# Patient Record
Sex: FEMALE | Race: BLACK OR AFRICAN AMERICAN | NOT HISPANIC OR LATINO | Employment: FULL TIME | URBAN - METROPOLITAN AREA
[De-identification: names, ages, dates, MRNs, and addresses within clinical notes are randomized per-mention and may not be internally consistent; named-entity substitution may affect disease eponyms.]

---

## 2017-01-28 ENCOUNTER — HOSPITAL ENCOUNTER (EMERGENCY)
Facility: HOSPITAL | Age: 47
Discharge: HOME | End: 2017-01-28
Attending: SURGERY
Payer: MEDICAID

## 2017-01-28 VITALS
DIASTOLIC BLOOD PRESSURE: 93 MMHG | HEART RATE: 84 BPM | RESPIRATION RATE: 18 BRPM | SYSTOLIC BLOOD PRESSURE: 168 MMHG | OXYGEN SATURATION: 98 % | WEIGHT: 200 LBS | TEMPERATURE: 98 F

## 2017-01-28 DIAGNOSIS — S62.609A FINGER FRACTURE, RIGHT, CLOSED, INITIAL ENCOUNTER: Primary | ICD-10-CM

## 2017-01-28 PROCEDURE — 99283 EMERGENCY DEPT VISIT LOW MDM: CPT

## 2017-01-28 PROCEDURE — 25000003 PHARM REV CODE 250: Performed by: SURGERY

## 2017-01-28 PROCEDURE — 26720 TREAT FINGER FRACTURE EACH: CPT | Mod: F9

## 2017-01-28 RX ORDER — IBUPROFEN 800 MG/1
800 TABLET ORAL
Status: COMPLETED | OUTPATIENT
Start: 2017-01-28 | End: 2017-01-28

## 2017-01-28 RX ORDER — IBUPROFEN 800 MG/1
800 TABLET ORAL EVERY 6 HOURS PRN
Qty: 20 TABLET | Refills: 0 | Status: SHIPPED | OUTPATIENT
Start: 2017-01-28 | End: 2017-05-31

## 2017-01-28 RX ADMIN — IBUPROFEN 800 MG: 800 TABLET ORAL at 06:01

## 2017-01-29 NOTE — ED PROVIDER NOTES
Encounter Date: 1/28/2017       History     Chief Complaint   Patient presents with    Hand Pain     rt 5th finger injury     Review of patient's allergies indicates:  No Known Allergies  HPI Aury Felix is a 46 y.o. female who fell through the air conditioning vent on the floor as she was   getting out of the shower.  She thought the air conditioning vent could hold her weight.  She jammed her right   fifth finger onto the floor.  She was getting ready to go to work.  She is supposed to work this evening.    She has limited range of motion of the right fifth finger due to swelling and ecchymosis.  It is also painful.    She also has a small abrasion on her right arm and right knee.  There is no bleeding.  Her tetanus is up-to-date.    She denies that she hit her head or loss of consciousness.  Denies nausea or vomiting.  Denies numbness  or tingling of extremity.  Past Medical History   Diagnosis Date    Hypertension      No past medical history pertinent negatives.  History reviewed. No pertinent past surgical history.  History reviewed. No pertinent family history.  Social History   Substance Use Topics    Smoking status: Never Smoker    Smokeless tobacco: Never Used    Alcohol use Yes     Review of Systems  -- Constitution - no fever, denies fatigue, no weakness, stable weight  -- Eyes - no tearing or redness, no change in vision, no double vision  -- Ear, Nose - no tinnitus or earache, no nasal congestion or discharge  -- Mouth,Throat - no sore throat, no toothache, denies dysphagia, normal swallowing  -- Respiratory - denies cough and sputum, no shortness of breath, no CARNES, no wheeze  -- Cardiovascular - denies chest pain, no palpitations, denies claudication. No orthopnea   -- Gastrointestinal - denies abdominal pain, nausea, vomiting, diarrhea, or constipation.   -- Genitourinary - denies flank pain and dysuria, also denies frequency or urgency  -- Musculoskeletal - Right 5th finger swelling and  tenderness.  Denies any othermuscle or joint pain, back pain.  -- Skin - denies rash or changes in skin, no hives or welts  -- Neurological - no headache, denies weakness or seizure; no loss of consciousness  -- Lymphatic- no lymphadenopathy or lymphedema noted by patient  -- Endocrine - no thirst, no excessive urination, no heat/cold intolerance.  -- Hematologic - denies abnormal bleeding or bruising, no petechiae  -- Allergic - no immunology issues, no shell fish allergies, no allergic rashes    Physical Exam   Initial Vitals   BP Pulse Resp Temp SpO2   01/28/17 1801 01/28/17 1801 01/28/17 1801 01/28/17 1801 01/28/17 1801   174/100 96 18 98 °F (36.7 °C) 98 %     Physical Exam    Vitals reviewed.  Constitutional: She appears well-developed and well-nourished. She is diaphoretic. No distress.   HENT:   Head: Normocephalic and atraumatic.   Mouth/Throat: Oropharynx is clear and moist.   Eyes: Conjunctivae and EOM are normal. No scleral icterus.   Neck: Normal range of motion. Neck supple. No thyromegaly present. No tracheal deviation present. No JVD present.   Cardiovascular: Normal rate, regular rhythm, normal heart sounds and intact distal pulses.   Pulmonary/Chest: Breath sounds normal. No stridor. No respiratory distress. She exhibits no tenderness.   Abdominal: She exhibits no distension. There is no tenderness. There is no rebound and no guarding.   Musculoskeletal: She exhibits edema and tenderness.   Mild edema with ecchymosis of the right fifth finger.  Tender to palpation.  There is decreased flexion and extension due to pain.  Sensory is intact.  All other extremities are within normal limits.   Lymphadenopathy:     She has no cervical adenopathy.   Neurological: She is alert and oriented to person, place, and time. She has normal strength. No cranial nerve deficit or sensory deficit.   Skin: Skin is warm.   There is a small superficial abrasion of the right forearm, volar surface.  It is not bleeding.  It  measures 4 mm x 10 mm.  There is a superficial abrasion on the right knee.  There is no bleeding.  It measures 2 x 3 cm.   Psychiatric: She has a normal mood and affect. Thought content normal.         ED Course   Procedures  Labs Reviewed - No data to display        Right fifth finger x-ray: Nondisplaced, closed fracture of the proximal head of the middle phalanx.      MDM: Right fifth finger fracture.  Findings discussed with the patient.    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  She needs orthopedics follow-up.   She has been given an excuse to be cleared by her orthopedist prior to returning to work.   Keep right hand elevated to the level of the heart.  No lifting or straining with the right hand.  Follow-up with orthopedics this week.              ED Course     Clinical Impression:   The encounter diagnosis was Finger fracture, right, closed, initial encounter.          Sharla Vann MD  01/28/17 2776

## 2017-01-29 NOTE — DISCHARGE INSTRUCTIONS
Finger Fracture, Closed  You have a broken finger (fracture). This causes local pain, swelling, and bruising. This injury takes about 4 to 6 weeks to heal. Finger injuries are often treated with a splint or cast, or by taping the injured finger to the next one (reba taping). This protects the injured finger and holds the bone in position while it heals. More serious fractures may need surgery.    If the fingernail has been severely injured, it will probably fall off in 1 to 2 weeks. A new fingernail will usually start to grow back within a month.  Home care  Follow these guidelines when caring for yourself at home:  · Keep your hand elevated to reduce pain and swelling. When sitting or lying down keep your arm above the level of your heart. You can do this by placing your arm on a pillow that rests on your chest or on a pillow at your side. This is most important during the first 2 days (48 hours) after the injury.  · Put an ice pack on the injured area. Do this for 20 minutes every 1 to 2 hours the first day for pain relief. You can make an ice pack by wrapping a plastic bag of ice cubes in a thin towel. As the ice melts, be careful that the cast or splint doesnt get wet. Continue using the ice pack 3 to 4 times a day until the pain and swelling go away.  · Keep the cast or splint completely dry at all times. Bathe with your cast or splint out of the water. Protect it with a large plastic bag, rubber-banded at the top end. If a fiberglass cast or splint gets wet, you can dry it with a hair dryer.  · If reba tape was put on and it becomes wet or dirty, change it. You may replace it with paper, plastic, or cloth tape. Cloth tape and paper tapes must be kept dry. Keep the reba tape in place for at least 4 weeks.  · You may use acetaminophen or ibuprofen to control pain, unless another pain medicine was prescribed. If you have chronic liver or kidney disease, talk with your health care provider before using these  medicines. Also talk with your provider if youve had a stomach ulcer or GI bleeding.  · Dont put creams or objects under the cast if you have itching.  Follow-up care  Follow up with your health care provider within 1 week, or as advised. This is to make sure the bone is healing the way it should.  If X-rays were taken, a radiologist will look at them. You will be told of any new findings that may affect your care.  When to seek medical advice  Call your health care provider right away if any of these occur:  · The plaster cast or splint becomes wet or soft  · The cast cracks  · The fiberglass cast or splint stays wet for more than 24 hours  · Pain or swelling gets worse  · Redness, warmth, swelling, drainage from the wound, or foul odor from a cast or splint  · Finger becomes more cold, blue, numb, or tingly  · You cant move your finger  · The skin around the cast becomes red  · Fever of 101ºF (38.3ºC) or higher, or as directed by your health care provider  © 7110-4146 [employer]. [address] [place] [address], [place] [address]. All rights reserved. This information is not intended as a substitute for professional medical care. Always follow your healthcare professional's instructions.

## 2017-01-30 ENCOUNTER — OFFICE VISIT (OUTPATIENT)
Dept: INTERNAL MEDICINE | Facility: CLINIC | Age: 47
End: 2017-01-30
Payer: MEDICAID

## 2017-01-30 VITALS
SYSTOLIC BLOOD PRESSURE: 132 MMHG | DIASTOLIC BLOOD PRESSURE: 80 MMHG | HEIGHT: 68 IN | WEIGHT: 220.44 LBS | RESPIRATION RATE: 18 BRPM | BODY MASS INDEX: 33.41 KG/M2 | HEART RATE: 113 BPM | OXYGEN SATURATION: 98 %

## 2017-01-30 DIAGNOSIS — S62.609D FINGER FRACTURE, RIGHT, WITH ROUTINE HEALING, SUBSEQUENT ENCOUNTER: Primary | ICD-10-CM

## 2017-01-30 PROCEDURE — 99214 OFFICE O/P EST MOD 30 MIN: CPT | Mod: S$PBB,,, | Performed by: NURSE PRACTITIONER

## 2017-01-30 PROCEDURE — 99213 OFFICE O/P EST LOW 20 MIN: CPT | Mod: PBBFAC | Performed by: NURSE PRACTITIONER

## 2017-01-30 PROCEDURE — 99999 PR PBB SHADOW E&M-EST. PATIENT-LVL III: CPT | Mod: PBBFAC,,, | Performed by: NURSE PRACTITIONER

## 2017-01-30 NOTE — PROGRESS NOTES
Subjective:       Patient ID: Aury Felix is a 46 y.o. female.    Chief Complaint: Hand Pain (fracture to finger)    HPI: Pt presents to clinic today new to me but usually seen by Yvette with c/o finger fracture. She reports that she stepped on vent in trailer and went through. She grabbed hold of anything she could when going down and ended up wit a finger fracture. She reports that she was seen in the ER and dx with fracture. Was put in a splint but took it off because it was hurting. Was suppose to f/u with Dr Mo but he doesn't accept her insurance so would like to be referred to todd.     Reviewed x ray  Review of Systems   Constitutional: Negative for chills, fatigue, fever and unexpected weight change.   HENT: Negative for congestion, ear pain, sore throat and trouble swallowing.    Eyes: Negative for pain and visual disturbance.   Respiratory: Negative for cough, chest tightness and shortness of breath.    Cardiovascular: Negative for chest pain, palpitations and leg swelling.   Gastrointestinal: Negative for abdominal distention, abdominal pain, constipation, diarrhea and vomiting.   Genitourinary: Negative for difficulty urinating, dysuria, flank pain, frequency and hematuria.   Musculoskeletal: Positive for arthralgias (left fifth finger swelling pain and bruising). Negative for back pain, gait problem, joint swelling, neck pain and neck stiffness.   Skin: Negative for rash and wound.   Neurological: Negative for dizziness, seizures, speech difficulty, light-headedness and headaches.       Objective:      Physical Exam   Constitutional: She is oriented to person, place, and time. She appears well-developed and well-nourished.   HENT:   Head: Normocephalic and atraumatic.   Right Ear: External ear normal.   Left Ear: External ear normal.   Nose: Nose normal.   Mouth/Throat: Oropharynx is clear and moist.   Eyes: Conjunctivae and EOM are normal. Pupils are equal, round, and reactive to light.    Neck: Normal range of motion. Neck supple.   Cardiovascular: Normal rate, regular rhythm, normal heart sounds and intact distal pulses.    Pulmonary/Chest: Effort normal and breath sounds normal.   Abdominal: Soft. Bowel sounds are normal.   Musculoskeletal: Normal range of motion. She exhibits edema and tenderness.        Arms:  Whole finger swollen and bruised. Pain around the PIP joint. Still moving joint   Neurological: She is alert and oriented to person, place, and time.   Skin: Skin is warm and dry.   Psychiatric: She has a normal mood and affect. Her behavior is normal. Judgment and thought content normal.   Nursing note and vitals reviewed.      Assessment:       1. Finger fracture, right, with routine healing, subsequent encounter        Plan:   Aury was seen today for hand pain.    Diagnoses and all orders for this visit:    Finger fracture, right, with routine healing, subsequent encounter  -     Ambulatory Referral to Orthopedics    Re splinted finger to the base of her knuckle and wrapped with gauze. I would like her to keep that PIP joint still till ortho can take a look at it. She verbalized understanding

## 2017-01-30 NOTE — MR AVS SNAPSHOT
[facility]  [address] [address]  Phone: [phone]  Fax: [phone]                  Aury Felix   2017 2:30 PM   Office Visit    Description:  Female : 1970   Provider:  Glenna Cota NP   Department:  [facility]           Reason for Visit     Hand Pain           Diagnoses this Visit        Comments    Finger fracture, right, with routine healing, subsequent encounter    -  Primary            To Do List           Future Appointments        Provider Department Dept Phone    2017 8:00 AM NURSE, [facility] [phone]    2017 9:00 AM Yvette Vargas NP [facility] [phone]      Goals (5 Years of Data)     None      [facility]     [facility] On Call Nurse Care Line -  Assistance  Registered nurses in the [facility] On Call Center provide clinical advisement, health education, appointment booking, and other advisory services.  Call for this free service at [phone].             Medications           Message regarding Medications     Verify the changes and/or additions to your medication regime listed below are the same as discussed with your clinician today.  If any of these changes or additions are incorrect, please notify your healthcare provider.             Verify that the below list of medications is an accurate representation of the medications you are currently taking.  If none reported, the list may be blank. If incorrect, please contact your healthcare provider. Carry this list with you in case of emergency.           Current Medications     aspirin 81 MG Chew Take 1 tablet (81 mg total) by mouth once daily.    fluticasone (FLONASE) 50 mcg/actuation nasal spray 2 sprays by Each Nare route once daily.    ibuprofen (ADVIL,MOTRIN) 800 MG tablet Take 1 tablet (800 mg total) by mouth every 6 (six) hours as needed for Pain.    lisinopril-hydrochlorothiazide  "(PRINZIDE,ZESTORETIC) 20-12.5 mg per tablet Take 1 tablet by mouth once daily.    potassium chloride (MICRO-K) 10 MEQ CpSR Take 1 capsule (10 mEq total) by mouth once daily.           Clinical Reference Information           Vital Signs - Last Recorded  Most recent update: 1/30/2017  2:32 PM by Rosa De La Cruz MA    BP Pulse Resp Ht Wt SpO2    132/80 (!) 113 18 5' 8" (1.727 m) 100 kg (220 lb 7.4 oz) 98%    BMI                33.52 kg/m2          Blood Pressure          Most Recent Value    BP  132/80      Allergies as of 1/30/2017     No Known Allergies      Immunizations Administered on Date of Encounter - 1/30/2017     None      Orders Placed During Today's Visit      Normal Orders This Visit    Ambulatory Referral to Orthopedics       "

## 2017-02-02 ENCOUNTER — OFFICE VISIT (OUTPATIENT)
Dept: INTERNAL MEDICINE | Facility: CLINIC | Age: 47
End: 2017-02-02
Payer: MEDICAID

## 2017-02-02 VITALS
WEIGHT: 220.44 LBS | HEIGHT: 68 IN | SYSTOLIC BLOOD PRESSURE: 140 MMHG | RESPIRATION RATE: 18 BRPM | HEART RATE: 80 BPM | DIASTOLIC BLOOD PRESSURE: 100 MMHG | BODY MASS INDEX: 33.41 KG/M2

## 2017-02-02 DIAGNOSIS — Z09 FOLLOW UP: Primary | ICD-10-CM

## 2017-02-02 DIAGNOSIS — Z79.1 ENCOUNTER FOR LONG-TERM (CURRENT) USE OF NSAIDS: ICD-10-CM

## 2017-02-02 DIAGNOSIS — S62.609D FINGER FRACTURE, RIGHT, WITH ROUTINE HEALING, SUBSEQUENT ENCOUNTER: ICD-10-CM

## 2017-02-02 DIAGNOSIS — I10 ESSENTIAL HYPERTENSION: ICD-10-CM

## 2017-02-02 PROCEDURE — 99999 PR PBB SHADOW E&M-EST. PATIENT-LVL III: CPT | Mod: PBBFAC,,, | Performed by: NURSE PRACTITIONER

## 2017-02-02 PROCEDURE — 99213 OFFICE O/P EST LOW 20 MIN: CPT | Mod: PBBFAC | Performed by: NURSE PRACTITIONER

## 2017-02-02 PROCEDURE — 99214 OFFICE O/P EST MOD 30 MIN: CPT | Mod: S$PBB,,, | Performed by: NURSE PRACTITIONER

## 2017-02-02 NOTE — PROGRESS NOTES
Subjective:       Patient ID: Aury Felix is a 46 y.o. female.    Chief Complaint: Follow-up    HPI: pt known to me presents for f/up. Reports doing well with finger. Ready to go back to work. Looked over imaging. Reports doing full duties at home.     Also reports taking nsaids. Does not monitor bp at home.     Review of Systems   Constitutional: Negative for chills, diaphoresis, fatigue and fever.   Eyes: Negative for visual disturbance.   Respiratory: Negative for cough, shortness of breath and wheezing.    Cardiovascular: Negative for chest pain.   Gastrointestinal: Negative for abdominal distention, abdominal pain, constipation, diarrhea, nausea and vomiting.   Musculoskeletal: Positive for arthralgias. Negative for back pain and myalgias.   Skin: Positive for color change.   Neurological: Negative for headaches.   Psychiatric/Behavioral: Negative for sleep disturbance.       Objective:      Physical Exam   Constitutional: She is oriented to person, place, and time. She appears well-developed and well-nourished.   HENT:   Head: Normocephalic and atraumatic.   Right Ear: External ear normal.   Left Ear: External ear normal.   Nose: Nose normal.   Mouth/Throat: Oropharynx is clear and moist.   Eyes: Conjunctivae and EOM are normal. Pupils are equal, round, and reactive to light.   Neck: Normal range of motion. Neck supple.   Cardiovascular: Normal rate, regular rhythm, normal heart sounds and intact distal pulses.    Pulmonary/Chest: Effort normal and breath sounds normal.   Abdominal: Soft. Bowel sounds are normal. There is no tenderness.   Musculoskeletal: Normal range of motion. She exhibits edema and tenderness.        Arms:  Whole finger swollen and bruised. Pain around the PIP joint. 5th digit with 70 % rom. Minimal tenderness noted.    Neurological: She is alert and oriented to person, place, and time.   Skin: Skin is warm and dry.   Psychiatric: She has a normal mood and affect. Her behavior is  normal. Judgment and thought content normal.   Nursing note and vitals reviewed.      Assessment:       1. Follow up    2. Finger fracture, right, with routine healing, subsequent encounter    3. Essential hypertension    4. Encounter for long-term (current) use of NSAIDs        Plan:   1. Follow up      2. Finger fracture, right, with routine healing, subsequent encounter  Ok to go back to work. Will be months until able to get into [employer]. Advised to listen to her body and limit use of finger. Not scheduled to go back for another week. Would like to go kirt on the 9th. I feel this is fine.     3. Essential hypertension  bp monitoring, d/c nsaids. F/u in 2 weeks with bp log.     4. Encounter for long-term (current) use of NSAIDs  See above.

## 2017-02-02 NOTE — MR AVS SNAPSHOT
[facility]  [address] [address]  Phone: [phone]  Fax: [phone]                  Aury Felix   2017 1:30 PM   Office Visit    Description:  Female : 1970   Provider:  Yvette Vargas NP   Department:  [facility]           Reason for Visit     Follow-up           Diagnoses this Visit        Comments    Follow up    -  Primary     Finger fracture, right, with routine healing, subsequent encounter         Essential hypertension         Encounter for long-term (current) use of NSAIDs                To Do List           Future Appointments        Provider Department Dept Phone    2017 8:00 AM NURSE [facility] [phone]    2017 8:00 AM NURSE, [facility] [phone]    2017 9:00 AM Yvette Vargas NP [facility] [phone]      Goals (5 Years of Data)     None      Follow-Up and Disposition     Return in about 6 days (around 2017), or if symptoms worsen or fail to improve.    Follow-up and Disposition History      [facility]     [facility] On Call Nurse Care Line -  Assistance  Registered nurses in the [facility] On Call Center provide clinical advisement, health education, appointment booking, and other advisory services.  Call for this free service at [phone].             Medications           Message regarding Medications     Verify the changes and/or additions to your medication regime listed below are the same as discussed with your clinician today.  If any of these changes or additions are incorrect, please notify your healthcare provider.             Verify that the below list of medications is an accurate representation of the medications you are currently taking.  If none reported, the list may be blank. If incorrect, please contact your healthcare provider. Carry this list with you in case of emergency.          "  Current Medications     aspirin 81 MG Chew Take 1 tablet (81 mg total) by mouth once daily.    fluticasone (FLONASE) 50 mcg/actuation nasal spray 2 sprays by Each Nare route once daily.    ibuprofen (ADVIL,MOTRIN) 800 MG tablet Take 1 tablet (800 mg total) by mouth every 6 (six) hours as needed for Pain.    lisinopril-hydrochlorothiazide (PRINZIDE,ZESTORETIC) 20-12.5 mg per tablet Take 1 tablet by mouth once daily.    potassium chloride (MICRO-K) 10 MEQ CpSR Take 1 capsule (10 mEq total) by mouth once daily.           Clinical Reference Information           Your Vitals Were     BP Pulse Resp Height Weight BMI    140/100 80 18 5' 8" (1.727 m) 100 kg (220 lb 7.4 oz) 33.52 kg/m2      Blood Pressure          Most Recent Value    BP  (!)  140/100      Allergies as of 2/2/2017     No Known Allergies      Immunizations Administered on Date of Encounter - 2/2/2017     None      Language Assistance Services     ATTENTION: Language assistance services are available, free of charge. Please call [phone].      ATENCIÓN: Si winter hoyos, tiene a michelle disposición servicios gratuitos de asistencia lingüística. Llame al [phone].     SORAYA Ý: N?u b?n nói Ti?ng Vi?t, có các d?ch v? h? tr? ngôn ng? mi?n phí dành cho b?n. G?i s? [phone].         [facility] complies with applicable Federal civil rights laws and does not discriminate on the basis of race, color, national origin, age, disability, or sex.        "

## 2017-02-08 ENCOUNTER — TELEPHONE (OUTPATIENT)
Dept: INTERNAL MEDICINE | Facility: CLINIC | Age: 47
End: 2017-02-08

## 2017-02-23 ENCOUNTER — TELEPHONE (OUTPATIENT)
Dept: INTERNAL MEDICINE | Facility: CLINIC | Age: 47
End: 2017-02-23

## 2017-02-23 ENCOUNTER — CLINICAL SUPPORT (OUTPATIENT)
Dept: INTERNAL MEDICINE | Facility: CLINIC | Age: 47
End: 2017-02-23
Payer: MEDICAID

## 2017-02-23 VITALS — SYSTOLIC BLOOD PRESSURE: 120 MMHG | DIASTOLIC BLOOD PRESSURE: 90 MMHG

## 2017-02-23 DIAGNOSIS — I10 ESSENTIAL HYPERTENSION: Primary | ICD-10-CM

## 2017-02-23 RX ORDER — AMLODIPINE BESYLATE 2.5 MG/1
2.5 TABLET ORAL DAILY
Qty: 30 TABLET | Refills: 11 | Status: SHIPPED | OUTPATIENT
Start: 2017-02-23 | End: 2017-05-31 | Stop reason: SDUPTHER

## 2017-05-16 ENCOUNTER — PATIENT OUTREACH (OUTPATIENT)
Dept: ADMINISTRATIVE | Facility: HOSPITAL | Age: 47
End: 2017-05-16

## 2017-05-16 NOTE — LETTER
May 16, 2017    Aury Felix  [address]  [place] [address]             [facility]  [address]  [facility] [address]  Phone: [phone] Dear Ms. Felix:    [facility] is committed to your overall health.  To help you get the most out of each of your visits, we will review your information to make sure you are up to date on all of your recommended tests and/or procedures.      Yvette Vargas NP has found that you may be due for a tetanus vaccine and a pap smear.     If you have had any of the above done at another facility, please bring the records or information with you so that your record at [facility] will be complete.  If you would like to schedule any of these, please contact me.    If you are currently taking medication, please bring it with you to your appointment for review.    If you have any questions or concerns, please don't hesitate to call.    Sincerely,        Haley De León LPN Clinical Care Coordinator  [facility]  [phone]

## 2017-05-31 ENCOUNTER — OFFICE VISIT (OUTPATIENT)
Dept: INTERNAL MEDICINE | Facility: CLINIC | Age: 47
End: 2017-05-31
Payer: MEDICAID

## 2017-05-31 VITALS
HEIGHT: 67 IN | SYSTOLIC BLOOD PRESSURE: 132 MMHG | BODY MASS INDEX: 34.91 KG/M2 | HEART RATE: 91 BPM | WEIGHT: 222.44 LBS | DIASTOLIC BLOOD PRESSURE: 98 MMHG | RESPIRATION RATE: 18 BRPM

## 2017-05-31 DIAGNOSIS — Z23 IMMUNIZATION DUE: ICD-10-CM

## 2017-05-31 DIAGNOSIS — I10 ESSENTIAL HYPERTENSION: ICD-10-CM

## 2017-05-31 DIAGNOSIS — Z13.6 SCREENING FOR CARDIOVASCULAR CONDITION: Primary | ICD-10-CM

## 2017-05-31 DIAGNOSIS — Z13.29 SCREENING FOR THYROID DISORDER: ICD-10-CM

## 2017-05-31 PROCEDURE — 99999 PR PBB SHADOW E&M-EST. PATIENT-LVL III: CPT | Mod: PBBFAC,,, | Performed by: NURSE PRACTITIONER

## 2017-05-31 PROCEDURE — 99213 OFFICE O/P EST LOW 20 MIN: CPT | Mod: PBBFAC,25 | Performed by: NURSE PRACTITIONER

## 2017-05-31 PROCEDURE — 90471 IMMUNIZATION ADMIN: CPT | Mod: PBBFAC

## 2017-05-31 PROCEDURE — 90715 TDAP VACCINE 7 YRS/> IM: CPT | Mod: PBBFAC

## 2017-05-31 PROCEDURE — 99213 OFFICE O/P EST LOW 20 MIN: CPT | Mod: S$PBB,,, | Performed by: NURSE PRACTITIONER

## 2017-05-31 RX ORDER — AMLODIPINE BESYLATE 2.5 MG/1
5 TABLET ORAL DAILY
Qty: 30 TABLET | Refills: 11
Start: 2017-05-31 | End: 2017-06-15 | Stop reason: SDUPTHER

## 2017-05-31 RX ORDER — LISINOPRIL AND HYDROCHLOROTHIAZIDE 10; 12.5 MG/1; MG/1
1 TABLET ORAL DAILY
COMMUNITY
End: 2017-06-15 | Stop reason: SDUPTHER

## 2017-05-31 NOTE — PROGRESS NOTES
Subjective:       Patient ID: Aury Felix is a 46 y.o. female.    Chief Complaint: 6 month checkup    HPI: pt known to me presents for 6 month check up. Admits to bp being high at home and at work. Didn't get blood work done. Has been feeling fine. Never got call from ortho concerning her left wrist gang cyst and right finger fracture.     Review of Systems   Constitutional: Negative for chills, diaphoresis, fatigue and fever.   Respiratory: Negative for cough, shortness of breath and wheezing.    Cardiovascular: Negative for chest pain and leg swelling.   Gastrointestinal: Negative for abdominal distention, abdominal pain, constipation, diarrhea, nausea and vomiting.   Musculoskeletal: Negative for arthralgias, back pain and myalgias.        See HPI   Neurological: Negative for headaches.   Psychiatric/Behavioral: Negative for sleep disturbance.       Objective:      Physical Exam   Constitutional: She is oriented to person, place, and time. She appears well-developed and well-nourished.   HENT:   Head: Normocephalic and atraumatic.   Cardiovascular: Normal rate, regular rhythm and normal heart sounds.    Pulmonary/Chest: Effort normal and breath sounds normal.   Abdominal: Soft. Bowel sounds are normal. There is no tenderness.   Musculoskeletal: She exhibits no edema.   Large ganglion cyst noted to left dorsal side of the wrist, quarter sized.    Neurological: She is alert and oriented to person, place, and time.   Skin: Skin is warm and dry.   Psychiatric: She has a normal mood and affect. Her behavior is normal. Judgment and thought content normal.   Nursing note and vitals reviewed.      Assessment:       1. Screening for cardiovascular condition    2. Essential hypertension    3. Screening for thyroid disorder    4. Immunization due        Plan:   1. Essential hypertension  bp not well controlled, mostly diastolic htn. Will increase norvasc x 2 week and f/up. Discussed to watch for leg swelling.   -  amlodipine (NORVASC) 2.5 MG tablet; Take 2 tablets (5 mg total) by mouth once daily.  Dispense: 30 tablet; Refill: 11    2. Screening for cardiovascular condition  3. Screening for thyroid disorder  Labs ordered.     4. Immunization due  tdap today, has grandchildren.

## 2017-06-13 ENCOUNTER — LAB VISIT (OUTPATIENT)
Dept: LAB | Facility: HOSPITAL | Age: 47
End: 2017-06-13
Attending: NURSE PRACTITIONER
Payer: MEDICAID

## 2017-06-13 DIAGNOSIS — I10 ESSENTIAL HYPERTENSION: ICD-10-CM

## 2017-06-13 DIAGNOSIS — R73.9 ELEVATED BLOOD SUGAR: ICD-10-CM

## 2017-06-13 LAB
ALBUMIN SERPL BCP-MCNC: 3.2 G/DL
ALP SERPL-CCNC: 82 U/L
ALT SERPL W/O P-5'-P-CCNC: 33 U/L
ANION GAP SERPL CALC-SCNC: 8 MMOL/L
AST SERPL-CCNC: 19 U/L
BASOPHILS NFR BLD: 0 %
BILIRUB SERPL-MCNC: 0.3 MG/DL
BUN SERPL-MCNC: 10 MG/DL
CALCIUM SERPL-MCNC: 9.2 MG/DL
CHLORIDE SERPL-SCNC: 107 MMOL/L
CHOLEST/HDLC SERPL: 4.5 {RATIO}
CO2 SERPL-SCNC: 26 MMOL/L
CREAT SERPL-MCNC: 0.8 MG/DL
DIFFERENTIAL METHOD: ABNORMAL
EOSINOPHIL NFR BLD: 1 %
ERYTHROCYTE [DISTWIDTH] IN BLOOD BY AUTOMATED COUNT: 15.5 %
EST. GFR  (AFRICAN AMERICAN): >60 ML/MIN/1.73 M^2
EST. GFR  (NON AFRICAN AMERICAN): >60 ML/MIN/1.73 M^2
GLUCOSE SERPL-MCNC: 179 MG/DL
HCT VFR BLD AUTO: 37.9 %
HDL/CHOLESTEROL RATIO: 22.2 %
HDLC SERPL-MCNC: 212 MG/DL
HDLC SERPL-MCNC: 47 MG/DL
HGB BLD-MCNC: 12.9 G/DL
LDLC SERPL CALC-MCNC: 134.2 MG/DL
LYMPHOCYTES NFR BLD: 54 %
MCH RBC QN AUTO: 26.3 PG
MCHC RBC AUTO-ENTMCNC: 34 %
MCV RBC AUTO: 77 FL
MONOCYTES NFR BLD: 2 %
NEUTROPHILS NFR BLD: 43 %
NONHDLC SERPL-MCNC: 165 MG/DL
PLATELET # BLD AUTO: 422 K/UL
PMV BLD AUTO: 10.7 FL
POTASSIUM SERPL-SCNC: 4 MMOL/L
PROT SERPL-MCNC: 8.1 G/DL
RBC # BLD AUTO: 4.9 M/UL
SODIUM SERPL-SCNC: 141 MMOL/L
TRIGL SERPL-MCNC: 154 MG/DL
WBC # BLD AUTO: 10.67 K/UL

## 2017-06-13 PROCEDURE — 80061 LIPID PANEL: CPT

## 2017-06-13 PROCEDURE — 36415 COLL VENOUS BLD VENIPUNCTURE: CPT

## 2017-06-13 PROCEDURE — 85025 COMPLETE CBC W/AUTO DIFF WBC: CPT

## 2017-06-13 PROCEDURE — 83036 HEMOGLOBIN GLYCOSYLATED A1C: CPT

## 2017-06-13 PROCEDURE — 80053 COMPREHEN METABOLIC PANEL: CPT

## 2017-06-14 ENCOUNTER — TELEPHONE (OUTPATIENT)
Dept: INTERNAL MEDICINE | Facility: CLINIC | Age: 47
End: 2017-06-14

## 2017-06-14 LAB
ESTIMATED AVG GLUCOSE: 134 MG/DL
HBA1C MFR BLD HPLC: 6.3 %

## 2017-06-15 ENCOUNTER — OFFICE VISIT (OUTPATIENT)
Dept: INTERNAL MEDICINE | Facility: CLINIC | Age: 47
End: 2017-06-15
Payer: MEDICAID

## 2017-06-15 VITALS
WEIGHT: 221.81 LBS | HEART RATE: 111 BPM | HEIGHT: 66 IN | SYSTOLIC BLOOD PRESSURE: 128 MMHG | RESPIRATION RATE: 18 BRPM | DIASTOLIC BLOOD PRESSURE: 99 MMHG | BODY MASS INDEX: 35.65 KG/M2

## 2017-06-15 DIAGNOSIS — R73.01 ELEVATED FASTING BLOOD SUGAR: ICD-10-CM

## 2017-06-15 DIAGNOSIS — E78.5 HYPERLIPIDEMIA, UNSPECIFIED HYPERLIPIDEMIA TYPE: ICD-10-CM

## 2017-06-15 DIAGNOSIS — R79.89 ELEVATED PLATELET COUNT: ICD-10-CM

## 2017-06-15 DIAGNOSIS — I10 ESSENTIAL HYPERTENSION: Primary | ICD-10-CM

## 2017-06-15 PROCEDURE — 99213 OFFICE O/P EST LOW 20 MIN: CPT | Mod: S$PBB,,, | Performed by: NURSE PRACTITIONER

## 2017-06-15 PROCEDURE — 99213 OFFICE O/P EST LOW 20 MIN: CPT | Mod: PBBFAC | Performed by: NURSE PRACTITIONER

## 2017-06-15 PROCEDURE — 99999 PR PBB SHADOW E&M-EST. PATIENT-LVL III: CPT | Mod: PBBFAC,,, | Performed by: NURSE PRACTITIONER

## 2017-06-15 RX ORDER — METFORMIN HYDROCHLORIDE 500 MG/1
500 TABLET, EXTENDED RELEASE ORAL
Qty: 90 TABLET | Refills: 3 | Status: SHIPPED | OUTPATIENT
Start: 2017-06-15 | End: 2021-11-10

## 2017-06-15 RX ORDER — ASPIRIN 81 MG/1
81 TABLET ORAL DAILY
Refills: 0 | COMMUNITY
Start: 2017-06-15 | End: 2018-06-15

## 2017-06-15 RX ORDER — LISINOPRIL AND HYDROCHLOROTHIAZIDE 10; 12.5 MG/1; MG/1
1 TABLET ORAL DAILY
Qty: 30 TABLET | Refills: 5 | Status: SHIPPED | OUTPATIENT
Start: 2017-06-15 | End: 2021-11-10

## 2017-06-15 RX ORDER — AMLODIPINE BESYLATE 5 MG/1
5 TABLET ORAL DAILY
Qty: 30 TABLET | Refills: 5 | Status: SHIPPED | OUTPATIENT
Start: 2017-06-15 | End: 2021-11-10

## 2017-06-15 NOTE — PATIENT INSTRUCTIONS
Diet: Diabetes  Food is an important tool that you can use to control diabetes and stay healthy. Eating well-balanced meals in the correct amounts will help you control your blood glucose levels and prevent low blood sugar reactions. It will also help you reduce the health risks of diabetes. There is no one specific diet that is right for everyone with diabetes. But there are general guidelines to follow. A registered dietitian (RD) will create a tailored diet approach thats just right for you. He or she will also help you plan healthy meals and snacks. If you have any questions, call your dietitian for advice.    Guidelines for success  Talk with your healthcare provider before starting a diabetes diet or weight loss program. If you haven't talked with a dietitian yet, ask your provider for a referral. The following guidelines can help you succeed:  · Select foods from the 6 food groups below. Your dietitian will help you find food choices within each group. He or she will also show you serving sizes and how many servings you can have at each meal.  ¨ Grains, beans, and starchy vegetables  ¨ Vegetables  ¨ Fruit  ¨ Milk or yogurt  ¨ Meat, poultry, fish, or tofu  ¨ Healthy fats  · Check your blood sugar levels as directed by your provider. Take any medicine as prescribed by your provider.  · Learn to read food labels and pick the right portion sizes.  · Eat only the amount of food in your meal plan. Eat about the same amount of food at regular times each day. Dont skip meals. Eat meals 4 to 5 hours apart, with snacks in between.  · Limit alcohol. It raises blood sugar levels. Drink water or calorie-free diet drinks that use safe sweeteners.  · Eat less fat to help lower your risk of heart disease. Use nonfat or low-fat dairy products and lean meats. Avoid fried foods. Use cooking oils that are unsaturated, such as olive, canola, or peanut oil.  · Talk with your dietitian about safe sugar substitutes.  · Avoid  added salt. It can contribute to high blood pressure, which can cause heart disease. People with diabetes already have a risk of high blood pressure and heart disease.  · Stay at a healthy weight. If you need to lose weight, cut down on your portion sizes. But dont skip meals. Exercise is an important part of any weight management program. Talk with your provider about an exercise program thats right for you.  · For more information about the best diet plan for you, talk with a registered dietitian (RD). To find an RD in your area, contact:  ¨ Academy of Nutrition and Dietetics www.eatright.org  ¨ The American Diabetes Association [phone] www.diabetes.org  Date Last Reviewed: 8/1/2016 © 2000-2016 [employer]. [address] [place] [address], [place] [address]. All rights reserved. This information is not intended as a substitute for professional medical care. Always follow your healthcare professional's instructions.        Healthy Meals for Diabetes     A healthcare provider will help you develop a meal plan that fits your needs.   Ask your healthcare team to help you make a meal plan that fits your needs. Your meal plan tells you when to eat your meals and snacks, what kinds of foods to eat, and how much of each food to eat. You dont have to give up all the foods you like. But you do need to follow some guidelines.  Choose healthy carbohydrates  Starches, sugars, and fiber are all types of carbohydrates. Fiber can help lower your cholesterol and triglycerides. Fiber is also healthy for your heart. You should have 20 to 35 grams of total fiber each day. Fiber-rich foods include:  · Whole-grain breads and cereals  · Bulgur wheat  · Brown rice     · Whole-wheat pasta  · Fruits and vegetables  · Dry beans, and peas   Keep track of the amount of carbohydrates you eat. This can help you keep the right balance of physical activity and medicine. The amount of carbohydrates needed will vary for each person.  It depends on many things such as your health, the medicines you take, and how active you are. Your healthcare team will help you figure out the right amount of carbohydrates for you. You may start with around 45 to 60 grams of carbohydrates per meal, depending on your situation.   Here are some examples of foods containing about 15 grams of carbohydrates (1 serving of carbohydrates):  · 1/2 cup of canned or frozen fruit  · A small piece of fresh fruit (4 ounces)  · 1 slice of bread  · 1/2 cup of oatmeal  · 1/3 cup of rice  · 4 to 6 crackers  · 1/2 English muffin  · 1/2 cup of black beans  · 1/4 of a large baked potato (3 ounces)  · 2/3 cup of plain fat-free yogurt  · 1 cup of soup  · 1/2 cup of casserole  · 6 chicken nuggets  · 2-inch-square brownie or cake without frosting  · 2 small cookies  · 1/2 cup of ice cream or sherbet  Choose healthy protein foods  Eating protein that is low in fat can help you control your weight. It also helps keep your heart healthy. Low-fat protein foods include:  · Fish  · Plant proteins, such as dry beans and peas, nuts, and soy products like tofu and soymilk  · Lean meat with all visible fat removed  · Poultry with the skin removed  · Low-fat or nonfat milk, cheese, and yogurt  Limit unhealthy fats and sugar  Saturated and trans fats are unhealthy for your heart. They raise LDL (bad) cholesterol. Fat is also high in calories, so it can make you gain weight. To cut down on unhealthy fats and sugar, limit these foods:  · Butter or margarine  · Palm and palm kernel oils and coconut oil  · Cream  · Cheese  · Tapia  · Lunch meats     · Ice cream  · Sweet bakery goods such as pies, muffins, and donuts  · Jams and jellies  · Candy bars  · Regular sodas   How much to eat  The amount of food you eat affects your blood sugar. It also affects your weight. Your healthcare team will tell you how much of each type of food you should eat.  · Use measuring cups and spoons and a food scale to  measure serving sizes.  · Learn what a correct serving size looks like on your plate. This will help when you are away from home and cant measure your servings.  · Eat only the number of servings given on your meal plan for each food. Dont take seconds.  · Learn to read food labels. Be sure to look at serving size, total carbohydrates, fiber, calories, sugar, and saturated and trans fats. Look for healthier alternatives to foods that have added sugar.  · Plan ahead for parties so you can still have a good time without going overboard with unhealthy food choices. Set a good example yourself by bringing a healthy dish to pot lucks.   Choose healthy snacks  When it comes to snacks, we usually think about foods with added sugar and fats. But there are many other options for healthier snack choices. Here are a few snack ideas to choose from:  Snacks with less than 5 grams of carbohydrates  · 1 piece of string cheese  · 3 celery sticks plus 1 tablespoon of peanut butter  · 5 cherry tomatoes plus 1 tablespoon of ranch dressing  · 1 hard-boiled egg  · 1/4 cup of fresh blueberries  ·  5 baby carrots  · 1 cup of light popcorn  · 1/2 cup of sugar-free gelatin  · 15 almonds  Snacks with about 10 to 20 grams of carbohydrates  · 1/3 cup of hummus plus 1 cup of fresh cut nonstarchy vegetables (carrots, green peppers, broccoli, celery, or a combination)  · 1/2 cup of fresh or canned fruit plus 1/4 cup of cottage cheese  · 1/2 cup of tuna salad with 4 crackers  · 2 rice cakes and a tablespoon of peanut butter  · 1 small apple or orange  · 3 cups light popcorn  · 1/2 of a turkey sandwich (1 slice of whole-wheat bread, 2 ounces of turkey, and mustard)  Portion sizes are important to controlling your blood sugar and staying at a healthy weight. Stock up on healthy snack items so you always have them on hand.  When to eat  Your meal plan will likely include breakfast, lunch, dinner, and some snacks.  · Try to eat your meals and snacks  at about the same times each day.  · Eat all your meals and snacks. Skipping a meal or snack can make your blood sugar drop too low. It can also cause you to eat too much at the next meal or snack. Then your blood sugar could get too high.  Date Last Reviewed: 7/1/2016  © 4341-4092 [employer]. [address] [place] [address], [place] [address]. All rights reserved. This information is not intended as a substitute for professional medical care. Always follow your healthcare professional's instructions.        Diabetes: Meal Planning    You can help keep your blood sugar level in your target range by eating healthy foods. Your healthcare team can help you create a low-fat, nutritious meal plan. Take an active role in your diabetes management by following your meal plan and working with your healthcare team.  Make your meal plan  A meal plan gives guidelines for the types and amounts of food you should eat. The goal is to balance food and insulin (or other diabetes medications) so your blood sugars will be in your target range. Your dietitian will help you make a flexible meal plan that includes many foods that you like.  Watch serving sizes  Your meal plan will group foods by servings. To learn how much a serving is, start by measuring food portions at each meal. Soon youll know what a serving looks like on your plate. Ask your healthcare provider about how to balance servings of different foods.  Eat from all the food groups  The basis of a healthy meal plan is variety (eating lots of different foods). Choose lean meats, fresh fruits and vegetables, whole grains, and low-fat or nonfat dairy products. Eating a wide variety of foods provides the nutrients your body needs. It can also keep you from getting bored with your meal plan.  Learn about carbohydrates, fats, and protein  · Carbohydrates are starches, sugars, and fiber. They are found in many foods, including fruit, bread, pasta, milk, and sweets. Of all  the foods you eat, carbohydrates have the most effect on your blood sugar. Your dietitian may teach you about carb counting, a way to figure out the number of carbohydrates in a meal.  · Fats have the most calories. They also have the most effect on your weight and your risk of heart disease. When you have diabetes, its important to control your weight and protect your heart. Foods that are high in fat include whole milk, cheese, snack foods, and desserts.  · Protein is important for building and repairing muscles and bones. Choose low-fat protein sources, such as fish, egg whites, and skinless chicken.  Reduce liquid sugars  Extra calories from sodas, sports drinks, and fruit drinks make it hard to keep blood sugar in range. Cut as many liquid sugars from your meal plan as you can.  This includes most fruit juices, which are often high in natural or added sugar. Instead, drink plenty of water and other sugar-free beverages.  Eat less fat  If you need to lose weight, try to reduce the amount of fat in your diet. This can also help lower your cholesterol level to keep blood vessels healthier. Cut fat by using only small amounts of liquid oil for cooking. Read food labels carefully to avoid foods with unhealthy trans fats.  Timing your meals  When it comes to blood sugar control, when you eat is as important as what you eat. You may need to eat several small meals spaced evenly throughout the day to stay in your target range. So dont skip breakfast or wait until late in the day to get most of your calories. Doing so can cause your blood sugar to rise too high or fall too low.   Date Last Reviewed: 3/1/2016  © 0861-8008 [employer]. [address] [place] [address], [place] [address]. All rights reserved. This information is not intended as a substitute for professional medical care. Always follow your healthcare professional's instructions.

## 2017-06-15 NOTE — PROGRESS NOTES
Subjective:       Patient ID: Aury Felix is a 46 y.o. female.    Chief Complaint: Follow-up (2 week)    HPI: pt known to me presents for 2 week f/up. Reports at home bp has been from 130-140's/70-90's. Reports no issues with meds.     Lab Results   Component Value Date    WBC 10.67 06/13/2017    HGB 12.9 06/13/2017    HCT 37.9 06/13/2017     (H) 06/13/2017    CHOL 212 (H) 06/13/2017    TRIG 154 (H) 06/13/2017    HDL 47 06/13/2017    ALT 33 06/13/2017    AST 19 06/13/2017     06/13/2017    K 4.0 06/13/2017     06/13/2017    CREATININE 0.8 06/13/2017    BUN 10 06/13/2017    CO2 26 06/13/2017    TSH 1.286 11/04/2016    HGBA1C 6.3 (H) 06/13/2017         Review of Systems   Constitutional: Negative for chills, diaphoresis, fatigue and fever.   Respiratory: Negative for cough, shortness of breath and wheezing.    Cardiovascular: Negative for chest pain.   Gastrointestinal: Negative for abdominal distention, abdominal pain, constipation, diarrhea, nausea and vomiting.   Endocrine: Positive for polyuria. Negative for polydipsia and polyphagia.   Genitourinary: Negative for difficulty urinating, frequency and hematuria.   Musculoskeletal: Negative for arthralgias, back pain and myalgias.   Neurological: Negative for headaches.   Psychiatric/Behavioral: Negative for sleep disturbance.       Objective:      Physical Exam   Constitutional: She is oriented to person, place, and time. She appears well-developed and well-nourished.   HENT:   Head: Normocephalic and atraumatic.   Cardiovascular: Normal rate, regular rhythm and normal heart sounds.    Pulmonary/Chest: Effort normal and breath sounds normal.   Abdominal: Soft. Bowel sounds are normal. There is no tenderness.   Musculoskeletal: She exhibits no edema.   Neurological: She is alert and oriented to person, place, and time.   Skin: Skin is warm and dry.   Psychiatric: She has a normal mood and affect. Her behavior is normal. Judgment and thought  content normal.   Nursing note and vitals reviewed.      Assessment:       1. Essential hypertension    2. Elevated platelet count    3. Hyperlipidemia, unspecified hyperlipidemia type    4. Elevated fasting blood sugar        Plan:   1. Essential hypertension  Still not well controlled. Hadn't started 5 mg, will send in and f/u in 1 month.   - amlodipine (NORVASC) 5 MG tablet; Take 1 tablet (5 mg total) by mouth once daily.  Dispense: 30 tablet; Refill: 5  - lisinopril-hydrochlorothiazide (PRINZIDE,ZESTORETIC) 10-12.5 mg per tablet; Take 1 tablet by mouth once daily.  Dispense: 30 tablet; Refill: 5    2. Elevated platelet count  Start baby asa    3. Hyperlipidemia, unspecified hyperlipidemia type  Advised on diet and exercise, need to increase hdl. Increase hdl with foods high in Omega 3 fatty oils such as almonds, walnuts, cold fish (salmon, tuna). Increased exercise as well as weight loss will also increase your HDL.     4. Elevated fasting blood sugar  Extensive counseling done.   - metformin (GLUCOPHAGE-XR) 500 MG 24 hr tablet; Take 1 tablet (500 mg total) by mouth daily with breakfast.  Dispense: 90 tablet; Refill: 3  - Hemoglobin A1c; Future

## 2017-12-12 ENCOUNTER — HOSPITAL ENCOUNTER (EMERGENCY)
Facility: HOSPITAL | Age: 47
Discharge: HOME | End: 2017-12-12
Attending: SURGERY

## 2017-12-12 VITALS
DIASTOLIC BLOOD PRESSURE: 107 MMHG | TEMPERATURE: 99 F | SYSTOLIC BLOOD PRESSURE: 168 MMHG | HEART RATE: 111 BPM | BODY MASS INDEX: 33.75 KG/M2 | OXYGEN SATURATION: 98 % | WEIGHT: 222 LBS | RESPIRATION RATE: 21 BRPM

## 2017-12-12 DIAGNOSIS — J00 ACUTE NASOPHARYNGITIS: Primary | ICD-10-CM

## 2017-12-12 PROCEDURE — 99284 EMERGENCY DEPT VISIT MOD MDM: CPT | Mod: 25

## 2017-12-12 PROCEDURE — 96372 THER/PROPH/DIAG INJ SC/IM: CPT

## 2017-12-12 PROCEDURE — 63600175 PHARM REV CODE 636 W HCPCS: Performed by: SURGERY

## 2017-12-12 RX ORDER — METHYLPREDNISOLONE 4 MG/1
TABLET ORAL
Qty: 1 PACKAGE | Refills: 0 | Status: SHIPPED | OUTPATIENT
Start: 2017-12-12 | End: 2021-11-10

## 2017-12-12 RX ORDER — METHYLPREDNISOLONE SOD SUCC 125 MG
125 VIAL (EA) INJECTION
Status: COMPLETED | OUTPATIENT
Start: 2017-12-12 | End: 2017-12-12

## 2017-12-12 RX ORDER — PROMETHAZINE HYDROCHLORIDE AND DEXTROMETHORPHAN HYDROBROMIDE 6.25; 15 MG/5ML; MG/5ML
5 SYRUP ORAL EVERY 6 HOURS PRN
Qty: 118 ML | Refills: 0 | Status: SHIPPED | OUTPATIENT
Start: 2017-12-12 | End: 2017-12-22

## 2017-12-12 RX ORDER — LEVOFLOXACIN 500 MG/1
500 TABLET, FILM COATED ORAL DAILY
Qty: 7 TABLET | Refills: 0 | Status: SHIPPED | OUTPATIENT
Start: 2017-12-12 | End: 2017-12-19

## 2017-12-12 RX ADMIN — METHYLPREDNISOLONE SODIUM SUCCINATE 125 MG: 125 INJECTION, POWDER, FOR SOLUTION INTRAMUSCULAR; INTRAVENOUS at 05:12

## 2017-12-12 NOTE — ED TRIAGE NOTES
46 y.o. female presents to ER   Chief Complaint   Patient presents with    Cough    No acute distress noted.

## 2017-12-12 NOTE — ED PROVIDER NOTES
[facility]                                       December 12, 2017                   Chief Complaint  46 y.o. female with Cough    History of Present Illness  Aury Felix presents to the emergency room with nasal congestion today  Patient has a cough and cold symptoms this week, no wheezing on ER evaluation  Patient on exam has clear nasal drainage with nasal mucosa erythema noted now  The patient has a 98% room air oxygenation with clear lung fields bilaterally today  Patient has no nausea vomiting or diarrhea, denies any flulike symptoms on interview    The history is provided by the patient    Past Medical History   -- Hypertension      History reviewed. No pertinent surgical history.   No Known Allergies   History reviewed. No pertinent family history.    Review of Systems and Physical Exam     Review of Systems  -- Constitution - no fever, denies fatigue, no weakness, no chills  -- Eyes - no tearing or redness, no visual disturbance  -- Ear, Nose - sneezing, nasal congestion and clear discharge   -- Mouth,Throat - no sore throat, no toothache, normal voice, normal swallowing  -- Respiratory - cough and congestion, no shortness of breath, no CARNES  -- Cardiovascular - denies chest pain, no palpitations, denies claudication  -- Gastrointestinal - denies abdominal pain, nausea, vomiting, or diarrhea  -- Musculoskeletal - denies back pain, negative for myalgias and arthralgias   -- Neurological - no headache, denies weakness or seizure; no LOC  -- Skin - denies pallor, rash, or changes in skin. no hives or welts noted    Vital Signs  -- Her oral temperature is 99 °F (37.2 °C).   -- Her blood pressure is 168/107 and her pulse is 111   -- Her respiration is 21 and oxygen saturation is 98%.      Physical Exam  -- Nursing note and vitals reviewed  -- Constitutional: Appears well-developed and well-nourished  -- Head: Atraumatic. Normocephalic. No obvious abnormality  -- Eyes: Pupils are equal  and reactive to light. Normal conjunctiva and lids  -- Nose: nasal mucosa erythema and edema; clear nasal discharge noted   -- Throat: Mucous membranes moist, pharynx normal, normal tonsils. No lesions   -- Ears: External ears and TM normal bilaterally. Normal hearing and no drainage  -- Neck: Normal range of motion. Neck supple. No masses, trachea midline  -- Cardiac: Normal rate, regular rhythm and normal heart sounds  -- Pulmonary: Normal respiratory effort, breath sounds clear to auscultation  -- Abdominal: Soft, no tenderness. Normal bowel sounds. Normal liver edge  -- Musculoskeletal: Normal range of motion, no effusions. Joints stable   -- Neurological: No focal deficits. Showed good interaction with staff  -- Vascular: Posterior tibial, dorsalis pedis and radial pulses 2+ bilaterally      Emergency Room Course     Treatment and Evaluation  -- Chest x-ray showed no infiltrate and showed no acute pathology  --  mg Solumedrol given today in the ER    Diagnosis  -- The encounter diagnosis was Acute nasopharyngitis.    Disposition and Plan  -- Disposition: home  -- Condition: stable  -- Follow-up: Patient to follow up with Yvette Vargas NP in 1-2 days.  -- I advised the patient that we have found no life threatening condition today  -- At this time, I believe the patient is clinically stable for discharge.   -- The patient acknowledges that close follow up with a MD is required   -- Patient agrees to comply with all instruction and direction given in the ER    This note is dictated on Dragon Natural Speaking word recognition program.  There are word recognition mistakes that are occasionally missed on review.           Geovanny Edward MD  12/12/17 4786

## 2018-02-06 ENCOUNTER — HOSPITAL ENCOUNTER (EMERGENCY)
Facility: HOSPITAL | Age: 48
Discharge: HOME | End: 2018-02-06
Attending: EMERGENCY MEDICINE

## 2018-02-06 VITALS
WEIGHT: 205 LBS | DIASTOLIC BLOOD PRESSURE: 93 MMHG | RESPIRATION RATE: 20 BRPM | SYSTOLIC BLOOD PRESSURE: 167 MMHG | TEMPERATURE: 99 F | BODY MASS INDEX: 31.17 KG/M2 | OXYGEN SATURATION: 97 % | HEART RATE: 108 BPM

## 2018-02-06 DIAGNOSIS — J00 ACUTE NASOPHARYNGITIS: Primary | ICD-10-CM

## 2018-02-06 LAB
FLUAV AG SPEC QL IA: NEGATIVE
FLUBV AG SPEC QL IA: NEGATIVE
SPECIMEN SOURCE: NORMAL

## 2018-02-06 PROCEDURE — 87400 INFLUENZA A/B EACH AG IA: CPT

## 2018-02-06 PROCEDURE — 63600175 PHARM REV CODE 636 W HCPCS: Performed by: SURGERY

## 2018-02-06 PROCEDURE — 99284 EMERGENCY DEPT VISIT MOD MDM: CPT | Mod: 25

## 2018-02-06 PROCEDURE — 96372 THER/PROPH/DIAG INJ SC/IM: CPT

## 2018-02-06 PROCEDURE — 25000003 PHARM REV CODE 250: Performed by: SURGERY

## 2018-02-06 RX ORDER — GUAIFENESIN, PSEUDOEPHEDRINE HYDROCHLORIDE 600; 60 MG/1; MG/1
1 TABLET, EXTENDED RELEASE ORAL 2 TIMES DAILY PRN
Qty: 20 TABLET | Refills: 0 | Status: SHIPPED | OUTPATIENT
Start: 2018-02-06 | End: 2018-02-16

## 2018-02-06 RX ORDER — CEFTRIAXONE 1 G/1
1 INJECTION, POWDER, FOR SOLUTION INTRAMUSCULAR; INTRAVENOUS ONCE
Status: COMPLETED | OUTPATIENT
Start: 2018-02-06 | End: 2018-02-06

## 2018-02-06 RX ORDER — LIDOCAINE HYDROCHLORIDE 10 MG/ML
2.1 INJECTION INFILTRATION; PERINEURAL ONCE
Status: COMPLETED | OUTPATIENT
Start: 2018-02-06 | End: 2018-02-06

## 2018-02-06 RX ORDER — AMOXICILLIN AND CLAVULANATE POTASSIUM 875; 125 MG/1; MG/1
1 TABLET, FILM COATED ORAL 2 TIMES DAILY
Qty: 20 TABLET | Refills: 0 | Status: SHIPPED | OUTPATIENT
Start: 2018-02-06 | End: 2018-02-16

## 2018-02-06 RX ADMIN — CEFTRIAXONE 1 G: 1 INJECTION, POWDER, FOR SOLUTION INTRAMUSCULAR; INTRAVENOUS at 06:02

## 2018-02-06 RX ADMIN — LIDOCAINE HYDROCHLORIDE 2.1 ML: 10 INJECTION, SOLUTION INFILTRATION; PERINEURAL at 06:02

## 2018-02-06 NOTE — ED NOTES
No s/s adverse reaction to medications given.  Discharge instructions/escript instructions given to patient, she voiced understanding.  Discharged to home in stable condition/ambulatory w steady gait out of ER, NAD.

## 2018-02-06 NOTE — ED PROVIDER NOTES
Encounter Date: 2/6/2018       History     Chief Complaint   Patient presents with    Nasal Congestion     The history is provided by the patient.   Cough   This is a new problem. The current episode started yesterday. The problem has been unchanged. The cough is productive of sputum. There has been no fever. Pertinent negatives include no chest pain, no chills, no sweats, no weight loss, no ear congestion, no ear pain, no headaches, no rhinorrhea, no sore throat, no myalgias, no shortness of breath, no wheezing and no eye redness. She has tried nothing for the symptoms.     Review of patient's allergies indicates:  No Known Allergies  Past Medical History:   Diagnosis Date    Hypertension      History reviewed. No pertinent surgical history.  History reviewed. No pertinent family history.  Social History   Substance Use Topics    Smoking status: Never Smoker    Smokeless tobacco: Never Used    Alcohol use Yes     Review of Systems   Constitutional: Negative for chills, fever and weight loss.   HENT: Negative for ear pain, rhinorrhea and sore throat.    Eyes: Negative for redness.   Respiratory: Positive for cough. Negative for shortness of breath and wheezing.    Cardiovascular: Negative for chest pain, palpitations and leg swelling.   Gastrointestinal: Negative for abdominal pain, diarrhea, nausea and vomiting.   Genitourinary: Negative for flank pain and frequency.   Musculoskeletal: Negative for back pain, gait problem, myalgias, neck pain and neck stiffness.   Skin: Negative for wound.   Neurological: Negative for weakness and headaches.       Physical Exam     Initial Vitals [02/06/18 0532]   BP Pulse Resp Temp SpO2   (!) 167/93 108 20 99.3 °F (37.4 °C) 97 %      MAP       117.67         Physical Exam    Nursing note and vitals reviewed.  Constitutional: She appears well-developed and well-nourished. She is not diaphoretic. No distress.   HENT:   Head: Normocephalic and atraumatic.   Mouth/Throat: No  oropharyngeal exudate.   Eyes: EOM are normal. Pupils are equal, round, and reactive to light.   Neck: Normal range of motion. Neck supple. No JVD present.   Pulmonary/Chest: Breath sounds normal. No stridor. No respiratory distress. She has no wheezes. She has no rhonchi. She has no rales.   Abdominal: Soft. Bowel sounds are normal. She exhibits no distension. There is no tenderness. There is no rebound and no guarding.   Musculoskeletal: Normal range of motion. She exhibits no edema or tenderness.   Neurological: She is alert and oriented to person, place, and time. No sensory deficit.   Skin: No rash noted.         ED Course   Procedures  Labs Reviewed   INFLUENZA A AND B ANTIGEN                               ED Course      Clinical Impression:   The encounter diagnosis was Acute nasopharyngitis.    Disposition:   Disposition: Discharged  Condition: Stable                        Clovis Dudley MD  02/06/18 9717

## 2018-02-06 NOTE — PROVIDER PROGRESS NOTES - EMERGENCY DEPT.
Encounter Date: 2/6/2018    Change of Shift and Discharge Note          I took over this patient from Dr. 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 6:12 AM 2/6/2018

## 2019-03-18 ENCOUNTER — HOSPITAL ENCOUNTER (EMERGENCY)
Facility: HOSPITAL | Age: 49
Discharge: HOME | End: 2019-03-18
Attending: FAMILY MEDICINE
Payer: MEDICAID

## 2019-03-18 VITALS
BODY MASS INDEX: 33.96 KG/M2 | TEMPERATURE: 97 F | HEART RATE: 110 BPM | SYSTOLIC BLOOD PRESSURE: 144 MMHG | OXYGEN SATURATION: 98 % | DIASTOLIC BLOOD PRESSURE: 107 MMHG | RESPIRATION RATE: 18 BRPM | WEIGHT: 223.31 LBS

## 2019-03-18 DIAGNOSIS — M46.1 SACROILIITIS: Primary | ICD-10-CM

## 2019-03-18 DIAGNOSIS — M79.18 LEFT BUTTOCK PAIN: ICD-10-CM

## 2019-03-18 PROCEDURE — 96372 THER/PROPH/DIAG INJ SC/IM: CPT

## 2019-03-18 PROCEDURE — 99284 EMERGENCY DEPT VISIT MOD MDM: CPT | Mod: 25

## 2019-03-18 PROCEDURE — 63600175 PHARM REV CODE 636 W HCPCS: Performed by: NURSE PRACTITIONER

## 2019-03-18 RX ORDER — CYCLOBENZAPRINE HCL 10 MG
10 TABLET ORAL 3 TIMES DAILY PRN
Qty: 9 TABLET | Refills: 0 | Status: SHIPPED | OUTPATIENT
Start: 2019-03-18 | End: 2019-03-21

## 2019-03-18 RX ORDER — KETOROLAC TROMETHAMINE 30 MG/ML
60 INJECTION, SOLUTION INTRAMUSCULAR; INTRAVENOUS
Status: COMPLETED | OUTPATIENT
Start: 2019-03-18 | End: 2019-03-18

## 2019-03-18 RX ORDER — NAPROXEN 500 MG/1
500 TABLET ORAL 2 TIMES DAILY PRN
Qty: 30 TABLET | Refills: 0 | Status: SHIPPED | OUTPATIENT
Start: 2019-03-18 | End: 2020-02-05

## 2019-03-18 RX ADMIN — KETOROLAC TROMETHAMINE 60 MG: 60 INJECTION, SOLUTION INTRAMUSCULAR at 10:03

## 2019-03-18 NOTE — ED PROVIDER NOTES
Encounter Date: 3/18/2019       History     Chief Complaint   Patient presents with    Muscle Pain     Aury Felix is a 48 y.o. Female with PMH of HTN who presents to the ED with reports of 1 day h/o left buttock pain. Patient describes aching pain to center of left buttock that radiates to left anterior thigh rated 7/10 on pain scale. Denies numbness or tingling to LLE; denies bowel or bladder dysfunction. Pain increases with movement. 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 Denies taking any medication for pain PTA.       The history is provided by the patient.     Review of patient's allergies indicates:  No Known Allergies  Past Medical History:   Diagnosis Date    Hypertension      History reviewed. No pertinent surgical history.  No family history on file.  Social History     Tobacco Use    Smoking status: Never Smoker    Smokeless tobacco: Never Used   Substance Use Topics    Alcohol use: Yes    Drug use: Not on file     Review of Systems   Constitutional: Negative.  Negative for activity change, chills and fever.   HENT: Negative.  Negative for congestion, ear discharge, ear pain, postnasal drip, sinus pressure, sinus pain and sore throat.    Eyes: Negative.    Respiratory: Negative.  Negative for cough, chest tightness and shortness of breath.    Cardiovascular: Negative.  Negative for chest pain.   Gastrointestinal: Negative.  Negative for abdominal distention, abdominal pain and nausea.   Endocrine: Negative.    Genitourinary: Negative.  Negative for dysuria, frequency and urgency.        S/p hysterectomy.    Musculoskeletal: Positive for back pain.        Left buttock pain with radiation to left anterior thigh. Denies numbness or tingling to LLE.    Skin: Negative.  Negative for rash.   Allergic/Immunologic: Negative.    Neurological: Negative.  Negative for dizziness, weakness, light-headedness and numbness.    Hematological: Negative.  Does not bruise/bleed easily.   Psychiatric/Behavioral: Negative.        Physical Exam     Initial Vitals [03/18/19 1015]   BP Pulse Resp Temp SpO2   (!) 144/107 110 18 97.4 °F (36.3 °C) 98 %      MAP       --         Physical Exam    Nursing note and vitals reviewed.  Constitutional: She appears well-developed and well-nourished.   HENT:   Head: Normocephalic and atraumatic.   Right Ear: External ear normal.   Left Ear: External ear normal.   Nose: Nose normal.   Mouth/Throat: Oropharynx is clear and moist.   Eyes: Conjunctivae and EOM are normal. Pupils are equal, round, and reactive to light.   Neck: Normal range of motion. Neck supple.   Cardiovascular: Normal rate, regular rhythm, normal heart sounds and intact distal pulses.   Pulmonary/Chest: Effort normal and breath sounds normal. She has no decreased breath sounds. She has no wheezes. She has no rhonchi. She has no rales.   Abdominal: Soft. Bowel sounds are normal. There is no tenderness.   Musculoskeletal: Normal range of motion.   TTP left SI joint. No sensory or motor deficits noted. Neg. SLR.    Neurological: She is alert and oriented to person, place, and time. She has normal strength. She displays normal reflexes. No cranial nerve deficit or sensory deficit.   Skin: Skin is warm and dry. Capillary refill takes less than 2 seconds. No rash noted.   Psychiatric: She has a normal mood and affect. Her behavior is normal. Judgment and thought content normal.         ED Course   Procedures  Labs Reviewed - No data to display       Imaging Results          X-Ray Sacrum And Coccyx (Final result)  Result time 03/18/19 11:02:46    Final result by Emmanuelle Hernandez MD (03/18/19 11:02:46)                 Impression:      As above.      Electronically signed by: Emmanuelle Hernandez MD  Date:    03/18/2019  Time:    11:02             Narrative:    EXAMINATION:  XR SACRUM AND COCCYX    CLINICAL HISTORY:  Myalgia, other site    TECHNIQUE:  Two  or three views of the sacrum and coccyx were performed.    COMPARISON:  None    FINDINGS:  Mild-to-moderate bilateral sacroiliitis and osteitis pubis.  No fracture or dislocation is seen.                                  Medications   ketorolac injection 60 mg (60 mg Intramuscular Given 3/18/19 1049)                          Clinical Impression:       ICD-10-CM ICD-9-CM   1. Sacroiliitis M46.1 720.2   2. Left buttock pain M79.18 729.1         Disposition:   Disposition: Discharged  Condition: Stable    New Prescriptions    CYCLOBENZAPRINE (FLEXERIL) 10 MG TABLET    Take 1 tablet (10 mg total) by mouth 3 (three) times daily as needed for Muscle spasms.    NAPROXEN (NAPROSYN) 500 MG TABLET    Take 1 tablet (500 mg total) by mouth 2 (two) times daily as needed (take with meals).        The patient acknowledges that close follow up with medical provider is required. Instructed to follow up with PCP within 2 days. Patient was given specific return precautions. The patient agrees to comply with all instruction and directions given in the ER.                Wanda Beatty NP  03/18/19 7553

## 2020-02-05 ENCOUNTER — HOSPITAL ENCOUNTER (EMERGENCY)
Facility: HOSPITAL | Age: 50
Discharge: HOME | End: 2020-02-05
Attending: SURGERY
Payer: MEDICAID

## 2020-02-05 VITALS
BODY MASS INDEX: 32.67 KG/M2 | SYSTOLIC BLOOD PRESSURE: 166 MMHG | RESPIRATION RATE: 16 BRPM | HEART RATE: 76 BPM | TEMPERATURE: 98 F | WEIGHT: 214.81 LBS | DIASTOLIC BLOOD PRESSURE: 94 MMHG

## 2020-02-05 DIAGNOSIS — M25.511 RIGHT SHOULDER PAIN: Primary | ICD-10-CM

## 2020-02-05 PROCEDURE — 99284 EMERGENCY DEPT VISIT MOD MDM: CPT | Mod: 25

## 2020-02-05 PROCEDURE — 63600175 PHARM REV CODE 636 W HCPCS: Performed by: NURSE PRACTITIONER

## 2020-02-05 RX ORDER — NAPROXEN 500 MG/1
500 TABLET ORAL 2 TIMES DAILY PRN
Qty: 30 TABLET | Refills: 0 | Status: SHIPPED | OUTPATIENT
Start: 2020-02-05 | End: 2021-11-10

## 2020-02-05 RX ORDER — KETOROLAC TROMETHAMINE 30 MG/ML
60 INJECTION, SOLUTION INTRAMUSCULAR; INTRAVENOUS
Status: COMPLETED | OUTPATIENT
Start: 2020-02-05 | End: 2020-02-05

## 2020-02-05 RX ADMIN — KETOROLAC TROMETHAMINE 60 MG: 30 INJECTION, SOLUTION INTRAMUSCULAR; INTRAVENOUS at 06:02

## 2020-02-05 NOTE — ED PROVIDER NOTES
Encounter Date: 2/5/2020       History     Chief Complaint   Patient presents with    Fall     right arm pain, pt states she fell on a slippery floor today      Aury Felix is a 49 y.o. female with PMH of hypertension who presents to the ED with reports of right shoulder pain.  Patient reports right shoulder pain after fall today.  She reports that she slipped, falling onto floor, catching herself with her right arm.  She presents with aching pain to right shoulder, pain is currently rated 8/10 on pain scale.  She reports associated decreased range of motion.  She denies numbness or tingling to right upper extremity.  She denies taking medication for pain prior to arrival.    The history is provided by the patient.     Review of patient's allergies indicates:  No Known Allergies  Past Medical History:   Diagnosis Date    Hypertension      History reviewed. No pertinent surgical history.  History reviewed. No pertinent family history.  Social History     Tobacco Use    Smoking status: Never Smoker    Smokeless tobacco: Never Used   Substance Use Topics    Alcohol use: Yes    Drug use: Not on file     Review of Systems   Constitutional: Negative.  Negative for activity change, chills and fever.   HENT: Negative.  Negative for congestion, ear discharge, ear pain, postnasal drip, sinus pressure, sinus pain and sore throat.    Eyes: Negative.    Respiratory: Negative.  Negative for cough, chest tightness and shortness of breath.    Cardiovascular: Negative.  Negative for chest pain.   Gastrointestinal: Negative.  Negative for abdominal distention, abdominal pain and nausea.   Endocrine: Negative.    Genitourinary: Negative.  Negative for dysuria, frequency and urgency.   Musculoskeletal: Positive for arthralgias (Right shoulder pain status post fall). Negative for back pain.   Skin: Negative.  Negative for rash.   Allergic/Immunologic: Negative.    Neurological: Negative.  Negative for dizziness, weakness,  light-headedness and numbness.   Hematological: Negative.  Does not bruise/bleed easily.   Psychiatric/Behavioral: Negative.        Physical Exam     Initial Vitals [02/05/20 1644]   BP Pulse Resp Temp SpO2   (!) 186/110 107 20 97.7 °F (36.5 °C) --      MAP       --         Physical Exam    Nursing note and vitals reviewed.  Constitutional: She appears well-developed and well-nourished.   HENT:   Head: Normocephalic and atraumatic.   Right Ear: Tympanic membrane, external ear and ear canal normal. Tympanic membrane is not erythematous. No middle ear effusion.   Left Ear: Tympanic membrane, external ear and ear canal normal. Tympanic membrane is not erythematous.  No middle ear effusion.   Nose: Nose normal.   Mouth/Throat: Uvula is midline, oropharynx is clear and moist and mucous membranes are normal. Mucous membranes are not pale and not dry.   Eyes: Conjunctivae and EOM are normal. Pupils are equal, round, and reactive to light.   Neck: Normal range of motion. Neck supple.   Cardiovascular: Normal rate, regular rhythm, normal heart sounds and intact distal pulses.   Pulmonary/Chest: Effort normal and breath sounds normal. She has no decreased breath sounds. She has no wheezes. She has no rhonchi. She has no rales.   Abdominal: Soft. Bowel sounds are normal. There is no tenderness.   Musculoskeletal:        Right shoulder: She exhibits decreased range of motion, tenderness (No sensory deficits noted. Distal pulses intact.  Capillary refill less than 2 sec.) and bony tenderness.   Neurological: She is alert and oriented to person, place, and time. She has normal strength. She displays normal reflexes. No cranial nerve deficit or sensory deficit.   Skin: Skin is warm and dry. Capillary refill takes less than 2 seconds. No rash noted.   Psychiatric: She has a normal mood and affect. Her behavior is normal. Judgment and thought content normal.         ED Course   Procedures  Labs Reviewed - No data to display        Imaging Results          X-ray Shoulder 2 or More Views Right (Final result)  Result time 02/05/20 17:41:42    Final result by Arya Navarro MD (02/05/20 17:41:42)                 Impression:      Negative exam.      Electronically signed by: Arya Navarro  Date:    02/05/2020  Time:    17:41             Narrative:    EXAMINATION:  XR SHOULDER COMPLETE 2 OR MORE VIEWS RIGHT    CLINICAL HISTORY:  Pain in right shoulder    TECHNIQUE:  Standard radiography performed.    COMPARISON:  None    FINDINGS:  Bone density and architecture are normal.  No acute findings.                                     Medications   ketorolac injection 60 mg (60 mg Intramuscular Given 2/5/20 1815)          Arm sling applied to right arm.                               Clinical Impression:       ICD-10-CM ICD-9-CM   1. Right shoulder pain M25.511 719.41         Disposition:   Disposition: Discharged  Condition: Stable    Discharge Medication List as of 2/5/2020  6:07 PM      START taking these medications    Details   naproxen (NAPROSYN) 500 MG tablet Take 1 tablet (500 mg total) by mouth 2 (two) times daily as needed., Starting Wed 2/5/2020, Normal           The patient acknowledges that close follow up with medical provider is required. Instructed to follow up with PCP within 2 days. Patient was given specific return precautions. The patient agrees to comply with all instruction and directions given in the ER.                Wanda Beatty NP  02/05/20 2035

## 2020-02-05 NOTE — ED TRIAGE NOTES
"Pt states she fell on a slippy floor today and she "braced her self" with her right arm and she is having right arm and shoulder pain.  Pt denies hitting her head and denies LOC  "

## 2020-02-07 ENCOUNTER — HOSPITAL ENCOUNTER (EMERGENCY)
Facility: HOSPITAL | Age: 50
Discharge: HOME | End: 2020-02-07
Attending: SURGERY
Payer: MEDICAID

## 2020-02-07 VITALS
TEMPERATURE: 98 F | SYSTOLIC BLOOD PRESSURE: 117 MMHG | RESPIRATION RATE: 19 BRPM | WEIGHT: 213.19 LBS | DIASTOLIC BLOOD PRESSURE: 78 MMHG | BODY MASS INDEX: 32.41 KG/M2 | OXYGEN SATURATION: 99 % | HEART RATE: 98 BPM

## 2020-02-07 DIAGNOSIS — N30.00 ACUTE CYSTITIS WITHOUT HEMATURIA: Primary | ICD-10-CM

## 2020-02-07 DIAGNOSIS — I10 HTN (HYPERTENSION): ICD-10-CM

## 2020-02-07 LAB
ALBUMIN SERPL BCP-MCNC: 4 G/DL (ref 3.5–5.2)
ALP SERPL-CCNC: 107 U/L (ref 55–135)
ALT SERPL W/O P-5'-P-CCNC: 49 U/L (ref 10–44)
AMPHET+METHAMPHET UR QL: NEGATIVE
ANION GAP SERPL CALC-SCNC: 12 MMOL/L (ref 8–16)
AST SERPL-CCNC: 25 U/L (ref 10–40)
BACTERIA #/AREA URNS HPF: ABNORMAL /HPF
BARBITURATES UR QL SCN>200 NG/ML: NEGATIVE
BASOPHILS # BLD AUTO: 0.11 K/UL (ref 0–0.2)
BASOPHILS NFR BLD: 0.8 % (ref 0–1.9)
BENZODIAZ UR QL SCN>200 NG/ML: NEGATIVE
BILIRUB SERPL-MCNC: 0.5 MG/DL (ref 0.1–1)
BILIRUB UR QL STRIP: NEGATIVE
BNP SERPL-MCNC: 12 PG/ML (ref 0–99)
BUN SERPL-MCNC: 10 MG/DL (ref 6–20)
BZE UR QL SCN: NEGATIVE
CALCIUM SERPL-MCNC: 10.6 MG/DL (ref 8.7–10.5)
CANNABINOIDS UR QL SCN: NEGATIVE
CHLORIDE SERPL-SCNC: 99 MMOL/L (ref 95–110)
CK MB SERPL-MCNC: 0.9 NG/ML (ref 0.1–6.5)
CK MB SERPL-MCNC: 1.1 NG/ML (ref 0.1–6.5)
CK MB SERPL-RTO: 1.4 % (ref 0–5)
CK MB SERPL-RTO: 1.4 % (ref 0–5)
CK SERPL-CCNC: 63 U/L (ref 20–180)
CK SERPL-CCNC: 63 U/L (ref 20–180)
CK SERPL-CCNC: 77 U/L (ref 20–180)
CK SERPL-CCNC: 77 U/L (ref 20–180)
CLARITY UR: ABNORMAL
CO2 SERPL-SCNC: 29 MMOL/L (ref 23–29)
COLOR UR: YELLOW
CREAT SERPL-MCNC: 0.9 MG/DL (ref 0.5–1.4)
CREAT UR-MCNC: 80.5 MG/DL (ref 15–325)
DIFFERENTIAL METHOD: ABNORMAL
EOSINOPHIL # BLD AUTO: 0.3 K/UL (ref 0–0.5)
EOSINOPHIL NFR BLD: 2.2 % (ref 0–8)
ERYTHROCYTE [DISTWIDTH] IN BLOOD BY AUTOMATED COUNT: 15.2 % (ref 11.5–14.5)
EST. GFR  (AFRICAN AMERICAN): >60 ML/MIN/1.73 M^2
EST. GFR  (NON AFRICAN AMERICAN): >60 ML/MIN/1.73 M^2
GLUCOSE SERPL-MCNC: 120 MG/DL (ref 70–110)
GLUCOSE UR QL STRIP: NEGATIVE
HCT VFR BLD AUTO: 45.2 % (ref 37–48.5)
HGB BLD-MCNC: 15.1 G/DL (ref 12–16)
HGB UR QL STRIP: NEGATIVE
HYALINE CASTS #/AREA URNS LPF: 0 /LPF
IMM GRANULOCYTES # BLD AUTO: 0.03 K/UL (ref 0–0.04)
IMM GRANULOCYTES NFR BLD AUTO: 0.2 % (ref 0–0.5)
KETONES UR QL STRIP: NEGATIVE
LEUKOCYTE ESTERASE UR QL STRIP: ABNORMAL
LYMPHOCYTES # BLD AUTO: 7.2 K/UL (ref 1–4.8)
LYMPHOCYTES NFR BLD: 52.3 % (ref 18–48)
MCH RBC QN AUTO: 27 PG (ref 27–31)
MCHC RBC AUTO-ENTMCNC: 33.4 G/DL (ref 32–36)
MCV RBC AUTO: 81 FL (ref 82–98)
METHADONE UR QL SCN>300 NG/ML: NEGATIVE
MICROSCOPIC COMMENT: ABNORMAL
MONOCYTES # BLD AUTO: 1.2 K/UL (ref 0.3–1)
MONOCYTES NFR BLD: 8.4 % (ref 4–15)
NEUTROPHILS # BLD AUTO: 5 K/UL (ref 1.8–7.7)
NEUTROPHILS NFR BLD: 36.1 % (ref 38–73)
NITRITE UR QL STRIP: NEGATIVE
NRBC BLD-RTO: 0 /100 WBC
OPIATES UR QL SCN: NEGATIVE
PCP UR QL SCN>25 NG/ML: NEGATIVE
PH UR STRIP: 6 [PH] (ref 5–8)
PLATELET # BLD AUTO: 559 K/UL (ref 150–350)
PMV BLD AUTO: 10.6 FL (ref 9.2–12.9)
POTASSIUM SERPL-SCNC: 3.3 MMOL/L (ref 3.5–5.1)
PROT SERPL-MCNC: 9.3 G/DL (ref 6–8.4)
PROT UR QL STRIP: ABNORMAL
RBC # BLD AUTO: 5.6 M/UL (ref 4–5.4)
RBC #/AREA URNS HPF: 1 /HPF (ref 0–4)
SODIUM SERPL-SCNC: 140 MMOL/L (ref 136–145)
SP GR UR STRIP: 1.01 (ref 1–1.03)
SQUAMOUS #/AREA URNS HPF: 34 /HPF
TOXICOLOGY INFORMATION: NORMAL
TRICHOMONAS UR QL MICRO: ABNORMAL
TROPONIN I SERPL DL<=0.01 NG/ML-MCNC: <0.006 NG/ML (ref 0–0.03)
TROPONIN I SERPL DL<=0.01 NG/ML-MCNC: <0.006 NG/ML (ref 0–0.03)
URN SPEC COLLECT METH UR: ABNORMAL
UROBILINOGEN UR STRIP-ACNC: NEGATIVE EU/DL
WBC # BLD AUTO: 13.8 K/UL (ref 3.9–12.7)
WBC #/AREA URNS HPF: >100 /HPF (ref 0–5)

## 2020-02-07 PROCEDURE — 63600175 PHARM REV CODE 636 W HCPCS: Performed by: SURGERY

## 2020-02-07 PROCEDURE — 87086 URINE CULTURE/COLONY COUNT: CPT

## 2020-02-07 PROCEDURE — 82553 CREATINE MB FRACTION: CPT | Mod: 91

## 2020-02-07 PROCEDURE — 25000003 PHARM REV CODE 250: Performed by: NURSE PRACTITIONER

## 2020-02-07 PROCEDURE — 82553 CREATINE MB FRACTION: CPT

## 2020-02-07 PROCEDURE — 93010 EKG 12-LEAD: ICD-10-PCS | Mod: ,,, | Performed by: INTERNAL MEDICINE

## 2020-02-07 PROCEDURE — 84484 ASSAY OF TROPONIN QUANT: CPT | Mod: 91

## 2020-02-07 PROCEDURE — 80307 DRUG TEST PRSMV CHEM ANLYZR: CPT

## 2020-02-07 PROCEDURE — 36415 COLL VENOUS BLD VENIPUNCTURE: CPT

## 2020-02-07 PROCEDURE — 83880 ASSAY OF NATRIURETIC PEPTIDE: CPT

## 2020-02-07 PROCEDURE — 85025 COMPLETE CBC W/AUTO DIFF WBC: CPT

## 2020-02-07 PROCEDURE — 99285 EMERGENCY DEPT VISIT HI MDM: CPT | Mod: 25

## 2020-02-07 PROCEDURE — 93010 ELECTROCARDIOGRAM REPORT: CPT | Mod: ,,, | Performed by: INTERNAL MEDICINE

## 2020-02-07 PROCEDURE — 84484 ASSAY OF TROPONIN QUANT: CPT

## 2020-02-07 PROCEDURE — 93005 ELECTROCARDIOGRAM TRACING: CPT

## 2020-02-07 PROCEDURE — 96372 THER/PROPH/DIAG INJ SC/IM: CPT

## 2020-02-07 PROCEDURE — 82550 ASSAY OF CK (CPK): CPT

## 2020-02-07 PROCEDURE — 82550 ASSAY OF CK (CPK): CPT | Mod: 91

## 2020-02-07 PROCEDURE — 80053 COMPREHEN METABOLIC PANEL: CPT

## 2020-02-07 PROCEDURE — 81000 URINALYSIS NONAUTO W/SCOPE: CPT

## 2020-02-07 RX ORDER — CLONIDINE HYDROCHLORIDE 0.1 MG/1
0.2 TABLET ORAL
Status: COMPLETED | OUTPATIENT
Start: 2020-02-07 | End: 2020-02-07

## 2020-02-07 RX ORDER — NITROFURANTOIN 25; 75 MG/1; MG/1
100 CAPSULE ORAL 2 TIMES DAILY
Qty: 14 CAPSULE | Refills: 0 | Status: SHIPPED | OUTPATIENT
Start: 2020-02-07 | End: 2020-02-14

## 2020-02-07 RX ORDER — CEFTRIAXONE 1 G/1
1 INJECTION, POWDER, FOR SOLUTION INTRAMUSCULAR; INTRAVENOUS
Status: COMPLETED | OUTPATIENT
Start: 2020-02-07 | End: 2020-02-07

## 2020-02-07 RX ADMIN — CEFTRIAXONE SODIUM 1 G: 1 INJECTION, POWDER, FOR SOLUTION INTRAMUSCULAR; INTRAVENOUS at 10:02

## 2020-02-07 RX ADMIN — CLONIDINE HYDROCHLORIDE 0.2 MG: 0.1 TABLET ORAL at 07:02

## 2020-02-08 NOTE — ED PROVIDER NOTES
"[facility]                                                 Chief Complaint  49 y.o. female with High blood pressure (reports blood pressure "180/82" at home)    History of Present Illness  Aury Felix presents to the emergency room with hypertension tonight  Patient took her blood pressure at home no versus systolic of 180 this p.m.  Patient has actually no symptoms at this time, long history of hypertension noted  Patient is noncompliant with diet the patient is noncompliant with medications  ROS is negative for chest pain or shortness of breath, normal neuro exam    The history is provided by the patient   device was not used during this ER visit  Medical history: Hypertension  History reviewed. No pertinent surgical history.   No Known Allergies     I have reviewed all of this patient's past medical, surgical, family, and social   histories as well as active allergies and medications documented in the  electronic medical record    Review of Systems and Physical Exam      Review of Systems  -- Constitution - no fever, denies fatigue, no weakness, no chills  -- Eyes - no tearing or redness, no visual disturbance  -- Ear, Nose - no tinnitus or earache, no nasal congestion or discharge  -- Mouth,Throat - no sore throat, no toothache, normal voice, normal swallowing  -- Respiratory - denies cough and congestion, no shortness of breath, no CARNES  -- Cardiovascular - denies chest pain, no palpitations, denies claudication  -- Gastrointestinal - denies abdominal pain, nausea, vomiting, or diarrhea  -- Genitourinary - no dysuria, denies flank pain, no hematuria, no STD risk  -- Musculoskeletal - denies back pain, negative for trauma or injury  -- Neurological - no headache, denies weakness or seizure; no LOC  -- Skin - denies pallor, rash, or changes in skin. no hives or welts noted    Vital Signs  Her temperature is 97.2 °F (36.2 °C).   Her blood pressure is 122/86 and her " pulse is 103.   Her respiration is 18 and oxygen saturation is 98%.     Physical Exam  -- Nursing note and vitals reviewed  -- Constitutional: Appears well-developed and well-nourished  -- Head: Atraumatic. Normocephalic. No obvious abnormality  -- Eyes: Pupils are equal and reactive to light. Normal conjunctiva and lids  -- Cardiac: Normal rate, regular rhythm and normal heart sounds  -- Pulmonary: Normal respiratory effort, breath sounds clear to auscultation  -- Abdominal: Soft, no tenderness. Normal bowel sounds. Normal liver edge  -- Musculoskeletal: Normal range of motion, no effusions. Joints stable   -- Neurological: No focal deficits. Showed good interaction with staff  -- Vascular: Posterior tibial, dorsalis pedis and radial pulses 2+ bilaterally      Emergency Room Course      Lab Results     K 3.3 (L)   CL 99   CO2 29   BUN 10   CREATININE 0.9    (H)   ALKPHOS 107   AST 25   ALT 49 (H)   BILITOT 0.5   ALBUMIN 4.0   PROT 9.3 (H)   WBC 13.80 (H)   HGB 15.1   HCT 45.2    (H)   CPK 63   CPK 63   CPKMB 0.9   TROPONINI <0.006   BNP 12     Urinalysis  -- Urinalysis performed during this ER visit showed signs of infection  -- The urine today has been sent for lab culture, results pending      EKG   -- The EKG findings today were without concerning findings from baseline  -- The troponin drawn in the ER today was within normal limits  -- The 2nd troponin drawn in the ER today was within normal limits      Radiology  -- The CT of the head performed in the ER today was negative for acute pathology  -- Chest x-ray showed no infiltrate and showed no acute pathology     Medications Given  cloNIDine tablet 0.2 mg (0.2 mg Oral Given 2/7/20 1952)   cefTRIAXone injection 1 g (1 g Intramuscular Given 2/7/20 2221)     ED Physician Management  -- Diagnosis management comments: 49 y.o. female with hypertension tonight  -- clonidine brought down the patient's blood pressure without issue in the ER today  --  patient had a negative workup including head CT and lab work and EKG today  -- patient also has urinary tract infection, IM Rocephin given the ER today  -- patient will prescribe Macrobid on discharge, follow-up PCP in 48 hours  -- patient needs blood pressure checks and further evaluation on discharge  -- return to the ER with any concerning symptoms after discharge today    Diagnosis  -- The primary encounter diagnosis was Acute cystitis without hematuria.   -- A diagnosis of HTN (hypertension) was also pertinent to this visit.    Disposition and Plan  -- Disposition: home  -- Condition: stable  -- Follow-up: Patient to follow up with Yvette Vargas NP in 1-2 days.  -- I advised the patient that we have found no life threatening condition today  -- At this time, I believe the patient is clinically stable for discharge.   -- The patient acknowledges that close follow up with a MD is required   -- Patient agrees to comply with all instruction and direction given in the ER    This note is dictated on M*Modal word recognition program.  There are word recognition mistakes that are occasionally missed on review.         Geovanny Edward MD  02/07/20 4113

## 2020-02-08 NOTE — ED TRIAGE NOTES
"49 y.o. female presents to ER    Chief Complaint   Patient presents with    High blood pressure     reports blood pressure "180/82" at home   Reports compliance with high blood pressure medications. No acute distress noted.    "

## 2020-02-09 LAB
BACTERIA UR CULT: NORMAL
BACTERIA UR CULT: NORMAL

## 2020-07-29 ENCOUNTER — HOSPITAL ENCOUNTER (EMERGENCY)
Facility: HOSPITAL | Age: 50
Discharge: HOME | End: 2020-07-29
Attending: SURGERY
Payer: MEDICAID

## 2020-07-29 VITALS
SYSTOLIC BLOOD PRESSURE: 135 MMHG | RESPIRATION RATE: 18 BRPM | HEART RATE: 99 BPM | DIASTOLIC BLOOD PRESSURE: 90 MMHG | TEMPERATURE: 98 F

## 2020-07-29 DIAGNOSIS — U07.1 COVID-19 VIRUS DETECTED: ICD-10-CM

## 2020-07-29 DIAGNOSIS — B34.2 CORONAVIRUS INFECTION: Primary | ICD-10-CM

## 2020-07-29 DIAGNOSIS — U07.1 COVID-19: ICD-10-CM

## 2020-07-29 LAB — SARS-COV-2 RDRP RESP QL NAA+PROBE: POSITIVE

## 2020-07-29 PROCEDURE — U0002 COVID-19 LAB TEST NON-CDC: HCPCS

## 2020-07-29 PROCEDURE — 99283 EMERGENCY DEPT VISIT LOW MDM: CPT | Mod: 25

## 2020-07-29 PROCEDURE — 25000003 PHARM REV CODE 250: Performed by: SURGERY

## 2020-07-29 PROCEDURE — 63700000 PHARM REV CODE 250 ALT 637 W/O HCPCS: Performed by: SURGERY

## 2020-07-29 RX ORDER — ACETAMINOPHEN 500 MG
1000 TABLET ORAL
Status: COMPLETED | OUTPATIENT
Start: 2020-07-29 | End: 2020-07-29

## 2020-07-29 RX ORDER — AZITHROMYCIN 250 MG/1
TABLET, FILM COATED ORAL
Qty: 6 TABLET | Refills: 0 | Status: SHIPPED | OUTPATIENT
Start: 2020-07-29 | End: 2021-11-10

## 2020-07-29 RX ORDER — IBUPROFEN 800 MG/1
800 TABLET ORAL
Status: COMPLETED | OUTPATIENT
Start: 2020-07-29 | End: 2020-07-29

## 2020-07-29 RX ORDER — AZITHROMYCIN 250 MG/1
500 TABLET, FILM COATED ORAL
Status: COMPLETED | OUTPATIENT
Start: 2020-07-29 | End: 2020-07-29

## 2020-07-29 RX ADMIN — AZITHROMYCIN MONOHYDRATE 500 MG: 250 TABLET ORAL at 10:07

## 2020-07-29 RX ADMIN — IBUPROFEN 800 MG: 800 TABLET, FILM COATED ORAL at 09:07

## 2020-07-29 RX ADMIN — ACETAMINOPHEN 1000 MG: 500 TABLET, FILM COATED ORAL at 09:07

## 2021-10-31 ENCOUNTER — HOSPITAL ENCOUNTER (EMERGENCY)
Facility: HOSPITAL | Age: 51
Discharge: HOME | End: 2021-10-31
Attending: STUDENT IN AN ORGANIZED HEALTH CARE EDUCATION/TRAINING PROGRAM
Payer: MEDICAID

## 2021-10-31 VITALS
HEART RATE: 86 BPM | DIASTOLIC BLOOD PRESSURE: 93 MMHG | WEIGHT: 198.75 LBS | BODY MASS INDEX: 30.22 KG/M2 | TEMPERATURE: 98 F | SYSTOLIC BLOOD PRESSURE: 153 MMHG | RESPIRATION RATE: 16 BRPM | OXYGEN SATURATION: 100 %
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
## 2021-10-31 DIAGNOSIS — M25.421 EFFUSION OF RIGHT ELBOW: Primary | ICD-10-CM

## 2021-10-31 PROCEDURE — 25000003 PHARM REV CODE 250: Performed by: STUDENT IN AN ORGANIZED HEALTH CARE EDUCATION/TRAINING PROGRAM

## 2021-10-31 PROCEDURE — 99283 EMERGENCY DEPT VISIT LOW MDM: CPT

## 2021-10-31 RX ORDER — ACETAMINOPHEN 500 MG
1000 TABLET ORAL
Status: COMPLETED | OUTPATIENT
Start: 2021-10-31 | End: 2021-10-31

## 2021-10-31 RX ORDER — IBUPROFEN 800 MG/1
800 TABLET ORAL
Status: COMPLETED | OUTPATIENT
Start: 2021-10-31 | End: 2021-10-31

## 2021-10-31 RX ADMIN — IBUPROFEN 800 MG: 800 TABLET, FILM COATED ORAL at 10:10

## 2021-10-31 RX ADMIN — ACETAMINOPHEN 1000 MG: 500 TABLET ORAL at 10:10

## 2021-11-10 ENCOUNTER — HOSPITAL ENCOUNTER (OUTPATIENT)
Dept: RADIOLOGY | Facility: HOSPITAL | Age: 51
Discharge: HOME | End: 2021-11-10
Attending: PHYSICIAN ASSISTANT
Payer: MEDICAID

## 2021-11-10 ENCOUNTER — OFFICE VISIT (OUTPATIENT)
Dept: ORTHOPEDICS | Facility: CLINIC | Age: 51
End: 2021-11-10
Payer: MEDICAID

## 2021-11-10 ENCOUNTER — TELEPHONE (OUTPATIENT)
Dept: ORTHOPEDICS | Facility: CLINIC | Age: 51
End: 2021-11-10

## 2021-11-10 VITALS
BODY MASS INDEX: 33.16 KG/M2 | WEIGHT: 199.06 LBS | HEIGHT: 65 IN | OXYGEN SATURATION: 99 % | SYSTOLIC BLOOD PRESSURE: 150 MMHG | DIASTOLIC BLOOD PRESSURE: 100 MMHG | RESPIRATION RATE: 18 BRPM | HEART RATE: 95 BPM

## 2021-11-10 DIAGNOSIS — M25.421 EFFUSION OF RIGHT ELBOW: ICD-10-CM

## 2021-11-10 DIAGNOSIS — R29.898 SUSPECTED FRACTURE OF BONE: ICD-10-CM
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
## 2021-11-10 DIAGNOSIS — M25.521 RIGHT ELBOW PAIN: Primary | ICD-10-CM

## 2021-11-10 DIAGNOSIS — M25.521 RIGHT ELBOW PAIN: ICD-10-CM

## 2021-11-10 PROCEDURE — 99999 PR PBB SHADOW E&M-EST. PATIENT-LVL III: CPT | Mod: PBBFAC,,, | Performed by: PHYSICIAN ASSISTANT

## 2021-11-10 PROCEDURE — 99203 OFFICE O/P NEW LOW 30 MIN: CPT | Mod: S$PBB,25,, | Performed by: PHYSICIAN ASSISTANT

## 2021-11-10 PROCEDURE — 29105 PR APPLY LONG ARM SPLINT: ICD-10-PCS | Mod: S$PBB,RT,, | Performed by: PHYSICIAN ASSISTANT

## 2021-11-10 PROCEDURE — 99999 PR PBB SHADOW E&M-EST. PATIENT-LVL III: ICD-10-PCS | Mod: PBBFAC,,, | Performed by: PHYSICIAN ASSISTANT

## 2021-11-10 PROCEDURE — 29105 APPLICATION LONG ARM SPLINT: CPT | Mod: S$PBB,RT,, | Performed by: PHYSICIAN ASSISTANT

## 2021-11-10 PROCEDURE — 29105 APPLICATION LONG ARM SPLINT: CPT | Mod: PBBFAC | Performed by: PHYSICIAN ASSISTANT

## 2021-11-10 PROCEDURE — 99213 OFFICE O/P EST LOW 20 MIN: CPT | Mod: PBBFAC,25 | Performed by: PHYSICIAN ASSISTANT

## 2021-11-10 PROCEDURE — 73080 XR ELBOW COMPLETE 3 VIEW RIGHT: ICD-10-PCS | Mod: 26,RT,, | Performed by: RADIOLOGY

## 2021-11-10 PROCEDURE — 73080 X-RAY EXAM OF ELBOW: CPT | Mod: TC,RT

## 2021-11-10 PROCEDURE — 99203 PR OFFICE/OUTPT VISIT, NEW, LEVL III, 30-44 MIN: ICD-10-PCS | Mod: S$PBB,25,, | Performed by: PHYSICIAN ASSISTANT

## 2021-11-10 PROCEDURE — 73080 X-RAY EXAM OF ELBOW: CPT | Mod: 26,RT,, | Performed by: RADIOLOGY

## 2023-02-08 NOTE — ED AVS SNAPSHOT
[facility]  [address] [facility] [address]               Aury Felix   2017  6:01 PM   ED    Description:  Female : 1970   Department:  [facility]           Your Care was Coordinated By:     Provider Role From To    Geovanny Edward MD Attending Provider 17 1802 17 [phone]    Sharla Vann MD Attending Provider 17 181 --      Reason for Visit     Hand Pain           Diagnoses this Visit        Comments    Finger fracture, right, closed, initial encounter    -  Primary       ED Disposition     ED Disposition Condition Comment    Discharge  Keep right hand elevated to level of heart.   Keep finger splint in place.  Ice off and on to the affected finger for 24 hours.  Keep right hand clean and dry.           To Do List           Follow-up Information     Follow up with Yvette Vargas NP.    Specialty:  Internal Medicine    Why:  Orthopedics follow up this week    Contact information:    [address] [place] [address]  [phone]         These Medications        Disp Refills Start End    ibuprofen (ADVIL,MOTRIN) 800 MG tablet 20 tablet 0 2017     Take 1 tablet (800 mg total) by mouth every 6 (six) hours as needed for Pain. - Oral    Pharmacy: [facility]/pharmacy #[id number] - [place], [place] - [address] [facility] [address]  #: [phone]         [facility]     [facility] -  Assistance  Registered nurses in the [facility] provide clinical advisement, health education, appointment booking, and other advisory services.  Call for this free service at [phone].             Medications           Message regarding Medications     Verify the changes and/or additions to your medication regime listed below are the same as discussed with your clinician today.  If any of these changes or additions are incorrect, please notify your healthcare provider.        START taking these NEW medications         Refills    ibuprofen (ADVIL,MOTRIN) 800 MG tablet 0    Sig: Take 1 tablet (800 mg total) by mouth every 6 (six) hours as needed for Pain.    Class: Print    Route: Oral      These medications were administered today        Dose Freq    ibuprofen tablet 800 mg 800 mg ED 1 Time    Sig: Take 1 tablet (800 mg total) by mouth ED 1 Time.    Class: Normal    Route: Oral           Verify that the below list of medications is an accurate representation of the medications you are currently taking.  If none reported, the list may be blank. If incorrect, please contact your healthcare provider. Carry this list with you in case of emergency.           Current Medications     aspirin 81 MG Chew Take 1 tablet (81 mg total) by mouth once daily.    fluticasone (FLONASE) 50 mcg/actuation nasal spray 2 sprays by Each Nare route once daily.    ibuprofen (ADVIL,MOTRIN) 800 MG tablet Take 1 tablet (800 mg total) by mouth every 6 (six) hours as needed for Pain.    lisinopril-hydrochlorothiazide (PRINZIDE,ZESTORETIC) 20-12.5 mg per tablet Take 1 tablet by mouth once daily.    potassium chloride (MICRO-K) 10 MEQ CpSR Take 1 capsule (10 mEq total) by mouth once daily.           Clinical Reference Information           Your Vitals Were     BP Pulse Temp Resp Weight SpO2    174/100 (BP Location: Right arm, Patient Position: Sitting) 96 98 °F (36.7 °C) (Oral) 18 90.7 kg (200 lb) 98%      Allergies as of 1/28/2017     No Known Allergies      Immunizations Administered on Date of Encounter - 1/28/2017     None      ED Micro, Lab, POCT     None      ED Imaging Orders     Start Ordered       Status Ordering Provider    01/28/17 1804 01/28/17 1803  X-Ray Hand 3 view Right  1 time imaging      In process         Discharge Instructions         Finger Fracture, Closed  You have a broken finger (fracture). This causes local pain, swelling, and bruising. This injury takes about 4 to 6 weeks to heal. Finger injuries are often treated with a splint or  cast, or by taping the injured finger to the next one (reba taping). This protects the injured finger and holds the bone in position while it heals. More serious fractures may need surgery.    If the fingernail has been severely injured, it will probably fall off in 1 to 2 weeks. A new fingernail will usually start to grow back within a month.  Home care  Follow these guidelines when caring for yourself at home:  · Keep your hand elevated to reduce pain and swelling. When sitting or lying down keep your arm above the level of your heart. You can do this by placing your arm on a pillow that rests on your chest or on a pillow at your side. This is most important during the first 2 days (48 hours) after the injury.  · Put an ice pack on the injured area. Do this for 20 minutes every 1 to 2 hours the first day for pain relief. You can make an ice pack by wrapping a plastic bag of ice cubes in a thin towel. As the ice melts, be careful that the cast or splint doesnt get wet. Continue using the ice pack 3 to 4 times a day until the pain and swelling go away.  · Keep the cast or splint completely dry at all times. Bathe with your cast or splint out of the water. Protect it with a large plastic bag, rubber-banded at the top end. If a fiberglass cast or splint gets wet, you can dry it with a hair dryer.  · If reba tape was put on and it becomes wet or dirty, change it. You may replace it with paper, plastic, or cloth tape. Cloth tape and paper tapes must be kept dry. Keep the reba tape in place for at least 4 weeks.  · You may use acetaminophen or ibuprofen to control pain, unless another pain medicine was prescribed. If you have chronic liver or kidney disease, talk with your health care provider before using these medicines. Also talk with your provider if youve had a stomach ulcer or GI bleeding.  · Dont put creams or objects under the cast if you have itching.  Follow-up care  Follow up with your health care  provider within 1 week, or as advised. This is to make sure the bone is healing the way it should.  If X-rays were taken, a radiologist will look at them. You will be told of any new findings that may affect your care.  When to seek medical advice  Call your health care provider right away if any of these occur:  · The plaster cast or splint becomes wet or soft  · The cast cracks  · The fiberglass cast or splint stays wet for more than 24 hours  · Pain or swelling gets worse  · Redness, warmth, swelling, drainage from the wound, or foul odor from a cast or splint  · Finger becomes more cold, blue, numb, or tingly  · You cant move your finger  · The skin around the cast becomes red  · Fever of 101ºF (38.3ºC) or higher, or as directed by your health care provider  © 2924-1579 [employer]. [address] [place] [address]. All rights reserved. This information is not intended as a substitute for professional medical care. Always follow your healthcare professional's instructions.          Your Scheduled Appointments     May 23, 2017  8:00 AM CDT   Nurse Visit with NURSE, [facility]   [facility]    [address] [facility] [address] [phone]            May 31, 2017  9:00 AM CDT   Established Patient Visit with Yvette Vargas NP   [facility]    [address] [facility] [address]   [phone]               [facility] complies with applicable Federal civil rights laws and does not discriminate on the basis of race, color, national origin, age, disability, or sex.        Language Assistance Services     ATTENTION: Language assistance services are available, free of charge. Please call [phone].      ATENCIÓN: Si emileela romain, tiene a michelle disposición servicios gratuitos de asistencia lingüística. Llame al [phone].     CHÚ Ý: N?u b?n nói Ti?ng Vi?t, có các d?ch v? h? tr? ngôn ng? mi?n phí dàn  ame medina?john PIEDRA?i s? [phone].         [id number]

## 2023-11-25 ENCOUNTER — HOSPITAL ENCOUNTER (EMERGENCY)
Facility: HOSPITAL | Age: 53
Discharge: HOME | End: 2023-11-25
Attending: SURGERY
Payer: MEDICAID

## 2023-11-25 VITALS
DIASTOLIC BLOOD PRESSURE: 114 MMHG | HEART RATE: 101 BPM | TEMPERATURE: 98 F | RESPIRATION RATE: 18 BRPM | WEIGHT: 206.25 LBS | OXYGEN SATURATION: 98 % | BODY MASS INDEX: 34.32 KG/M2 | SYSTOLIC BLOOD PRESSURE: 175 MMHG

## 2023-11-25 DIAGNOSIS — M25.512 ACUTE PAIN OF LEFT SHOULDER: Primary | ICD-10-CM

## 2023-11-25 PROCEDURE — 96372 THER/PROPH/DIAG INJ SC/IM: CPT | Performed by: SURGERY

## 2023-11-25 PROCEDURE — 63600175 PHARM REV CODE 636 W HCPCS: Performed by: SURGERY

## 2023-11-25 PROCEDURE — 99284 EMERGENCY DEPT VISIT MOD MDM: CPT

## 2023-11-25 RX ORDER — CYCLOBENZAPRINE HCL 10 MG
10 TABLET ORAL 3 TIMES DAILY PRN
Qty: 10 TABLET | Refills: 0 | Status: SHIPPED | OUTPATIENT
Start: 2023-11-25 | End: 2023-11-30

## 2023-11-25 RX ORDER — KETOROLAC TROMETHAMINE 30 MG/ML
60 INJECTION, SOLUTION INTRAMUSCULAR; INTRAVENOUS
Status: COMPLETED | OUTPATIENT
Start: 2023-11-25 | End: 2023-11-25

## 2023-11-25 RX ORDER — IBUPROFEN 800 MG/1
800 TABLET ORAL EVERY 6 HOURS PRN
Qty: 20 TABLET | Refills: 0 | Status: SHIPPED | OUTPATIENT
Start: 2023-11-25

## 2023-11-25 RX ADMIN — KETOROLAC TROMETHAMINE 60 MG: 30 INJECTION, SOLUTION INTRAMUSCULAR at 12:11

## 2023-11-25 NOTE — ED PROVIDER NOTES
Encounter Date: 11/25/2023       History     Chief Complaint   Patient presents with    Shoulder Pain     Aury Felix is a 52 y.o. female she that presents with left shoulder pain  Patient was doing heavy lifting at work with left shoulder pain this weekend  No obvious signs of deformity, no obvious crepitus, no instability on exam  Patient has good range of motion with no obvious signs of rotator cuff pain  Good , normal tone, no obvious signs of distress on ER evaluation today    The history is provided by the patient.     Review of patient's allergies indicates:  No Known Allergies  Past Medical History:   Diagnosis Date    Hypertension      No past surgical history on file.  No family history on file.  Social History     Tobacco Use    Smoking status: Never    Smokeless tobacco: Never   Substance Use Topics    Alcohol use: Yes     Review of Systems   Constitutional: Negative.    HENT: Negative.     Eyes: Negative.    Respiratory: Negative.     Cardiovascular: Negative.    Gastrointestinal: Negative.    Genitourinary: Negative.    Musculoskeletal:         (+) left shoulder pain   Skin: Negative.    Neurological: Negative.    Psychiatric/Behavioral: Negative.         Physical Exam     Initial Vitals [11/25/23 1127]   BP Pulse Resp Temp SpO2   (!) 202/129 (!) 121 20 97.9 °F (36.6 °C) 98 %      MAP       --         Physical Exam    Nursing note and vitals reviewed.  Constitutional: Vital signs are normal. She appears well-developed and well-nourished. She is cooperative.   HENT:   Head: Normocephalic and atraumatic.   Eyes: Conjunctivae, EOM and lids are normal. Pupils are equal, round, and reactive to light.   Neck: Trachea normal and phonation normal. Neck supple. No JVD present.   Normal range of motion.   Full passive range of motion without pain.     Cardiovascular:  Normal rate, regular rhythm, S1 normal, S2 normal, normal heart sounds, intact distal pulses and normal pulses.            Pulmonary/Chest: Effort normal and breath sounds normal.   Abdominal: Abdomen is soft and flat. Bowel sounds are normal.   Musculoskeletal:         General: Normal range of motion.      Cervical back: Full passive range of motion without pain, normal range of motion and neck supple.     Neurological: She is alert and oriented to person, place, and time. She has normal strength.   Skin: Skin is warm, dry and intact. Capillary refill takes less than 2 seconds.         ED Course   Procedures  Labs Reviewed - No data to display       Imaging Results              X-Ray Shoulder 2 or More Views Left (Final result)  Result time 11/25/23 12:09:07      Final result by Millicent Roth MD (11/25/23 12:09:07)                   Impression:      No acute fracture or dislocation      Electronically signed by: Millicent Roth MD  Date:    11/25/2023  Time:    12:09               Narrative:    EXAMINATION:  XR SHOULDER COMPLETE 2 OR MORE VIEWS LEFT    CLINICAL HISTORY:  pain;    TECHNIQUE:  Two or three views of the left shoulder were performed.    COMPARISON:  None    FINDINGS:  Mild degenerative changes.  No acute fracture or dislocation.  No peritendinous soft tissue calcifications.                                       Medications   ketorolac injection 60 mg (has no administration in time range)     Medical Decision Making  Left shoulder pain after doing heavy lifting at work this week on ER interview  Differential includes sprain, strain, fracture, dislocation, ligament/tendon injury    Problems Addressed:  Acute pain of left shoulder: complicated acute illness or injury    Amount and/or Complexity of Data Reviewed  Radiology: ordered and independent interpretation performed.    Risk  Prescription drug management.    ED Management & Risk of Complications, Morbidity, Mortality:  X-ray left shoulder showed no acute findings in the emergency room today  Toradol injection for pain with prescriptions given on ER discharge  today  Ice & rest left shoulder with a work excuse given for next 4 days  Ambulatory referral to Orthopedics going forward on discharge  Follow-up with primary care MD until complete resolution of symptoms  This patient does not need to be hospitalized on ER evaluation today  The patient's diagnosis is not limited by social determinants of health  Does not require surgery or procedure (major or minor), no risk factors  Pt/Family counseled to return to the ER with any concerning symptoms     Clinical Impression:  Final diagnoses:  [M25.512] Acute pain of left shoulder (Primary)          ED Disposition Condition    Discharge Stable          ED Prescriptions       Medication Sig Dispense Start Date End Date Auth. Provider    ibuprofen (ADVIL,MOTRIN) 800 MG tablet Take 1 tablet (800 mg total) by mouth every 6 (six) hours as needed for Pain. 20 tablet 11/25/2023 -- Geovanny Edward MD    cyclobenzaprine (FLEXERIL) 10 MG tablet Take 1 tablet (10 mg total) by mouth 3 (three) times daily as needed for Muscle spasms. 10 tablet 11/25/2023 11/30/2023 Geovanny Edward MD          Follow-up Information       Follow up With Specialties Details Why Contact Info    Aron Tapia MD Family Medicine Go in 2 days  [address] [facility] [address]  [phone]      Chantell Aggarwal PA-C Orthopedic Surgery Go in 2 days  [address] [place] [address]  [phone]                    Geovanny Edward MD  12/10/23 3589

## 2023-11-25 NOTE — ED TRIAGE NOTES
52 y.o. female presents to ER Room/bed info not found   Chief Complaint   Patient presents with    Shoulder Pain   .   C/o left shoulder pain for three days, reports hurt it while at work lifting on a harmeet chair

## 2023-11-25 NOTE — Clinical Note
"Aury"Dannie Felix was seen and treated in our emergency department on 11/25/2023.  She may return to work on 11/29/2023.       If you have any questions or concerns, please don't hesitate to call.       RN    "

## 2024-05-09 ENCOUNTER — HOSPITAL ENCOUNTER (EMERGENCY)
Facility: HOSPITAL | Age: 54
Discharge: HOME | End: 2024-05-09
Attending: STUDENT IN AN ORGANIZED HEALTH CARE EDUCATION/TRAINING PROGRAM

## 2024-05-09 VITALS
TEMPERATURE: 99 F | BODY MASS INDEX: 32.24 KG/M2 | DIASTOLIC BLOOD PRESSURE: 125 MMHG | OXYGEN SATURATION: 97 % | SYSTOLIC BLOOD PRESSURE: 192 MMHG | HEIGHT: 68 IN | RESPIRATION RATE: 18 BRPM | HEART RATE: 94 BPM | WEIGHT: 212.75 LBS

## 2024-05-09 DIAGNOSIS — M25.471 PAIN AND SWELLING OF RIGHT ANKLE: ICD-10-CM

## 2024-05-09 DIAGNOSIS — M79.671 RIGHT FOOT PAIN: ICD-10-CM

## 2024-05-09 DIAGNOSIS — M10.9 ACUTE GOUT OF RIGHT ANKLE, UNSPECIFIED CAUSE: Primary | ICD-10-CM

## 2024-05-09 DIAGNOSIS — M25.571 PAIN AND SWELLING OF RIGHT ANKLE: ICD-10-CM

## 2024-05-09 LAB
ALBUMIN SERPL BCP-MCNC: 3.4 G/DL (ref 3.5–5.2)
ALP SERPL-CCNC: 80 U/L (ref 55–135)
ALT SERPL W/O P-5'-P-CCNC: 38 U/L (ref 10–44)
ANION GAP SERPL CALC-SCNC: 8 MMOL/L (ref 8–16)
AST SERPL-CCNC: 31 U/L (ref 10–40)
BASOPHILS # BLD AUTO: 0.08 K/UL (ref 0–0.2)
BASOPHILS NFR BLD: 0.8 % (ref 0–1.9)
BILIRUB SERPL-MCNC: 0.3 MG/DL (ref 0.1–1)
BUN SERPL-MCNC: 8 MG/DL (ref 6–20)
CALCIUM SERPL-MCNC: 9.3 MG/DL (ref 8.7–10.5)
CHLORIDE SERPL-SCNC: 105 MMOL/L (ref 95–110)
CO2 SERPL-SCNC: 26 MMOL/L (ref 23–29)
CREAT SERPL-MCNC: 0.9 MG/DL (ref 0.5–1.4)
DIFFERENTIAL METHOD BLD: ABNORMAL
EOSINOPHIL # BLD AUTO: 0.3 K/UL (ref 0–0.5)
EOSINOPHIL NFR BLD: 2.8 % (ref 0–8)
ERYTHROCYTE [DISTWIDTH] IN BLOOD BY AUTOMATED COUNT: 13.8 % (ref 11.5–14.5)
EST. GFR  (NO RACE VARIABLE): >60 ML/MIN/1.73 M^2
GLUCOSE SERPL-MCNC: 186 MG/DL (ref 70–110)
HCT VFR BLD AUTO: 41.1 % (ref 37–48.5)
HGB BLD-MCNC: 13.6 G/DL (ref 12–16)
IMM GRANULOCYTES # BLD AUTO: 0.04 K/UL (ref 0–0.04)
IMM GRANULOCYTES NFR BLD AUTO: 0.4 % (ref 0–0.5)
LYMPHOCYTES # BLD AUTO: 4.9 K/UL (ref 1–4.8)
LYMPHOCYTES NFR BLD: 48.6 % (ref 18–48)
MCH RBC QN AUTO: 28.1 PG (ref 27–31)
MCHC RBC AUTO-ENTMCNC: 33.1 G/DL (ref 32–36)
MCV RBC AUTO: 85 FL (ref 82–98)
MONOCYTES # BLD AUTO: 0.8 K/UL (ref 0.3–1)
MONOCYTES NFR BLD: 8.3 % (ref 4–15)
NEUTROPHILS # BLD AUTO: 3.9 K/UL (ref 1.8–7.7)
NEUTROPHILS NFR BLD: 39.1 % (ref 38–73)
NRBC BLD-RTO: 0 /100 WBC
PLATELET # BLD AUTO: 416 K/UL (ref 150–450)
PMV BLD AUTO: 10.5 FL (ref 9.2–12.9)
POTASSIUM SERPL-SCNC: 4.4 MMOL/L (ref 3.5–5.1)
PROT SERPL-MCNC: 7.8 G/DL (ref 6–8.4)
RBC # BLD AUTO: 4.84 M/UL (ref 4–5.4)
SODIUM SERPL-SCNC: 139 MMOL/L (ref 136–145)
URATE SERPL-MCNC: 7.5 MG/DL (ref 2.4–5.7)
WBC # BLD AUTO: 10.04 K/UL (ref 3.9–12.7)

## 2024-05-09 PROCEDURE — 63600175 PHARM REV CODE 636 W HCPCS: Performed by: NURSE PRACTITIONER

## 2024-05-09 PROCEDURE — 80053 COMPREHEN METABOLIC PANEL: CPT | Performed by: NURSE PRACTITIONER

## 2024-05-09 PROCEDURE — 84550 ASSAY OF BLOOD/URIC ACID: CPT | Performed by: NURSE PRACTITIONER

## 2024-05-09 PROCEDURE — 85025 COMPLETE CBC W/AUTO DIFF WBC: CPT | Performed by: NURSE PRACTITIONER

## 2024-05-09 PROCEDURE — 96372 THER/PROPH/DIAG INJ SC/IM: CPT | Performed by: NURSE PRACTITIONER

## 2024-05-09 PROCEDURE — 25000003 PHARM REV CODE 250: Performed by: NURSE PRACTITIONER

## 2024-05-09 PROCEDURE — 99284 EMERGENCY DEPT VISIT MOD MDM: CPT | Mod: 25

## 2024-05-09 RX ORDER — INDOMETHACIN 50 MG/1
50 CAPSULE ORAL 3 TIMES DAILY PRN
Qty: 20 CAPSULE | Refills: 0 | Status: SHIPPED | OUTPATIENT
Start: 2024-05-09

## 2024-05-09 RX ORDER — KETOROLAC TROMETHAMINE 30 MG/ML
15 INJECTION, SOLUTION INTRAMUSCULAR; INTRAVENOUS
Status: COMPLETED | OUTPATIENT
Start: 2024-05-09 | End: 2024-05-09

## 2024-05-09 RX ORDER — COLCHICINE 0.6 MG/1
1.2 TABLET, FILM COATED ORAL
Status: COMPLETED | OUTPATIENT
Start: 2024-05-09 | End: 2024-05-09

## 2024-05-09 RX ADMIN — KETOROLAC TROMETHAMINE 15 MG: 30 INJECTION, SOLUTION INTRAMUSCULAR at 04:05

## 2024-05-09 RX ADMIN — COLCHICINE 1.2 MG: 0.6 TABLET ORAL at 04:05

## 2024-05-09 NOTE — Clinical Note
"Aury"Dannie Felix was seen and treated in our emergency department on 5/9/2024.  She may return to work on 05/13/2024.       If you have any questions or concerns, please don't hesitate to call.       RN    "

## 2024-05-09 NOTE — ED PROVIDER NOTES
Encounter Date: 5/9/2024       History     Chief Complaint   Patient presents with    Ankle Pain     Aury Felix is a 53 y.o. female with PMH of hypertension presents to the ED for evaluation of atraumaticr right ankle pain and swelling.  Patient reports that symptoms have been present for the past week.  She describes a throbbing pain in her lateral ankle and heel.  Pain is exacerbated by light touch and movement.  She currently rates pain 8/10 in severity.  She denies numbness or tingling to right lower extremity.  Denies associated redness or warmth.  Denies previous history of gout.    The history is provided by the patient.     Review of patient's allergies indicates:  No Known Allergies  Past Medical History:   Diagnosis Date    Hypertension      History reviewed. No pertinent surgical history.  No family history on file.  Social History     Tobacco Use    Smoking status: Never    Smokeless tobacco: Never   Substance Use Topics    Alcohol use: Yes     Review of Systems   Constitutional:  Positive for chills. Negative for activity change and fever.   HENT:  Negative for congestion, ear discharge, ear pain, postnasal drip, rhinorrhea, sinus pressure, sinus pain and sore throat.    Respiratory:  Negative for cough, chest tightness and shortness of breath.    Cardiovascular:  Negative for chest pain.   Gastrointestinal:  Negative for abdominal distention, abdominal pain and nausea.   Genitourinary:  Negative for dysuria, frequency and urgency.   Musculoskeletal:  Positive for arthralgias (R ankle) and joint swelling. Negative for back pain.   Skin:  Negative for rash.   Neurological:  Negative for dizziness, weakness, light-headedness and numbness.   Hematological:  Does not bruise/bleed easily.       Physical Exam     Initial Vitals [05/09/24 1346]   BP Pulse Resp Temp SpO2   (!) 194/114 (!) 121 18 98.8 °F (37.1 °C) 97 %      MAP       --         Physical Exam    Nursing note and vitals  reviewed.  Constitutional: She appears well-developed and well-nourished.   HENT:   Head: Normocephalic and atraumatic.   Right Ear: Tympanic membrane, external ear and ear canal normal. Tympanic membrane is not erythematous. No middle ear effusion.   Left Ear: Tympanic membrane, external ear and ear canal normal. Tympanic membrane is not erythematous.  No middle ear effusion.   Nose: Nose normal.   Mouth/Throat: Uvula is midline, oropharynx is clear and moist and mucous membranes are normal. Mucous membranes are not pale and not dry.   Eyes: Conjunctivae and EOM are normal. Pupils are equal, round, and reactive to light.   Neck: Neck supple.   Normal range of motion.  Cardiovascular:  Regular rhythm, normal heart sounds and intact distal pulses.   Tachycardia present.         Pulmonary/Chest: Effort normal and breath sounds normal. She has no decreased breath sounds. She has no wheezes. She has no rhonchi. She has no rales.   Abdominal: Abdomen is soft. Bowel sounds are normal. There is no abdominal tenderness.   Musculoskeletal:      Cervical back: Normal range of motion and neck supple.      Left ankle: Swelling present. Tenderness present over the lateral malleolus. Decreased range of motion.      Comments: Good distal pulses     Neurological: She is alert and oriented to person, place, and time. She has normal strength. She displays normal reflexes. No cranial nerve deficit or sensory deficit.   Skin: Skin is warm and dry. Capillary refill takes less than 2 seconds. No rash noted.   Psychiatric: She has a normal mood and affect. Her behavior is normal. Judgment and thought content normal.         ED Course   Procedures  Labs Reviewed   CBC W/ AUTO DIFFERENTIAL - Abnormal; Notable for the following components:       Result Value    Lymph # 4.9 (*)     Lymph % 48.6 (*)     All other components within normal limits   COMPREHENSIVE METABOLIC PANEL - Abnormal; Notable for the following components:    Glucose 186 (*)      Albumin 3.4 (*)     All other components within normal limits   URIC ACID - Abnormal; Notable for the following components:    Uric Acid 7.5 (*)     All other components within normal limits          Imaging Results              X-Ray Foot Complete Right (Final result)  Result time 05/09/24 14:36:14      Final result by Emmanuelle Hernandez MD (05/09/24 14:36:14)                   Impression:      As above.      Electronically signed by: Emmanuelle Hernandez MD  Date:    05/09/2024  Time:    14:36               Narrative:    EXAMINATION:  XR ANKLE COMPLETE 3 VIEW RIGHT; XR FOOT COMPLETE 3 VIEW RIGHT    CLINICAL HISTORY:  Pain in right ankle and joints of right foot; Pain in right foot    TECHNIQUE:  Three views of the right ankle and right foot    COMPARISON:  None.    FINDINGS:  The ankle mortise is intact.  The talar dome is preserved.  No fracture or dislocation is seen.  There is a small plantar calcaneal spur and tiny Achilles enthesophyte.  This bronchial tick elation is congruent.  Mild interphalangeal joint space narrowing.  No fracture or dislocation is seen..                                       X-Ray Ankle Complete Right (Final result)  Result time 05/09/24 14:36:14      Final result by Emmanuelle Hernandez MD (05/09/24 14:36:14)                   Impression:      As above.      Electronically signed by: Emmanuelle Hernandez MD  Date:    05/09/2024  Time:    14:36               Narrative:    EXAMINATION:  XR ANKLE COMPLETE 3 VIEW RIGHT; XR FOOT COMPLETE 3 VIEW RIGHT    CLINICAL HISTORY:  Pain in right ankle and joints of right foot; Pain in right foot    TECHNIQUE:  Three views of the right ankle and right foot    COMPARISON:  None.    FINDINGS:  The ankle mortise is intact.  The talar dome is preserved.  No fracture or dislocation is seen.  There is a small plantar calcaneal spur and tiny Achilles enthesophyte.  This bronchial tick elation is congruent.  Mild interphalangeal joint space narrowing.  No fracture or  dislocation is seen..                                       Medications   colchicine capsule/tablet 1.2 mg (1.2 mg Oral Given 5/9/24 1652)   ketorolac injection 15 mg (15 mg Intramuscular Given 5/9/24 1653)     Medical Decision Making  Evaluation of a 53-year-old female with right lateral ankle pain, swelling with radiation into right heel.  Symptoms present for the past week.  Patient has + lateral tenderness on exam with mild swelling.  No redness.  Good distal pulses with good capillary refill.    Differential diagnosis includes gout, osteoarthritis, plantar fasciitis, heel spur, sprain/strain    Amount and/or Complexity of Data Reviewed  Labs: ordered. Decision-making details documented in ED Course.     Details: Uric acid 7.5  Radiology: ordered. Decision-making details documented in ED Course.     Details: X-ray of the right ankle shows The ankle mortise is intact.  The talar dome is preserved.  No fracture or dislocation is seen.  There is a small plantar calcaneal spur and tiny Achilles enthesophyte.  This bronchial tick elation is congruent.  Mild interphalangeal joint space narrowing.  No fracture or dislocation    Risk  Prescription drug management.  Risk Details: Stable for DC home.  Patient with atraumatic left ankle pain and swelling with elevated uric acid.  She was given colchicine and a Toradol injection in the ED with improvement in symptoms.  Patient hypertensive in the ED, refusing BP medication. She is asymptomatic and wants to take her BP medication at home. Patient aware of risks and still refusing BP medication. Will dc home with close follow-up with PCP; Patient/caregiver voices understanding and feels comfortable with discharge plan.      The patient acknowledges that close follow up with medical provider is required. Instructed to follow up with PCP within 2 days. Patient was given specific return precautions. The patient agrees to comply with all instruction and directions given in the  ER.                                        Clinical Impression:  Final diagnoses:  [M25.571, M25.471] Pain and swelling of right ankle  [M79.671] Right foot pain  [M10.9] Acute gout of right ankle, unspecified cause (Primary)          ED Disposition Condition    Discharge Stable          ED Prescriptions       Medication Sig Dispense Start Date End Date Auth. Provider    indomethacin (INDOCIN) 50 MG capsule Take 1 capsule (50 mg total) by mouth 3 (three) times daily as needed (pain). 20 capsule 5/9/2024 -- Wanda Beatty NP          Follow-up Information       Follow up With Specialties Details Why Contact Info    PCP  Schedule an appointment as soon as possible for a visit in 2 days               Wanda Beatty NP  05/09/24 2744

## 2024-09-14 PROBLEM — E11.65 TYPE 2 DIABETES MELLITUS WITH HYPERGLYCEMIA, WITHOUT LONG-TERM CURRENT USE OF INSULIN: Status: ACTIVE | Noted: 2024-09-14

## 2024-09-14 PROBLEM — R82.71 BACTERIURIA, ASYMPTOMATIC: Status: ACTIVE | Noted: 2024-09-14

## 2024-09-14 PROBLEM — E11.9 NEW ONSET TYPE 2 DIABETES MELLITUS: Status: ACTIVE | Noted: 2024-09-14

## 2024-09-14 PROBLEM — H57.9 LEFT EYE COMPLAINT: Status: ACTIVE | Noted: 2024-09-14

## 2024-09-14 PROBLEM — E87.8 ELECTROLYTE ABNORMALITY: Status: ACTIVE | Noted: 2024-09-14

## 2024-10-25 PROBLEM — E88.82 CLASS 1 OBESITY DUE TO DISRUPTION OF MC4R PATHWAY WITH SERIOUS COMORBIDITY AND BODY MASS INDEX (BMI) OF 30.0 TO 30.9 IN ADULT: Status: ACTIVE | Noted: 2024-10-25

## 2024-10-25 PROBLEM — E66.811 CLASS 1 OBESITY DUE TO DISRUPTION OF MC4R PATHWAY WITH SERIOUS COMORBIDITY AND BODY MASS INDEX (BMI) OF 30.0 TO 30.9 IN ADULT: Status: ACTIVE | Noted: 2024-10-25

## 2024-10-25 PROBLEM — Z59.9 FINANCIAL DIFFICULTIES: Status: ACTIVE | Noted: 2024-10-25

## 2024-10-25 PROBLEM — E78.2 MIXED HYPERLIPIDEMIA: Status: ACTIVE | Noted: 2024-10-25

## 2024-10-25 PROBLEM — Z15.2 CLASS 1 OBESITY DUE TO DISRUPTION OF MC4R PATHWAY WITH SERIOUS COMORBIDITY AND BODY MASS INDEX (BMI) OF 30.0 TO 30.9 IN ADULT: Status: ACTIVE | Noted: 2024-10-25

## 2024-10-25 PROBLEM — Z79.899 LONG-TERM USE OF HIGH-RISK MEDICATION: Status: ACTIVE | Noted: 2024-10-25

## 2025-01-26 ENCOUNTER — HOSPITAL ENCOUNTER (EMERGENCY)
Facility: HOSPITAL | Age: 55
Discharge: HOME | End: 2025-01-26
Attending: SURGERY

## 2025-01-26 VITALS
HEART RATE: 105 BPM | WEIGHT: 200.81 LBS | DIASTOLIC BLOOD PRESSURE: 82 MMHG | RESPIRATION RATE: 20 BRPM | BODY MASS INDEX: 30.44 KG/M2 | TEMPERATURE: 98 F | OXYGEN SATURATION: 100 % | SYSTOLIC BLOOD PRESSURE: 124 MMHG | HEIGHT: 68 IN

## 2025-01-26 DIAGNOSIS — T78.40XA ALLERGIC REACTION, INITIAL ENCOUNTER: Primary | ICD-10-CM

## 2025-01-26 PROCEDURE — 25000003 PHARM REV CODE 250

## 2025-01-26 PROCEDURE — 63600175 PHARM REV CODE 636 W HCPCS

## 2025-01-26 PROCEDURE — 96372 THER/PROPH/DIAG INJ SC/IM: CPT

## 2025-01-26 PROCEDURE — 99284 EMERGENCY DEPT VISIT MOD MDM: CPT | Mod: 25

## 2025-01-26 RX ORDER — HYDROXYZINE HYDROCHLORIDE 25 MG/1
25 TABLET, FILM COATED ORAL 3 TIMES DAILY PRN
Qty: 21 TABLET | Refills: 0 | Status: SHIPPED | OUTPATIENT
Start: 2025-01-26

## 2025-01-26 RX ORDER — METHYLPREDNISOLONE SOD SUCC 125 MG
125 VIAL (EA) INJECTION
Status: COMPLETED | OUTPATIENT
Start: 2025-01-26 | End: 2025-01-26

## 2025-01-26 RX ORDER — DIPHENHYDRAMINE HYDROCHLORIDE 50 MG/ML
25 INJECTION INTRAMUSCULAR; INTRAVENOUS
Status: COMPLETED | OUTPATIENT
Start: 2025-01-26 | End: 2025-01-26

## 2025-01-26 RX ORDER — FAMOTIDINE 20 MG/1
40 TABLET, FILM COATED ORAL
Status: COMPLETED | OUTPATIENT
Start: 2025-01-26 | End: 2025-01-26

## 2025-01-26 RX ORDER — PREDNISONE 20 MG/1
40 TABLET ORAL DAILY
Qty: 10 TABLET | Refills: 0 | Status: SHIPPED | OUTPATIENT
Start: 2025-01-27 | End: 2025-02-01

## 2025-01-26 RX ADMIN — FAMOTIDINE 40 MG: 20 TABLET ORAL at 10:01

## 2025-01-26 RX ADMIN — METHYLPREDNISOLONE SODIUM SUCCINATE 125 MG: 125 INJECTION, POWDER, FOR SOLUTION INTRAMUSCULAR; INTRAVENOUS at 10:01

## 2025-01-26 RX ADMIN — DIPHENHYDRAMINE HYDROCHLORIDE 25 MG: 50 INJECTION INTRAMUSCULAR; INTRAVENOUS at 10:01

## 2025-01-26 NOTE — ED PROVIDER NOTES
Encounter Date: 1/26/2025       History     Chief Complaint   Patient presents with    Rash     Pt arrived to ED c/o itchy, red rash on bilateral arms, chest, abdomen, back, and legs that started yesterday. Pt rates pain 0/10. PT denies using new products or trying any new food. Pt denies shortness of breath.      This note is dictated on M*Modal word recognition program.  There are word recognition mistakes and grammatical errors that are occasionally missed on review.     Aury Felix is a 54 y.o. female presents to ER today with complaints of rash to torso, bilateral upper and lower extremities that started this morning.  Patient reports rash is pruritic.  Patient denies any pain denies shortness of breath.  Unknown cause of rash.  No new foods no new detergents no new body washes      The history is provided by the patient.     Review of patient's allergies indicates:  No Known Allergies  Past Medical History:   Diagnosis Date    Hypertension      Past Surgical History:   Procedure Laterality Date    HYSTERECTOMY       No family history on file.  Social History     Tobacco Use    Smoking status: Never    Smokeless tobacco: Never   Substance Use Topics    Alcohol use: Yes    Drug use: Never     Review of Systems   Skin:  Positive for color change and rash.   All other systems reviewed and are negative.      Physical Exam     Initial Vitals [01/26/25 1001]   BP Pulse Resp Temp SpO2   123/80 (!) 116 20 99.2 °F (37.3 °C) 100 %      MAP       --         Physical Exam    Constitutional: She appears well-developed and well-nourished.   HENT:   Head: Normocephalic.   Eyes: Pupils are equal, round, and reactive to light.   Neck:   Normal range of motion.  Cardiovascular:  Normal rate.           Pulmonary/Chest: Breath sounds normal.   Abdominal: She exhibits no distension.   Musculoskeletal:         General: No edema. Normal range of motion.      Cervical back: Normal range of motion.     Neurological: GCS score is  15. GCS eye subscore is 4. GCS verbal subscore is 5. GCS motor subscore is 6.   Skin: Rash noted.   Patient has rash consistent with urticaria to torso, bilateral upper and lower extremities no stridor no respiratory distress.   Psychiatric: She has a normal mood and affect. Thought content normal.         ED Course   Procedures  Labs Reviewed - No data to display       Imaging Results    None          Medications   methylPREDNISolone sodium succinate injection 125 mg (125 mg Intramuscular Given 1/26/25 1021)   diphenhydrAMINE injection 25 mg (25 mg Intramuscular Given 1/26/25 1020)   famotidine tablet 40 mg (40 mg Oral Given 1/26/25 1020)     Medical Decision Making  DDX includes, urticaria, allergic reaction, contact dermatitis    Will treat patient with corticosteroids.  Benefits outweigh the risk of hyperglycemia  Will also treat patient with Atarax for pruritus.  Patient was given Pepcid, Solu-Medrol, Benadryl prior to discharge today.  Vital signs stable no stridor no respiratory distress on examination  Symptoms consistent with allergic reaction of unknown cause  Patient identifiers and allergies verified and correct.  Patient stable at time of discharge in no acute distress.  No life-threatening illnesses were found during ER visit today.  Patient was instructed to follow-up with PCP or other recommended specialist within the next 48-72 hours.  Patient was instructed to return to ER immediately for any worsening or concerning symptoms.  All discharge instructions discussed with patient, and patient agrees to comply with discharge instructions given today.     Risk  Prescription drug management.                                      Clinical Impression:  Final diagnoses:  [T78.40XA] Allergic reaction, initial encounter (Primary)          ED Disposition Condition    Discharge Stable          ED Prescriptions       Medication Sig Dispense Start Date End Date Auth. Provider    predniSONE (DELTASONE) 20 MG tablet  Take 2 tablets (40 mg total) by mouth once daily. for 5 days 10 tablet 1/27/2025 2/1/2025 Geovanny Cortes, SUREKHA    hydrOXYzine HCL (ATARAX) 25 MG tablet Take 1 tablet (25 mg total) by mouth 3 (three) times daily as needed for Itching. 21 tablet 1/26/2025 -- Geovanny Cortes NP          Follow-up Information       Follow up With Specialties Details Why Contact Info    Jimbo Menard II, MD Internal Medicine Schedule an appointment as soon as possible for a visit in 3 days As needed, If symptoms worsen [address] [facility] [id number]  [phone]               Geovanny Cortes NP  01/26/25 6396

## 2025-01-26 NOTE — Clinical Note
"Aury"Dannie Felix was seen and treated in our emergency department on 1/26/2025.  She may return to work on 01/27/2025.       If you have any questions or concerns, please don't hesitate to call.      Geovanny Cortes, NP"

## 2025-01-26 NOTE — ED TRIAGE NOTES
Pt arrived to ED c/o itchy, red rash on bilateral arms, chest, abdomen, back, and legs that started yesterday. Pt rates pain 0/10. PT denies using new products or trying any new food. Pt denies shortness of breath.

## 2025-04-09 ENCOUNTER — HOSPITAL ENCOUNTER (EMERGENCY)
Facility: HOSPITAL | Age: 55
Discharge: ANOTHER HOSPITAL | End: 2025-04-09
Attending: STUDENT IN AN ORGANIZED HEALTH CARE EDUCATION/TRAINING PROGRAM

## 2025-04-09 VITALS
TEMPERATURE: 98 F | RESPIRATION RATE: 15 BRPM | OXYGEN SATURATION: 100 % | BODY MASS INDEX: 31.46 KG/M2 | WEIGHT: 207.56 LBS | HEART RATE: 108 BPM | HEIGHT: 68 IN | SYSTOLIC BLOOD PRESSURE: 152 MMHG | DIASTOLIC BLOOD PRESSURE: 98 MMHG

## 2025-04-09 DIAGNOSIS — R19.5 DARK STOOLS: Primary | ICD-10-CM

## 2025-04-09 LAB
ABSOLUTE EOSINOPHIL (OHS): 0.14 K/UL
ABSOLUTE EOSINOPHIL (OHS): 0.31 K/UL
ABSOLUTE MONOCYTE (OHS): 0.78 K/UL (ref 0.3–1)
ABSOLUTE MONOCYTE (OHS): 0.82 K/UL (ref 0.3–1)
ABSOLUTE NEUTROPHIL COUNT (OHS): 5.03 K/UL (ref 1.8–7.7)
ABSOLUTE NEUTROPHIL COUNT (OHS): 6.59 K/UL (ref 1.8–7.7)
ALBUMIN SERPL BCP-MCNC: 3.8 G/DL (ref 3.5–5.2)
ALP SERPL-CCNC: 84 UNIT/L (ref 40–150)
ALT SERPL W/O P-5'-P-CCNC: 21 UNIT/L (ref 10–44)
ANION GAP (OHS): 14 MMOL/L (ref 8–16)
APTT PPP: 22.9 SECONDS (ref 21–32)
AST SERPL-CCNC: 16 UNIT/L (ref 11–45)
BACTERIA #/AREA URNS HPF: NORMAL /HPF
BASOPHILS # BLD AUTO: 0.09 K/UL
BASOPHILS # BLD AUTO: 0.09 K/UL
BASOPHILS NFR BLD AUTO: 0.8 %
BASOPHILS NFR BLD AUTO: 0.8 %
BILIRUB SERPL-MCNC: 0.3 MG/DL (ref 0.1–1)
BILIRUB UR QL STRIP.AUTO: NEGATIVE
BUN SERPL-MCNC: 10 MG/DL (ref 6–20)
CALCIUM SERPL-MCNC: 9.4 MG/DL (ref 8.7–10.5)
CHLORIDE SERPL-SCNC: 106 MMOL/L (ref 95–110)
CLARITY UR: CLEAR
CO2 SERPL-SCNC: 20 MMOL/L (ref 23–29)
COLOR UR AUTO: YELLOW
CREAT SERPL-MCNC: 0.7 MG/DL (ref 0.5–1.4)
ERYTHROCYTE [DISTWIDTH] IN BLOOD BY AUTOMATED COUNT: 14.9 % (ref 11.5–14.5)
ERYTHROCYTE [DISTWIDTH] IN BLOOD BY AUTOMATED COUNT: 15.1 % (ref 11.5–14.5)
GFR SERPLBLD CREATININE-BSD FMLA CKD-EPI: >60 ML/MIN/1.73/M2
GLUCOSE SERPL-MCNC: 185 MG/DL (ref 70–110)
GLUCOSE UR QL STRIP: NEGATIVE
HCT VFR BLD AUTO: 26.1 % (ref 37–48.5)
HCT VFR BLD AUTO: 28.1 % (ref 37–48.5)
HGB BLD-MCNC: 8.9 GM/DL (ref 12–16)
HGB BLD-MCNC: 9.5 GM/DL (ref 12–16)
HGB UR QL STRIP: NEGATIVE
HOLD SPECIMEN: NORMAL
IMM GRANULOCYTES # BLD AUTO: 0.07 K/UL (ref 0–0.04)
IMM GRANULOCYTES # BLD AUTO: 0.09 K/UL (ref 0–0.04)
IMM GRANULOCYTES NFR BLD AUTO: 0.6 % (ref 0–0.5)
IMM GRANULOCYTES NFR BLD AUTO: 0.8 % (ref 0–0.5)
INDIRECT COOMBS: NORMAL
INR PPP: 1 (ref 0.8–1.2)
KETONES UR QL STRIP: NEGATIVE
LEUKOCYTE ESTERASE UR QL STRIP: ABNORMAL
LYMPHOCYTES # BLD AUTO: 3.42 K/UL (ref 1–4.8)
LYMPHOCYTES # BLD AUTO: 5.14 K/UL (ref 1–4.8)
MCH RBC QN AUTO: 27.6 PG (ref 27–31)
MCH RBC QN AUTO: 27.7 PG (ref 27–31)
MCHC RBC AUTO-ENTMCNC: 33.8 G/DL (ref 32–36)
MCHC RBC AUTO-ENTMCNC: 34.1 G/DL (ref 32–36)
MCV RBC AUTO: 81 FL (ref 82–98)
MCV RBC AUTO: 82 FL (ref 82–98)
MICROSCOPIC COMMENT: NORMAL
NITRITE UR QL STRIP: NEGATIVE
NUCLEATED RBC (/100WBC) (OHS): 0 /100 WBC
NUCLEATED RBC (/100WBC) (OHS): 0 /100 WBC
OB PNL STL: POSITIVE
PH UR STRIP: 6 [PH]
PLATELET # BLD AUTO: 423 K/UL (ref 150–450)
PLATELET # BLD AUTO: 465 K/UL (ref 150–450)
PLATELET BLD QL SMEAR: ABNORMAL
PMV BLD AUTO: 10.4 FL (ref 9.2–12.9)
PMV BLD AUTO: 11.1 FL (ref 9.2–12.9)
POTASSIUM SERPL-SCNC: 3.6 MMOL/L (ref 3.5–5.1)
PROT SERPL-MCNC: 8.2 GM/DL (ref 6–8.4)
PROT UR QL STRIP: NEGATIVE
PROTHROMBIN TIME: 10.8 SECONDS (ref 9–12.5)
RBC # BLD AUTO: 3.23 M/UL (ref 4–5.4)
RBC # BLD AUTO: 3.43 M/UL (ref 4–5.4)
RBC #/AREA URNS HPF: 1 /HPF (ref 0–4)
RELATIVE EOSINOPHIL (OHS): 1.3 %
RELATIVE EOSINOPHIL (OHS): 2.7 %
RELATIVE LYMPHOCYTE (OHS): 30.7 % (ref 18–48)
RELATIVE LYMPHOCYTE (OHS): 45 % (ref 18–48)
RELATIVE MONOCYTE (OHS): 6.8 % (ref 4–15)
RELATIVE MONOCYTE (OHS): 7.4 % (ref 4–15)
RELATIVE NEUTROPHIL (OHS): 44.1 % (ref 38–73)
RELATIVE NEUTROPHIL (OHS): 59 % (ref 38–73)
RH BLD: NORMAL
SODIUM SERPL-SCNC: 140 MMOL/L (ref 136–145)
SP GR UR STRIP: 1.01
SPECIMEN OUTDATE: NORMAL
SQUAMOUS #/AREA URNS HPF: 2 /HPF
UROBILINOGEN UR STRIP-ACNC: 1 EU/DL
WBC # BLD AUTO: 11.15 K/UL (ref 3.9–12.7)
WBC # BLD AUTO: 11.42 K/UL (ref 3.9–12.7)
WBC #/AREA URNS HPF: 3 /HPF (ref 0–5)

## 2025-04-09 PROCEDURE — 99284 EMERGENCY DEPT VISIT MOD MDM: CPT | Mod: 25

## 2025-04-09 PROCEDURE — 96375 TX/PRO/DX INJ NEW DRUG ADDON: CPT

## 2025-04-09 PROCEDURE — 25000003 PHARM REV CODE 250: Performed by: SURGERY

## 2025-04-09 PROCEDURE — 81003 URINALYSIS AUTO W/O SCOPE: CPT | Performed by: STUDENT IN AN ORGANIZED HEALTH CARE EDUCATION/TRAINING PROGRAM

## 2025-04-09 PROCEDURE — 96374 THER/PROPH/DIAG INJ IV PUSH: CPT

## 2025-04-09 PROCEDURE — 85025 COMPLETE CBC W/AUTO DIFF WBC: CPT | Performed by: SURGERY

## 2025-04-09 PROCEDURE — 99285 EMERGENCY DEPT VISIT HI MDM: CPT | Mod: 25

## 2025-04-09 PROCEDURE — 63600175 PHARM REV CODE 636 W HCPCS: Performed by: SURGERY

## 2025-04-09 PROCEDURE — 85025 COMPLETE CBC W/AUTO DIFF WBC: CPT | Performed by: STUDENT IN AN ORGANIZED HEALTH CARE EDUCATION/TRAINING PROGRAM

## 2025-04-09 PROCEDURE — 86901 BLOOD TYPING SEROLOGIC RH(D): CPT | Performed by: STUDENT IN AN ORGANIZED HEALTH CARE EDUCATION/TRAINING PROGRAM

## 2025-04-09 PROCEDURE — 85610 PROTHROMBIN TIME: CPT | Performed by: STUDENT IN AN ORGANIZED HEALTH CARE EDUCATION/TRAINING PROGRAM

## 2025-04-09 PROCEDURE — 85730 THROMBOPLASTIN TIME PARTIAL: CPT | Performed by: STUDENT IN AN ORGANIZED HEALTH CARE EDUCATION/TRAINING PROGRAM

## 2025-04-09 PROCEDURE — 82272 OCCULT BLD FECES 1-3 TESTS: CPT | Performed by: STUDENT IN AN ORGANIZED HEALTH CARE EDUCATION/TRAINING PROGRAM

## 2025-04-09 PROCEDURE — 25000003 PHARM REV CODE 250: Performed by: STUDENT IN AN ORGANIZED HEALTH CARE EDUCATION/TRAINING PROGRAM

## 2025-04-09 PROCEDURE — 80053 COMPREHEN METABOLIC PANEL: CPT | Performed by: STUDENT IN AN ORGANIZED HEALTH CARE EDUCATION/TRAINING PROGRAM

## 2025-04-09 RX ORDER — HYDRALAZINE HYDROCHLORIDE 20 MG/ML
20 INJECTION INTRAMUSCULAR; INTRAVENOUS
Status: COMPLETED | OUTPATIENT
Start: 2025-04-09 | End: 2025-04-09

## 2025-04-09 RX ORDER — CLONIDINE HYDROCHLORIDE 0.1 MG/1
0.1 TABLET ORAL
Status: COMPLETED | OUTPATIENT
Start: 2025-04-09 | End: 2025-04-09

## 2025-04-09 RX ORDER — PANTOPRAZOLE SODIUM 40 MG/10ML
80 INJECTION, POWDER, LYOPHILIZED, FOR SOLUTION INTRAVENOUS
Status: COMPLETED | OUTPATIENT
Start: 2025-04-09 | End: 2025-04-09

## 2025-04-09 RX ORDER — SODIUM CHLORIDE 9 MG/ML
1000 INJECTION, SOLUTION INTRAVENOUS CONTINUOUS
Status: DISCONTINUED | OUTPATIENT
Start: 2025-04-09 | End: 2025-04-10 | Stop reason: HOSPADM

## 2025-04-09 RX ADMIN — SODIUM CHLORIDE 1000 ML: 0.9 INJECTION, SOLUTION INTRAVENOUS at 08:04

## 2025-04-09 RX ADMIN — CLONIDINE HYDROCHLORIDE 0.1 MG: 0.1 TABLET ORAL at 05:04

## 2025-04-09 RX ADMIN — SODIUM CHLORIDE 8 MG/HR: 900 INJECTION INTRAVENOUS at 07:04

## 2025-04-09 RX ADMIN — HYDRALAZINE HYDROCHLORIDE 20 MG: 20 INJECTION INTRAMUSCULAR; INTRAVENOUS at 06:04

## 2025-04-09 RX ADMIN — PANTOPRAZOLE SODIUM 80 MG: 40 INJECTION, POWDER, LYOPHILIZED, FOR SOLUTION INTRAVENOUS at 07:04

## 2025-04-09 NOTE — PROVIDER TRANSFER
Outside Transfer Acceptance Note / Regional Referral Center    Referring facility: [facility]   Referring provider: KYLE DRISCOLL  Accepting facility: \[facility]\""  Accepting provider: FISH CONTEH  Reason for transfer: Higher Level of Care   Transfer diagnosis: GI bleed  Transfer specialty requested: Gastroenterology  Transfer specialty notified: No  Transfer level: NUMBER 1-5: 2  Bed type requested: tele  Isolation status: No active isolations   Admission class or status: IP- Inpatient    Narrative     54-y/o w with PMH HTN, and DM2 presented to [facility] with intermittent epigastric pain and black stools for one week.  Home meds:  Aspirin (81 daily), Jardiance, metformin, insulin glargine (20 U every evening) lisinopril atorvastatin    In ED /109 > 200/117 > 182/102,  > 102, RR 20, SpO2 100% (RA).  On exam there is no abd tenderness to palpation guarding rebound or masses.  No CVA tenderness.  WBC 11.4, Hb 14.8 (BL) > 9.5, Plts 423, CMP unremarkable except for Glu 185, U/A neg, FOBT pos.      The patient has had a few more melanotic stools in the ED. Patient given clonidine (0.1 mg PO) and hydralazine (20 mg IV).  Patient given Protonix (80 IV).  Repeat CBC in one hr.  Transfusion on hold at the moment.  Transfer requested for GI.  Transfer diagnosis: Upper GIB, acute blood loss anemia, hypertensive urgency      Instructions      [facility]  Admit to Hospital Medicine  Upon patient arrival to floor, please contact Hospital Medicine on call.

## 2025-04-09 NOTE — ED PROVIDER NOTES
Encounter Date: 4/9/2025       History     Chief Complaint   Patient presents with    Abdominal Pain    Melena     54-year-old female with history of hypertension, presenting with epigastric pain and black stools for one week.  She denies any history of ulcers, but states that her mother had some.  Denies taking any other medication.  No lower abdominal pain.  No other complaints.  Patient states that she is not currently suffering from the abdominal pain, that it is intermittent.      Review of patient's allergies indicates:  No Known Allergies  Past Medical History:   Diagnosis Date    Hypertension      Past Surgical History:   Procedure Laterality Date    HYSTERECTOMY       No family history on file.  Social History[1]  Review of Systems   Constitutional:  Negative for chills and fever.   HENT:  Negative for congestion and sore throat.    Respiratory:  Negative for cough and shortness of breath.    Cardiovascular:  Negative for chest pain.   Gastrointestinal:  Positive for abdominal pain. Negative for diarrhea, nausea and vomiting.        Dark stools   Genitourinary:  Negative for dysuria.   Musculoskeletal:  Negative for back pain.   Skin:  Negative for rash.   Neurological:  Negative for weakness.   Hematological:  Does not bruise/bleed easily.       Physical Exam     Initial Vitals [04/09/25 1504]   BP Pulse Resp Temp SpO2   (!) 176/109 (!) 112 20 98.5 °F (36.9 °C) 100 %      MAP       --         Physical Exam    Nursing note and vitals reviewed.  Constitutional: She appears well-developed.   HENT:   Head: Normocephalic.   Eyes: Pupils are equal, round, and reactive to light.   Neck:   Normal range of motion.  Cardiovascular:            No murmur heard.  Pulmonary/Chest: No respiratory distress.   Abdominal: Abdomen is soft.   No TTP diffusely. No guarding, rebound, or masses. No CVAT bilaterally.     Genitourinary:    Genitourinary Comments: Patient declined to have rectal exam performed, requesting to give a  stool sample instead.     Musculoskeletal:         General: No edema.      Cervical back: Normal range of motion.     Neurological: She is alert.   Skin: Skin is warm.   Psychiatric: She has a normal mood and affect.         ED Course   Critical Care    Date/Time: 4/9/2025 4:52 PM    Performed by: Rakan Davis MD  Authorized by: Rakan Davis MD  Direct patient critical care time: 10 minutes  Additional history critical care time: 5 minutes  Ordering / reviewing critical care time: 5 minutes  Documentation critical care time: 10 minutes  Consulting other physicians critical care time: 5 minutes  Consult with family critical care time: 0 minutes  Total critical care time (exclusive of procedural time) : 35 minutes  Critical care was necessary to treat or prevent imminent or life-threatening deterioration of the following conditions: GI Bleed.  Critical care was time spent personally by me on the following activities: interpretation of cardiac output measurements, evaluation of patient's response to treatment, examination of patient, obtaining history from patient or surrogate, ordering and performing treatments and interventions, ordering and review of laboratory studies, ordering and review of radiographic studies, pulse oximetry, re-evaluation of patient's condition and review of old charts.        Labs Reviewed   URINALYSIS, REFLEX TO URINE CULTURE   COMPREHENSIVE METABOLIC PANEL   CBC W/ AUTO DIFFERENTIAL    Narrative:     The following orders were created for panel order Complete Blood Count (CBC).  Procedure                               Abnormality         Status                     ---------                               -----------         ------                     CBC with Differential[[id number]]                                                        Please view results for these tests on the individual orders.   APTT   PROTIME-INR   CBC WITH DIFFERENTIAL   TYPE & SCREEN          Imaging  Results    None          Medications - No data to display  Medical Decision Making  DDX: Concern for GI bleed.  Will screen for signs of anemia, transfuse as necessary.  Will rule out other intra-abdominal pathologies such as pancreatitis, biliary pathology, appendicitis, with labs and imaging.  DX:  CBC, CMP coags, type and screen.   TX:  IV fluids, analgesia p.r.n., transfusion p.r.n.  Dispo:  Pending workup      Amount and/or Complexity of Data Reviewed  Labs: ordered. Decision-making details documented in ED Course.               ED Course as of 04/09/25 1652 Wed Apr 09, 2025   1606 Normal stool on NEHEMIAH. [NB]   1627 Hemoglobin(!): 9.5 [NB]   1646 Hemoglobin(!): 9.5 [NB]      ED Course User Index  [NB] Rakan Davis MD                           Clinical Impression:  Final diagnoses:  [R19.5] Dark stools (Primary)                   Rakan Davis MD  04/09/25 1535       [1]   Social History  Tobacco Use    Smoking status: Never    Smokeless tobacco: Never   Substance Use Topics    Alcohol use: Yes    Drug use: Never        Rakan Davis MD  04/09/25 1652

## 2025-04-10 ENCOUNTER — HOSPITAL ENCOUNTER (INPATIENT)
Facility: HOSPITAL | Age: 55
LOS: 2 days | Discharge: HOME | DRG: 376 | End: 2025-04-12
Attending: INTERNAL MEDICINE | Admitting: STUDENT IN AN ORGANIZED HEALTH CARE EDUCATION/TRAINING PROGRAM

## 2025-04-10 ENCOUNTER — ANESTHESIA (OUTPATIENT)
Dept: ENDOSCOPY | Facility: HOSPITAL | Age: 55
DRG: 376 | End: 2025-04-10

## 2025-04-10 ENCOUNTER — ANESTHESIA EVENT (OUTPATIENT)
Dept: ENDOSCOPY | Facility: HOSPITAL | Age: 55
DRG: 376 | End: 2025-04-10

## 2025-04-10 DIAGNOSIS — Z79.4 TYPE 2 DIABETES MELLITUS WITHOUT COMPLICATION, WITH LONG-TERM CURRENT USE OF INSULIN: ICD-10-CM

## 2025-04-10 DIAGNOSIS — K92.2 GIB (GASTROINTESTINAL BLEEDING): ICD-10-CM

## 2025-04-10 DIAGNOSIS — E11.9 TYPE 2 DIABETES MELLITUS WITHOUT COMPLICATION, WITH LONG-TERM CURRENT USE OF INSULIN: ICD-10-CM

## 2025-04-10 DIAGNOSIS — K31.89 DUODENAL MASS: Primary | ICD-10-CM

## 2025-04-10 PROBLEM — E66.9 OBESITY (BMI 30-39.9): Status: ACTIVE | Noted: 2025-04-10

## 2025-04-10 PROBLEM — I16.0 HYPERTENSIVE URGENCY: Status: ACTIVE | Noted: 2025-04-10

## 2025-04-10 LAB
ABSOLUTE EOSINOPHIL (OHS): 0.13 K/UL
ABSOLUTE EOSINOPHIL (OHS): 0.17 K/UL
ABSOLUTE MONOCYTE (OHS): 0.83 K/UL (ref 0.3–1)
ABSOLUTE MONOCYTE (OHS): 1.05 K/UL (ref 0.3–1)
ABSOLUTE NEUTROPHIL COUNT (OHS): 5.62 K/UL (ref 1.8–7.7)
ABSOLUTE NEUTROPHIL COUNT (OHS): 7.54 K/UL (ref 1.8–7.7)
BASOPHILS # BLD AUTO: 0.08 K/UL
BASOPHILS # BLD AUTO: 0.08 K/UL
BASOPHILS NFR BLD AUTO: 0.6 %
BASOPHILS NFR BLD AUTO: 0.7 %
ERYTHROCYTE [DISTWIDTH] IN BLOOD BY AUTOMATED COUNT: 15.2 % (ref 11.5–14.5)
ERYTHROCYTE [DISTWIDTH] IN BLOOD BY AUTOMATED COUNT: 15.3 % (ref 11.5–14.5)
HCT VFR BLD AUTO: 25.6 % (ref 37–48.5)
HCT VFR BLD AUTO: 25.9 % (ref 37–48.5)
HGB BLD-MCNC: 8.8 GM/DL (ref 12–16)
HGB BLD-MCNC: 8.8 GM/DL (ref 12–16)
IMM GRANULOCYTES # BLD AUTO: 0.04 K/UL (ref 0–0.04)
IMM GRANULOCYTES # BLD AUTO: 0.05 K/UL (ref 0–0.04)
IMM GRANULOCYTES NFR BLD AUTO: 0.3 % (ref 0–0.5)
IMM GRANULOCYTES NFR BLD AUTO: 0.5 % (ref 0–0.5)
INDIRECT COOMBS: NORMAL
LYMPHOCYTES # BLD AUTO: 4.11 K/UL (ref 1–4.8)
LYMPHOCYTES # BLD AUTO: 4.2 K/UL (ref 1–4.8)
MCH RBC QN AUTO: 27.8 PG (ref 27–31)
MCH RBC QN AUTO: 28.3 PG (ref 27–31)
MCHC RBC AUTO-ENTMCNC: 34 G/DL (ref 32–36)
MCHC RBC AUTO-ENTMCNC: 34.4 G/DL (ref 32–36)
MCV RBC AUTO: 82 FL (ref 82–98)
MCV RBC AUTO: 82 FL (ref 82–98)
NUCLEATED RBC (/100WBC) (OHS): 0 /100 WBC
NUCLEATED RBC (/100WBC) (OHS): 0 /100 WBC
PLATELET # BLD AUTO: 474 K/UL (ref 150–450)
PLATELET # BLD AUTO: 476 K/UL (ref 150–450)
PMV BLD AUTO: 10.2 FL (ref 9.2–12.9)
PMV BLD AUTO: 11 FL (ref 9.2–12.9)
POCT GLUCOSE: 145 MG/DL (ref 70–110)
POCT GLUCOSE: 146 MG/DL (ref 70–110)
POCT GLUCOSE: 169 MG/DL (ref 70–110)
POCT GLUCOSE: 225 MG/DL (ref 70–110)
RBC # BLD AUTO: 3.11 M/UL (ref 4–5.4)
RBC # BLD AUTO: 3.16 M/UL (ref 4–5.4)
RELATIVE EOSINOPHIL (OHS): 1 %
RELATIVE EOSINOPHIL (OHS): 1.6 %
RELATIVE LYMPHOCYTE (OHS): 32.2 % (ref 18–48)
RELATIVE LYMPHOCYTE (OHS): 37.8 % (ref 18–48)
RELATIVE MONOCYTE (OHS): 7.6 % (ref 4–15)
RELATIVE MONOCYTE (OHS): 8.1 % (ref 4–15)
RELATIVE NEUTROPHIL (OHS): 51.8 % (ref 38–73)
RELATIVE NEUTROPHIL (OHS): 57.8 % (ref 38–73)
RH BLD: NORMAL
SPECIMEN OUTDATE: NORMAL
WBC # BLD AUTO: 10.86 K/UL (ref 3.9–12.7)
WBC # BLD AUTO: 13.04 K/UL (ref 3.9–12.7)

## 2025-04-10 PROCEDURE — 27201012 HC FORCEPS, HOT/COLD, DISP: Performed by: INTERNAL MEDICINE

## 2025-04-10 PROCEDURE — 85025 COMPLETE CBC W/AUTO DIFF WBC: CPT | Performed by: FAMILY MEDICINE

## 2025-04-10 PROCEDURE — 99223 1ST HOSP IP/OBS HIGH 75: CPT | Mod: ,,, | Performed by: INTERNAL MEDICINE

## 2025-04-10 PROCEDURE — 11000001 HC ACUTE MED/SURG PRIVATE ROOM

## 2025-04-10 PROCEDURE — 37000008 HC ANESTHESIA 1ST 15 MINUTES: Performed by: INTERNAL MEDICINE

## 2025-04-10 PROCEDURE — 88341 IMHCHEM/IMCYTCHM EA ADD ANTB: CPT | Mod: TC | Performed by: INTERNAL MEDICINE

## 2025-04-10 PROCEDURE — 0DB98ZX EXCISION OF DUODENUM, VIA NATURAL OR ARTIFICIAL OPENING ENDOSCOPIC, DIAGNOSTIC: ICD-10-PCS | Performed by: STUDENT IN AN ORGANIZED HEALTH CARE EDUCATION/TRAINING PROGRAM

## 2025-04-10 PROCEDURE — 36415 COLL VENOUS BLD VENIPUNCTURE: CPT | Performed by: FAMILY MEDICINE

## 2025-04-10 PROCEDURE — 86901 BLOOD TYPING SEROLOGIC RH(D): CPT | Performed by: FAMILY MEDICINE

## 2025-04-10 PROCEDURE — 25500020 PHARM REV CODE 255

## 2025-04-10 PROCEDURE — 43239 EGD BIOPSY SINGLE/MULTIPLE: CPT | Performed by: INTERNAL MEDICINE

## 2025-04-10 PROCEDURE — 63600175 PHARM REV CODE 636 W HCPCS

## 2025-04-10 PROCEDURE — D9220A PRA ANESTHESIA: Mod: ,,, | Performed by: ANESTHESIOLOGY

## 2025-04-10 PROCEDURE — 43239 EGD BIOPSY SINGLE/MULTIPLE: CPT | Mod: ,,, | Performed by: INTERNAL MEDICINE

## 2025-04-10 PROCEDURE — 63600175 PHARM REV CODE 636 W HCPCS: Performed by: FAMILY MEDICINE

## 2025-04-10 PROCEDURE — 37000009 HC ANESTHESIA EA ADD 15 MINS: Performed by: INTERNAL MEDICINE

## 2025-04-10 PROCEDURE — 25000003 PHARM REV CODE 250: Performed by: FAMILY MEDICINE

## 2025-04-10 PROCEDURE — 25000003 PHARM REV CODE 250

## 2025-04-10 RX ORDER — PROPOFOL 10 MG/ML
VIAL (ML) INTRAVENOUS
Status: DISCONTINUED | OUTPATIENT
Start: 2025-04-10 | End: 2025-04-10

## 2025-04-10 RX ORDER — HYDRALAZINE HYDROCHLORIDE 20 MG/ML
10 INJECTION INTRAMUSCULAR; INTRAVENOUS EVERY 6 HOURS PRN
Status: DISCONTINUED | OUTPATIENT
Start: 2025-04-10 | End: 2025-04-12 | Stop reason: HOSPADM

## 2025-04-10 RX ORDER — LISINOPRIL 20 MG/1
20 TABLET ORAL DAILY
Status: DISCONTINUED | OUTPATIENT
Start: 2025-04-10 | End: 2025-04-12 | Stop reason: HOSPADM

## 2025-04-10 RX ORDER — PROPOFOL 10 MG/ML
VIAL (ML) INTRAVENOUS CONTINUOUS PRN
Status: DISCONTINUED | OUTPATIENT
Start: 2025-04-10 | End: 2025-04-10

## 2025-04-10 RX ORDER — PANTOPRAZOLE SODIUM 40 MG/10ML
40 INJECTION, POWDER, LYOPHILIZED, FOR SOLUTION INTRAVENOUS 2 TIMES DAILY
Status: DISCONTINUED | OUTPATIENT
Start: 2025-04-10 | End: 2025-04-12 | Stop reason: HOSPADM

## 2025-04-10 RX ORDER — ONDANSETRON HYDROCHLORIDE 2 MG/ML
4 INJECTION, SOLUTION INTRAVENOUS EVERY 12 HOURS PRN
Status: DISCONTINUED | OUTPATIENT
Start: 2025-04-10 | End: 2025-04-12 | Stop reason: HOSPADM

## 2025-04-10 RX ORDER — ATORVASTATIN CALCIUM 40 MG/1
40 TABLET, FILM COATED ORAL DAILY
Status: DISCONTINUED | OUTPATIENT
Start: 2025-04-10 | End: 2025-04-12 | Stop reason: HOSPADM

## 2025-04-10 RX ORDER — LIDOCAINE HYDROCHLORIDE 20 MG/ML
INJECTION INTRAVENOUS
Status: DISCONTINUED | OUTPATIENT
Start: 2025-04-10 | End: 2025-04-10

## 2025-04-10 RX ORDER — INSULIN GLARGINE 100 [IU]/ML
10 INJECTION, SOLUTION SUBCUTANEOUS NIGHTLY
Status: DISCONTINUED | OUTPATIENT
Start: 2025-04-10 | End: 2025-04-12 | Stop reason: HOSPADM

## 2025-04-10 RX ORDER — INSULIN ASPART 100 [IU]/ML
0-5 INJECTION, SOLUTION INTRAVENOUS; SUBCUTANEOUS EVERY 6 HOURS PRN
Status: DISCONTINUED | OUTPATIENT
Start: 2025-04-10 | End: 2025-04-11

## 2025-04-10 RX ORDER — GLUCAGON 1 MG
1 KIT INJECTION
Status: DISCONTINUED | OUTPATIENT
Start: 2025-04-10 | End: 2025-04-12 | Stop reason: HOSPADM

## 2025-04-10 RX ORDER — TOPICAL ANESTHETIC 200 MG/ML
SPRAY DENTAL; PERIODONTAL
Status: DISCONTINUED | OUTPATIENT
Start: 2025-04-10 | End: 2025-04-10

## 2025-04-10 RX ORDER — DEXMEDETOMIDINE HYDROCHLORIDE 100 UG/ML
INJECTION, SOLUTION INTRAVENOUS
Status: DISCONTINUED | OUTPATIENT
Start: 2025-04-10 | End: 2025-04-10

## 2025-04-10 RX ORDER — ACETAMINOPHEN 325 MG/1
650 TABLET ORAL EVERY 8 HOURS PRN
Status: DISCONTINUED | OUTPATIENT
Start: 2025-04-10 | End: 2025-04-12 | Stop reason: HOSPADM

## 2025-04-10 RX ADMIN — PROPOFOL 150 MCG/KG/MIN: 10 INJECTION, EMULSION INTRAVENOUS at 11:04

## 2025-04-10 RX ADMIN — LIDOCAINE HYDROCHLORIDE 100 MG: 20 INJECTION, SOLUTION INTRAVENOUS at 11:04

## 2025-04-10 RX ADMIN — PROPOFOL 20 MG: 10 INJECTION, EMULSION INTRAVENOUS at 11:04

## 2025-04-10 RX ADMIN — PROPOFOL 60 MG: 10 INJECTION, EMULSION INTRAVENOUS at 11:04

## 2025-04-10 RX ADMIN — ATORVASTATIN CALCIUM 40 MG: 40 TABLET, FILM COATED ORAL at 08:04

## 2025-04-10 RX ADMIN — PANTOPRAZOLE SODIUM 40 MG: 40 INJECTION, POWDER, FOR SOLUTION INTRAVENOUS at 12:04

## 2025-04-10 RX ADMIN — SODIUM CHLORIDE: 0.9 INJECTION, SOLUTION INTRAVENOUS at 11:04

## 2025-04-10 RX ADMIN — PANTOPRAZOLE SODIUM 40 MG: 40 INJECTION, POWDER, FOR SOLUTION INTRAVENOUS at 09:04

## 2025-04-10 RX ADMIN — INSULIN ASPART 2 UNITS: 100 INJECTION, SOLUTION INTRAVENOUS; SUBCUTANEOUS at 07:04

## 2025-04-10 RX ADMIN — LISINOPRIL 20 MG: 20 TABLET ORAL at 08:04

## 2025-04-10 RX ADMIN — DEXMEDETOMIDINE HYDROCHLORIDE 8 MCG: 100 INJECTION, SOLUTION, CONCENTRATE INTRAVENOUS at 11:04

## 2025-04-10 RX ADMIN — PROPOFOL 50 MG: 10 INJECTION, EMULSION INTRAVENOUS at 11:04

## 2025-04-10 RX ADMIN — TOPICAL ANESTHETIC 1 EACH: 200 SPRAY DENTAL; PERIODONTAL at 11:04

## 2025-04-10 RX ADMIN — INSULIN GLARGINE 10 UNITS: 100 INJECTION, SOLUTION SUBCUTANEOUS at 10:04

## 2025-04-10 RX ADMIN — PROPOFOL 30 MG: 10 INJECTION, EMULSION INTRAVENOUS at 11:04

## 2025-04-10 RX ADMIN — IOHEXOL 100 ML: 350 INJECTION, SOLUTION INTRAVENOUS at 03:04

## 2025-04-10 RX ADMIN — PANTOPRAZOLE SODIUM 40 MG: 40 INJECTION, POWDER, FOR SOLUTION INTRAVENOUS at 10:04

## 2025-04-10 NOTE — ASSESSMENT & PLAN NOTE
Admit to medical floor with telemetry.    NPO.  Patient's hemorrhage is due to gastrointestinal bleed, this bleeding is associated with an antiplatelet agent, the antiplatelet agent is Select Antiplatelet Agent(s): Aspirin. Patients most recent Hgb, Hct, platelets, and INR are listed below.  Recent Labs     04/09/25  1543 04/09/25  2055 04/10/25  0328   HGB 9.5* 8.9* 8.8*   HCT 28.1* 26.1* 25.9*    465* 476*   INR 1.0  --   --      Plan  - Will trend hemoglobin/hematocrit Daily  - Will monitor and correct any coagulation defects  - Will transfuse if Hgb is <7g/dl (<8g/dl in cases of active ACS) or if patient has rapid bleeding leading to hemodynamic instability  - inpatient gastroenterology consultation, appreciate rec's:    EGD 4/10:   Impression:            - Normal esophagus.                          - Small hiatal hernia.                          - Rule out malignancy, duodenal mass. Biopsied.   Recommendation:        - Return patient to [facility] for ongoing care.                          - Resume previous diet.                          - Continue present medications.                          - Await pathology results.                          - Observe patient's clinical course.

## 2025-04-10 NOTE — TRANSFER OF CARE
"Anesthesia Transfer of Care Note    Patient: Aury Felix    Procedure(s) Performed: Procedure(s) (LRB):  EGD (ESOPHAGOGASTRODUODENOSCOPY) (N/A)    Patient location: GI    Anesthesia Type: general    Transport from OR: Transported from OR on room air with adequate spontaneous ventilation    Post pain: adequate analgesia    Post assessment: no apparent anesthetic complications and tolerated procedure well    Post vital signs: stable    Level of consciousness: awake    Nausea/Vomiting: no nausea/vomiting    Complications: none    Transfer of care protocol was followed      Last vitals: Visit Vitals  BP (!) 160/104   Pulse 107   Temp 36.3 °C (97.3 °F) (Temporal)   Resp (!) 24   Ht 5' 8" (1.727 m)   Wt 94.7 kg (208 lb 12.4 oz)   SpO2 98%   Breastfeeding No   BMI 31.74 kg/m²     "

## 2025-04-10 NOTE — NURSING
Patient back on unit.  Vitals obtained.  Blood glucose 146.  NPO diet remains in place for CT scan of abd/pelvis.  No pain at this time.

## 2025-04-10 NOTE — ASSESSMENT & PLAN NOTE
Patient has a current diagnosis of hypertensive urgency (without evidence of end organ damage) which is controlled.  Latest blood pressure and vitals reviewed-   Temp:  [98.1 °F (36.7 °C)-98.5 °F (36.9 °C)]   Pulse:  []   Resp:  [12-20]   BP: (132-213)/()   SpO2:  [99 %-100 %] .   Patient currently on IV antihypertensives.   Home meds for hypertension were reviewed and noted below.   Hypertension Medications              lisinopriL (PRINIVIL,ZESTRIL) 20 MG tablet Take 1 tablet (20 mg total) by mouth once daily.            Medication adjustment for hospital antihypertensives is as follows- IV hydralazine p.r.n..    Will aim for controlled BP reduction by medications noted above. Monitor and mitigate end organ damage as indicated.

## 2025-04-10 NOTE — ASSESSMENT & PLAN NOTE
Admit to medical floor with telemetry.    NPO.  Patient's hemorrhage is due to gastrointestinal bleed, this bleeding is associated with an antiplatelet agent, the antiplatelet agent is Select Antiplatelet Agent(s): Aspirin. Patients most recent Hgb, Hct, platelets, and INR are listed below.  Recent Labs     04/09/25  1543 04/09/25 2055   HGB 9.5* 8.9*   HCT 28.1* 26.1*    465*   INR 1.0  --      Plan  - Will trend hemoglobin/hematocrit Daily  - Will monitor and correct any coagulation defects  - Will transfuse if Hgb is <7g/dl (<8g/dl in cases of active ACS) or if patient has rapid bleeding leading to hemodynamic instability  - inpatient gastroenterology consultation.

## 2025-04-10 NOTE — LETTER
"April 12, 2025             [facility]  [address]  [place] [address]  Phone: [phone]  April 12, 2025     Patient: Aury Felix (Melissa)     YOB: 1970        To Whom It May Concern:    Aury Felix was admitted to the hospital on 4/10/2025 12:07 AM and discharged on  4/12/2025 .  She may return to work on 4/17/2025 .  If you have any questions or concerns, or if I can be of any further assistance, please do not hesitate to contact me.    Sincerely,        Kurt Bliss PA-C  Department of Hospital Medicine     "

## 2025-04-10 NOTE — PLAN OF CARE
Problem: Gastrointestinal Bleeding  Goal: Optimal Coping with Acute Illness  Outcome: Progressing     Problem: Fall Injury Risk  Goal: Absence of Fall and Fall-Related Injury  Outcome: Progressing  Intervention: Identify and Manage Contributors  Flowsheets (Taken 4/10/2025 0603)  Self-Care Promotion:   independence encouraged   BADL personal objects within reach  Medication Review/Management:   medications reviewed   high-risk medications identified

## 2025-04-10 NOTE — HPI
54-year-old female with PMH HTN, and DM2 presented to [facility] ER with intermittent epigastric pain and black stools for one week.  Patient states that she has been taking Pepto-Bismol for past 3 days to relieve her epigastric pain.    In ED /109 > 200/117 > 182/102,  > 102, RR 20, SpO2 100% (RA).   WBC 11.4, Hb 14.8 (BL) > 9.5, Plts 423, CMP unremarkable except for Glu 185, U/A neg, FOBT pos.    The patient has had a few melanotic stools in the ED. Patient given clonidine (0.1 mg PO) and hydralazine (20 mg IV).  Patient given Protonix (80 IV).  Due to patient's presenting symptoms she is transferred to [facility] for higher level of care.  At time of my examination patient denied any headache, fever, chills, chest pain or shortness of breath but complained of black stools and epigastric pain.     Denies significant NSAIDs

## 2025-04-10 NOTE — HPI
Aury Felix is a 54-year-old female with PMH HTN, and DM2 presented to [facility] ER with intermittent epigastric pain and black stools for one week.  Patient states that she has been taking Pepto-Bismol for past 3 days to relieve her epigastric pain.    In ED /109 > 200/117 > 182/102,  > 102, RR 20, SpO2 100% (RA).   WBC 11.4, Hb 14.8 (BL) > 9.5, Plts 423, CMP unremarkable except for Glu 185, U/A neg, FOBT pos.    The patient has had a few melanotic stools in the ED. Patient given clonidine (0.1 mg PO) and hydralazine (20 mg IV).  Patient given Protonix (80 IV).  Due to patient's presenting symptoms she is transferred to [facility] for higher level of care.  At time of my examination patient denied any headache, fever, chills, chest pain or shortness of breath but complained of black stools and epigastric pain.

## 2025-04-10 NOTE — PROVATION PATIENT INSTRUCTIONS
Discharge Summary/Instructions after an Endoscopic Procedure  Patient Name: Aury Felix  Patient MRN: [id number]  Patient YOB: 1970  Thursday, April 10, 2025  Manny Calles MD  Dear patient,  As a result of recent federal legislation (The Federal Cures Act), you may   receive lab or pathology results from your procedure in your [facility]   account before your physician is able to contact you. Your physician or   their representative will relay the results to you with their   recommendations at their soonest availability.  Thank you,  Your health is very important to us during the Covid Crisis. Following your   procedure today, you will receive a daily text for 2 weeks asking about   signs or symptoms of Covid 19.  Please respond to this text when you   receive it so we can follow up and keep you as safe as possible.   RESTRICTIONS:  During your procedure today, you received medications for sedation.  These   medications may affect your judgment, balance and coordination.  Therefore,   for 24 hours, you have the following restrictions:   - DO NOT drive a car, operate machinery, make legal/financial decisions,   sign important papers or drink alcohol.    ACTIVITY:  Today: no heavy lifting, straining or running due to procedural   sedation/anesthesia.  The following day: return to full activity including work.  DIET:  Eat and drink normally unless instructed otherwise.     TREATMENT FOR COMMON SIDE EFFECTS:  - Mild abdominal pain, nausea, belching, bloating or excessive gas:  rest,   eat lightly and use a heating pad.  - Sore Throat: treat with throat lozenges and/or gargle with warm salt   water.  - Because air was used during the procedure, expelling large amounts of air   from your rectum or belching is normal.  - If a bowel prep was taken, you may not have a bowel movement for 1-3 days.    This is normal.  SYMPTOMS TO WATCH FOR AND REPORT TO YOUR PHYSICIAN:  1. Abdominal pain or bloating,  other than gas cramps.  2. Chest pain.  3. Back pain.  4. Signs of infection such as: chills or fever occurring within 24 hours   after the procedure.  5. Rectal bleeding, which would show as bright red, maroon, or black stools.   (A tablespoon of blood from the rectum is not serious, especially if   hemorrhoids are present.)  6. Vomiting.  7. Weakness or dizziness.  GO DIRECTLY TO THE NEAREST EMERGENCY ROOM IF YOU HAVE ANY OF THE FOLLOWING:      Difficulty breathing              Chills and/or fever over 101 F   Persistent vomiting and/or vomiting blood   Severe abdominal pain   Severe chest pain   Black, tarry stools   Bleeding- more than one tablespoon   Any other symptom or condition that you feel may need urgent attention  Your doctor recommends these additional instructions:  If any biopsies were taken, your doctors clinic will contact you in 1 to 2   weeks with any results.  - Return patient to [facility] for ongoing care.   - Resume previous diet.   - Continue present medications.   - Await pathology results.   - Observe patient's clinical course.  For questions, problems or results please call your physician - Manny Calles MD.  EMERGENCY PHONE NUMBER: [phone],  LAB RESULTS: [phone]  IF A COMPLICATION OR EMERGENCY SITUATION ARISES AND YOU ARE UNABLE TO REACH   YOUR PHYSICIAN - GO DIRECTLY TO THE EMERGENCY ROOM.  Manny Calles MD  4/10/2025 12:04:11 PM  This report has been verified and signed electronically.  Dear patient,  As a result of recent federal legislation (The Federal Cures Act), you may   receive lab or pathology results from your procedure in your [facility]   account before your physician is able to contact you. Your physician or   their representative will relay the results to you with their   recommendations at their soonest availability.  Thank you,  [employer]ATION

## 2025-04-10 NOTE — ASSESSMENT & PLAN NOTE
Patient's FSGs are controlled on current medication regimen.  Last A1c reviewed-   Lab Results   Component Value Date    HGBA1C 6.7 (H) 01/17/2025     Most recent fingerstick glucose reviewed-   Recent Labs   Lab 04/10/25  0604 04/10/25  1258   POCTGLUCOSE 169* 146*     Current correctional scale  Low  Maintain anti-hyperglycemic dose as follows-   Antihyperglycemics (From admission, onward)      Start     Stop Route Frequency Ordered    04/10/25 2100  insulin glargine U-100 (Lantus) pen 10 Units         -- SubQ Nightly 04/10/25 0953    04/10/25 0123  insulin aspart U-100 pen 0-5 Units         -- SubQ Every 6 hours PRN 04/10/25 0023          Hold Oral hypoglycemics while patient is in the hospital.

## 2025-04-10 NOTE — SUBJECTIVE & OBJECTIVE
Past Medical History:   Diagnosis Date    Hypertension        Past Surgical History:   Procedure Laterality Date    HYSTERECTOMY         Review of patient's allergies indicates:  No Known Allergies    Current Facility-Administered Medications on File Prior to Encounter   Medication    [COMPLETED] cloNIDine tablet 0.1 mg    [COMPLETED] hydrALAZINE injection 20 mg    [COMPLETED] pantoprazole injection 80 mg    [DISCONTINUED] 0.9% NaCl infusion    [DISCONTINUED] pantoprazole (PROTONIX) 40 mg in 0.9% NaCl 100 mL IVPB (MB+)     Current Outpatient Medications on File Prior to Encounter   Medication Sig    aspirin (ECOTRIN) 81 MG EC tablet Take 1 tablet (81 mg total) by mouth once daily.    blood-glucose meter kit Use as instructed    cetirizine (ZYRTEC) 10 MG tablet Take 1 tablet (10 mg total) by mouth once daily.    diclofenac sodium (VOLTAREN ARTHRITIS PAIN) 1 % Gel Apply 2 g topically 2 (two) times daily.    empagliflozin (JARDIANCE) 25 mg tablet Take 1 tablet (25 mg total) by mouth once daily.    hydrocortisone 2.5 % cream Apply topically 2 (two) times daily. To dry or itchy skin    hydrOXYzine HCL (ATARAX) 25 MG tablet Take 1 tablet (25 mg total) by mouth 3 (three) times daily as needed for Itching.    insulin glargine U-100, Lantus, 100 unit/mL (3 mL) SubQ InPn pen Inject 20 Units into the skin every evening.    lisinopriL (PRINIVIL,ZESTRIL) 20 MG tablet Take 1 tablet (20 mg total) by mouth once daily.    metFORMIN (GLUCOPHAGE-XR) 500 MG ER 24hr tablet Take 1 tablet (500 mg total) by mouth 2 (two) times daily with meals.    rosuvastatin (CRESTOR) 40 MG Tab Take 1 tablet (40 mg total) by mouth once daily.     Family History    None       Tobacco Use    Smoking status: Never    Smokeless tobacco: Never   Substance and Sexual Activity    Alcohol use: Yes    Drug use: Never    Sexual activity: Yes     Review of Systems   Constitutional: Negative.    HENT: Negative.     Eyes: Negative.    Respiratory: Negative.      Cardiovascular: Negative.    Gastrointestinal:  Positive for abdominal pain and blood in stool.   Musculoskeletal: Negative.    Skin: Negative.    Hematological: Negative.    Psychiatric/Behavioral: Negative.       Objective:     Vital Signs (Most Recent):    Vital Signs (24h Range):  Temp:  [98.1 °F (36.7 °C)-98.5 °F (36.9 °C)] 98.1 °F (36.7 °C)  Pulse:  [] 108  Resp:  [12-20] 15  SpO2:  [99 %-100 %] 100 %  BP: (132-213)/() 152/98        There is no height or weight on file to calculate BMI.     Physical Exam  HENT:      Head: Normocephalic and atraumatic.      Nose: Nose normal.      Mouth/Throat:      Mouth: Mucous membranes are moist.   Eyes:      Extraocular Movements: Extraocular movements intact.      Pupils: Pupils are equal, round, and reactive to light.   Cardiovascular:      Rate and Rhythm: Regular rhythm.   Pulmonary:      Breath sounds: Normal breath sounds.   Abdominal:      Palpations: Abdomen is soft.   Musculoskeletal:         General: Normal range of motion.      Cervical back: Neck supple.   Skin:     General: Skin is warm.   Neurological:      General: No focal deficit present.      Mental Status: She is alert.   Psychiatric:         Mood and Affect: Mood normal.              CRANIAL NERVES     CN III, IV, VI   Pupils are equal, round, and reactive to light.       Significant Labs: All pertinent labs within the past 24 hours have been reviewed.  Recent Lab Results         04/09/25  2055   04/09/25  1617   04/09/25  1550   04/09/25  1543        Albumin       3.8       ALP       84       ALT       21       Anion Gap       14       Appearance, UA       Clear       PTT       22.9  Comment: Refer to local heparin nomogram for intensity/dose specific therapeutic range.       AST       16       Bacteria, UA       Occasional       Baso # 0.09       0.09       Basophil % 0.8       0.8       Bilirubin (UA)       Negative       BILIRUBIN TOTAL       0.3  Comment: For infants and newborns,  interpretation of results should be based   on gestational age, weight and in agreement with clinical   observations.    Premature Infant recommended reference ranges:   0-24 hours:  <8.0 mg/dL   24-48 hours: <12.0 mg/dL   3-5 days:    <15.0 mg/dL   6-29 days:   <15.0 mg/dL       BUN       10       Calcium       9.4       Chloride       106       CO2       20       Color, UA       Yellow       Creatinine       0.7       eGFR       >60  Comment: Estimated GFR calculated using the CKD-EPI creatinine (2021) equation.       Eos # 0.14       0.31       Eos % 1.3       2.7       Glucose       185       Glucose, UA       Negative       Gran # (ANC) 6.59       5.03       Group & Rh       AB NEG       Hematocrit 26.1       28.1       Hemoglobin 8.9       9.5       Extra Tube     Hold for add-ons.  Comment: Auto resulted.            Immature Grans (Abs) 0.09  Comment: Mild elevation in immature granulocytes is non specific and can be seen in a variety of conditions including stress response, acute inflammation, trauma and pregnancy. Correlation with other laboratory and clinical findings is essential.       0.07  Comment: Mild elevation in immature granulocytes is non specific and can be seen in a variety of conditions including stress response, acute inflammation, trauma and pregnancy. Correlation with other laboratory and clinical findings is essential.       Immature Granulocytes 0.8       0.6       INDIRECT RUPESH       NEG       INR       1.0  Comment: Coumadin Therapy:    2.0 - 3.0 for INR for all indicators except mechanical heart valves    and antiphospholipid syndromes which should use 2.5 - 3.5.       Ketones, UA       Negative       Leukocyte Esterase, UA       1+       Lymph # 3.42       5.14       Lymph % 30.7       45.0       MCH 27.6       27.7       MCHC 34.1       33.8       MCV 81       82       Microscopic Comment         Comment: Other formed elements not mentioned in the report are not present in the  microscopic examination.       Mono # 0.82       0.78       Mono % 7.4       6.8       MPV 10.4       11.1       Neut % 59.0       44.1       NITRITE UA       Negative       nRBC 0       0       Occult Blood   Positive           Blood, UA       Negative       pH, UA       6.0       Platelet Estimate       Increased       Platelet Count 465       423       Potassium       3.6       PROTEIN TOTAL       8.2       Protein, UA       Negative  Comment: Recommend a 24 hour urine protein or a urine protein/creatinine ratio if globulin induced proteinuria is clinically suspected.       PT       10.8       RBC 3.23       3.43       RBC, UA       1       RDW 14.9       15.1       Sodium       140       Spec Grav UA       1.010       Specimen Outdate       04/12/2025 23:59       Squam Epithel, UA       2       Urobilinogen, UA       1.0       WBC, UA       3       WBC 11.15       11.42               Significant Imaging: I have reviewed all pertinent imaging results/findings within the past 24 hours.

## 2025-04-10 NOTE — CONSULTS
[facility] Surg  Gastroenterology  Consult Note    Patient Name: Aury Felix  MRN: [id number]  Admission Date: 4/10/2025  Hospital Length of Stay: 0 days  Code Status: Full Code   Attending Provider: Layton Hayward MD   Consulting Provider: Manny Calles MD  Primary Care Physician: Jimbo Menard II, MD  Principal Problem:GIB (gastrointestinal bleeding)    Inpatient consult to Gastroenterology  Consult performed by: Manny Calles MD  Consult ordered by: Antoni Patel MD        Subjective:     HPI:  54-year-old female with PMH HTN, and DM2 presented to [facility] ER with intermittent epigastric pain and black stools for one week.  Patient states that she has been taking Pepto-Bismol for past 3 days to relieve her epigastric pain.    In ED /109 > 200/117 > 182/102,  > 102, RR 20, SpO2 100% (RA).   WBC 11.4, Hb 14.8 (BL) > 9.5, Plts 423, CMP unremarkable except for Glu 185, U/A neg, FOBT pos.    The patient has had a few melanotic stools in the ED. Patient given clonidine (0.1 mg PO) and hydralazine (20 mg IV).  Patient given Protonix (80 IV).  Due to patient's presenting symptoms she is transferred to [facility] for higher level of care.  At time of my examination patient denied any headache, fever, chills, chest pain or shortness of breath but complained of black stools and epigastric pain.     Denies significant NSAIDs    Past Medical History:   Diagnosis Date    Hypertension        Past Surgical History:   Procedure Laterality Date    HYSTERECTOMY         Review of patient's allergies indicates:  No Known Allergies  Family History    None       Tobacco Use    Smoking status: Never    Smokeless tobacco: Never   Substance and Sexual Activity    Alcohol use: Yes    Drug use: Never    Sexual activity: Yes     Review of Systems   Gastrointestinal:  Positive for abdominal pain and blood in stool.     Objective:     Vital Signs (Most Recent):  Temp: 98 °F  (36.7 °C) (04/10/25 0730)  Pulse: 97 (04/10/25 0730)  Resp: 18 (04/10/25 0730)  BP: 129/84 (04/10/25 0730)  SpO2: 98 % (04/10/25 0730) Vital Signs (24h Range):  Temp:  [98 °F (36.7 °C)-98.5 °F (36.9 °C)] 98 °F (36.7 °C)  Pulse:  [] 97  Resp:  [12-20] 18  SpO2:  [96 %-100 %] 98 %  BP: (123-213)/() 129/84     Weight: 94.7 kg (208 lb 12.4 oz) (04/10/25 0040)  Body mass index is 31.74 kg/m².      Intake/Output Summary (Last 24 hours) at 4/10/2025 0838  Last data filed at 4/10/2025 0825  Gross per 24 hour   Intake 240 ml   Output 0 ml   Net 240 ml       Lines/Drains/Airways       Peripheral Intravenous Line  Duration                  Peripheral IV - Single Lumen 04/09/25 1430 18 G Left Antecubital <1 day         Peripheral IV - Single Lumen 04/09/25 1430 20 G Anterior;Left Forearm <1 day                     Physical Exam  Constitutional:       Appearance: She is well-developed.   HENT:      Head: Normocephalic and atraumatic.   Eyes:      Conjunctiva/sclera: Conjunctivae normal.   Pulmonary:      Effort: Pulmonary effort is normal. No respiratory distress.   Musculoskeletal:      Cervical back: Normal range of motion.   Neurological:      Mental Status: She is alert and oriented to person, place, and time.   Psychiatric:         Behavior: Behavior normal.         Thought Content: Thought content normal.         Judgment: Judgment normal.          Significant Labs:  Recent Lab Results  (Last 5 results in the past 24 hours)        04/10/25  0604   04/10/25  0328   04/10/25  0052   04/09/25 2055 04/09/25  1617        Baso #   0.08     0.09         Basophil %   0.7     0.8         Eos #   0.17     0.14         Eos %   1.6     1.3         Gran # (ANC)   5.62     6.59         Group & Rh     AB NEG           Hematocrit   25.9     26.1         Hemoglobin   8.8     8.9         Immature Grans (Abs)   0.05  Comment: Mild elevation in immature granulocytes is non specific and can be seen in a variety of conditions  including stress response, acute inflammation, trauma and pregnancy. Correlation with other laboratory and clinical findings is essential.     0.09  Comment: Mild elevation in immature granulocytes is non specific and can be seen in a variety of conditions including stress response, acute inflammation, trauma and pregnancy. Correlation with other laboratory and clinical findings is essential.         Immature Granulocytes   0.5     0.8         INDIRECT RUPESH     NEG           Lymph #   4.11     3.42         Lymph %   37.8     30.7         MCH   27.8     27.6         MCHC   34.0     34.1         MCV   82     81         Mono #   0.83     0.82         Mono %   7.6     7.4         MPV   11.0     10.4         Neut %   51.8     59.0         nRBC   0     0         Occult Blood         Positive       Platelet Count   476     465         POCT Glucose 169               RBC   3.16     3.23         RDW   15.2     14.9         Specimen Outdate     04/13/2025 23:59           WBC   10.86     11.15                                Significant Imaging:  Imaging results within the past 24 hours have been reviewed.  Assessment/Plan:     GI  * GIB (gastrointestinal bleeding)  With suspicion for UGIB  Trend Hgb. Transfuse > 7  Continue PPI  Plan for EGD today  NPO        Thank you for your consult. I will follow-up with patient. Please contact us if you have any additional questions.    Manny Calles MD  Gastroenterology  [facility] Surg

## 2025-04-10 NOTE — ASSESSMENT & PLAN NOTE
"Patient's FSGs are controlled on current medication regimen.  Last A1c reviewed-   Lab Results   Component Value Date    HGBA1C 6.7 (H) 01/17/2025     Most recent fingerstick glucose reviewed- No results for input(s): "POCTGLUCOSE" in the last 24 hours.  Current correctional scale  Low  Maintain anti-hyperglycemic dose as follows-   Antihyperglycemics (From admission, onward)      Start     Stop Route Frequency Ordered    04/10/25 0123  insulin aspart U-100 pen 0-5 Units         -- SubQ Every 6 hours PRN 04/10/25 0023          Hold Oral hypoglycemics while patient is in the hospital.  "

## 2025-04-10 NOTE — PLAN OF CARE
Problem: Adult Inpatient Plan of Care  Goal: Optimal Comfort and Wellbeing  Outcome: Progressing     Problem: Adult Inpatient Plan of Care  Goal: Plan of Care Review  Outcome: Progressing     Problem: Diabetes Comorbidity  Goal: Blood Glucose Level Within Targeted Range  Outcome: Progressing     Problem: Gastrointestinal Bleeding  Goal: Optimal Coping with Acute Illness  Outcome: Progressing     Problem: Comorbidity Management  Goal: Blood Pressure in Desired Range  Outcome: Progressing    Heart healthy diet.  Tolerating meals.  Blood glucose monitoring.  Meds administered per MAR.  No BM on this shift.  Safety maintained.  Non-skid socks when out of bed.  Call light within reach.

## 2025-04-10 NOTE — PLAN OF CARE
CM met with pt at bedside to discuss discharge planning. Pt sitting up in chair. She lives at home and her adult son Kimo lives with her. She is uninsured due to she is unable to afford insurance at her FT job. Pt is independent with ADL's. Pt has a glucometer and no other DME. Upon discharge, pts family member Jim (sister)  will provide transport home. Pt made aware to contact CM with any questions or concerns. CM added name and number to white board and will continue to follow and assist with any discharge needs. Pt is NPO and scheduled for EGD today. MAIDA today or tomorrow.     Future Appointments   Date Time Provider Department Center   6/30/2025 10:40 AM [facility]   7/7/2025  8:20 AM Nicolette Murillo NP [facility] END[facility]CRN [facility]   7/28/2025  6:05 AM [facility]   12/22/2025  8:30 AM OPHTHALMOLOGY GENERAL, [facility] FE [facility] - Endoscopy ([facility])  Initial Discharge Assessment       Primary Care Provider: Jimbo Menard II, MD    Admission Diagnosis: GIB (gastrointestinal bleeding) [K92.2]    Admission Date: 4/10/2025  Expected Discharge Date:     Transition of Care Barriers: (P) None    Payor: /     Extended Emergency Contact Information  Primary Emergency Contact: Neftali Felix  Address: [address] [place] [address]           [place] [address] [facility]  Home Phone: [phone]  Mobile Phone: [phone]  Relation: Daughter    Discharge Plan A: (P) Home, Home with family         [id number] [place] - [address]  [address]  [place] [address]  Phone: [phone] Fax: [phone]    [facility] - [place] - [address]  [facility] [address]  [place] [address]  Phone: [phone] Fax: [phone]    [id number] - [place] - [address]  [address]  [place] [address]  Phone: [phone] Fax: [phone]    [facility]  [address]  [place] [address]  Phone: [phone] Fax: [phone]      Initial Assessment (most recent)       Adult Discharge Assessment - 04/10/25 1126          Discharge Assessment    Assessment Type Discharge Planning Assessment     Confirmed/corrected address, phone number and insurance Yes     Confirmed Demographics Correct on Facesheet     Source of Information patient     When was your last doctors appointment? 01/27/25 (P)      Communicated MAIDA with patient/caregiver Date not available/Unable to determine (P)      Reason For Admission GIB (P)      People in Home child(shona), adult (P)      Facility Arrived From: [facility] ED (P)      Do you expect to return to your current living situation? Yes (P)      Do you have help at home or someone to help you manage your care at home? Yes (P)      Who are your caregiver(s) and their phone number(s)? Neftali Bledsoe (daughter) [phone] (P)      Prior to hospitilization cognitive status: Alert/Oriented (P)      Current cognitive status: Alert/Oriented (P)      Walking or Climbing Stairs Difficulty no (P)      Dressing/Bathing Difficulty no (P)      Equipment Currently Used at Home glucometer (P)      Readmission within 30 days? No (P)      Patient currently being followed by outpatient case management? No (P)      Do you currently have service(s) that help you manage your care at home? No (P)      Do you take prescription medications? Yes (P)      Do you have prescription coverage? No (P)      Do you have any problems affording any of your prescribed medications? TBD (P)      Is the patient taking medications as prescribed? yes (P)      Who is going to help you get home at discharge? Jim(sister) (P)      How do you get to doctors appointments? car, drives self (P)      Are you on dialysis? No (P)      Do you take coumadin? No (P)      Discharge Plan A Home;Home with family (P)      DME Needed Upon Discharge  none (P)      Discharge Plan discussed with: Patient (P)       Transition of Care Barriers None (P)         Physical Activity    On average, how many days per week do you engage in moderate to strenuous exercise (like a brisk walk)? 0 days (P)    works FT an active at work    On average, how many minutes do you engage in exercise at this level? 0 min (P)         Financial Resource Strain    How hard is it for you to pay for the very basics like food, housing, medical care, and heating? Not hard at all (P)         Housing Stability    In the last 12 months, was there a time when you were not able to pay the mortgage or rent on time? No (P)      At any time in the past 12 months, were you homeless or living in a shelter (including now)? No (P)         Transportation Needs    In the past 12 months, has lack of transportation kept you from medical appointments or from getting medications? No (P)      In the past 12 months, has lack of transportation kept you from meetings, work, or from getting things needed for daily living? No (P)         Food Insecurity    Within the past 12 months, you worried that your food would run out before you got the money to buy more. Never true (P)      Within the past 12 months, the food you bought just didn't last and you didn't have money to get more. Never true (P)         Stress    Do you feel stress - tense, restless, nervous, or anxious, or unable to sleep at night because your mind is troubled all the time - these days? Not at all (P)         Social Isolation    How often do you feel lonely or isolated from those around you?  Never (P)         Alcohol Use    Q1: How often do you have a drink containing alcohol? Monthly or less (P)      Q2: How many drinks containing alcohol do you have on a typical day when you are drinking? 1 or 2 (P)      Q3: How often do you have six or more drinks on one occasion? Never (P)         SessionM    In the past 12 months has the electric, gas, oil, or water company threatened to shut off services in your  home? No (P)         Health Literacy    How often do you need to have someone help you when you read instructions, pamphlets, or other written material from your doctor or pharmacy? Never (P)         OTHER    Name(s) of People in Home [place](adult son) (P)

## 2025-04-10 NOTE — PROGRESS NOTES
04/10/25 0101   Admission   Initial VN Admission Questions Complete   Shift   Virtual Nurse - Patient Verbalized Approval Of Camera Use   Positioning   Head of Bed (HOB) Positioning HOB at 45 degrees   Pain/Comfort/Sleep   Sleep/Rest/Relaxation awake        VIRTUAL NURSE: Pt arrived to unit. Permission received per patient to turn camera to view patient. VIP model explained; patient informed this VN will be working with bedside nurse and the rest of the care team. Plan of care reviewed with patient.  Educated patient on VTE, fall risk and fall risk precautions in place. Call light within reach, side rails up x2. Admission questions completed. Patient instucted to ask staff for assistance. Patient verbalized complete understanding. Patient denies complaints or any needs at this time.

## 2025-04-10 NOTE — NURSING
Report given to ELIZABETH Hardy. Patient leaving unit at this time.  NPO since midnight.  Dentures removed.  SCDs placed.  Ambulates without difficulty.  Jewelry removed (earrings)

## 2025-04-10 NOTE — ASSESSMENT & PLAN NOTE
Patient has a current diagnosis of hypertensive urgency (without evidence of end organ damage) which is controlled.  Latest blood pressure and vitals reviewed-   Temp:  [97.3 °F (36.3 °C)-98.5 °F (36.9 °C)]   Pulse:  []   Resp:  [12-24]   BP: (123-213)/()   SpO2:  [96 %-100 %] .   Patient currently on IV antihypertensives.   Home meds for hypertension were reviewed and noted below.   Hypertension Medications              lisinopriL (PRINIVIL,ZESTRIL) 20 MG tablet Take 1 tablet (20 mg total) by mouth once daily.            Medication adjustment for hospital antihypertensives is as follows- IV hydralazine p.r.n..    Will aim for controlled BP reduction by medications noted above. Monitor and mitigate end organ damage as indicated.

## 2025-04-10 NOTE — PLAN OF CARE
Recovery complete. Patient recovered to baseline. Discharge instructions reviewed with patient. VSS.    Report called to Smiley

## 2025-04-10 NOTE — SUBJECTIVE & OBJECTIVE
Past Medical History:   Diagnosis Date    Hypertension        Past Surgical History:   Procedure Laterality Date    HYSTERECTOMY         Review of patient's allergies indicates:  No Known Allergies  Family History    None       Tobacco Use    Smoking status: Never    Smokeless tobacco: Never   Substance and Sexual Activity    Alcohol use: Yes    Drug use: Never    Sexual activity: Yes     Review of Systems   Gastrointestinal:  Positive for abdominal pain and blood in stool.     Objective:     Vital Signs (Most Recent):  Temp: 98 °F (36.7 °C) (04/10/25 0730)  Pulse: 97 (04/10/25 0730)  Resp: 18 (04/10/25 0730)  BP: 129/84 (04/10/25 0730)  SpO2: 98 % (04/10/25 0730) Vital Signs (24h Range):  Temp:  [98 °F (36.7 °C)-98.5 °F (36.9 °C)] 98 °F (36.7 °C)  Pulse:  [] 97  Resp:  [12-20] 18  SpO2:  [96 %-100 %] 98 %  BP: (123-213)/() 129/84     Weight: 94.7 kg (208 lb 12.4 oz) (04/10/25 0040)  Body mass index is 31.74 kg/m².      Intake/Output Summary (Last 24 hours) at 4/10/2025 0838  Last data filed at 4/10/2025 0825  Gross per 24 hour   Intake 240 ml   Output 0 ml   Net 240 ml       Lines/Drains/Airways       Peripheral Intravenous Line  Duration                  Peripheral IV - Single Lumen 04/09/25 1430 18 G Left Antecubital <1 day         Peripheral IV - Single Lumen 04/09/25 1430 20 G Anterior;Left Forearm <1 day                     Physical Exam  Constitutional:       Appearance: She is well-developed.   HENT:      Head: Normocephalic and atraumatic.   Eyes:      Conjunctiva/sclera: Conjunctivae normal.   Pulmonary:      Effort: Pulmonary effort is normal. No respiratory distress.   Musculoskeletal:      Cervical back: Normal range of motion.   Neurological:      Mental Status: She is alert and oriented to person, place, and time.   Psychiatric:         Behavior: Behavior normal.         Thought Content: Thought content normal.         Judgment: Judgment normal.          Significant Labs:  Recent Lab  Results  (Last 5 results in the past 24 hours)        04/10/25  0604   04/10/25  0328   04/10/25  0052   04/09/25 2055 04/09/25  1617        Baso #   0.08     0.09         Basophil %   0.7     0.8         Eos #   0.17     0.14         Eos %   1.6     1.3         Gran # (ANC)   5.62     6.59         Group & Rh     AB NEG           Hematocrit   25.9     26.1         Hemoglobin   8.8     8.9         Immature Grans (Abs)   0.05  Comment: Mild elevation in immature granulocytes is non specific and can be seen in a variety of conditions including stress response, acute inflammation, trauma and pregnancy. Correlation with other laboratory and clinical findings is essential.     0.09  Comment: Mild elevation in immature granulocytes is non specific and can be seen in a variety of conditions including stress response, acute inflammation, trauma and pregnancy. Correlation with other laboratory and clinical findings is essential.         Immature Granulocytes   0.5     0.8         INDIRECT RUPESH     NEG           Lymph #   4.11     3.42         Lymph %   37.8     30.7         MCH   27.8     27.6         MCHC   34.0     34.1         MCV   82     81         Mono #   0.83     0.82         Mono %   7.6     7.4         MPV   11.0     10.4         Neut %   51.8     59.0         nRBC   0     0         Occult Blood         Positive       Platelet Count   476     465         POCT Glucose 169               RBC   3.16     3.23         RDW   15.2     14.9         Specimen Outdate     04/13/2025 23:59           WBC   10.86     11.15                                Significant Imaging:  Imaging results within the past 24 hours have been reviewed.

## 2025-04-10 NOTE — ANESTHESIA PREPROCEDURE EVALUATION
04/10/2025  Aury Felix is a 54 y.o., female here for an EGD for suspected GIB.       Pre-op Assessment    I have reviewed the Patient Summary Reports.    I have reviewed the NPO Status.   I have reviewed the Medications.     Review of Systems  Anesthesia Hx:  No problems with previous Anesthesia               Denies Personal Hx of Anesthesia complications.                    Social:  Non-Smoker, No Alcohol Use       Hematology/Oncology:       -- Anemia:                                  Cardiovascular:     Hypertension                                          Endocrine:  Diabetes         Obesity / BMI > 30      Physical Exam  General: Well nourished, Cooperative, Alert and Oriented    Airway:  Mallampati: II   Mouth Opening: Normal  TM Distance: Normal  Tongue: Normal  Neck ROM: Normal ROM    Chest/Lungs:  Clear to auscultation, Normal Respiratory Rate    Heart:  Rate: Normal  Rhythm: Regular Rhythm        Anesthesia Plan  Type of Anesthesia, risks & benefits discussed:    Anesthesia Type: Gen Natural Airway  Intra-op Monitoring Plan: Standard ASA Monitors  Post Op Pain Control Plan: multimodal analgesia  Induction:  IV  Informed Consent: Informed consent signed with the Patient and all parties understand the risks and agree with anesthesia plan.  All questions answered.   ASA Score: 2    Ready For Surgery From Anesthesia Perspective.     .

## 2025-04-10 NOTE — PROGRESS NOTES
[facility] Medicine  Progress Note    Patient Name: Aury Felix  MRN: [id number]  Patient Class: IP- Inpatient   Admission Date: 4/10/2025  Length of Stay: 0 days  Attending Physician: Layton Hayward MD  Primary Care Provider: Jimbo Menard II, MD        Subjective     Principal Problem:GIB (gastrointestinal bleeding)        HPI:  Aury Felix is a 54-year-old female with PMH HTN, and DM2 presented to [facility] ER with intermittent epigastric pain and black stools for one week.  Patient states that she has been taking Pepto-Bismol for past 3 days to relieve her epigastric pain.    In ED /109 > 200/117 > 182/102,  > 102, RR 20, SpO2 100% (RA).   WBC 11.4, Hb 14.8 (BL) > 9.5, Plts 423, CMP unremarkable except for Glu 185, U/A neg, FOBT pos.    The patient has had a few melanotic stools in the ED. Patient given clonidine (0.1 mg PO) and hydralazine (20 mg IV).  Patient given Protonix (80 IV).  Due to patient's presenting symptoms she is transferred to [facility] for higher level of care.  At time of my examination patient denied any headache, fever, chills, chest pain or shortness of breath but complained of black stools and epigastric pain.     Overview/Hospital Course:  No notes on file    Interval History:  Patient seen and examined.  Found in no acute distress.  No new complaints.  VSS.  Hemoglobin stable.  No active bleeding on EGD, CT A&P ordered for duodenal mass malignancy rule out.    Review of Systems   Constitutional: Negative.    HENT: Negative.     Eyes: Negative.    Respiratory: Negative.     Cardiovascular: Negative.    Gastrointestinal:  Positive for abdominal pain and blood in stool.   Musculoskeletal: Negative.    Skin: Negative.    Hematological: Negative.    Psychiatric/Behavioral: Negative.       Objective:     Vital Signs (Most Recent):  Temp: 98.2 °F (36.8 °C) (04/10/25 1257)  Pulse: 94 (04/10/25 1257)  Resp: 16 (04/10/25 1257)  BP:  136/89 (04/10/25 1257)  SpO2: 100 % (04/10/25 1257) Vital Signs (24h Range):  Temp:  [97.3 °F (36.3 °C)-98.5 °F (36.9 °C)] 98.2 °F (36.8 °C)  Pulse:  [] 94  Resp:  [12-24] 16  SpO2:  [96 %-100 %] 100 %  BP: (123-213)/() 136/89     Weight: 94.7 kg (208 lb 12.4 oz)  Body mass index is 31.74 kg/m².    Intake/Output Summary (Last 24 hours) at 4/10/2025 1503  Last data filed at 4/10/2025 1203  Gross per 24 hour   Intake 340 ml   Output 0 ml   Net 340 ml         Physical Exam  Vitals and nursing note reviewed.   HENT:      Head: Normocephalic and atraumatic.      Nose: Nose normal.      Mouth/Throat:      Mouth: Mucous membranes are moist.   Eyes:      Extraocular Movements: Extraocular movements intact.      Pupils: Pupils are equal, round, and reactive to light.   Cardiovascular:      Rate and Rhythm: Normal rate and regular rhythm.   Pulmonary:      Breath sounds: Normal breath sounds.   Abdominal:      Palpations: Abdomen is soft.   Musculoskeletal:         General: Normal range of motion.      Cervical back: Neck supple.   Skin:     General: Skin is warm.   Neurological:      General: No focal deficit present.      Mental Status: She is alert.   Psychiatric:         Mood and Affect: Mood normal.               Significant Labs: All pertinent labs within the past 24 hours have been reviewed.  CBC:   Recent Labs   Lab 04/09/25  1543 04/09/25  2055 04/10/25  0328   WBC 11.42 11.15 10.86   HGB 9.5* 8.9* 8.8*   HCT 28.1* 26.1* 25.9*    465* 476*       Significant Imaging: I have reviewed all pertinent imaging results/findings within the past 24 hours.      Assessment & Plan  GIB (gastrointestinal bleeding)  Admit to medical floor with telemetry.    NPO.  Patient's hemorrhage is due to gastrointestinal bleed, this bleeding is associated with an antiplatelet agent, the antiplatelet agent is Select Antiplatelet Agent(s): Aspirin. Patients most recent Hgb, Hct, platelets, and INR are listed below.  Recent  Labs     04/09/25  1543 04/09/25  2055 04/10/25  0328   HGB 9.5* 8.9* 8.8*   HCT 28.1* 26.1* 25.9*    465* 476*   INR 1.0  --   --      Plan  - Will trend hemoglobin/hematocrit Daily  - Will monitor and correct any coagulation defects  - Will transfuse if Hgb is <7g/dl (<8g/dl in cases of active ACS) or if patient has rapid bleeding leading to hemodynamic instability  - inpatient gastroenterology consultation, appreciate rec's:    EGD 4/10:   Impression:            - Normal esophagus.                          - Small hiatal hernia.                          - Rule out malignancy, duodenal mass. Biopsied.   Recommendation:        - Return patient to [facility] for ongoing care.                          - Resume previous diet.                          - Continue present medications.                          - Await pathology results.                          - Observe patient's clinical course.   Hypertensive urgency  Patient has a current diagnosis of hypertensive urgency (without evidence of end organ damage) which is controlled.  Latest blood pressure and vitals reviewed-   Temp:  [97.3 °F (36.3 °C)-98.5 °F (36.9 °C)]   Pulse:  []   Resp:  [12-24]   BP: (123-213)/()   SpO2:  [96 %-100 %] .   Patient currently on IV antihypertensives.   Home meds for hypertension were reviewed and noted below.   Hypertension Medications              lisinopriL (PRINIVIL,ZESTRIL) 20 MG tablet Take 1 tablet (20 mg total) by mouth once daily.            Medication adjustment for hospital antihypertensives is as follows- IV hydralazine p.r.n..    Will aim for controlled BP reduction by medications noted above. Monitor and mitigate end organ damage as indicated.  Type 2 diabetes mellitus  Patient's FSGs are controlled on current medication regimen.  Last A1c reviewed-   Lab Results   Component Value Date    HGBA1C 6.7 (H) 01/17/2025     Most recent fingerstick glucose reviewed-   Recent Labs   Lab 04/10/25  0604  04/10/25  1258   POCTGLUCOSE 169* 146*     Current correctional scale  Low  Maintain anti-hyperglycemic dose as follows-   Antihyperglycemics (From admission, onward)      Start     Stop Route Frequency Ordered    04/10/25 2100  insulin glargine U-100 (Lantus) pen 10 Units         -- SubQ Nightly 04/10/25 0953    04/10/25 0123  insulin aspart U-100 pen 0-5 Units         -- SubQ Every 6 hours PRN 04/10/25 0023          Hold Oral hypoglycemics while patient is in the hospital.  Mixed hyperlipidemia    Continue home dose of statin.  Obesity (BMI 30-39.9)  Body mass index is 31.74 kg/m². Morbid obesity complicates all aspects of disease management from diagnostic modalities to treatment. Weight loss encouraged and health benefits explained to patient.       VTE Risk Mitigation (From admission, onward)           Ordered     IP VTE HIGH RISK PATIENT  Once         04/10/25 0020     Place sequential compression device  Until discontinued         04/10/25 0020                    Discharge Planning   MAIDA: 4/11/2025     Code Status: Full Code   Medical Readiness for Discharge Date:   Discharge Plan A: Home, Home with family                        Kurt Bliss PA-C  Department of Hospital Medicine   [facility] Surg

## 2025-04-10 NOTE — H&P
[facility] Medicine  History & Physical    Patient Name: Aury Felix  MRN: [id number]  Patient Class: IP- Inpatient  Admission Date: 4/10/2025  Attending Physician: Ketty Soler MD   Primary Care Provider: Jimbo Menard II, MD         Patient information was obtained from patient, past medical records, and ER records.     Subjective:     Principal Problem:GIB (gastrointestinal bleeding)    Chief Complaint: No chief complaint on file.       HPI: Aury Felix is a 54-year-old female with PMH HTN, and DM2 presented to [facility] ER with intermittent epigastric pain and black stools for one week.  Patient states that she has been taking Pepto-Bismol for past 3 days to relieve her epigastric pain.    In ED /109 > 200/117 > 182/102,  > 102, RR 20, SpO2 100% (RA).   WBC 11.4, Hb 14.8 (BL) > 9.5, Plts 423, CMP unremarkable except for Glu 185, U/A neg, FOBT pos.    The patient has had a few melanotic stools in the ED. Patient given clonidine (0.1 mg PO) and hydralazine (20 mg IV).  Patient given Protonix (80 IV).  Due to patient's presenting symptoms she is transferred to [facility] for higher level of care.  At time of my examination patient denied any headache, fever, chills, chest pain or shortness of breath but complained of black stools and epigastric pain.     Past Medical History:   Diagnosis Date    Hypertension        Past Surgical History:   Procedure Laterality Date    HYSTERECTOMY         Review of patient's allergies indicates:  No Known Allergies    Current Facility-Administered Medications on File Prior to Encounter   Medication    [COMPLETED] cloNIDine tablet 0.1 mg    [COMPLETED] hydrALAZINE injection 20 mg    [COMPLETED] pantoprazole injection 80 mg    [DISCONTINUED] 0.9% NaCl infusion    [DISCONTINUED] pantoprazole (PROTONIX) 40 mg in 0.9% NaCl 100 mL IVPB (MB+)     Current Outpatient Medications on File Prior to Encounter   Medication Sig     aspirin (ECOTRIN) 81 MG EC tablet Take 1 tablet (81 mg total) by mouth once daily.    blood-glucose meter kit Use as instructed    cetirizine (ZYRTEC) 10 MG tablet Take 1 tablet (10 mg total) by mouth once daily.    diclofenac sodium (VOLTAREN ARTHRITIS PAIN) 1 % Gel Apply 2 g topically 2 (two) times daily.    empagliflozin (JARDIANCE) 25 mg tablet Take 1 tablet (25 mg total) by mouth once daily.    hydrocortisone 2.5 % cream Apply topically 2 (two) times daily. To dry or itchy skin    hydrOXYzine HCL (ATARAX) 25 MG tablet Take 1 tablet (25 mg total) by mouth 3 (three) times daily as needed for Itching.    insulin glargine U-100, Lantus, 100 unit/mL (3 mL) SubQ InPn pen Inject 20 Units into the skin every evening.    lisinopriL (PRINIVIL,ZESTRIL) 20 MG tablet Take 1 tablet (20 mg total) by mouth once daily.    metFORMIN (GLUCOPHAGE-XR) 500 MG ER 24hr tablet Take 1 tablet (500 mg total) by mouth 2 (two) times daily with meals.    rosuvastatin (CRESTOR) 40 MG Tab Take 1 tablet (40 mg total) by mouth once daily.     Family History    None       Tobacco Use    Smoking status: Never    Smokeless tobacco: Never   Substance and Sexual Activity    Alcohol use: Yes    Drug use: Never    Sexual activity: Yes     Review of Systems   Constitutional: Negative.    HENT: Negative.     Eyes: Negative.    Respiratory: Negative.     Cardiovascular: Negative.    Gastrointestinal:  Positive for abdominal pain and blood in stool.   Musculoskeletal: Negative.    Skin: Negative.    Hematological: Negative.    Psychiatric/Behavioral: Negative.       Objective:     Vital Signs (Most Recent):    Vital Signs (24h Range):  Temp:  [98.1 °F (36.7 °C)-98.5 °F (36.9 °C)] 98.1 °F (36.7 °C)  Pulse:  [] 108  Resp:  [12-20] 15  SpO2:  [99 %-100 %] 100 %  BP: (132-213)/() 152/98        There is no height or weight on file to calculate BMI.     Physical Exam  HENT:      Head: Normocephalic and atraumatic.      Nose: Nose normal.       Mouth/Throat:      Mouth: Mucous membranes are moist.   Eyes:      Extraocular Movements: Extraocular movements intact.      Pupils: Pupils are equal, round, and reactive to light.   Cardiovascular:      Rate and Rhythm: Regular rhythm.   Pulmonary:      Breath sounds: Normal breath sounds.   Abdominal:      Palpations: Abdomen is soft.   Musculoskeletal:         General: Normal range of motion.      Cervical back: Neck supple.   Skin:     General: Skin is warm.   Neurological:      General: No focal deficit present.      Mental Status: She is alert.   Psychiatric:         Mood and Affect: Mood normal.              CRANIAL NERVES     CN III, IV, VI   Pupils are equal, round, and reactive to light.       Significant Labs: All pertinent labs within the past 24 hours have been reviewed.  Recent Lab Results         04/09/25  2055   04/09/25  1617   04/09/25  1550   04/09/25  1543        Albumin       3.8       ALP       84       ALT       21       Anion Gap       14       Appearance, UA       Clear       PTT       22.9  Comment: Refer to local heparin nomogram for intensity/dose specific therapeutic range.       AST       16       Bacteria, UA       Occasional       Baso # 0.09       0.09       Basophil % 0.8       0.8       Bilirubin (UA)       Negative       BILIRUBIN TOTAL       0.3  Comment: For infants and newborns, interpretation of results should be based   on gestational age, weight and in agreement with clinical   observations.    Premature Infant recommended reference ranges:   0-24 hours:  <8.0 mg/dL   24-48 hours: <12.0 mg/dL   3-5 days:    <15.0 mg/dL   6-29 days:   <15.0 mg/dL       BUN       10       Calcium       9.4       Chloride       106       CO2       20       Color, UA       Yellow       Creatinine       0.7       eGFR       >60  Comment: Estimated GFR calculated using the CKD-EPI creatinine (2021) equation.       Eos # 0.14       0.31       Eos % 1.3       2.7       Glucose       185        Glucose, UA       Negative       Gran # (ANC) 6.59       5.03       Group & Rh       AB NEG       Hematocrit 26.1       28.1       Hemoglobin 8.9       9.5       Extra Tube     Hold for add-ons.  Comment: Auto resulted.            Immature Grans (Abs) 0.09  Comment: Mild elevation in immature granulocytes is non specific and can be seen in a variety of conditions including stress response, acute inflammation, trauma and pregnancy. Correlation with other laboratory and clinical findings is essential.       0.07  Comment: Mild elevation in immature granulocytes is non specific and can be seen in a variety of conditions including stress response, acute inflammation, trauma and pregnancy. Correlation with other laboratory and clinical findings is essential.       Immature Granulocytes 0.8       0.6       INDIRECT RUPESH       NEG       INR       1.0  Comment: Coumadin Therapy:    2.0 - 3.0 for INR for all indicators except mechanical heart valves    and antiphospholipid syndromes which should use 2.5 - 3.5.       Ketones, UA       Negative       Leukocyte Esterase, UA       1+       Lymph # 3.42       5.14       Lymph % 30.7       45.0       MCH 27.6       27.7       MCHC 34.1       33.8       MCV 81       82       Microscopic Comment         Comment: Other formed elements not mentioned in the report are not present in the microscopic examination.       Mono # 0.82       0.78       Mono % 7.4       6.8       MPV 10.4       11.1       Neut % 59.0       44.1       NITRITE UA       Negative       nRBC 0       0       Occult Blood   Positive           Blood, UA       Negative       pH, UA       6.0       Platelet Estimate       Increased       Platelet Count 465       423       Potassium       3.6       PROTEIN TOTAL       8.2       Protein, UA       Negative  Comment: Recommend a 24 hour urine protein or a urine protein/creatinine ratio if globulin induced proteinuria is clinically suspected.       PT       10.8       RBC  3.23       3.43       RBC, UA       1       RDW 14.9       15.1       Sodium       140       Spec Grav UA       1.010       Specimen Outdate       04/12/2025 23:59       Squam Epithel, UA       2       Urobilinogen, UA       1.0       WBC, UA       3       WBC 11.15       11.42               Significant Imaging: I have reviewed all pertinent imaging results/findings within the past 24 hours.  Assessment/Plan:     Assessment & Plan  GIB (gastrointestinal bleeding)  Admit to medical floor with telemetry.    NPO.  Patient's hemorrhage is due to gastrointestinal bleed, this bleeding is associated with an antiplatelet agent, the antiplatelet agent is Select Antiplatelet Agent(s): Aspirin. Patients most recent Hgb, Hct, platelets, and INR are listed below.  Recent Labs     04/09/25  1543 04/09/25 2055   HGB 9.5* 8.9*   HCT 28.1* 26.1*    465*   INR 1.0  --      Plan  - Will trend hemoglobin/hematocrit Daily  - Will monitor and correct any coagulation defects  - Will transfuse if Hgb is <7g/dl (<8g/dl in cases of active ACS) or if patient has rapid bleeding leading to hemodynamic instability  - inpatient gastroenterology consultation.  Hypertensive urgency  Patient has a current diagnosis of hypertensive urgency (without evidence of end organ damage) which is controlled.  Latest blood pressure and vitals reviewed-   Temp:  [98.1 °F (36.7 °C)-98.5 °F (36.9 °C)]   Pulse:  []   Resp:  [12-20]   BP: (132-213)/()   SpO2:  [99 %-100 %] .   Patient currently on IV antihypertensives.   Home meds for hypertension were reviewed and noted below.   Hypertension Medications              lisinopriL (PRINIVIL,ZESTRIL) 20 MG tablet Take 1 tablet (20 mg total) by mouth once daily.            Medication adjustment for hospital antihypertensives is as follows- IV hydralazine p.r.n..    Will aim for controlled BP reduction by medications noted above. Monitor and mitigate end organ damage as indicated.  Type 2 diabetes  "mellitus  Patient's FSGs are controlled on current medication regimen.  Last A1c reviewed-   Lab Results   Component Value Date    HGBA1C 6.7 (H) 01/17/2025     Most recent fingerstick glucose reviewed- No results for input(s): "POCTGLUCOSE" in the last 24 hours.  Current correctional scale  Low  Maintain anti-hyperglycemic dose as follows-   Antihyperglycemics (From admission, onward)      Start     Stop Route Frequency Ordered    04/10/25 0123  insulin aspart U-100 pen 0-5 Units         -- SubQ Every 6 hours PRN 04/10/25 0023          Hold Oral hypoglycemics while patient is in the hospital.  Mixed hyperlipidemia    Continue home dose of statin.  Obesity (BMI 30-39.9)  There is no height or weight on file to calculate BMI. Morbid obesity complicates all aspects of disease management from diagnostic modalities to treatment. Weight loss encouraged and health benefits explained to patient.         VTE Risk Mitigation (From admission, onward)           Ordered     IP VTE HIGH RISK PATIENT  Once         04/10/25 0020     Place sequential compression device  Until discontinued         04/10/25 0020                     Further recommendations, diagnosis and orders will be made by day team hospitalist who will assume care in the morning.               Antoni Patel MD  Department of Hospital Medicine  [facility]          "

## 2025-04-10 NOTE — SUBJECTIVE & OBJECTIVE
Interval History:  Patient seen and examined.  Found in no acute distress.  No new complaints.  VSS.  Hemoglobin stable.  No active bleeding on EGD, CT A&P ordered for duodenal mass malignancy rule out.    Review of Systems   Constitutional: Negative.    HENT: Negative.     Eyes: Negative.    Respiratory: Negative.     Cardiovascular: Negative.    Gastrointestinal:  Positive for abdominal pain and blood in stool.   Musculoskeletal: Negative.    Skin: Negative.    Hematological: Negative.    Psychiatric/Behavioral: Negative.       Objective:     Vital Signs (Most Recent):  Temp: 98.2 °F (36.8 °C) (04/10/25 1257)  Pulse: 94 (04/10/25 1257)  Resp: 16 (04/10/25 1257)  BP: 136/89 (04/10/25 1257)  SpO2: 100 % (04/10/25 1257) Vital Signs (24h Range):  Temp:  [97.3 °F (36.3 °C)-98.5 °F (36.9 °C)] 98.2 °F (36.8 °C)  Pulse:  [] 94  Resp:  [12-24] 16  SpO2:  [96 %-100 %] 100 %  BP: (123-213)/() 136/89     Weight: 94.7 kg (208 lb 12.4 oz)  Body mass index is 31.74 kg/m².    Intake/Output Summary (Last 24 hours) at 4/10/2025 1503  Last data filed at 4/10/2025 1203  Gross per 24 hour   Intake 340 ml   Output 0 ml   Net 340 ml         Physical Exam  Vitals and nursing note reviewed.   HENT:      Head: Normocephalic and atraumatic.      Nose: Nose normal.      Mouth/Throat:      Mouth: Mucous membranes are moist.   Eyes:      Extraocular Movements: Extraocular movements intact.      Pupils: Pupils are equal, round, and reactive to light.   Cardiovascular:      Rate and Rhythm: Normal rate and regular rhythm.   Pulmonary:      Breath sounds: Normal breath sounds.   Abdominal:      Palpations: Abdomen is soft.   Musculoskeletal:         General: Normal range of motion.      Cervical back: Neck supple.   Skin:     General: Skin is warm.   Neurological:      General: No focal deficit present.      Mental Status: She is alert.   Psychiatric:         Mood and Affect: Mood normal.               Significant Labs: All pertinent  labs within the past 24 hours have been reviewed.  CBC:   Recent Labs   Lab 04/09/25  1543 04/09/25  2055 04/10/25  0328   WBC 11.42 11.15 10.86   HGB 9.5* 8.9* 8.8*   HCT 28.1* 26.1* 25.9*    465* 476*       Significant Imaging: I have reviewed all pertinent imaging results/findings within the past 24 hours.

## 2025-04-11 PROBLEM — K31.89 DUODENAL MASS: Status: ACTIVE | Noted: 2025-04-11

## 2025-04-11 PROBLEM — R00.0 TACHYCARDIA: Status: ACTIVE | Noted: 2025-04-11

## 2025-04-11 LAB
ABSOLUTE EOSINOPHIL (OHS): 0.17 K/UL
ABSOLUTE MONOCYTE (OHS): 0.93 K/UL (ref 0.3–1)
ABSOLUTE NEUTROPHIL COUNT (OHS): 5.78 K/UL (ref 1.8–7.7)
ANION GAP (OHS): 10 MMOL/L (ref 8–16)
BASOPHILS # BLD AUTO: 0.07 K/UL
BASOPHILS NFR BLD AUTO: 0.7 %
BUN SERPL-MCNC: 9 MG/DL (ref 6–20)
CALCIUM SERPL-MCNC: 9.4 MG/DL (ref 8.7–10.5)
CHLORIDE SERPL-SCNC: 108 MMOL/L (ref 95–110)
CO2 SERPL-SCNC: 22 MMOL/L (ref 23–29)
CREAT SERPL-MCNC: 0.7 MG/DL (ref 0.5–1.4)
ERYTHROCYTE [DISTWIDTH] IN BLOOD BY AUTOMATED COUNT: 15.4 % (ref 11.5–14.5)
GFR SERPLBLD CREATININE-BSD FMLA CKD-EPI: >60 ML/MIN/1.73/M2
GLUCOSE SERPL-MCNC: 144 MG/DL (ref 70–110)
HCT VFR BLD AUTO: 26.5 % (ref 37–48.5)
HGB BLD-MCNC: 9 GM/DL (ref 12–16)
IMM GRANULOCYTES # BLD AUTO: 0.04 K/UL (ref 0–0.04)
IMM GRANULOCYTES NFR BLD AUTO: 0.4 % (ref 0–0.5)
LYMPHOCYTES # BLD AUTO: 3.36 K/UL (ref 1–4.8)
MCH RBC QN AUTO: 28.3 PG (ref 27–31)
MCHC RBC AUTO-ENTMCNC: 34 G/DL (ref 32–36)
MCV RBC AUTO: 83 FL (ref 82–98)
NUCLEATED RBC (/100WBC) (OHS): 0 /100 WBC
PLATELET # BLD AUTO: 500 K/UL (ref 150–450)
PMV BLD AUTO: 10.8 FL (ref 9.2–12.9)
POCT GLUCOSE: 140 MG/DL (ref 70–110)
POCT GLUCOSE: 169 MG/DL (ref 70–110)
POCT GLUCOSE: 187 MG/DL (ref 70–110)
POCT GLUCOSE: 189 MG/DL (ref 70–110)
POTASSIUM SERPL-SCNC: 3.9 MMOL/L (ref 3.5–5.1)
RBC # BLD AUTO: 3.18 M/UL (ref 4–5.4)
RELATIVE EOSINOPHIL (OHS): 1.6 %
RELATIVE LYMPHOCYTE (OHS): 32.5 % (ref 18–48)
RELATIVE MONOCYTE (OHS): 9 % (ref 4–15)
RELATIVE NEUTROPHIL (OHS): 55.8 % (ref 38–73)
SODIUM SERPL-SCNC: 140 MMOL/L (ref 136–145)
WBC # BLD AUTO: 10.35 K/UL (ref 3.9–12.7)

## 2025-04-11 PROCEDURE — 85025 COMPLETE CBC W/AUTO DIFF WBC: CPT | Performed by: FAMILY MEDICINE

## 2025-04-11 PROCEDURE — 90471 IMMUNIZATION ADMIN: CPT

## 2025-04-11 PROCEDURE — 3E0234Z INTRODUCTION OF SERUM, TOXOID AND VACCINE INTO MUSCLE, PERCUTANEOUS APPROACH: ICD-10-PCS

## 2025-04-11 PROCEDURE — 25000003 PHARM REV CODE 250: Performed by: FAMILY MEDICINE

## 2025-04-11 PROCEDURE — 21400001 HC TELEMETRY ROOM

## 2025-04-11 PROCEDURE — 63600175 PHARM REV CODE 636 W HCPCS: Performed by: FAMILY MEDICINE

## 2025-04-11 PROCEDURE — 25000003 PHARM REV CODE 250

## 2025-04-11 PROCEDURE — 99232 SBSQ HOSP IP/OBS MODERATE 35: CPT | Mod: ,,, | Performed by: INTERNAL MEDICINE

## 2025-04-11 PROCEDURE — 80048 BASIC METABOLIC PNL TOTAL CA: CPT

## 2025-04-11 PROCEDURE — 63600175 PHARM REV CODE 636 W HCPCS

## 2025-04-11 PROCEDURE — 36415 COLL VENOUS BLD VENIPUNCTURE: CPT

## 2025-04-11 PROCEDURE — 90677 PCV20 VACCINE IM: CPT

## 2025-04-11 RX ORDER — IBUPROFEN 200 MG
16 TABLET ORAL
Status: DISCONTINUED | OUTPATIENT
Start: 2025-04-11 | End: 2025-04-12 | Stop reason: HOSPADM

## 2025-04-11 RX ORDER — IBUPROFEN 200 MG
24 TABLET ORAL
Status: DISCONTINUED | OUTPATIENT
Start: 2025-04-11 | End: 2025-04-12 | Stop reason: HOSPADM

## 2025-04-11 RX ORDER — POLYETHYLENE GLYCOL 3350 17 G/17G
17 POWDER, FOR SOLUTION ORAL DAILY
Status: DISCONTINUED | OUTPATIENT
Start: 2025-04-11 | End: 2025-04-12 | Stop reason: HOSPADM

## 2025-04-11 RX ORDER — INSULIN ASPART 100 [IU]/ML
0-5 INJECTION, SOLUTION INTRAVENOUS; SUBCUTANEOUS
Status: DISCONTINUED | OUTPATIENT
Start: 2025-04-11 | End: 2025-04-11

## 2025-04-11 RX ORDER — INSULIN ASPART 100 [IU]/ML
0-5 INJECTION, SOLUTION INTRAVENOUS; SUBCUTANEOUS
Status: DISCONTINUED | OUTPATIENT
Start: 2025-04-11 | End: 2025-04-12 | Stop reason: HOSPADM

## 2025-04-11 RX ADMIN — POLYETHYLENE GLYCOL 3350 17 G: 17 POWDER, FOR SOLUTION ORAL at 08:04

## 2025-04-11 RX ADMIN — INSULIN GLARGINE 10 UNITS: 100 INJECTION, SOLUTION SUBCUTANEOUS at 08:04

## 2025-04-11 RX ADMIN — PANTOPRAZOLE SODIUM 40 MG: 40 INJECTION, POWDER, FOR SOLUTION INTRAVENOUS at 08:04

## 2025-04-11 RX ADMIN — ATORVASTATIN CALCIUM 40 MG: 40 TABLET, FILM COATED ORAL at 09:04

## 2025-04-11 RX ADMIN — PANTOPRAZOLE SODIUM 40 MG: 40 INJECTION, POWDER, FOR SOLUTION INTRAVENOUS at 09:04

## 2025-04-11 RX ADMIN — LISINOPRIL 20 MG: 20 TABLET ORAL at 09:04

## 2025-04-11 RX ADMIN — PNEUMOCOCCAL 20-VALENT CONJUGATE VACCINE 0.5 ML
2.2; 2.2; 2.2; 2.2; 2.2; 2.2; 2.2; 2.2; 2.2; 2.2; 2.2; 2.2; 2.2; 2.2; 2.2; 2.2; 4.4; 2.2; 2.2; 2.2 INJECTION, SUSPENSION INTRAMUSCULAR at 09:04

## 2025-04-11 NOTE — PLAN OF CARE
Rounded at bedside, not medically ready for discharge to home today. Biopsy needed. Plans are to discharge to home with family when medically ready. No DME or HH needed. CM will continue to follow pt and provide any dc needs.    Future Appointments   Date Time Provider Department Center   6/30/2025 10:40 AM [facility]   7/7/2025  8:20 AM Nicolette Murillo NP [facility] ENDOCRN FE [facility]   7/28/2025  6:05 AM [facility] LAB [facility]   12/22/2025  8:30 AM OPHTHALMOLOGY GENERAL, [facility] [place]      04/11/25 1200   Rounds   Attendance Nurse    Discharge Plan A Home;Home with family   Why the patient remains in the hospital Requires continued medical care   Transition of Care Barriers None

## 2025-04-11 NOTE — ASSESSMENT & PLAN NOTE
Noted on EGD in confirmed with CT A&P. Mildly hypoechoic area of mass-like fullness near the pancreaticoduodenal groove, potentially arising from the duodenal wall in this patient with recently noted ulcerated mass by endoscopy.  This is difficult to fully separate from the pancreatic head with at least abutment at this level.     Follow up biopsy results  General Surgery consulted for evaluation  GI recommends follow up with outpatient Surgical Oncology

## 2025-04-11 NOTE — PROGRESS NOTES
[facility]  Progress Note    Patient Name: Aury Felix  MRN: [id number]  Patient Class: IP- Inpatient   Admission Date: 4/10/2025  Length of Stay: 1 days  Attending Physician: Layton Hayward MD  Primary Care Provider: Jimbo Menard II, MD        Subjective     Principal Problem:GIB (gastrointestinal bleeding)        HPI:  Aury Felix is a 54-year-old female with PMH HTN, and DM2 presented to [facility] ER with intermittent epigastric pain and black stools for one week.  Patient states that she has been taking Pepto-Bismol for past 3 days to relieve her epigastric pain.    In ED /109 > 200/117 > 182/102,  > 102, RR 20, SpO2 100% (RA).   WBC 11.4, Hb 14.8 (BL) > 9.5, Plts 423, CMP unremarkable except for Glu 185, U/A neg, FOBT pos.    The patient has had a few melanotic stools in the ED. Patient given clonidine (0.1 mg PO) and hydralazine (20 mg IV).  Patient given Protonix (80 IV).  Due to patient's presenting symptoms she is transferred to [facility] for higher level of care.  At time of my examination patient denied any headache, fever, chills, chest pain or shortness of breath but complained of black stools and epigastric pain.     Overview/Hospital Course:  No notes on file    Interval History:  Patient seen and examined.  Found in no acute distress.  No new complaints.  Patient has been tachycardic but vitals otherwise stable.  Hemoglobin stable.  General Surgery consulted for evaluation and duodenal mass.    Review of Systems   Constitutional: Negative.    HENT: Negative.     Eyes: Negative.    Respiratory: Negative.     Cardiovascular: Negative.    Gastrointestinal:  Positive for abdominal pain and blood in stool.   Musculoskeletal: Negative.    Skin: Negative.    Hematological: Negative.    Psychiatric/Behavioral: Negative.       Objective:     Vital Signs (Most Recent):  Temp: 98.4 °F (36.9 °C) (04/11/25 1606)  Pulse: 108 (04/11/25  1606)  Resp: 17 (04/11/25 1606)  BP: 125/83 (04/11/25 1606)  SpO2: 98 % (04/11/25 1606) Vital Signs (24h Range):  Temp:  [97 °F (36.1 °C)-98.4 °F (36.9 °C)] 98.4 °F (36.9 °C)  Pulse:  [] 108  Resp:  [17-20] 17  SpO2:  [96 %-99 %] 98 %  BP: (115-131)/(78-90) 125/83     Weight: 94.8 kg (208 lb 15.9 oz)  Body mass index is 31.78 kg/m².    Intake/Output Summary (Last 24 hours) at 4/11/2025 1640  Last data filed at 4/10/2025 2234  Gross per 24 hour   Intake 365 ml   Output --   Net 365 ml         Physical Exam  Vitals and nursing note reviewed.   HENT:      Head: Normocephalic and atraumatic.      Nose: Nose normal.      Mouth/Throat:      Mouth: Mucous membranes are moist.   Eyes:      Extraocular Movements: Extraocular movements intact.      Pupils: Pupils are equal, round, and reactive to light.   Cardiovascular:      Rate and Rhythm: Normal rate and regular rhythm.   Pulmonary:      Breath sounds: Normal breath sounds.   Abdominal:      Palpations: Abdomen is soft.   Musculoskeletal:         General: Normal range of motion.      Cervical back: Neck supple.   Skin:     General: Skin is warm.   Neurological:      General: No focal deficit present.      Mental Status: She is alert.   Psychiatric:         Mood and Affect: Mood normal.               Significant Labs: All pertinent labs within the past 24 hours have been reviewed.  CBC:   Recent Labs   Lab 04/10/25  0328 04/10/25  2107 04/11/25  0247   WBC 10.86 13.04* 10.35   HGB 8.8* 8.8* 9.0*   HCT 25.9* 25.6* 26.5*   * 474* 500*       Significant Imaging: I have reviewed all pertinent imaging results/findings within the past 24 hours.      Assessment & Plan  GIB (gastrointestinal bleeding)  Admit to medical floor with telemetry.    NPO.  Patient's hemorrhage is due to gastrointestinal bleed, this bleeding is associated with an antiplatelet agent, the antiplatelet agent is Select Antiplatelet Agent(s): Aspirin. Patients most recent Hgb, Hct, platelets, and  INR are listed below.  Recent Labs     04/09/25  1543 04/09/25  2055 04/10/25  0328 04/10/25  2107 04/11/25  0247   HGB 9.5*   < > 8.8* 8.8* 9.0*   HCT 28.1*   < > 25.9* 25.6* 26.5*      < > 476* 474* 500*   INR 1.0  --   --   --   --     < > = values in this interval not displayed.     Plan  - Will trend hemoglobin/hematocrit Daily  - Will monitor and correct any coagulation defects  - Will transfuse if Hgb is <7g/dl (<8g/dl in cases of active ACS) or if patient has rapid bleeding leading to hemodynamic instability  - inpatient gastroenterology consultation, appreciate rec's:    EGD 4/10:   Impression:            - Normal esophagus.                          - Small hiatal hernia.                          - Rule out malignancy, duodenal mass. Biopsied.   Recommendation:        - [facility].                          - Resume previous diet.                          - Continue present medications.                          - Await pathology results.                          - Observe patient's clinical course.   Hypertensive urgency  Patient has a current diagnosis of hypertensive urgency (without evidence of end organ damage) which is controlled.  Latest blood pressure and vitals reviewed-   Temp:  [97 °F (36.1 °C)-98.4 °F (36.9 °C)]   Pulse:  []   Resp:  [17-20]   BP: (115-131)/(78-90)   SpO2:  [96 %-99 %] .   Patient currently on IV antihypertensives.   Home meds for hypertension were reviewed and noted below.   Hypertension Medications              lisinopriL (PRINIVIL,ZESTRIL) 20 MG tablet Take 1 tablet (20 mg total) by mouth once daily.            Medication adjustment for hospital antihypertensives is as follows- IV hydralazine p.r.n..    Will aim for controlled BP reduction by medications noted above. Monitor and mitigate end organ damage as indicated.  Type 2 diabetes mellitus  Patient's FSGs are controlled on current medication regimen.  Last A1c reviewed-    Lab Results   Component Value Date    HGBA1C 6.7 (H) 01/17/2025     Most recent fingerstick glucose reviewed-   Recent Labs   Lab 04/10/25  2230 04/11/25  0608 04/11/25  1134 04/11/25  1607   POCTGLUCOSE 145* 140* 187* 189*     Current correctional scale  Low  Maintain anti-hyperglycemic dose as follows-   Antihyperglycemics (From admission, onward)      Start     Stop Route Frequency Ordered    04/11/25 1312  insulin aspart U-100 pen 0-5 Units         -- SubQ Before meals & nightly PRN 04/11/25 1213    04/10/25 2100  insulin glargine U-100 (Lantus) pen 10 Units         -- SubQ Nightly 04/10/25 0953          Hold Oral hypoglycemics while patient is in the hospital.  Mixed hyperlipidemia    Continue home dose of statin.  Obesity (BMI 30-39.9)  Body mass index is 31.78 kg/m². Morbid obesity complicates all aspects of disease management from diagnostic modalities to treatment. Weight loss encouraged and health benefits explained to patient.       Duodenal mass  Noted on EGD in confirmed with CT A&P. Mildly hypoechoic area of mass-like fullness near the pancreaticoduodenal groove, potentially arising from the duodenal wall in this patient with recently noted ulcerated mass by endoscopy.  This is difficult to fully separate from the pancreatic head with at least abutment at this level.     Follow up biopsy results  General Surgery consulted for evaluation  GI recommends follow up with outpatient Surgical Oncology    Tachycardia  Patient tachycardic to 110s and asymptomatic.  She does report significant anxiety.    EKG ordered    VTE Risk Mitigation (From admission, onward)           Ordered     IP VTE HIGH RISK PATIENT  Once         04/10/25 0020     Place sequential compression device  Until discontinued         04/10/25 0020                    Discharge Planning   MAIDA: 4/14/2025     Code Status: Full Code   Medical Readiness for Discharge Date:   Discharge Plan A: Home, Home with family                        Kurt  LAMINE Bliss  Department of [facility]   [facility]

## 2025-04-11 NOTE — PLAN OF CARE
Pt AAOX4. Medications administered per order. BG monitored. Cardiac monitor maintained. Ambulates independently. Encouraged to call with questions/concerns/assistance. Safety.

## 2025-04-11 NOTE — SUBJECTIVE & OBJECTIVE
No current facility-administered medications on file prior to encounter.     Current Outpatient Medications on File Prior to Encounter   Medication Sig    aspirin (ECOTRIN) 81 MG EC tablet Take 1 tablet (81 mg total) by mouth once daily.    diclofenac sodium (VOLTAREN ARTHRITIS PAIN) 1 % Gel Apply 2 g topically 2 (two) times daily.    empagliflozin (JARDIANCE) 25 mg tablet Take 1 tablet (25 mg total) by mouth once daily.    insulin glargine U-100, Lantus, 100 unit/mL (3 mL) SubQ InPn pen Inject 20 Units into the skin every evening.    lisinopriL (PRINIVIL,ZESTRIL) 20 MG tablet Take 1 tablet (20 mg total) by mouth once daily.    rosuvastatin (CRESTOR) 40 MG Tab Take 1 tablet (40 mg total) by mouth once daily.    blood-glucose meter kit Use as instructed    cetirizine (ZYRTEC) 10 MG tablet Take 1 tablet (10 mg total) by mouth once daily.    hydrocortisone 2.5 % cream Apply topically 2 (two) times daily. To dry or itchy skin    hydrOXYzine HCL (ATARAX) 25 MG tablet Take 1 tablet (25 mg total) by mouth 3 (three) times daily as needed for Itching.    metFORMIN (GLUCOPHAGE-XR) 500 MG ER 24hr tablet Take 1 tablet (500 mg total) by mouth 2 (two) times daily with meals.       Review of patient's allergies indicates:  No Known Allergies    Past Medical History:   Diagnosis Date    Hypertension      Past Surgical History:   Procedure Laterality Date    ESOPHAGOGASTRODUODENOSCOPY N/A 4/10/2025    Procedure: EGD (ESOPHAGOGASTRODUODENOSCOPY);  Surgeon: Manny Calles MD;  Location: [facility];  Service: Endoscopy;  Laterality: N/A;    HYSTERECTOMY       Family History    None       Tobacco Use    Smoking status: Never    Smokeless tobacco: Never   Substance and Sexual Activity    Alcohol use: Yes    Drug use: Never    Sexual activity: Yes     Review of Systems   Constitutional:  Negative for chills and fever.   Respiratory:  Negative for shortness of breath.    Cardiovascular:  Negative for chest pain.    Gastrointestinal:  Negative for abdominal pain, nausea and vomiting.   Genitourinary:  Negative for dysuria.   Neurological:  Negative for dizziness and weakness.     Objective:     Vital Signs (Most Recent):  Temp: 98.4 °F (36.9 °C) (04/11/25 1606)  Pulse: 109 (04/11/25 1710)  Resp: 17 (04/11/25 1606)  BP: 125/83 (04/11/25 1606)  SpO2: 98 % (04/11/25 1606) Vital Signs (24h Range):  Temp:  [97 °F (36.1 °C)-98.4 °F (36.9 °C)] 98.4 °F (36.9 °C)  Pulse:  [] 109  Resp:  [17-20] 17  SpO2:  [96 %-99 %] 98 %  BP: (115-131)/(78-90) 125/83     Weight: 94.8 kg (208 lb 15.9 oz)  Body mass index is 31.78 kg/m².     Physical Exam  Constitutional:       Appearance: Normal appearance.   HENT:      Head: Normocephalic and atraumatic.   Eyes:      Extraocular Movements: Extraocular movements intact.      Conjunctiva/sclera: Conjunctivae normal.   Cardiovascular:      Rate and Rhythm: Normal rate and regular rhythm.   Pulmonary:      Effort: Pulmonary effort is normal.   Abdominal:      General: Abdomen is flat.      Palpations: Abdomen is soft.   Skin:     General: Skin is warm and dry.      Capillary Refill: Capillary refill takes less than 2 seconds.   Neurological:      General: No focal deficit present.      Mental Status: She is alert and oriented to person, place, and time.   Psychiatric:         Mood and Affect: Mood normal.         Behavior: Behavior normal.            I have reviewed all pertinent lab results within the past 24 hours.    Significant Diagnostics:  I have reviewed all pertinent imaging results/findings within the past 24 hours.

## 2025-04-11 NOTE — PROGRESS NOTES
"[facility]    CC: melena    Interval:  Patient reports no interval episodes of melena or hematochezia. Denies abdominal pain this morning. Discussed results of EGD and CT.    Past Medical History  HTN  DM2    Physical Examination  BP (!) 131/90   Pulse 106   Temp 98.3 °F (36.8 °C) (Oral)   Resp 18   Ht 5' 8" (1.727 m)   Wt 94.8 kg (208 lb 15.9 oz)   SpO2 99%   Breastfeeding No   BMI 31.78 kg/m²   General appearance: alert, cooperative, no distress  Lungs: no increased work of breathing  Abdomen: soft, non-tender; non-distended    Imagin/10 CT AP:  Mass like fullness near pancreaticoduodenal groove, potentially arising from duodenal wall    I have personally reviewed and interpreted these images.    Assessment:   54F with PMHx HTN and DM2 who presented with melena. Noted to have hgb of 9 (baseline 14). EGD on 4/10 revealed ulcerated mass with stigmata of recent bleeding in 2nd portion of duodenum.    Plan:  - follow up duodenal mass pathology  - recommend surgery consult  - trend hgb. Transfuse for hgb >7    Case discussed with Dr. Jeffery Pino MD  [facility] Internal Medicine PGY-2  "

## 2025-04-11 NOTE — SUBJECTIVE & OBJECTIVE
Interval History:  Patient seen and examined.  Found in no acute distress.  No new complaints.  Patient has been tachycardic but vitals otherwise stable.  Hemoglobin stable.  General Surgery consulted for evaluation and duodenal mass.    Review of Systems   Constitutional: Negative.    HENT: Negative.     Eyes: Negative.    Respiratory: Negative.     Cardiovascular: Negative.    Gastrointestinal:  Positive for abdominal pain and blood in stool.   Musculoskeletal: Negative.    Skin: Negative.    Hematological: Negative.    Psychiatric/Behavioral: Negative.       Objective:     Vital Signs (Most Recent):  Temp: 98.4 °F (36.9 °C) (04/11/25 1606)  Pulse: 108 (04/11/25 1606)  Resp: 17 (04/11/25 1606)  BP: 125/83 (04/11/25 1606)  SpO2: 98 % (04/11/25 1606) Vital Signs (24h Range):  Temp:  [97 °F (36.1 °C)-98.4 °F (36.9 °C)] 98.4 °F (36.9 °C)  Pulse:  [] 108  Resp:  [17-20] 17  SpO2:  [96 %-99 %] 98 %  BP: (115-131)/(78-90) 125/83     Weight: 94.8 kg (208 lb 15.9 oz)  Body mass index is 31.78 kg/m².    Intake/Output Summary (Last 24 hours) at 4/11/2025 1640  Last data filed at 4/10/2025 2234  Gross per 24 hour   Intake 365 ml   Output --   Net 365 ml         Physical Exam  Vitals and nursing note reviewed.   HENT:      Head: Normocephalic and atraumatic.      Nose: Nose normal.      Mouth/Throat:      Mouth: Mucous membranes are moist.   Eyes:      Extraocular Movements: Extraocular movements intact.      Pupils: Pupils are equal, round, and reactive to light.   Cardiovascular:      Rate and Rhythm: Normal rate and regular rhythm.   Pulmonary:      Breath sounds: Normal breath sounds.   Abdominal:      Palpations: Abdomen is soft.   Musculoskeletal:         General: Normal range of motion.      Cervical back: Neck supple.   Skin:     General: Skin is warm.   Neurological:      General: No focal deficit present.      Mental Status: She is alert.   Psychiatric:         Mood and Affect: Mood normal.                Significant Labs: All pertinent labs within the past 24 hours have been reviewed.  CBC:   Recent Labs   Lab 04/10/25  0328 04/10/25  2107 04/11/25  0247   WBC 10.86 13.04* 10.35   HGB 8.8* 8.8* 9.0*   HCT 25.9* 25.6* 26.5*   * 474* 500*       Significant Imaging: I have reviewed all pertinent imaging results/findings within the past 24 hours.

## 2025-04-11 NOTE — ASSESSMENT & PLAN NOTE
54 y.o. female with a recently found duodenal mass on EGD. Pathology pending. Does not show signs of obstruction.    Plan:  No acute surgical intervention indicated at this time  Will have her follow up with Dr. Reyes outpatient  Ok to discharge from a surgical standpoint

## 2025-04-11 NOTE — ASSESSMENT & PLAN NOTE
Patient's FSGs are controlled on current medication regimen.  Last A1c reviewed-   Lab Results   Component Value Date    HGBA1C 6.7 (H) 01/17/2025     Most recent fingerstick glucose reviewed-   Recent Labs   Lab 04/10/25  2230 04/11/25  0608 04/11/25  1134 04/11/25  1607   POCTGLUCOSE 145* 140* 187* 189*     Current correctional scale  Low  Maintain anti-hyperglycemic dose as follows-   Antihyperglycemics (From admission, onward)      Start     Stop Route Frequency Ordered    04/11/25 1312  insulin aspart U-100 pen 0-5 Units         -- SubQ Before meals & nightly PRN 04/11/25 1213    04/10/25 2100  insulin glargine U-100 (Lantus) pen 10 Units         -- SubQ Nightly 04/10/25 0953          Hold Oral hypoglycemics while patient is in the hospital.

## 2025-04-11 NOTE — ASSESSMENT & PLAN NOTE
Admit to medical floor with telemetry.    NPO.  Patient's hemorrhage is due to gastrointestinal bleed, this bleeding is associated with an antiplatelet agent, the antiplatelet agent is Select Antiplatelet Agent(s): Aspirin. Patients most recent Hgb, Hct, platelets, and INR are listed below.  Recent Labs     04/09/25  1543 04/09/25  2055 04/10/25  0328 04/10/25  2107 04/11/25  0247   HGB 9.5*   < > 8.8* 8.8* 9.0*   HCT 28.1*   < > 25.9* 25.6* 26.5*      < > 476* 474* 500*   INR 1.0  --   --   --   --     < > = values in this interval not displayed.     Plan  - Will trend hemoglobin/hematocrit Daily  - Will monitor and correct any coagulation defects  - Will transfuse if Hgb is <7g/dl (<8g/dl in cases of active ACS) or if patient has rapid bleeding leading to hemodynamic instability  - inpatient gastroenterology consultation, appreciate rec's:    EGD 4/10:   Impression:            - Normal esophagus.                          - Small hiatal hernia.                          - Rule out malignancy, duodenal mass. Biopsied.   Recommendation:        - Return patient to [facility] for [facility] care.                          - Resume previous diet.                          - Continue present medications.                          - Await pathology results.                          - Observe patient's clinical course.

## 2025-04-11 NOTE — HPI
Aury Felix is a 54-year-old female with PMH HTN, and DM2 presented to [facility] ER with intermittent epigastric pain and black stools for one week. She was transferred to [place] for higher level of care. EGD was done which showed a medium-sized ulcerated mass with stigmata of recent bleeding was found in the second portion of the duodenum. CT scan showed a mildly hypoechoic area of mass-like fullness near the pancreaticoduodenal groove. She denies fevers, chills, chest pain, shortness of breath, nausea, vomiting, obstructive symptoms, dizziness, or weakness. PSH includes hysterectomy. Does not take any blood thinners. General surgery was consulted for evaluation.

## 2025-04-11 NOTE — ASSESSMENT & PLAN NOTE
Body mass index is 31.78 kg/m². Morbid obesity complicates all aspects of disease management from diagnostic modalities to treatment. Weight loss encouraged and health benefits explained to patient.

## 2025-04-11 NOTE — CONSULTS
[facility] Surg  General Surgery  Consult Note    Patient Name: Aury Felix  MRN: [id number]  Code Status: Full Code  Admission Date: 4/10/2025  Hospital Length of Stay: 1 days  Attending Physician: Layton Hayward MD  Primary Care Provider: Jimbo Menard II, MD    Patient information was obtained from patient and primary team.     Inpatient consult to General Surgery  Consult performed by: Misael Gamboa MD  Consult ordered by: Kurt Bliss PA-C        Subjective:     Principal Problem: GIB (gastrointestinal bleeding)    History of Present Illness: Aury Felix is a 54-year-old female with PMH HTN, and DM2 presented to [facility] ER with intermittent epigastric pain and black stools for one week. She was transferred to [place] for higher level of care. EGD was done which showed a medium-sized ulcerated mass with stigmata of recent bleeding was found in the second portion of the duodenum. CT scan showed a mildly hypoechoic area of mass-like fullness near the pancreaticoduodenal groove. She denies fevers, chills, chest pain, shortness of breath, nausea, vomiting, obstructive symptoms, dizziness, or weakness. PSH includes hysterectomy. Does not take any blood thinners. General surgery was consulted for evaluation.    No current facility-administered medications on file prior to encounter.     Current Outpatient Medications on File Prior to Encounter   Medication Sig    aspirin (ECOTRIN) 81 MG EC tablet Take 1 tablet (81 mg total) by mouth once daily.    diclofenac sodium (VOLTAREN ARTHRITIS PAIN) 1 % Gel Apply 2 g topically 2 (two) times daily.    empagliflozin (JARDIANCE) 25 mg tablet Take 1 tablet (25 mg total) by mouth once daily.    insulin glargine U-100, Lantus, 100 unit/mL (3 mL) SubQ InPn pen Inject 20 Units into the skin every evening.    lisinopriL (PRINIVIL,ZESTRIL) 20 MG tablet Take 1 tablet (20 mg total) by mouth once daily.    rosuvastatin (CRESTOR) 40 MG Tab Take 1 tablet  (40 mg total) by mouth once daily.    blood-glucose meter kit Use as instructed    cetirizine (ZYRTEC) 10 MG tablet Take 1 tablet (10 mg total) by mouth once daily.    hydrocortisone 2.5 % cream Apply topically 2 (two) times daily. To dry or itchy skin    hydrOXYzine HCL (ATARAX) 25 MG tablet Take 1 tablet (25 mg total) by mouth 3 (three) times daily as needed for Itching.    metFORMIN (GLUCOPHAGE-XR) 500 MG ER 24hr tablet Take 1 tablet (500 mg total) by mouth 2 (two) times daily with meals.       Review of patient's allergies indicates:  No Known Allergies    Past Medical History:   Diagnosis Date    Hypertension      Past Surgical History:   Procedure Laterality Date    ESOPHAGOGASTRODUODENOSCOPY N/A 4/10/2025    Procedure: EGD (ESOPHAGOGASTRODUODENOSCOPY);  Surgeon: Manny Calles MD;  Location: [facility];  Service: Endoscopy;  Laterality: N/A;    HYSTERECTOMY       Family History    None       Tobacco Use    Smoking status: Never    Smokeless tobacco: Never   Substance and Sexual Activity    Alcohol use: Yes    Drug use: Never    Sexual activity: Yes     Review of Systems   Constitutional:  Negative for chills and fever.   Respiratory:  Negative for shortness of breath.    Cardiovascular:  Negative for chest pain.   Gastrointestinal:  Negative for abdominal pain, nausea and vomiting.   Genitourinary:  Negative for dysuria.   Neurological:  Negative for dizziness and weakness.     Objective:     Vital Signs (Most Recent):  Temp: 98.4 °F (36.9 °C) (04/11/25 1606)  Pulse: 109 (04/11/25 1710)  Resp: 17 (04/11/25 1606)  BP: 125/83 (04/11/25 1606)  SpO2: 98 % (04/11/25 1606) Vital Signs (24h Range):  Temp:  [97 °F (36.1 °C)-98.4 °F (36.9 °C)] 98.4 °F (36.9 °C)  Pulse:  [] 109  Resp:  [17-20] 17  SpO2:  [96 %-99 %] 98 %  BP: (115-131)/(78-90) 125/83     Weight: 94.8 kg (208 lb 15.9 oz)  Body mass index is 31.78 kg/m².     Physical Exam  Constitutional:       Appearance: Normal appearance.   HENT:       Head: Normocephalic and atraumatic.   Eyes:      Extraocular Movements: Extraocular movements intact.      Conjunctiva/sclera: Conjunctivae normal.   Cardiovascular:      Rate and Rhythm: Normal rate and regular rhythm.   Pulmonary:      Effort: Pulmonary effort is normal.   Abdominal:      General: Abdomen is flat.      Palpations: Abdomen is soft.   Skin:     General: Skin is warm and dry.      Capillary Refill: Capillary refill takes less than 2 seconds.   Neurological:      General: No focal deficit present.      Mental Status: She is alert and oriented to person, place, and time.   Psychiatric:         Mood and Affect: Mood normal.         Behavior: Behavior normal.            I have reviewed all pertinent lab results within the past 24 hours.    Significant Diagnostics:  I have reviewed all pertinent imaging results/findings within the past 24 hours.    Assessment/Plan:     Duodenal mass  54 y.o. female with a recently found duodenal mass on EGD. Pathology pending. Does not show signs of obstruction.    Plan:  No acute surgical intervention indicated at this time  Will have her follow up with Dr. Reyes outpatient  Ok to discharge from a surgical standpoint      VTE Risk Mitigation (From admission, onward)           Ordered     IP VTE HIGH RISK PATIENT  Once         04/10/25 0020     Place sequential compression device  Until discontinued         04/10/25 0020                    Thank you for your consult. I will sign off. Please contact us if you have any additional questions.    Misael Gamboa MD  General Surgery  [facility] Surg

## 2025-04-11 NOTE — ASSESSMENT & PLAN NOTE
Patient has a current diagnosis of hypertensive urgency (without evidence of end organ damage) which is controlled.  Latest blood pressure and vitals reviewed-   Temp:  [97 °F (36.1 °C)-98.4 °F (36.9 °C)]   Pulse:  []   Resp:  [17-20]   BP: (115-131)/(78-90)   SpO2:  [96 %-99 %] .   Patient currently on IV antihypertensives.   Home meds for hypertension were reviewed and noted below.   Hypertension Medications              lisinopriL (PRINIVIL,ZESTRIL) 20 MG tablet Take 1 tablet (20 mg total) by mouth once daily.            Medication adjustment for hospital antihypertensives is as follows- IV hydralazine p.r.n..    Will aim for controlled BP reduction by medications noted above. Monitor and mitigate end organ damage as indicated.

## 2025-04-11 NOTE — PLAN OF CARE
Pt. AAOx4. No c/o pain during shift. Pt. Independent. General surgery consult placed during shift. Call light in reach. Pt. Instructed to call for assistance. Plan of care continued.    Problem: Adult Inpatient Plan of Care  Goal: Plan of Care Review  Outcome: Progressing     Problem: Adult Inpatient Plan of Care  Goal: Patient-Specific Goal (Individualized)  Outcome: Progressing     Problem: Adult Inpatient Plan of Care  Goal: Absence of Hospital-Acquired Illness or Injury  Outcome: Progressing     Problem: Adult Inpatient Plan of Care  Goal: Optimal Comfort and Wellbeing  Outcome: Progressing     Problem: Adult Inpatient Plan of Care  Goal: Readiness for Transition of Care  Outcome: Progressing

## 2025-04-11 NOTE — ASSESSMENT & PLAN NOTE
Patient tachycardic to 110s and asymptomatic.  She does report significant anxiety.    EKG ordered

## 2025-04-12 VITALS
WEIGHT: 209 LBS | TEMPERATURE: 99 F | RESPIRATION RATE: 18 BRPM | SYSTOLIC BLOOD PRESSURE: 155 MMHG | BODY MASS INDEX: 31.67 KG/M2 | DIASTOLIC BLOOD PRESSURE: 93 MMHG | HEART RATE: 103 BPM | OXYGEN SATURATION: 100 % | HEIGHT: 68 IN

## 2025-04-12 LAB
ABSOLUTE EOSINOPHIL (OHS): 0.22 K/UL
ABSOLUTE MONOCYTE (OHS): 0.93 K/UL (ref 0.3–1)
ABSOLUTE NEUTROPHIL COUNT (OHS): 4.88 K/UL (ref 1.8–7.7)
ANION GAP (OHS): 12 MMOL/L (ref 8–16)
BASOPHILS # BLD AUTO: 0.05 K/UL
BASOPHILS NFR BLD AUTO: 0.5 %
BUN SERPL-MCNC: 9 MG/DL (ref 6–20)
CALCIUM SERPL-MCNC: 9.4 MG/DL (ref 8.7–10.5)
CHLORIDE SERPL-SCNC: 108 MMOL/L (ref 95–110)
CO2 SERPL-SCNC: 21 MMOL/L (ref 23–29)
CREAT SERPL-MCNC: 0.7 MG/DL (ref 0.5–1.4)
ERYTHROCYTE [DISTWIDTH] IN BLOOD BY AUTOMATED COUNT: 15.2 % (ref 11.5–14.5)
GFR SERPLBLD CREATININE-BSD FMLA CKD-EPI: >60 ML/MIN/1.73/M2
GLUCOSE SERPL-MCNC: 135 MG/DL (ref 70–110)
HCT VFR BLD AUTO: 26.6 % (ref 37–48.5)
HGB BLD-MCNC: 8.8 GM/DL (ref 12–16)
IMM GRANULOCYTES # BLD AUTO: 0.02 K/UL (ref 0–0.04)
IMM GRANULOCYTES NFR BLD AUTO: 0.2 % (ref 0–0.5)
LYMPHOCYTES # BLD AUTO: 4.37 K/UL (ref 1–4.8)
MCH RBC QN AUTO: 28 PG (ref 27–31)
MCHC RBC AUTO-ENTMCNC: 33.1 G/DL (ref 32–36)
MCV RBC AUTO: 85 FL (ref 82–98)
NUCLEATED RBC (/100WBC) (OHS): 0 /100 WBC
PLATELET # BLD AUTO: 500 K/UL (ref 150–450)
PMV BLD AUTO: 10.8 FL (ref 9.2–12.9)
POCT GLUCOSE: 155 MG/DL (ref 70–110)
POTASSIUM SERPL-SCNC: 3.8 MMOL/L (ref 3.5–5.1)
RBC # BLD AUTO: 3.14 M/UL (ref 4–5.4)
RELATIVE EOSINOPHIL (OHS): 2.1 %
RELATIVE LYMPHOCYTE (OHS): 41.7 % (ref 18–48)
RELATIVE MONOCYTE (OHS): 8.9 % (ref 4–15)
RELATIVE NEUTROPHIL (OHS): 46.6 % (ref 38–73)
SODIUM SERPL-SCNC: 141 MMOL/L (ref 136–145)
WBC # BLD AUTO: 10.47 K/UL (ref 3.9–12.7)

## 2025-04-12 PROCEDURE — 63600175 PHARM REV CODE 636 W HCPCS: Performed by: FAMILY MEDICINE

## 2025-04-12 PROCEDURE — 25000003 PHARM REV CODE 250: Performed by: FAMILY MEDICINE

## 2025-04-12 PROCEDURE — 36415 COLL VENOUS BLD VENIPUNCTURE: CPT | Performed by: FAMILY MEDICINE

## 2025-04-12 PROCEDURE — 85025 COMPLETE CBC W/AUTO DIFF WBC: CPT | Performed by: FAMILY MEDICINE

## 2025-04-12 PROCEDURE — 80048 BASIC METABOLIC PNL TOTAL CA: CPT

## 2025-04-12 PROCEDURE — 25000003 PHARM REV CODE 250

## 2025-04-12 RX ADMIN — PANTOPRAZOLE SODIUM 40 MG: 40 INJECTION, POWDER, FOR SOLUTION INTRAVENOUS at 07:04

## 2025-04-12 RX ADMIN — ATORVASTATIN CALCIUM 40 MG: 40 TABLET, FILM COATED ORAL at 07:04

## 2025-04-12 RX ADMIN — POLYETHYLENE GLYCOL 3350 17 G: 17 POWDER, FOR SOLUTION ORAL at 07:04

## 2025-04-12 RX ADMIN — LISINOPRIL 20 MG: 20 TABLET ORAL at 07:04

## 2025-04-12 NOTE — ASSESSMENT & PLAN NOTE
Noted on EGD in confirmed with CT A&P. Mildly hypoechoic area of mass-like fullness near the pancreaticoduodenal groove, potentially arising from the duodenal wall in this patient with recently noted ulcerated mass by endoscopy.  This is difficult to fully separate from the pancreatic head with at least abutment at this level.     Follow up biopsy results  General Surgery consulted for evaluation, appreciate rec's below:  Plan:  No acute surgical intervention indicated at this time  Will have her follow up with Dr. Reyes outpatient  Ok to discharge from a surgical standpoint  Ambulatory referral placed to General Surgery

## 2025-04-12 NOTE — ASSESSMENT & PLAN NOTE
Patient's FSGs are controlled on current medication regimen.  Last A1c reviewed-   Lab Results   Component Value Date    HGBA1C 6.7 (H) 01/17/2025     Most recent fingerstick glucose reviewed-   Recent Labs   Lab 04/11/25  1607 04/11/25  2041 04/12/25  0600   POCTGLUCOSE 189* 169* 155*     Current correctional scale  Low  Maintain anti-hyperglycemic dose as follows-   Antihyperglycemics (From admission, onward)      Start     Stop Route Frequency Ordered    04/11/25 1312  insulin aspart U-100 pen 0-5 Units         -- SubQ Before meals & nightly PRN 04/11/25 1213    04/10/25 2100  insulin glargine U-100 (Lantus) pen 10 Units         -- SubQ Nightly 04/10/25 0953          Hold Oral hypoglycemics while patient is in the hospital.

## 2025-04-12 NOTE — NURSING
VN cued into room and permission is given to adjust the camera towards patient who is sitting up in chair and in no apparent distress.  AVS reviewed with patient and she verbalized complete understanding on the discharge instructions.  Awaiting for brother to .  Patient is instructed to call the Nursing Station for wheelchair per Transport when she is ready to leave.  Requesting for Work Excuse.  Floor Nurse notified via secure chat.  Voiced no other concerns.

## 2025-04-12 NOTE — ASSESSMENT & PLAN NOTE
Patient has a current diagnosis of hypertensive urgency (without evidence of end organ damage) which is controlled.  Latest blood pressure and vitals reviewed-   Temp:  [98.4 °F (36.9 °C)-99.8 °F (37.7 °C)]   Pulse:  []   Resp:  [17-18]   BP: (125-168)/()   SpO2:  [95 %-100 %] .   Patient currently off IV antihypertensives.   Home meds for hypertension were reviewed and noted below.   Hypertension Medications              lisinopriL (PRINIVIL,ZESTRIL) 20 MG tablet Take 1 tablet (20 mg total) by mouth once daily.            Medication adjustment for hospital antihypertensives is as follows- IV hydralazine p.r.n..    Will aim for controlled BP reduction by medications noted above. Monitor and mitigate end organ damage as indicated.

## 2025-04-12 NOTE — PLAN OF CARE
Pt clear to D/C home today. Pt family to transport Pt home. F/U appts have been requested and scheduled. SW spoke with Pt and no other D/C needs identified. Pt clear from CM.     Future Appointments   Date Time Provider Department Center   6/30/2025 10:40 AM [facility] LAB [facility]   7/7/2025  8:20 AM Nicolette Murillo, NP [facility] ENDOCRN FE ACC   7/28/2025  6:05 AM [facility]   12/22/2025  8:30 AM OPHTHALMOLOGY GENERAL, [facility] Surg  Discharge Final Note    Primary Care Provider: Jimbo Menard II, MD    Expected Discharge Date: 4/12/2025    Final Discharge Note (most recent)       Final Note - 04/12/25 1114          Final Note    Assessment Type Final Discharge Note (P)      Anticipated Discharge Disposition Home or Self Care (P)      What phone number can be called within the next 1-3 days to see how you are doing after discharge? [phone] (P)         Post-Acute Status    Discharge Delays None known at this time (P)                      Important Message from Medicare             Contact Info       Jimbo Menard II, MD   Specialty: Internal Medicine   Relationship: PCP - General    [address] [facility]  [address] [facility] [address]   Phone: [phone]       Next Steps: Follow up              Tamara Hernandez LMSW  Phone: [phone]  [facility]

## 2025-04-12 NOTE — NURSING
Telemetry and PIV's removed for discharge. No medications to be delivered to bedside. AVS given to patient. VN notified.

## 2025-04-12 NOTE — ASSESSMENT & PLAN NOTE
Patient tachycardic to 110s and asymptomatic.  She does report significant anxiety.    No acute events or arrhythmias observed on Telemetry.  PCP follow up

## 2025-04-12 NOTE — DISCHARGE SUMMARY
[facility] Medicine  Discharge Summary      Patient Name: Aury Felix  MRN: [id number]  VIKAS: [id number]  Patient Class: IP- Inpatient  Admission Date: 4/10/2025  Hospital Length of Stay: 2 days  Discharge Date and Time: 4/12/2025  1:10 PM  Attending Physician:  KAYLYN LEE   Discharging Provider: Kurt Bliss PA-C  Primary Care Provider: Jimbo Menard II, MD    Primary Care Team: Networked reference to record PCT     HPI:   Aury Felix is a 54-year-old female with PMH HTN, and DM2 presented to [facility] ER with intermittent epigastric pain and black stools for one week.  Patient states that she has been taking Pepto-Bismol for past 3 days to relieve her epigastric pain.    In ED /109 > 200/117 > 182/102,  > 102, RR 20, SpO2 100% (RA).   WBC 11.4, Hb 14.8 (BL) > 9.5, Plts 423, CMP unremarkable except for Glu 185, U/A neg, FOBT pos.    The patient has had a few melanotic stools in the ED. Patient given clonidine (0.1 mg PO) and hydralazine (20 mg IV).  Patient given Protonix (80 IV).  Due to patient's presenting symptoms she is transferred to [facility] for higher level of care.  At time of my examination patient denied any headache, fever, chills, chest pain or shortness of breath but complained of black stools and epigastric pain.     Procedure(s) (LRB):  EGD (ESOPHAGOGASTRODUODENOSCOPY) (N/A)      Hospital Course:   No notes on file     Goals of Care Treatment Preferences:  Code Status: Full Code      SDOH Screening:  The patient was screened for food insecurity, housing instability, transportation needs, utility difficulties, and interpersonal safety. The social determinant(s) of health identified as a concern this admission are:  Interpersonal Safety    Will discuss with case management and/or community health workers.          Consults:   Consults (From admission, onward)          Status Ordering Provider     Inpatient consult to General Surgery   Once        Provider:  (Not yet assigned)    Completed LUKAS COLEY     Inpatient consult to Gastroenterology  Once        Provider:  (Not yet assigned)    Completed CHANEL PASTRANA            Assessment & Plan  GIB (gastrointestinal bleeding)  Admit to medical floor with telemetry.    NPO.  Patient's hemorrhage is due to gastrointestinal bleed, this bleeding is associated with an antiplatelet agent, the antiplatelet agent is Select Antiplatelet Agent(s): Aspirin. Patients most recent Hgb, Hct, platelets, and INR are listed below.  Recent Labs     04/09/25  1543 04/09/25  2055 04/10/25  2107 04/11/25  0247 04/12/25  0336   HGB 9.5*   < > 8.8* 9.0* 8.8*   HCT 28.1*   < > 25.6* 26.5* 26.6*      < > 474* 500* 500*   INR 1.0  --   --   --   --     < > = values in this interval not displayed.     Plan  - Will trend hemoglobin/hematocrit Daily  - Will monitor and correct any coagulation defects  - Will transfuse if Hgb is <7g/dl (<8g/dl in cases of active ACS) or if patient has rapid bleeding leading to hemodynamic instability  - inpatient gastroenterology consultation, appreciate rec's:    EGD 4/10:   Impression:            - Normal esophagus.                          - Small hiatal hernia.                          - Rule out malignancy, duodenal mass. Biopsied.   Recommendation:        - Return patient to [facility] for ongoing care.                          - Resume previous diet.                          - Continue present medications.                          - Await pathology results.                          - Observe patient's clinical course.   Hypertensive urgency  Patient has a current diagnosis of hypertensive urgency (without evidence of end organ damage) which is controlled.  Latest blood pressure and vitals reviewed-   Temp:  [98.4 °F (36.9 °C)-99.8 °F (37.7 °C)]   Pulse:  []   Resp:  [17-18]   BP: (125-168)/()   SpO2:  [95 %-100 %] .   Patient currently off IV antihypertensives.    Home meds for hypertension were reviewed and noted below.   Hypertension Medications              lisinopriL (PRINIVIL,ZESTRIL) 20 MG tablet Take 1 tablet (20 mg total) by mouth once daily.            Medication adjustment for hospital antihypertensives is as follows- IV hydralazine p.r.n..    Will aim for controlled BP reduction by medications noted above. Monitor and mitigate end organ damage as indicated.  Type 2 diabetes mellitus  Patient's FSGs are controlled on current medication regimen.  Last A1c reviewed-   Lab Results   Component Value Date    HGBA1C 6.7 (H) 01/17/2025     Most recent fingerstick glucose reviewed-   Recent Labs   Lab 04/11/25  1607 04/11/25  2041 04/12/25  0600   POCTGLUCOSE 189* 169* 155*     Current correctional scale  Low  Maintain anti-hyperglycemic dose as follows-   Antihyperglycemics (From admission, onward)      Start     Stop Route Frequency Ordered    04/11/25 1312  insulin aspart U-100 pen 0-5 Units         -- SubQ Before meals & nightly PRN 04/11/25 1213    04/10/25 2100  insulin glargine U-100 (Lantus) pen 10 Units         -- SubQ Nightly 04/10/25 0953          Hold Oral hypoglycemics while patient is in the hospital.  Mixed hyperlipidemia    Continue home dose of statin.  Obesity (BMI 30-39.9)  Body mass index is 31.78 kg/m². Morbid obesity complicates all aspects of disease management from diagnostic modalities to treatment. Weight loss encouraged and health benefits explained to patient.       Duodenal mass  Noted on EGD in confirmed with CT A&P. Mildly hypoechoic area of mass-like fullness near the pancreaticoduodenal groove, potentially arising from the duodenal wall in this patient with recently noted ulcerated mass by endoscopy.  This is difficult to fully separate from the pancreatic head with at least abutment at this level.     Follow up biopsy results  General Surgery consulted for evaluation, appreciate rec's below:  Plan:  No acute surgical intervention  indicated at this time  Will have her follow up with Dr. Reyes outpatient  Ok to discharge from a surgical standpoint  Ambulatory referral placed to General Surgery    Tachycardia  Patient tachycardic to 110s and asymptomatic.  She does report significant anxiety.    No acute events or arrhythmias observed on Telemetry.  PCP follow up      Final Active Diagnoses:    Diagnosis Date Noted POA    PRINCIPAL PROBLEM:  GIB (gastrointestinal bleeding) [K92.2] 04/10/2025 Yes    Duodenal mass [K31.89] 04/11/2025 Yes    Tachycardia [R00.0] 04/11/2025 Yes    Hypertensive urgency [I16.0] 04/10/2025 Yes    Obesity (BMI 30-39.9) [E66.9] 04/10/2025 Yes    Mixed hyperlipidemia [E78.2] 10/25/2024 Yes    Type 2 diabetes mellitus [E11.9] 09/14/2024 Yes      Problems Resolved During this Admission:       Discharged Condition: stable    Disposition: Home or Self Care    Follow Up:   Follow-up Information       Jimbo Menard II, MD Follow up.    Specialty: Internal Medicine  Contact information:  [address] [facility]  [place] [place] [facility] [address]  [phone]                           Patient Instructions:      Diet diabetic     Notify your health care provider if you experience any of the following:  temperature >100.4     Notify your health care provider if you experience any of the following:  persistent nausea and vomiting or diarrhea     Notify your health care provider if you experience any of the following:  severe uncontrolled pain     Notify your health care provider if you experience any of the following:  redness, tenderness, or signs of infection (pain, swelling, redness, odor or green/yellow discharge around incision site)     Notify your health care provider if you experience any of the following:  difficulty breathing or increased cough     Notify your health care provider if you experience any of the following:  severe persistent headache     Notify your health care provider if you experience any  of the following:  worsening rash     Notify your health care provider if you experience any of the following:  persistent dizziness, light-headedness, or visual disturbances     Notify your health care provider if you experience any of the following:  increased confusion or weakness     Activity as tolerated       Significant Diagnostic Studies:     CT Abdomen Pelvis With IV Contrast NO Oral Contrast [[id number]] Resulted: 04/10/25 1702   Order Status: Completed Updated: 04/10/25 1705   Narrative:     EXAMINATION:  CT ABDOMEN PELVIS WITH IV CONTRAST    CLINICAL HISTORY:  Duodenal ulcer;    TECHNIQUE:  Postcontrast images were obtained through the abdomen and pelvis per protocol.  Coronal and sagittal images were reviewed.    COMPARISON:  None.    FINDINGS:  Limited images through the lower chest are unremarkable aside from streaky mild atelectasis.    Abdomen and pelvis:    Liver is enlarged with relative decreased attenuation suggesting steatosis.  Gallbladder is unremarkable.  No abnormal biliary dilatation.  The spleen, adrenal glands, and kidneys appear unremarkable.  There is area of mildly hypoechoic fullness near the pancreaticoduodenal groove at the at the proximal descending duodenum.  Accurate measurement is difficult though approximately 2.0 cm (series 2, image 63).  This is difficult to fully separate from the nearby pancreatic head.  Remainder of the pancreas is unremarkable.  No pancreatic ductal dilatation.  Mild nearby stranding more inferior, potentially reactive post-procedural in nature with shotty nonenlarged 7 mm node.    The urinary bladder is unremarkable.  Uterus is absent.  Left adnexal hypodensity measuring 2.2 cm suggesting ovarian cyst.  Few intrapelvic phleboliths.    Remainder of the GI tract and appendix are normal in caliber.  No significant free fluid or pathologic adenopathy.  A 0.8 cm node adjacent to the pancreatic head (series 2, image 57).  The main portal vein, splenic vein,  and SMV are patent.  Tiny fat containing umbilical hernia.    No aggressive osseous lesion.   Impression:       Mildly hypoechoic area of mass-like fullness near the pancreaticoduodenal groove, potentially arising from the duodenal wall in this patient with recently noted ulcerated mass by endoscopy.  This is difficult to fully separate from the pancreatic head with at least abutment at this level.  To correlate with pathology findings.    Enlarged steatotic liver and additional findings as above.      Electronically signed by: Rakan Houston  Date: 04/10/2025  Time: 17:02       Pending Diagnostic Studies:       None           Medications:  Reconciled Home Medications:      Medication List        CONTINUE taking these medications      aspirin 81 MG EC tablet  Commonly known as: ECOTRIN  Take 1 tablet (81 mg total) by mouth once daily.     blood-glucose meter kit  Use as instructed     cetirizine 10 MG tablet  Commonly known as: ZYRTEC  Take 1 tablet (10 mg total) by mouth once daily.     diclofenac sodium 1 % Gel  Commonly known as: VOLTAREN ARTHRITIS PAIN  Apply 2 g topically 2 (two) times daily.     hydrocortisone 2.5 % cream  Apply topically 2 (two) times daily. To dry or itchy skin     hydrOXYzine HCL 25 MG tablet  Commonly known as: ATARAX  Take 1 tablet (25 mg total) by mouth 3 (three) times daily as needed for Itching.     JARDIANCE 25 mg tablet  Generic drug: empagliflozin  Take 1 tablet (25 mg total) by mouth once daily.     LANTUS SOLOSTAR U-100 INSULIN 100 unit/mL (3 mL) Inpn pen  Generic drug: insulin glargine U-100 (Lantus)  Inject 20 Units into the skin every evening.     lisinopriL 20 MG tablet  Commonly known as: PRINIVIL,ZESTRIL  Take 1 tablet (20 mg total) by mouth once daily.     metFORMIN 500 MG ER 24hr tablet  Commonly known as: GLUCOPHAGE-XR  Take 1 tablet (500 mg total) by mouth 2 (two) times daily with meals.     rosuvastatin 40 MG Tab  Commonly known as: CRESTOR  Take 1 tablet (40 mg  total) by mouth once daily.              Indwelling Lines/Drains at time of discharge:   Lines/Drains/Airways       None                   Time spent on the discharge of patient: 20 minutes         Kurt Bliss PA-C  Department of Hospital Medicine  [facility]

## 2025-04-12 NOTE — ASSESSMENT & PLAN NOTE
Admit to medical floor with telemetry.    NPO.  Patient's hemorrhage is due to gastrointestinal bleed, this bleeding is associated with an antiplatelet agent, the antiplatelet agent is Select Antiplatelet Agent(s): Aspirin. Patients most recent Hgb, Hct, platelets, and INR are listed below.  Recent Labs     04/09/25  1543 04/09/25  2055 04/10/25  2107 04/11/25  0247 04/12/25  0336   HGB 9.5*   < > 8.8* 9.0* 8.8*   HCT 28.1*   < > 25.6* 26.5* 26.6*      < > 474* 500* 500*   INR 1.0  --   --   --   --     < > = values in this interval not displayed.     Plan  - Will trend hemoglobin/hematocrit Daily  - Will monitor and correct any coagulation defects  - Will transfuse if Hgb is <7g/dl (<8g/dl in cases of active ACS) or if patient has rapid bleeding leading to hemodynamic instability  - inpatient gastroenterology consultation, appreciate rec's:    EGD 4/10:   Impression:            - Normal esophagus.                          - Small hiatal hernia.                          - Rule out malignancy, duodenal mass. Biopsied.   Recommendation:        - Return patient to [facility] for ongoing care.                          - Resume previous diet.                          - Continue present medications.                          - Await pathology results.                          - Observe patient's clinical course.

## 2025-04-14 ENCOUNTER — PATIENT MESSAGE (OUTPATIENT)
Dept: GASTROENTEROLOGY | Facility: HOSPITAL | Age: 55
End: 2025-04-14

## 2025-04-14 ENCOUNTER — RESULTS FOLLOW-UP (OUTPATIENT)
Dept: GASTROENTEROLOGY | Facility: HOSPITAL | Age: 55
End: 2025-04-14

## 2025-04-14 DIAGNOSIS — C80.1 ADENOCARCINOMA: Primary | ICD-10-CM

## 2025-04-14 LAB
ESTROGEN SERPL-MCNC: ABNORMAL PG/ML
INSULIN SERPL-ACNC: ABNORMAL U[IU]/ML
LAB AP CLINICAL INFORMATION: ABNORMAL
LAB AP DIAGNOSIS CATEGORY: ABNORMAL
LAB AP GROSS DESCRIPTION: ABNORMAL
LAB AP PERFORMING LOCATION(S): ABNORMAL
LAB AP REPORT FOOTNOTES: ABNORMAL
T3RU NFR SERPL: ABNORMAL %

## 2025-04-14 RX ORDER — DICYCLOMINE HYDROCHLORIDE 10 MG/1
10 CAPSULE ORAL 4 TIMES DAILY PRN
Qty: 120 CAPSULE | Refills: 3 | Status: SHIPPED | OUTPATIENT
Start: 2025-04-14

## 2025-04-14 NOTE — ANESTHESIA POSTPROCEDURE EVALUATION
Anesthesia Post Evaluation    Patient: Aury Felix    Procedure(s) Performed: Procedure(s) (LRB):  EGD (ESOPHAGOGASTRODUODENOSCOPY) (N/A)    Final Anesthesia Type: general      Patient location during evaluation: GI PACU  Patient participation: Yes- Able to Participate  Level of consciousness: awake and alert, oriented and awake  Post-procedure vital signs: reviewed and stable  Pain management: adequate  Airway patency: patent    PONV status at discharge: No PONV  Anesthetic complications: no      Cardiovascular status: stable  Respiratory status: unassisted and room air  Hydration status: euvolemic  Follow-up not needed.              Vitals Value Taken Time   /93 04/12/25 07:40   Temp 37 °C (98.6 °F) 04/12/25 07:40   Pulse 103 04/12/25 10:25   Resp 18 04/12/25 07:40   SpO2 100 % 04/12/25 07:40         Event Time   Out of Recovery 12:33:20         Pain/Vita Score: No data recorded

## 2025-04-15 ENCOUNTER — TELEPHONE (OUTPATIENT)
Dept: HEMATOLOGY/ONCOLOGY | Facility: CLINIC | Age: 55
End: 2025-04-15

## 2025-04-16 NOTE — PLAN OF CARE
Re:  Aury Felix, WORK / SCHOOL EXCUSE    Date: 04/16/2025           [facility] [place] [facility] [address] [place] [address]  Office: [phone]  Fax: [phone]     To whom it may concern:    Ms. Aury Felix has been hospitalized at the [facility] [place] since 4/10/2025.  Please excuse the patient from duties.  Patient may return on 4/17/2025.  No restrictions.     Please contact me if you have any questions.                __________________________  Tiny Palm MD

## 2025-04-24 ENCOUNTER — OFFICE VISIT (OUTPATIENT)
Dept: SURGERY | Facility: CLINIC | Age: 55
End: 2025-04-24

## 2025-04-24 VITALS
HEIGHT: 68 IN | BODY MASS INDEX: 29.99 KG/M2 | SYSTOLIC BLOOD PRESSURE: 170 MMHG | WEIGHT: 197.88 LBS | DIASTOLIC BLOOD PRESSURE: 104 MMHG | HEART RATE: 106 BPM | TEMPERATURE: 99 F

## 2025-04-24 DIAGNOSIS — C17.0 DUODENAL CANCER: Primary | ICD-10-CM

## 2025-04-24 PROCEDURE — 99999 PR PBB SHADOW E&M-EST. PATIENT-LVL IV: CPT | Mod: PBBFAC,,, | Performed by: SURGERY

## 2025-04-24 PROCEDURE — 99205 OFFICE O/P NEW HI 60 MIN: CPT | Mod: S$PBB,,, | Performed by: SURGERY

## 2025-04-24 PROCEDURE — 99214 OFFICE O/P EST MOD 30 MIN: CPT | Mod: PBBFAC,PN | Performed by: SURGERY

## 2025-04-24 NOTE — PROGRESS NOTES
[facility]  OUTPATIENT H&P      HPI: Aury Felix is a 54 y.o. female recent hospitalization noted and reviewed.  Pt notes continued dark stools.  No weight loss or abdominal pain.    I have reviewed the patient's chart including prior progress notes, procedures and testing.     ROS:   Review of Systems   All other systems reviewed and are negative.      PROBLEM LIST:  Problem List[1]      HISTORY  Past Medical History:   Diagnosis Date    Hypertension        Past Surgical History:   Procedure Laterality Date    ESOPHAGOGASTRODUODENOSCOPY N/A 4/10/2025    Procedure: EGD (ESOPHAGOGASTRODUODENOSCOPY);  Surgeon: Manny Calles MD;  Location: [facility];  Service: Endoscopy;  Laterality: N/A;    HYSTERECTOMY         Social History[2]    No family history on file.      MEDS:  Medications Ordered Prior to Encounter[3]    ALLERGIES:  Review of patient's allergies indicates:  No Known Allergies      VITALS:  Vitals:    04/24/25 0902   BP: (!) 170/104   Pulse:    Temp:          PHYSICAL EXAM:  Physical Exam  Constitutional:       General: She is not in acute distress.     Appearance: Normal appearance.   HENT:      Head: Normocephalic and atraumatic.   Pulmonary:      Effort: Pulmonary effort is normal.   Abdominal:      Palpations: Abdomen is soft.   Skin:     General: Skin is warm and dry.   Neurological:      Mental Status: She is oriented to person, place, and time.   Psychiatric:         Behavior: Behavior normal.           LABS:  Lab Results   Component Value Date    WBC 10.47 04/12/2025    RBC 3.14 (L) 04/12/2025    RBC 5.53 (H) 01/17/2025    HGB 8.8 (L) 04/12/2025    HGB 14.8 01/17/2025    HCT 26.6 (L) 04/12/2025    HCT 45.4 01/17/2025     (H) 04/12/2025     01/17/2025     Lab Results   Component Value Date     (H) 01/17/2025     04/12/2025     01/17/2025    K 3.8 04/12/2025    K 3.8 01/17/2025     04/12/2025     01/17/2025    CO2 21 (L)  04/12/2025    CO2 27 01/17/2025    BUN 9 04/12/2025    CREATININE 0.7 04/12/2025    CALCIUM 9.4 04/12/2025    CALCIUM 9.4 01/17/2025     Lab Results   Component Value Date    ALT 21 04/09/2025    AST 16 04/09/2025    ALKPHOS 84 04/09/2025    BILITOT 0.3 04/09/2025     Lab Results   Component Value Date    MG 1.7 09/16/2024    PHOS 4.0 09/16/2024       STUDIES:  CT abdomen images and reports were personally reviewed.        ASSESSMENT & PLAN:  54 y.o. female needs CT chest, tumor markers, Prealbumin, HgBA1c.  Will call with results.  Will need [facility] hopefully soon.  Anticipate need for admission day before operation.      William Reyes M.D., F.A.C.S.  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 90 tablet 3    blood-glucose meter kit Use as instructed 1 each 0    cetirizine (ZYRTEC) 10 MG tablet Take 1 tablet (10 mg total) by mouth once daily. 30 tablet 2    dicyclomine (BENTYL) 10 MG capsule Take 1 capsule (10 mg total) by mouth 4 (four) times daily as needed. 120 capsule 3    empagliflozin (JARDIANCE) 25 mg tablet Take 1 tablet (25 mg total) by mouth  once daily. 90 tablet 1    hydrocortisone 2.5 % cream Apply topically 2 (two) times daily. To dry or itchy skin 28 g 1    hydrOXYzine HCL (ATARAX) 25 MG tablet Take 1 tablet (25 mg total) by mouth 3 (three) times daily as needed for Itching. 21 tablet 0    insulin glargine U-100, Lantus, 100 unit/mL (3 mL) SubQ InPn pen Inject 20 Units into the skin every evening. 15 mL 1    lisinopriL (PRINIVIL,ZESTRIL) 20 MG tablet Take 1 tablet (20 mg total) by mouth once daily. 90 tablet 3    metFORMIN (GLUCOPHAGE-XR) 500 MG ER 24hr tablet Take 1 tablet (500 mg total) by mouth 2 (two) times daily with meals. 180 tablet 3    rosuvastatin (CRESTOR) 40 MG Tab Take 1 tablet (40 mg total) by mouth once daily. 90 tablet 3    diclofenac sodium (VOLTAREN ARTHRITIS PAIN) 1 % Gel Apply 2 g topically 2 (two) times daily. 60 g 0     No current facility-administered medications on file prior to visit.

## 2025-05-07 ENCOUNTER — OFFICE VISIT (OUTPATIENT)
Dept: HEMATOLOGY/ONCOLOGY | Facility: CLINIC | Age: 55
End: 2025-05-07

## 2025-05-07 VITALS
OXYGEN SATURATION: 85 % | DIASTOLIC BLOOD PRESSURE: 87 MMHG | HEART RATE: 96 BPM | WEIGHT: 195.56 LBS | TEMPERATURE: 98 F | SYSTOLIC BLOOD PRESSURE: 134 MMHG | BODY MASS INDEX: 29.73 KG/M2

## 2025-05-07 DIAGNOSIS — D50.9 MICROCYTIC ANEMIA: ICD-10-CM

## 2025-05-07 DIAGNOSIS — R53.83 FATIGUE, UNSPECIFIED TYPE: ICD-10-CM

## 2025-05-07 DIAGNOSIS — Z71.89 OTHER SPECIFIED COUNSELING: ICD-10-CM

## 2025-05-07 DIAGNOSIS — D50.0 IRON DEFICIENCY ANEMIA DUE TO CHRONIC BLOOD LOSS: Primary | ICD-10-CM

## 2025-05-07 DIAGNOSIS — D75.838 REACTIVE THROMBOCYTOSIS: ICD-10-CM

## 2025-05-07 DIAGNOSIS — D50.0 IRON DEFICIENCY ANEMIA DUE TO CHRONIC BLOOD LOSS: ICD-10-CM

## 2025-05-07 DIAGNOSIS — C17.0 DUODENAL CANCER: Primary | ICD-10-CM

## 2025-05-07 DIAGNOSIS — C80.1 ADENOCARCINOMA: ICD-10-CM

## 2025-05-07 DIAGNOSIS — R06.09 DYSPNEA ON EXERTION: ICD-10-CM

## 2025-05-07 PROCEDURE — 99214 OFFICE O/P EST MOD 30 MIN: CPT | Mod: PBBFAC,PN | Performed by: INTERNAL MEDICINE

## 2025-05-07 PROCEDURE — 99999 PR PBB SHADOW E&M-EST. PATIENT-LVL IV: CPT | Mod: PBBFAC,,,

## 2025-05-07 NOTE — ASSESSMENT & PLAN NOTE
- CEA elevated at 10.0, CA 19-9 <2.1  - Reviewed national treatment guidelines/literature regarding diagnosis, treatment, and staging of duodenal cancer.  - Discussed possible treatment options pending results of PET Scan. Discussed surgery as mentioned by general surgery. Discussed possibility of needing chemotherapy treatment and how she would be on treatment for approximately 6 months. Counseled patient on risks, benefits, and side effects of treatment. Patient voices understanding of plan at this time.  - Ordered staging PET scan today

## 2025-05-07 NOTE — PROGRESS NOTES
PATIENT: Aury Felix  MRN: [id number]  DATE: 5/7/2025    Diagnosis:   1. Duodenal cancer    2. Adenocarcinoma    3. Other specified counseling    4. Microcytic anemia    5. Iron deficiency anemia due to chronic blood loss    6. Reactive thrombocytosis    7. Fatigue, unspecified type    8. Dyspnea on exertion        Chief Complaint: No chief complaint on file.      Oncologic History:      Oncologic History     Oncologic Treatment     Pathology       Subjective:    History of Present Illness: Ms. Felix is a 54 y.o. 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 Pathology reports showed Invasive adenocarcinoma, poorly differentiated. She first started noticing dark stools and went to ED on 04/10/2025. She was evaluated by General Surgeon William Reyes and discussed having surgery to remove the cancer.    She endorses SOB that began in the past few weeks. She also endorses fatigue. She denies SOB prior to developing current symptoms.    Patient mentions that she previously has right vision issues related to DM, but it has improved since getting her DM under control. She stopped drinking alcohol after being diagnosed with DM.    Pt has no fever, chills, rigors, or night sweats. Appetite is good. Weight stable. Pt denies headache, confusion, or Lhermitte symptomatology. No C/T/L spine or other back pain. Denies blurry vision. No changes in urinary habits. Pt has no cough. Denies palpitation, chest pain, anginal or pleuritic, or leg swelling.     Of note, patient works as a CNA.    Past medical, surgical, family, and social histories have been reviewed and updated below.    Past Medical History:   Past Medical History:   Diagnosis Date    Hypertension        Past Surgical History:   Past Surgical History:   Procedure Laterality Date    ESOPHAGOGASTRODUODENOSCOPY N/A 4/10/2025    Procedure: EGD (ESOPHAGOGASTRODUODENOSCOPY);  Surgeon: Manny Calles MD;   Location: [facility];  Service: Endoscopy;  Laterality: N/A;    HYSTERECTOMY         Family History: No family history on file.    Social History:  reports that she has never smoked. She has never used smokeless tobacco. She reports current alcohol use. She reports that she does not use drugs.    Allergies:  Review of patient's allergies indicates:  No Known Allergies    Medications:  Current Medications[1]    Review of Systems  Comprehensive ROS is negative except for what was mentioned in HPI.     ECOG Performance Status:   ECOG SCORE             Objective:      Vitals:   Vitals:    05/07/25 1012   BP: 134/87   Pulse: 96   Temp: 97.8 °F (36.6 °C)   SpO2: (!) 85%   Weight: 88.7 kg (195 lb 8.8 oz)     BMI: Body mass index is 29.73 kg/m².    Physical Exam  Vitals and nursing note reviewed.   Constitutional:       General: She is not in acute distress.     Appearance: She is not toxic-appearing or diaphoretic.      Comments: APPEARS ENERGETIC/NORMAL ENERGY   HENT:      Head: Normocephalic and atraumatic.      Right Ear: External ear normal.      Left Ear: External ear normal.      Nose: Nose normal. No congestion or rhinorrhea.      Mouth/Throat:      Mouth: Mucous membranes are moist.      Pharynx: No oropharyngeal exudate or posterior oropharyngeal erythema.      Comments: NO HALITOSIS  Eyes:      General: Lids are normal. Vision grossly intact.      Extraocular Movements: Extraocular movements intact.      Conjunctiva/sclera: Conjunctivae normal.      Pupils: Pupils are equal, round, and reactive to light.   Cardiovascular:      Rate and Rhythm: Normal rate and regular rhythm.      Pulses: Normal pulses.      Heart sounds: Normal heart sounds.   Pulmonary:      Effort: Pulmonary effort is normal. No respiratory distress.      Breath sounds: Normal breath sounds. No stridor. No wheezing, rhonchi or rales.      Comments: NO PLEURITIC CHEST PAIN  Chest:      Chest wall: No tenderness.   Abdominal:      General: Bowel  sounds are normal. There is no distension.      Palpations: Abdomen is soft. There is no fluid wave, hepatomegaly, splenomegaly or mass.      Tenderness: There is no abdominal tenderness. There is no right CVA tenderness, left CVA tenderness, guarding or rebound.      Comments: NO ORGANOMEGALY   Musculoskeletal:         General: No swelling or tenderness. Normal range of motion.      Cervical back: Normal range of motion and neck supple. No rigidity.      Right lower leg: No edema.      Left lower leg: No edema.      Comments: NO CORDS PALPABLE, NEGATIVE HOMANS   Lymphadenopathy:      Head:      Right side of head: No submental, submandibular, tonsillar, preauricular, posterior auricular or occipital adenopathy.      Left side of head: No submental, submandibular, tonsillar, preauricular, posterior auricular or occipital adenopathy.      Cervical: No cervical adenopathy.      Right cervical: No superficial, deep or posterior cervical adenopathy.     Left cervical: No superficial, deep or posterior cervical adenopathy.      Upper Body:      Right upper body: No supraclavicular, axillary or epitrochlear adenopathy.      Left upper body: No supraclavicular, axillary or epitrochlear adenopathy.      Lower Body: No right inguinal adenopathy. No left inguinal adenopathy.   Skin:     General: Skin is warm and dry.      Coloration: Skin is not ashen, cyanotic, jaundiced, mottled or pale.      Findings: No bruising, erythema or rash.      Comments: -NO PETECHIAE  -NO OBVIOUS LESIONS BUT THROUGH EXAM NOT CARRIED OUT   Neurological:      General: No focal deficit present.      Mental Status: She is alert and oriented to person, place, and time.      Motor: No weakness.      Gait: Gait normal.      Deep Tendon Reflexes: Reflexes are normal and symmetric. Reflexes normal.      Reflex Scores:       Patellar reflexes are 2+ on the right side and 2+ on the left side.     Comments: 1-2 BEAT CLONUS Bilaterally  ABSENT LHERMITTE'S    Psychiatric:         Mood and Affect: Mood normal.         Behavior: Behavior normal. Behavior is cooperative.         Judgment: Judgment normal.       Laboratory Data:  Labs have been reviewed.    Lab Results   Component Value Date    WBC 11.11 05/07/2025    RBC 2.67 (L) 05/07/2025    HGB 6.8 (L) 05/07/2025    HCT 21.6 (L) 05/07/2025    MCV 81 (L) 05/07/2025    MCH 25.5 (L) 05/07/2025    MCHC 31.5 (L) 05/07/2025    RDW 15.2 (H) 05/07/2025     (H) 05/07/2025    MPV 9.3 05/07/2025    GRAN 1.7 (L) 01/17/2025    GRAN 24.9 (L) 01/17/2025    LYMPH 36.3 05/02/2025    LYMPH 4.18 05/02/2025    MONO 6.2 05/02/2025    MONO 0.71 05/02/2025    EOS 2.7 05/02/2025    EOS 0.31 05/02/2025    BASO 0.03 01/17/2025    EOSINOPHIL 1.0 01/17/2025    BASOPHIL 0.9 05/02/2025    BASOPHIL 0.10 05/02/2025     CMP  Sodium   Date Value Ref Range Status   05/02/2025 140 136 - 145 mmol/L Final   01/17/2025 143 136 - 145 mmol/L Final     Potassium   Date Value Ref Range Status   05/02/2025 4.5 3.5 - 5.1 mmol/L Final   01/17/2025 3.8 3.5 - 5.1 mmol/L Final     Chloride   Date Value Ref Range Status   05/02/2025 108 95 - 110 mmol/L Final   01/17/2025 108 95 - 110 mmol/L Final     CO2   Date Value Ref Range Status   05/02/2025 22 (L) 23 - 29 mmol/L Final   01/17/2025 27 23 - 29 mmol/L Final     Glucose   Date Value Ref Range Status   05/02/2025 136 (H) 70 - 110 mg/dL Final   01/17/2025 115 (H) 70 - 110 mg/dL Final     BUN   Date Value Ref Range Status   05/02/2025 11 6 - 20 mg/dL Final     Creatinine   Date Value Ref Range Status   05/02/2025 0.8 0.5 - 1.4 mg/dL Final     Calcium   Date Value Ref Range Status   05/02/2025 9.5 8.7 - 10.5 mg/dL Final   01/17/2025 9.4 8.7 - 10.5 mg/dL Final     Protein Total   Date Value Ref Range Status   05/02/2025 7.9 6.0 - 8.4 gm/dL Final     Total Protein   Date Value Ref Range Status   01/17/2025 8.2 6.0 - 8.4 g/dL Final     Albumin   Date Value Ref Range Status   05/02/2025 3.6 3.5 - 5.2 g/dL Final    01/17/2025 3.5 3.5 - 5.2 g/dL Final     Total Bilirubin   Date Value Ref Range Status   01/17/2025 0.4 0.1 - 1.0 mg/dL Final     Comment:     For infants and newborns, interpretation of results should be based  on gestational age, weight and in agreement with clinical  observations.    Premature Infant recommended reference ranges:  Up to 24 hours.............<8.0 mg/dL  Up to 48 hours............<12.0 mg/dL  3-5 days..................<15.0 mg/dL  6-29 days.................<15.0 mg/dL       Bilirubin Total   Date Value Ref Range Status   05/02/2025 0.3 0.1 - 1.0 mg/dL Final     Comment:     For infants and newborns, interpretation of results should be based   on gestational age, weight and in agreement with clinical   observations.    Premature Infant recommended reference ranges:   0-24 hours:  <8.0 mg/dL   24-48 hours: <12.0 mg/dL   3-5 days:    <15.0 mg/dL   6-29 days:   <15.0 mg/dL     Alkaline Phosphatase   Date Value Ref Range Status   01/17/2025 86 40 - 150 U/L Final     ALP   Date Value Ref Range Status   05/02/2025 81 40 - 150 unit/L Final     AST   Date Value Ref Range Status   05/02/2025 16 11 - 45 unit/L Final   01/17/2025 30 10 - 40 U/L Final     ALT   Date Value Ref Range Status   05/02/2025 21 10 - 44 unit/L Final   01/17/2025 27 10 - 44 U/L Final     Anion Gap   Date Value Ref Range Status   05/02/2025 10 8 - 16 mmol/L Final     eGFR   Date Value Ref Range Status   05/02/2025 >60 >60 mL/min/1.73/m2 Final     Comment:     Estimated GFR calculated using the CKD-EPI creatinine (2021) equation.   01/17/2025 >60.0 >60 mL/min/1.73 m^2 Final     CEA on 05/02/2025  Carcinoembryonic Antigen 10.0 High      CA 19-9 on 05/02/2025  CA 19-9 <2.1     Pathology Gastrointestinal Tract on 04/10/2025  Final Diagnosis   DUODENAL MASS, 2ND PORTION (BIOPSY):  Invasive adenocarcinoma, poorly differentiated  Surrounding small intestinal mucosa without dysplasia.   Immunoprofile compatible with upper gastrointestinal  malignancy; clinical/imaging correlation required.     Immunohistochemistry (IHC) Testing for Mismatch Repair (MMR) Proteins:  MLH1, MSH2, MSH6, PMS2 - Intact nuclear expression  Background nonneoplastic tissue/internal control with intact nuclear expression     Interpretation: No loss of nuclear expression of MMR proteins: low probability of microsatellite instability. There are exceptions to the above IHC interpretations. These results should not be considered in isolation, and clinical correlation with genetic counseling is recommended to assess the need for germline testing.     Microscopic Description    Sections show high nuclear grade malignant cells infiltrating with only focal/minimal glandular formation and scattered intracellular mucin vacuoles. The surrounding small intestinal mucosa shows no dysplasia.     CK7: Positive, strong and diffuse  CK 20: Negative  CDX2:  Positive, focal, faint  Immunostains are performed with appropriate controls.   Gross Description    1. Small Intestine, Duodenum - 2nd Part: 2nd portion duodenum mass bx  MRN # on Epic Label:  [id number]  MRN # on Pathology Label: [id number]    Received in formalin, labeled 2nd portion duodenal mass Bx, are multiple fragments of rubbery and tan tissue measuring together 1 cm.  Submitted as received entirely.  [id number]     Imaging:    CT Abdomen Pelvis with IV Contrast on 04/10/2025  Impression:  Mildly hypoechoic area of mass-like fullness near the pancreaticoduodenal groove, potentially arising from the duodenal wall in this patient with recently noted ulcerated mass by endoscopy.  This is difficult to fully separate from the pancreatic head with at least abutment at this level.  To correlate with pathology findings.     Enlarged steatotic liver and additional findings as above.    CT Chest with Contrast on 04/24/2025  Impression:  Linear foci of atelectasis or parenchymal scarring in the lungs bilaterally.     Scoliosis of the thoracic  spine.    Assessment:       1. Duodenal cancer    2. Adenocarcinoma    3. Other specified counseling    4. Microcytic anemia    5. Iron deficiency anemia due to chronic blood loss    6. Reactive thrombocytosis    7. Fatigue, unspecified type    8. Dyspnea on exertion      Duodenal cancer:  - CEA elevated at 10.0, CA 19-9 <2.1  - Reviewed national treatment guidelines/literature regarding diagnosis, treatment, and staging of duodenal cancer.  - Discussed possible treatment options pending results of PET Scan. Discussed surgery as mentioned by general surgery. Discussed possibility of needing chemotherapy treatment and how she would be on treatment for approximately 6 months. Counseled patient on risks, benefits, and side effects of treatment. Patient voices understanding of plan at this time.  - Ordered staging PET scan today    Microcytic anemia/Reactive thrombocytosis:  - Ordered CBC, iron studies, and ferritin levels to be collected today  - Discussed treatment options pending results of labs today. She is not currently on an iron supplement. Will consider iron infusions if needed.  - I will call patient with results of CBC and iron studies once I receive them.    Fatigue/Dyspnea on exertion:  - Of just a few weeks duration with melena  - Likely related to cancer and anemia  - Should improve some with anemia treatment       Plan:     Today I counseled pt about the following:  MEDICATION INSTRUCTIONS and TODAYS PLANS: FOLLOW UP, REFERRALS, TREATMENT DETAILS  Patient exhibits understanding of all counseling today.    RTC on 1 work day after completing staging PET Scan    I spent a total of 75+ minutes in a combination of focused physical exam and history, reviewing outside records, reviewing any available radiographs, counseling, communicating with other health care professionals regarding this patients medical condition, independently interpreting results, developing diagnostic and treatment plan and documenting in  the EMR.     Participating Clinicians:  PCP-see story board  William Reyes M.D., F.A.C.S.- [facility]    Dori Prince M.D.  Hematology/Oncology  [facility] - [address], [address]  [place] [address]  Phone: [phone]  Fax: [phone]         [1]   Current Outpatient Medications   Medication Sig Dispense Refill    aspirin (ECOTRIN) 81 MG EC tablet Take 1 tablet (81 mg total) by mouth once daily. 90 tablet 3    blood-glucose meter kit Use as instructed 1 each 0    cetirizine (ZYRTEC) 10 MG tablet Take 1 tablet (10 mg total) by mouth once daily. 30 tablet 2    dicyclomine (BENTYL) 10 MG capsule Take 1 capsule (10 mg total) by mouth 4 (four) times daily as needed. 120 capsule 3    empagliflozin (JARDIANCE) 25 mg tablet Take 1 tablet (25 mg total) by mouth once daily. 90 tablet 1    hydrocortisone 2.5 % cream Apply topically 2 (two) times daily. To dry or itchy skin 28 g 1    hydrOXYzine HCL (ATARAX) 25 MG tablet Take 1 tablet (25 mg total) by mouth 3 (three) times daily as needed for Itching. 21 tablet 0    insulin glargine U-100, Lantus, 100 unit/mL (3 mL) SubQ InPn pen Inject 20 Units into the skin every evening. 15 mL 1    lisinopriL (PRINIVIL,ZESTRIL) 20 MG tablet Take 1 tablet (20 mg total) by mouth once daily. 90 tablet 3    metFORMIN (GLUCOPHAGE-XR) 500 MG ER 24hr tablet Take 1 tablet (500 mg total) by mouth 2 (two) times daily with meals. 180 tablet 3    rosuvastatin (CRESTOR) 40 MG Tab Take 1 tablet (40 mg total) by mouth once daily. 90 tablet 3    diclofenac sodium (VOLTAREN ARTHRITIS PAIN) 1 % Gel Apply 2 g topically 2 (two) times daily. 60 g 0     No current facility-administered medications for this visit.

## 2025-05-08 ENCOUNTER — OFFICE VISIT (OUTPATIENT)
Dept: SURGERY | Facility: CLINIC | Age: 55
End: 2025-05-08

## 2025-05-08 ENCOUNTER — RESULTS FOLLOW-UP (OUTPATIENT)
Dept: SURGERY | Facility: CLINIC | Age: 55
End: 2025-05-08

## 2025-05-08 DIAGNOSIS — C17.0 DUODENAL CANCER: Primary | ICD-10-CM

## 2025-05-08 PROBLEM — D75.838 REACTIVE THROMBOCYTOSIS: Status: ACTIVE | Noted: 2025-05-08

## 2025-05-08 PROBLEM — C80.1 ADENOCARCINOMA: Status: ACTIVE | Noted: 2025-05-08

## 2025-05-08 PROBLEM — R06.09 DYSPNEA ON EXERTION: Status: ACTIVE | Noted: 2025-05-08

## 2025-05-08 NOTE — PROGRESS NOTES
Audio Only Telehealth Visit     The patient location is: [place]  The chief complaint leading to consultation is: imaging  Visit type: Virtual visit with audio only (telephone)  Total time spent in medical discussion with patient: 12 minutes  Total time spent on date of the encounter:12 minutes       The reason for the audio only service rather than synchronous audio and video virtual visit was related to technical difficulties or patient preference/necessity.       Each patient to whom I provide medical services by telemedicine is:  (1) informed of the relationship between the physician and patient and the respective role of any other health care provider with respect to management of the patient; and (2) notified that they may decline to receive medical services by telemedicine and may withdraw from such care at any time. Patient verbally consented to receive this service via voice-only telephone call.       HPI: CT chest negative per metastatic disease.  Pt saw Heme/Onc, PET ordered.  Labs reviewed.     Assessment and plan:  Will follow with PET, hopefully can move forward with resection                        This service was not originating from a related E/M service provided within the previous 7 days nor will  to an E/M service or procedure within the next 24 hours or my soonest available appointment.  Prevailing standard of care was able to be met in this audio-only visit.

## 2025-05-14 ENCOUNTER — HOSPITAL ENCOUNTER (OUTPATIENT)
Dept: RADIOLOGY | Facility: HOSPITAL | Age: 55
Discharge: HOME | End: 2025-05-14
Attending: INTERNAL MEDICINE

## 2025-05-14 DIAGNOSIS — C80.1 ADENOCARCINOMA: ICD-10-CM

## 2025-05-14 DIAGNOSIS — Z71.89 OTHER SPECIFIED COUNSELING: ICD-10-CM

## 2025-05-14 DIAGNOSIS — C17.0 DUODENAL CANCER: ICD-10-CM

## 2025-05-14 LAB — POCT GLUCOSE: 118 MG/DL (ref 70–110)

## 2025-05-14 PROCEDURE — 78815 PET IMAGE W/CT SKULL-THIGH: CPT | Mod: 26,PI,, | Performed by: STUDENT IN AN ORGANIZED HEALTH CARE EDUCATION/TRAINING PROGRAM

## 2025-05-14 PROCEDURE — 78815 PET IMAGE W/CT SKULL-THIGH: CPT | Mod: TC,PI

## 2025-05-14 PROCEDURE — A9552 F18 FDG: HCPCS | Performed by: INTERNAL MEDICINE

## 2025-05-14 RX ORDER — FLUDEOXYGLUCOSE F18 500 MCI/ML
12 INJECTION INTRAVENOUS
Status: COMPLETED | OUTPATIENT
Start: 2025-05-14 | End: 2025-05-14

## 2025-05-14 RX ADMIN — FLUDEOXYGLUCOSE F-18 10.13 MILLICURIE: 500 INJECTION INTRAVENOUS at 08:05

## 2025-05-20 ENCOUNTER — HOSPITAL ENCOUNTER (EMERGENCY)
Facility: HOSPITAL | Age: 55
Discharge: ANOTHER HOSPITAL | End: 2025-05-20
Attending: FAMILY MEDICINE

## 2025-05-20 ENCOUNTER — HOSPITAL ENCOUNTER (INPATIENT)
Facility: HOSPITAL | Age: 55
LOS: 3 days | Discharge: HOME | DRG: 375 | End: 2025-05-23
Attending: STUDENT IN AN ORGANIZED HEALTH CARE EDUCATION/TRAINING PROGRAM | Admitting: STUDENT IN AN ORGANIZED HEALTH CARE EDUCATION/TRAINING PROGRAM

## 2025-05-20 VITALS
OXYGEN SATURATION: 100 % | BODY MASS INDEX: 29.02 KG/M2 | RESPIRATION RATE: 18 BRPM | SYSTOLIC BLOOD PRESSURE: 119 MMHG | HEIGHT: 68 IN | DIASTOLIC BLOOD PRESSURE: 80 MMHG | TEMPERATURE: 100 F | WEIGHT: 191.5 LBS | HEART RATE: 92 BPM

## 2025-05-20 DIAGNOSIS — R06.02 SHORTNESS OF BREATH: ICD-10-CM

## 2025-05-20 DIAGNOSIS — K92.0 GASTROINTESTINAL HEMORRHAGE WITH HEMATEMESIS: ICD-10-CM

## 2025-05-20 DIAGNOSIS — K92.2 GI BLEED: ICD-10-CM

## 2025-05-20 DIAGNOSIS — E11.9 TYPE 2 DIABETES MELLITUS WITHOUT COMPLICATION, WITHOUT LONG-TERM CURRENT USE OF INSULIN: ICD-10-CM

## 2025-05-20 DIAGNOSIS — K92.2 UPPER GI BLEED: ICD-10-CM

## 2025-05-20 DIAGNOSIS — K92.0 HEMATEMESIS WITH NAUSEA: Primary | ICD-10-CM

## 2025-05-20 DIAGNOSIS — K31.89 DUODENAL MASS: ICD-10-CM

## 2025-05-20 DIAGNOSIS — C80.1 ADENOCARCINOMA: Primary | ICD-10-CM

## 2025-05-20 LAB
ABSOLUTE EOSINOPHIL (OHS): 0.03 K/UL
ABSOLUTE MONOCYTE (OHS): 0.98 K/UL (ref 0.3–1)
ABSOLUTE NEUTROPHIL COUNT (OHS): 6.69 K/UL (ref 1.8–7.7)
ALBUMIN SERPL BCP-MCNC: 3.5 G/DL (ref 3.5–5.2)
ALP SERPL-CCNC: 83 UNIT/L (ref 40–150)
ALT SERPL W/O P-5'-P-CCNC: 23 UNIT/L (ref 10–44)
ANION GAP (OHS): 13 MMOL/L (ref 8–16)
AST SERPL-CCNC: 23 UNIT/L (ref 11–45)
BACTERIA #/AREA URNS HPF: ABNORMAL /HPF
BASOPHILS # BLD AUTO: 0.05 K/UL
BASOPHILS NFR BLD AUTO: 0.5 %
BILIRUB SERPL-MCNC: 1.2 MG/DL (ref 0.1–1)
BILIRUB UR QL STRIP.AUTO: NEGATIVE
BUN SERPL-MCNC: 12 MG/DL (ref 6–20)
CALCIUM SERPL-MCNC: 9.1 MG/DL (ref 8.7–10.5)
CHLORIDE SERPL-SCNC: 104 MMOL/L (ref 95–110)
CLARITY UR: ABNORMAL
CO2 SERPL-SCNC: 23 MMOL/L (ref 23–29)
COLOR UR AUTO: YELLOW
CREAT SERPL-MCNC: 0.7 MG/DL (ref 0.5–1.4)
D DIMER PPP IA.FEU-MCNC: 0.67 MG/L FEU
ERYTHROCYTE [DISTWIDTH] IN BLOOD BY AUTOMATED COUNT: 16.6 % (ref 11.5–14.5)
GFR SERPLBLD CREATININE-BSD FMLA CKD-EPI: >60 ML/MIN/1.73/M2
GLUCOSE SERPL-MCNC: 134 MG/DL (ref 70–110)
GLUCOSE UR QL STRIP: ABNORMAL
HCT VFR BLD AUTO: 18.7 % (ref 37–48.5)
HCT VFR BLD AUTO: 23.8 % (ref 37–48.5)
HGB BLD-MCNC: 6.2 GM/DL (ref 12–16)
HGB BLD-MCNC: 7.7 GM/DL (ref 12–16)
HGB UR QL STRIP: ABNORMAL
HOLD SPECIMEN: NORMAL
HYALINE CASTS #/AREA URNS LPF: 0 /LPF (ref 0–1)
IMM GRANULOCYTES # BLD AUTO: 0.04 K/UL (ref 0–0.04)
IMM GRANULOCYTES NFR BLD AUTO: 0.4 % (ref 0–0.5)
INDIRECT COOMBS: NORMAL
KETONES UR QL STRIP: NEGATIVE
LEUKOCYTE ESTERASE UR QL STRIP: ABNORMAL
LIPASE SERPL-CCNC: 47 U/L (ref 4–60)
LYMPHOCYTES # BLD AUTO: 3.17 K/UL (ref 1–4.8)
MCH RBC QN AUTO: 25.6 PG (ref 27–31)
MCHC RBC AUTO-ENTMCNC: 32.4 G/DL (ref 32–36)
MCV RBC AUTO: 79 FL (ref 82–98)
MICROSCOPIC COMMENT: ABNORMAL
NITRITE UR QL STRIP: NEGATIVE
NUCLEATED RBC (/100WBC) (OHS): 0 /100 WBC
OHS QRS DURATION: 74 MS
OHS QTC CALCULATION: 471 MS
PH UR STRIP: 6 [PH]
PLATELET # BLD AUTO: 469 K/UL (ref 150–450)
PMV BLD AUTO: 9.8 FL (ref 9.2–12.9)
POTASSIUM SERPL-SCNC: 3.3 MMOL/L (ref 3.5–5.1)
PROT SERPL-MCNC: 7.7 GM/DL (ref 6–8.4)
PROT UR QL STRIP: ABNORMAL
RBC # BLD AUTO: 3.01 M/UL (ref 4–5.4)
RBC #/AREA URNS HPF: 5 /HPF (ref 0–4)
RELATIVE EOSINOPHIL (OHS): 0.3 %
RELATIVE LYMPHOCYTE (OHS): 28.9 % (ref 18–48)
RELATIVE MONOCYTE (OHS): 8.9 % (ref 4–15)
RELATIVE NEUTROPHIL (OHS): 61 % (ref 38–73)
RH BLD: NORMAL
SODIUM SERPL-SCNC: 140 MMOL/L (ref 136–145)
SP GR UR STRIP: <=1.005
SPECIMEN OUTDATE: NORMAL
SQUAMOUS #/AREA URNS HPF: 10 /HPF
TRICHOMONAS UR QL MICRO: ABNORMAL /HPF
TROPONIN I SERPL DL<=0.01 NG/ML-MCNC: <0.006 NG/ML
UROBILINOGEN UR STRIP-ACNC: NEGATIVE EU/DL
WBC # BLD AUTO: 10.96 K/UL (ref 3.9–12.7)
WBC #/AREA URNS HPF: >100 /HPF (ref 0–5)
WBC CLUMPS URNS QL MICRO: ABNORMAL
YEAST URNS QL MICRO: ABNORMAL /HPF

## 2025-05-20 PROCEDURE — 87086 URINE CULTURE/COLONY COUNT: CPT | Performed by: FAMILY MEDICINE

## 2025-05-20 PROCEDURE — 63600175 PHARM REV CODE 636 W HCPCS: Performed by: FAMILY MEDICINE

## 2025-05-20 PROCEDURE — 93010 ELECTROCARDIOGRAM REPORT: CPT | Mod: ,,, | Performed by: INTERNAL MEDICINE

## 2025-05-20 PROCEDURE — 93005 ELECTROCARDIOGRAM TRACING: CPT

## 2025-05-20 PROCEDURE — 85025 COMPLETE CBC W/AUTO DIFF WBC: CPT | Performed by: FAMILY MEDICINE

## 2025-05-20 PROCEDURE — 25000003 PHARM REV CODE 250: Performed by: FAMILY MEDICINE

## 2025-05-20 PROCEDURE — 85379 FIBRIN DEGRADATION QUANT: CPT | Performed by: FAMILY MEDICINE

## 2025-05-20 PROCEDURE — P9016 RBC LEUKOCYTES REDUCED: HCPCS | Performed by: SURGERY

## 2025-05-20 PROCEDURE — 84484 ASSAY OF TROPONIN QUANT: CPT | Performed by: FAMILY MEDICINE

## 2025-05-20 PROCEDURE — 96376 TX/PRO/DX INJ SAME DRUG ADON: CPT

## 2025-05-20 PROCEDURE — 99285 EMERGENCY DEPT VISIT HI MDM: CPT | Mod: 25

## 2025-05-20 PROCEDURE — 86900 BLOOD TYPING SEROLOGIC ABO: CPT | Performed by: FAMILY MEDICINE

## 2025-05-20 PROCEDURE — 81003 URINALYSIS AUTO W/O SCOPE: CPT | Performed by: FAMILY MEDICINE

## 2025-05-20 PROCEDURE — 96361 HYDRATE IV INFUSION ADD-ON: CPT

## 2025-05-20 PROCEDURE — 96374 THER/PROPH/DIAG INJ IV PUSH: CPT

## 2025-05-20 PROCEDURE — 86920 COMPATIBILITY TEST SPIN: CPT | Performed by: SURGERY

## 2025-05-20 PROCEDURE — 85018 HEMOGLOBIN: CPT | Performed by: FAMILY MEDICINE

## 2025-05-20 PROCEDURE — 36430 TRANSFUSION BLD/BLD COMPNT: CPT

## 2025-05-20 PROCEDURE — 25500020 PHARM REV CODE 255: Performed by: FAMILY MEDICINE

## 2025-05-20 PROCEDURE — 11000001 HC ACUTE MED/SURG PRIVATE ROOM

## 2025-05-20 PROCEDURE — 83690 ASSAY OF LIPASE: CPT | Performed by: FAMILY MEDICINE

## 2025-05-20 PROCEDURE — 85014 HEMATOCRIT: CPT | Performed by: FAMILY MEDICINE

## 2025-05-20 PROCEDURE — 63600175 PHARM REV CODE 636 W HCPCS: Performed by: NURSE PRACTITIONER

## 2025-05-20 PROCEDURE — 25000003 PHARM REV CODE 250: Performed by: SURGERY

## 2025-05-20 PROCEDURE — 84075 ASSAY ALKALINE PHOSPHATASE: CPT | Performed by: FAMILY MEDICINE

## 2025-05-20 PROCEDURE — 96375 TX/PRO/DX INJ NEW DRUG ADDON: CPT

## 2025-05-20 RX ORDER — INSULIN ASPART 100 [IU]/ML
0-5 INJECTION, SOLUTION INTRAVENOUS; SUBCUTANEOUS EVERY 6 HOURS PRN
Status: DISCONTINUED | OUTPATIENT
Start: 2025-05-20 | End: 2025-05-22

## 2025-05-20 RX ORDER — ACETAMINOPHEN 500 MG
1000 TABLET ORAL
Status: COMPLETED | OUTPATIENT
Start: 2025-05-20 | End: 2025-05-20

## 2025-05-20 RX ORDER — SODIUM CHLORIDE 9 MG/ML
1000 INJECTION, SOLUTION INTRAVENOUS
Status: COMPLETED | OUTPATIENT
Start: 2025-05-20 | End: 2025-05-20

## 2025-05-20 RX ORDER — CEFTRIAXONE 1 G/1
1 INJECTION, POWDER, FOR SOLUTION INTRAMUSCULAR; INTRAVENOUS
Status: COMPLETED | OUTPATIENT
Start: 2025-05-20 | End: 2025-05-20

## 2025-05-20 RX ORDER — ATORVASTATIN CALCIUM 40 MG/1
80 TABLET, FILM COATED ORAL DAILY
Status: DISCONTINUED | OUTPATIENT
Start: 2025-05-21 | End: 2025-05-23 | Stop reason: HOSPADM

## 2025-05-20 RX ORDER — PANTOPRAZOLE SODIUM 40 MG/10ML
80 INJECTION, POWDER, LYOPHILIZED, FOR SOLUTION INTRAVENOUS
Status: COMPLETED | OUTPATIENT
Start: 2025-05-20 | End: 2025-05-20

## 2025-05-20 RX ORDER — IBUPROFEN 800 MG/1
800 TABLET, FILM COATED ORAL
Status: COMPLETED | OUTPATIENT
Start: 2025-05-20 | End: 2025-05-20

## 2025-05-20 RX ORDER — HYDROCODONE BITARTRATE AND ACETAMINOPHEN 500; 5 MG/1; MG/1
TABLET ORAL
Status: DISCONTINUED | OUTPATIENT
Start: 2025-05-20 | End: 2025-05-20 | Stop reason: HOSPADM

## 2025-05-20 RX ORDER — PANTOPRAZOLE SODIUM 40 MG/10ML
40 INJECTION, POWDER, LYOPHILIZED, FOR SOLUTION INTRAVENOUS 2 TIMES DAILY
Status: DISCONTINUED | OUTPATIENT
Start: 2025-05-20 | End: 2025-05-23 | Stop reason: HOSPADM

## 2025-05-20 RX ORDER — LISINOPRIL 20 MG/1
20 TABLET ORAL DAILY
Status: DISCONTINUED | OUTPATIENT
Start: 2025-05-21 | End: 2025-05-23 | Stop reason: HOSPADM

## 2025-05-20 RX ORDER — DICYCLOMINE HYDROCHLORIDE 10 MG/1
10 CAPSULE ORAL 4 TIMES DAILY PRN
Status: DISCONTINUED | OUTPATIENT
Start: 2025-05-20 | End: 2025-05-23 | Stop reason: HOSPADM

## 2025-05-20 RX ORDER — GLUCAGON 1 MG
1 KIT INJECTION
Status: DISCONTINUED | OUTPATIENT
Start: 2025-05-20 | End: 2025-05-22

## 2025-05-20 RX ADMIN — PANTOPRAZOLE SODIUM 40 MG: 40 INJECTION, POWDER, FOR SOLUTION INTRAVENOUS at 08:05

## 2025-05-20 RX ADMIN — PANTOPRAZOLE SODIUM 80 MG: 40 INJECTION, POWDER, FOR SOLUTION INTRAVENOUS at 10:05

## 2025-05-20 RX ADMIN — IOHEXOL 75 ML: 350 INJECTION, SOLUTION INTRAVENOUS at 12:05

## 2025-05-20 RX ADMIN — CEFTRIAXONE SODIUM 1 G: 1 INJECTION, POWDER, FOR SOLUTION INTRAMUSCULAR; INTRAVENOUS at 11:05

## 2025-05-20 RX ADMIN — ACETAMINOPHEN 1000 MG: 500 TABLET ORAL at 05:05

## 2025-05-20 RX ADMIN — SODIUM CHLORIDE 1000 ML: 9 INJECTION, SOLUTION INTRAVENOUS at 02:05

## 2025-05-20 RX ADMIN — IBUPROFEN 800 MG: 800 TABLET, FILM COATED ORAL at 05:05

## 2025-05-20 RX ADMIN — PANTOPRAZOLE SODIUM 8 MG/HR: 40 INJECTION, POWDER, FOR SOLUTION INTRAVENOUS at 01:05

## 2025-05-20 NOTE — ED NOTES
Updated the transfer center regarding blood transfusion x units PRBC. Bed assignment received ([facility] room 429, Accepting Dr. Juarez...[phone].

## 2025-05-20 NOTE — ED NOTES
MD notified of 99.7 temp before initiation of blood. MD aware. New orders for tylenol and and ibuprofen prior to transfusion

## 2025-05-20 NOTE — PROVIDER TRANSFER
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  Patient states she received a blood transfusion last week a the infusion center ordered by hematology. She ses Dr. Reyes and is undergoing evaluation of resection of the duodenal mass. She denies any chest pain.  She does endorse shortness of breath.  No known sick contacts.  No fever, chills.  No diarrhea.Hgb 7.7 where she was 6.8 last week (pre-transfusion). She has pending H/H. CT abdomen and pelvis showed increased air within the duodenal mass. The gastroduodenal artery near the ulcerated portion of the mass.  She is on protonix. Dr. Reyes agreed to see patient while on medicine service.    Objective     Vitals: Temp: 99 °F (37.2 °C) (05/20/25 0931)  Pulse: 102 (05/20/25 1332)  Resp: 15 (05/20/25 1332)  BP: 98/75 (05/20/25 1331)  SpO2: 100 % (05/20/25 1332)  Recent Labs   Lab 05/20/25  0947      K 3.3*      CO2 23   BUN 12   CREATININE 0.7   *   CALCIUM 9.1     Recent Labs   Lab 05/20/25  0947   ALKPHOS 83   ALT 23   AST 23   ALBUMIN 3.5   PROT 7.7   BILITOT 1.2*       Recent Labs   Lab 05/20/25  0947   WBC 10.96   HGB 7.7*   HCT 23.8*   *   LYMPH 3.17  28.9   MONO 8.9  0.98       Recent Labs   Lab 05/14/25  0824   POCTGLUCOSE 118*     Recent imaging:CTA Abdomen and Pelvis  Narrative: EXAMINATION:  CTA  ABDOMEN AND PELVIS    CLINICAL HISTORY:  GI bleed;  patient with history of duodenal malignancy and recent onset hemoptysis    TECHNIQUE:  Initial noncontrast CT images of the abdomen and pelvis were obtained.  Following IV administration of 75 cc Omnipaque 350 nonionic contrast material, CTA of the abdomen and pelvis was performed.  The patient was scanned in the arterial and portal venous phases.  No oral contrast was administered    COMPARISON:  CT 04/10/2025, PET-CT 05/14/2025    FINDINGS:  The visualized portions of the lung bases are clear.    There is an ulcerated mass in the 2nd portion of the duodenum, intimately associated with the pancreatic head with approximate dimensions of 2.2 x 4.7 cm series 5, image 238.  It is unchanged in size compared to the recent PET-CT, but the amount of air within the mass has increased.  This appears to be a result of direct communication of the lumen of the bowel with the mass.  There is no free intraperitoneal air, and no free intraperitoneal fluid.  There is no active extravasation of contrast, but the gastroduodenal artery is noted to be intimately associated with ulcerated portion of the mass.  There are adjacent mildly enlarged lymph nodes, the largest measuring 12 mm in short axis series 5, image 267, concerning for metastatic nodes.    There is no evidence of metastatic disease to the liver.  The spleen, adrenal glands, kidneys are normal.  As noted above, the head of the pancreas is intimately associated with the mass and may be involved by tumor.  The remainder of the pancreas is unremarkable.  There is no pancreatic or biliary ductal dilatation.  The gallbladder is present.    The aorta is normal in caliber.  There is no retroperitoneal adenopathy.  There is a 2.5 cm unilocular left ovarian cyst.  The uterus is surgically absent.  The urinary bladder is unremarkable.  There is a tiny fat-containing umbilical hernia.  There are no suspicious bone lesions.   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  Impression: Increasing air within the known malignancy in the 2nd portion of the duodenum, see comments above.  No active extravasation of contrast.    Adjacent metastatic nodes.  No distant metastatic disease.  No free intraperitoneal air or fluid.    Electronically signed by: Caren Coates  Date:    05/20/2025  Time:    12:59  CTA Chest Non-Coronary (PE Studies)  Narrative: EXAMINATION:  CTA CHEST NON CORONARY (PE STUDIES)    CLINICAL HISTORY:  Pulmonary embolism (PE) suspected, positive D-dimer;history of duodenal carcinoma and hematemesis    TECHNIQUE:  Low dose axial images, sagittal and coronal reformations were obtained from the thoracic inlet to the lung bases following the IV administration of 75 mL of Omnipaque 350.  Contrast timing was optimized to evaluate the pulmonary arteries.  MIP images were performed.    COMPARISON:  CT chest 05/02/2025 and PET-CT 05/14/2025    FINDINGS:  There is adequate opacification of the pulmonary arteries with no evidence of a pulmonary embolus.  The aorta is normal in caliber without aneurysm or dissection.  There is no mediastinal nor axillary adenopathy.  No abnormality at the base of the neck.  No pleural effusion.    No lung nodules nor areas of consolidation are present.  There are few bands of subsegmental atelectasis or scarring.    There are no suspicious lytic or sclerotic bone lesions.    Findings in the abdomen are reported separately.  Impression: No evidence of a pulmonary embolus.  No acute pathology in the lungs, and no evidence of metastatic disease in the thorax.    Electronically signed by: Caren Coates  Date:    05/20/2025  Time:    12:44  X-Ray Chest 1 View  Narrative: EXAMINATION:  XR CHEST 1 VIEW    CLINICAL HISTORY:  Shortness of breath    TECHNIQUE:  Single frontal view of the chest was performed.    COMPARISON:  CT  05/02/2025    FINDINGS:  Heart size borderline.  Bands of subsegmental atelectasis or scarring noted in the left mid lung laterally.  Otherwise, the lungs are clear.  There is no pleural effusion.  Thoracic dextroscoliosis is present.  Impression: Minimal subsegmental atelectasis or scarring left lung.    Electronically signed by: Caern Coates  Date:    05/20/2025  Time:    10:22    Airway:   room air     IV access:        Peripheral IV - Single Lumen 05/20/25 0948 18 G Left Antecubital (Active)   Site Assessment Clean;Dry;Intact;No redness;No swelling 05/20/25 0948   Line Status Blood return noted;Positional 05/20/25 0948   Dressing Status Clean;Dry;Intact 05/20/25 0948            Peripheral IV - Single Lumen 05/20/25 0958 18 G Anterior;Left Forearm (Active)   Site Assessment Clean;Dry;Intact;No redness;No swelling 05/20/25 0959   Line Status Blood return noted 05/20/25 0959   Dressing Status Clean;Dry;Intact 05/20/25 0959     Allergies: Review of patient's allergies indicates:  No Known Allergies   NPO: No    Anticoagulation:   Anticoagulants       None             Instructions      [facility]  Admit to Hospital Medicine  Upon patient arrival to floor, please contact Hospital Medicine on call.

## 2025-05-20 NOTE — ED NOTES
Pt was accepted at [facility] Bed 429. Number for report [phone]. Ground STAT set up per primary RN.

## 2025-05-20 NOTE — ED PROVIDER NOTES
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  Patient states symptoms started yesterday evening.  Patient states she received a blood transfusion last week.  Surgeon is at [facility].  She denies any chest pain.  She does endorse shortness of breath.  No known sick contacts.  No fever, chills.  No diarrhea.  Review of patient's allergies indicates:  No Known Allergies  Past Medical History:   Diagnosis Date    Hypertension      Past Surgical History:   Procedure Laterality Date    ESOPHAGOGASTRODUODENOSCOPY N/A 4/10/2025    Procedure: EGD (ESOPHAGOGASTRODUODENOSCOPY);  Surgeon: Manny Calles MD;  Location: [facility];  Service: Endoscopy;  Laterality: N/A;    HYSTERECTOMY       No family history on file.  Social History[1]  Review of Systems   Constitutional:  Negative for chills and fever.   HENT:  Negative for sore throat.    Respiratory:  Positive for shortness of breath. Negative for wheezing.    Cardiovascular:  Negative for chest pain and palpitations.   Gastrointestinal:  Positive for nausea and vomiting (with blood).   Endocrine: Negative for polyuria.   Genitourinary:  Negative for dysuria.   Musculoskeletal:  Negative for back pain.   Neurological:  Negative for headaches.   Psychiatric/Behavioral:  Negative for behavioral problems.    All other systems reviewed and are negative.      Physical Exam     Initial Vitals [05/20/25 0931]   BP Pulse Resp Temp SpO2   129/73 (!) 129 20 99 °F (37.2 °C) 100 %      MAP       --         Physical Exam    Constitutional: She appears well-developed and well-nourished. She is not diaphoretic. No distress.   HENT:   Head: Normocephalic and atraumatic.   Right Ear: External ear normal.   Left Ear: External ear normal.   Eyes: EOM are normal. Pupils are equal, round, and reactive to light.   Neck:   Normal  range of motion.  Cardiovascular:            S1, S2, tachycardia   Pulmonary/Chest: Breath sounds normal. No respiratory distress. She has no wheezes.   Abdominal: Abdomen is soft. She exhibits no distension. There is no abdominal tenderness.   Musculoskeletal:         General: Normal range of motion.      Cervical back: Normal range of motion.     Neurological: She is alert and oriented to person, place, and time.   Skin: Skin is warm and dry. No rash noted.   Psychiatric: She has a normal mood and affect.         ED Course   Procedures  Labs Reviewed   COMPREHENSIVE METABOLIC PANEL - Abnormal       Result Value    Sodium 140      Potassium 3.3 (*)     Chloride 104      CO2 23      Glucose 134 (*)     BUN 12      Creatinine 0.7      Calcium 9.1      Protein Total 7.7      Albumin 3.5      Bilirubin Total 1.2 (*)     ALP 83      AST 23      ALT 23      Anion Gap 13      eGFR >60     URINALYSIS, REFLEX TO URINE CULTURE - Abnormal    Color, UA Yellow      Appearance, UA Hazy (*)     pH, UA 6.0      Spec Grav UA <=1.005 (*)     Protein, UA 1+ (*)     Glucose, UA 3+ (*)     Ketones, UA Negative      Bilirubin, UA Negative      Blood, UA 1+ (*)     Nitrites, UA Negative      Urobilinogen, UA Negative      Leukocyte Esterase, UA 1+ (*)    CBC WITH DIFFERENTIAL - Abnormal    WBC 10.96      RBC 3.01 (*)     HGB 7.7 (*)     HCT 23.8 (*)     MCV 79 (*)     MCH 25.6 (*)     MCHC 32.4      RDW 16.6 (*)     Platelet Count 469 (*)     MPV 9.8      Nucleated RBC 0      Neut % 61.0      Lymph % 28.9      Mono % 8.9      Eos % 0.3      Basophil % 0.5      Imm Grans % 0.4      Neut # 6.69      Lymph # 3.17      Mono # 0.98      Eos # 0.03      Baso # 0.05      Imm Grans # 0.04     D DIMER, QUANTITATIVE - Abnormal    D-Dimer 0.67 (*)    URINALYSIS MICROSCOPIC - Abnormal    RBC, UA 5 (*)     WBC, UA >100 (*)     WBC Clumps, UA Occasional (*)     Bacteria, UA Few (*)     Yeast, UA None      Squamous Epithelial Cells, UA 10      Hyaline  Casts, UA 0      Trichomonas, UA Moderate (*)     Microscopic Comment       LIPASE - Normal    Lipase Level 47     TROPONIN I - Normal    Troponin-I <0.006     CULTURE, URINE   CBC W/ AUTO DIFFERENTIAL    Narrative:     The following orders were created for panel order CBC auto differential.  Procedure                               Abnormality         Status                     ---------                               -----------         ------                     CBC with Differential[[id number]]       Abnormal            Final result                 Please view results for these tests on the individual orders.   GREY TOP URINE HOLD    Extra Tube Hold for add-ons.     HEMOGLOBIN   HEMATOCRIT   TYPE & SCREEN    Specimen Outdate 05/23/2025 23:59      Group & Rh AB NEG      Indirect Thao NEG          ECG Results              EKG 12-lead (Final result)        Collection Time Result Time QRS Duration OHS QTC Calculation    05/20/25 09:53:00 05/20/25 14:11:41 74 471                     Final result by Interface, Lab In [facility] (05/20/25 14:11:49)                   Narrative:    Test Reason : K92.2,    Vent. Rate : 114 BPM     Atrial Rate : 114 BPM     P-R Int : 126 ms          QRS Dur :  74 ms      QT Int : 342 ms       P-R-T Axes :  61  64  36 degrees    QTcB Int : 471 ms    Sinus tachycardia  Otherwise normal ECG  When compared with ECG of 07-Feb-2020 19:55,  No significant change was found  Confirmed by Parish Sheriff ([id number]) on 5/20/2025 2:11:39 PM    Referred By: AAAREFERRAL SELF           Confirmed By: Parish Sheriff                                  Imaging Results              CTA Chest Non-Coronary (PE Studies) (Final result)  Result time 05/20/25 12:44:34      Final result by Caren Coates MD (05/20/25 12:44:34)                   Impression:      No evidence of a pulmonary embolus.  No acute pathology in the lungs, and no evidence of metastatic disease in the thorax.      Electronically signed by: Caren  Ara Coates  Date:    05/20/2025  Time:    12:44               Narrative:    EXAMINATION:  CTA CHEST NON CORONARY (PE STUDIES)    CLINICAL HISTORY:  Pulmonary embolism (PE) suspected, positive D-dimer;history of duodenal carcinoma and hematemesis    TECHNIQUE:  Low dose axial images, sagittal and coronal reformations were obtained from the thoracic inlet to the lung bases following the IV administration of 75 mL of Omnipaque 350.  Contrast timing was optimized to evaluate the pulmonary arteries.  MIP images were performed.    COMPARISON:  CT chest 05/02/2025 and PET-CT 05/14/2025    FINDINGS:  There is adequate opacification of the pulmonary arteries with no evidence of a pulmonary embolus.  The aorta is normal in caliber without aneurysm or dissection.  There is no mediastinal nor axillary adenopathy.  No abnormality at the base of the neck.  No pleural effusion.    No lung nodules nor areas of consolidation are present.  There are few bands of subsegmental atelectasis or scarring.    There are no suspicious lytic or sclerotic bone lesions.    Findings in the abdomen are reported separately.                                       CTA Abdomen and Pelvis (Final result)  Result time 05/20/25 12:59:27      Final result by Caren Coates MD (05/20/25 12:59:27)                   Impression:      Increasing air within the known malignancy in the 2nd portion of the duodenum, see comments above.  No active extravasation of contrast.    Adjacent metastatic nodes.  No distant metastatic disease.  No free intraperitoneal air or fluid.      Electronically signed by: Caren Coates  Date:    05/20/2025  Time:    12:59               Narrative:    EXAMINATION:  CTA ABDOMEN AND PELVIS    CLINICAL HISTORY:  GI bleed;  patient with history of duodenal malignancy and recent onset hemoptysis    TECHNIQUE:  Initial noncontrast CT images of the abdomen and pelvis were obtained.  Following IV administration of 75 cc  Omnipaque 350 nonionic contrast material, CTA of the abdomen and pelvis was performed.  The patient was scanned in the arterial and portal venous phases.  No oral contrast was administered    COMPARISON:  CT 04/10/2025, PET-CT 05/14/2025    FINDINGS:  The visualized portions of the lung bases are clear.    There is an ulcerated mass in the 2nd portion of the duodenum, intimately associated with the pancreatic head with approximate dimensions of 2.2 x 4.7 cm series 5, image 238.  It is unchanged in size compared to the recent PET-CT, but the amount of air within the mass has increased.  This appears to be a result of direct communication of the lumen of the bowel with the mass.  There is no free intraperitoneal air, and no free intraperitoneal fluid.  There is no active extravasation of contrast, but the gastroduodenal artery is noted to be intimately associated with ulcerated portion of the mass.  There are adjacent mildly enlarged lymph nodes, the largest measuring 12 mm in short axis series 5, image 267, concerning for metastatic nodes.    There is no evidence of metastatic disease to the liver.  The spleen, adrenal glands, kidneys are normal.  As noted above, the head of the pancreas is intimately associated with the mass and may be involved by tumor.  The remainder of the pancreas is unremarkable.  There is no pancreatic or biliary ductal dilatation.  The gallbladder is present.    The aorta is normal in caliber.  There is no retroperitoneal adenopathy.  There is a 2.5 cm unilocular left ovarian cyst.  The uterus is surgically absent.  The urinary bladder is unremarkable.  There is a tiny fat-containing umbilical hernia.  There are no suspicious bone lesions.  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                                       X-Ray Chest 1 View (Final result)  Result time 05/20/25 10:22:27      Final result by Caren Coates MD (05/20/25 10:22:27)                    Impression:      Minimal subsegmental atelectasis or scarring left lung.      Electronically signed by: Caren Bullock Aminata  Date:    05/20/2025  Time:    10:22               Narrative:    EXAMINATION:  XR CHEST 1 VIEW    CLINICAL HISTORY:  Shortness of breath    TECHNIQUE:  Single frontal view of the chest was performed.    COMPARISON:  CT 05/02/2025    FINDINGS:  Heart size borderline.  Bands of subsegmental atelectasis or scarring noted in the left mid lung laterally.  Otherwise, the lungs are clear.  There is no pleural effusion.  Thoracic dextroscoliosis is present.                                       Medications   pantoprazole injection 80 mg (80 mg Intravenous Given 5/20/25 1004)   cefTRIAXone injection 1 g (1 g Intravenous Given 5/20/25 1111)   iohexoL (OMNIPAQUE 350) injection 75 mL (75 mLs Intravenous Given 5/20/25 1225)   iohexoL (OMNIPAQUE 350) injection 75 mL (75 mLs Intravenous Given 5/20/25 1228)   pantoprazole (PROTONIX) 40 mg in 0.9% NaCl 100 mL IVPB (MB+) (8 mg/hr Intravenous New Bag 5/20/25 1348)   0.9% NaCl infusion (1,000 mLs Intravenous New Bag 5/20/25 1410)     Medical Decision Making  Differential diagnosis:  GI bleed, metastatic disease, dehydration, electrolyte abnormality    54-year-old female who presents to the ED with hematemesis.  Patient with known duodenal mass.  Scheduled for resection.  Reviewed labs, imaging.  IV fluids given.  Will repeat hemoglobin and hematocrit.  Patient accepted in transfer to [facility].  IV antibiotics given for UTI.  Patient understands plan of care.  Awaiting transfer at this time.    Amount and/or Complexity of Data Reviewed  Labs: ordered.  Radiology: ordered.    Risk  Prescription drug management.               ED Course as of 05/20/25 1541   Tue May 20, 2025   1206 EKG  Sinus tachycardia  Heart rate 114  QRS normal  No acute ST elevations  Reviewed interpreted by myself [FP]   5011 Awaiting call back from Dr. Doyle. [FP]   1500  Patient accepted in transfer to [facility] with Dr.Cheuk medrano. [FP]      ED Course User Index  [FP] Aileen Andrew MD                           Clinical Impression:  Final diagnoses:  [K92.2] GI bleed  [R06.02] Shortness of breath  [K92.0] Hematemesis with nausea (Primary)  [K31.89] Duodenal mass          ED Disposition Condition    Transfer to Another Facility Stable                      [1]   Social History  Tobacco Use    Smoking status: Never    Smokeless tobacco: Never   Substance Use Topics    Alcohol use: Yes    Drug use: Never        Aileen Andrew MD  05/20/25 4382

## 2025-05-20 NOTE — ED NOTES
[employer] arrived for patient transportation. Report and chart given to EMS. ELIZABETH Spangler updated on transportation.

## 2025-05-21 ENCOUNTER — ANESTHESIA (OUTPATIENT)
Dept: ENDOSCOPY | Facility: HOSPITAL | Age: 55
End: 2025-05-21

## 2025-05-21 ENCOUNTER — ANESTHESIA EVENT (OUTPATIENT)
Dept: ENDOSCOPY | Facility: HOSPITAL | Age: 55
End: 2025-05-21

## 2025-05-21 PROBLEM — E87.6 HYPOKALEMIA: Status: ACTIVE | Noted: 2025-05-21

## 2025-05-21 LAB
ABSOLUTE EOSINOPHIL (OHS): 0.06 K/UL
ABSOLUTE EOSINOPHIL (OHS): 0.08 K/UL
ABSOLUTE MONOCYTE (OHS): 0.85 K/UL (ref 0.3–1)
ABSOLUTE MONOCYTE (OHS): 1.06 K/UL (ref 0.3–1)
ABSOLUTE NEUTROPHIL COUNT (OHS): 5.07 K/UL (ref 1.8–7.7)
ABSOLUTE NEUTROPHIL COUNT (OHS): 6.25 K/UL (ref 1.8–7.7)
ALBUMIN SERPL BCP-MCNC: 3 G/DL (ref 3.5–5.2)
ALP SERPL-CCNC: 62 UNIT/L (ref 40–150)
ALT SERPL W/O P-5'-P-CCNC: 15 UNIT/L (ref 10–44)
ANION GAP (OHS): 7 MMOL/L (ref 8–16)
AST SERPL-CCNC: 20 UNIT/L (ref 11–45)
BACTERIA UR CULT: NORMAL
BASOPHILS # BLD AUTO: 0.05 K/UL
BASOPHILS # BLD AUTO: 0.08 K/UL
BASOPHILS NFR BLD AUTO: 0.5 %
BASOPHILS NFR BLD AUTO: 0.7 %
BILIRUB SERPL-MCNC: 1.8 MG/DL (ref 0.1–1)
BUN SERPL-MCNC: 16 MG/DL (ref 6–20)
CALCIUM SERPL-MCNC: 8.6 MG/DL (ref 8.7–10.5)
CHLORIDE SERPL-SCNC: 112 MMOL/L (ref 95–110)
CO2 SERPL-SCNC: 24 MMOL/L (ref 23–29)
CREAT SERPL-MCNC: 0.8 MG/DL (ref 0.5–1.4)
ERYTHROCYTE [DISTWIDTH] IN BLOOD BY AUTOMATED COUNT: 16.5 % (ref 11.5–14.5)
ERYTHROCYTE [DISTWIDTH] IN BLOOD BY AUTOMATED COUNT: 16.8 % (ref 11.5–14.5)
GFR SERPLBLD CREATININE-BSD FMLA CKD-EPI: >60 ML/MIN/1.73/M2
GLUCOSE SERPL-MCNC: 103 MG/DL (ref 70–110)
HCT VFR BLD AUTO: 22.3 % (ref 37–48.5)
HCT VFR BLD AUTO: 27.7 % (ref 37–48.5)
HGB BLD-MCNC: 7.2 GM/DL (ref 12–16)
HGB BLD-MCNC: 8.6 GM/DL (ref 12–16)
IMM GRANULOCYTES # BLD AUTO: 0.06 K/UL (ref 0–0.04)
IMM GRANULOCYTES # BLD AUTO: 0.08 K/UL (ref 0–0.04)
IMM GRANULOCYTES NFR BLD AUTO: 0.6 % (ref 0–0.5)
IMM GRANULOCYTES NFR BLD AUTO: 0.7 % (ref 0–0.5)
LYMPHOCYTES # BLD AUTO: 3.32 K/UL (ref 1–4.8)
LYMPHOCYTES # BLD AUTO: 3.49 K/UL (ref 1–4.8)
MAGNESIUM SERPL-MCNC: 2 MG/DL (ref 1.6–2.6)
MCH RBC QN AUTO: 25.4 PG (ref 27–31)
MCH RBC QN AUTO: 25.6 PG (ref 27–31)
MCHC RBC AUTO-ENTMCNC: 31 G/DL (ref 32–36)
MCHC RBC AUTO-ENTMCNC: 32.3 G/DL (ref 32–36)
MCV RBC AUTO: 79 FL (ref 82–98)
MCV RBC AUTO: 82 FL (ref 82–98)
NUCLEATED RBC (/100WBC) (OHS): 0 /100 WBC
NUCLEATED RBC (/100WBC) (OHS): 0 /100 WBC
PLATELET # BLD AUTO: 364 K/UL (ref 150–450)
PLATELET # BLD AUTO: 445 K/UL (ref 150–450)
PMV BLD AUTO: 10.7 FL (ref 9.2–12.9)
PMV BLD AUTO: 9.9 FL (ref 9.2–12.9)
POCT GLUCOSE: 119 MG/DL (ref 70–110)
POCT GLUCOSE: 85 MG/DL (ref 70–110)
POCT GLUCOSE: 88 MG/DL (ref 70–110)
POCT GLUCOSE: 95 MG/DL (ref 70–110)
POTASSIUM SERPL-SCNC: 2.9 MMOL/L (ref 3.5–5.1)
PROT SERPL-MCNC: 6.5 GM/DL (ref 6–8.4)
RBC # BLD AUTO: 2.81 M/UL (ref 4–5.4)
RBC # BLD AUTO: 3.38 M/UL (ref 4–5.4)
RELATIVE EOSINOPHIL (OHS): 0.6 %
RELATIVE EOSINOPHIL (OHS): 0.7 %
RELATIVE LYMPHOCYTE (OHS): 30.5 % (ref 18–48)
RELATIVE LYMPHOCYTE (OHS): 36.4 % (ref 18–48)
RELATIVE MONOCYTE (OHS): 8.9 % (ref 4–15)
RELATIVE MONOCYTE (OHS): 9.8 % (ref 4–15)
RELATIVE NEUTROPHIL (OHS): 53 % (ref 38–73)
RELATIVE NEUTROPHIL (OHS): 57.6 % (ref 38–73)
SODIUM SERPL-SCNC: 143 MMOL/L (ref 136–145)
WBC # BLD AUTO: 10.87 K/UL (ref 3.9–12.7)
WBC # BLD AUTO: 9.58 K/UL (ref 3.9–12.7)

## 2025-05-21 PROCEDURE — 85025 COMPLETE CBC W/AUTO DIFF WBC: CPT | Performed by: NURSE PRACTITIONER

## 2025-05-21 PROCEDURE — 11000001 HC ACUTE MED/SURG PRIVATE ROOM

## 2025-05-21 PROCEDURE — 63600175 PHARM REV CODE 636 W HCPCS

## 2025-05-21 PROCEDURE — 63600175 PHARM REV CODE 636 W HCPCS: Performed by: NURSE PRACTITIONER

## 2025-05-21 PROCEDURE — 99223 1ST HOSP IP/OBS HIGH 75: CPT | Mod: 25,,, | Performed by: INTERNAL MEDICINE

## 2025-05-21 PROCEDURE — 83735 ASSAY OF MAGNESIUM: CPT

## 2025-05-21 PROCEDURE — 82040 ASSAY OF SERUM ALBUMIN: CPT | Performed by: NURSE PRACTITIONER

## 2025-05-21 PROCEDURE — 0DJ08ZZ INSPECTION OF UPPER INTESTINAL TRACT, VIA NATURAL OR ARTIFICIAL OPENING ENDOSCOPIC: ICD-10-PCS | Performed by: STUDENT IN AN ORGANIZED HEALTH CARE EDUCATION/TRAINING PROGRAM

## 2025-05-21 PROCEDURE — 99223 1ST HOSP IP/OBS HIGH 75: CPT | Mod: ,,, | Performed by: SURGERY

## 2025-05-21 PROCEDURE — 25000003 PHARM REV CODE 250: Performed by: NURSE PRACTITIONER

## 2025-05-21 PROCEDURE — 43255 EGD CONTROL BLEEDING ANY: CPT | Mod: ,,, | Performed by: INTERNAL MEDICINE

## 2025-05-21 PROCEDURE — 36415 COLL VENOUS BLD VENIPUNCTURE: CPT | Performed by: NURSE PRACTITIONER

## 2025-05-21 PROCEDURE — 63600175 PHARM REV CODE 636 W HCPCS: Performed by: NURSE ANESTHETIST, CERTIFIED REGISTERED

## 2025-05-21 PROCEDURE — 37000009 HC ANESTHESIA EA ADD 15 MINS: Performed by: INTERNAL MEDICINE

## 2025-05-21 PROCEDURE — 37000008 HC ANESTHESIA 1ST 15 MINUTES: Performed by: INTERNAL MEDICINE

## 2025-05-21 PROCEDURE — C1052 HEMOSTATIC AGENT, GI, TOPIC: HCPCS | Performed by: INTERNAL MEDICINE

## 2025-05-21 PROCEDURE — 43255 EGD CONTROL BLEEDING ANY: CPT | Performed by: INTERNAL MEDICINE

## 2025-05-21 PROCEDURE — 25000003 PHARM REV CODE 250

## 2025-05-21 RX ORDER — PROPOFOL 10 MG/ML
VIAL (ML) INTRAVENOUS
Status: DISCONTINUED | OUTPATIENT
Start: 2025-05-21 | End: 2025-05-21

## 2025-05-21 RX ORDER — LIDOCAINE HYDROCHLORIDE 20 MG/ML
INJECTION INTRAVENOUS
Status: DISCONTINUED | OUTPATIENT
Start: 2025-05-21 | End: 2025-05-21

## 2025-05-21 RX ORDER — POTASSIUM CHLORIDE 7.45 MG/ML
10 INJECTION INTRAVENOUS
Status: DISPENSED | OUTPATIENT
Start: 2025-05-21 | End: 2025-05-21

## 2025-05-21 RX ORDER — METRONIDAZOLE 500 MG/1
500 TABLET ORAL EVERY 12 HOURS
Status: DISCONTINUED | OUTPATIENT
Start: 2025-05-21 | End: 2025-05-23 | Stop reason: HOSPADM

## 2025-05-21 RX ORDER — MORPHINE SULFATE 2 MG/ML
2 INJECTION, SOLUTION INTRAMUSCULAR; INTRAVENOUS EVERY 6 HOURS PRN
Status: DISPENSED | OUTPATIENT
Start: 2025-05-21 | End: 2025-05-22

## 2025-05-21 RX ORDER — MAGNESIUM SULFATE HEPTAHYDRATE 40 MG/ML
2 INJECTION, SOLUTION INTRAVENOUS ONCE
Status: COMPLETED | OUTPATIENT
Start: 2025-05-21 | End: 2025-05-21

## 2025-05-21 RX ADMIN — PROPOFOL 20 MG: 10 INJECTION, EMULSION INTRAVENOUS at 04:05

## 2025-05-21 RX ADMIN — POTASSIUM CHLORIDE 10 MEQ: 7.46 INJECTION, SOLUTION INTRAVENOUS at 12:05

## 2025-05-21 RX ADMIN — MAGNESIUM SULFATE HEPTAHYDRATE 2 G: 40 INJECTION, SOLUTION INTRAVENOUS at 09:05

## 2025-05-21 RX ADMIN — PROPOFOL 30 MG: 10 INJECTION, EMULSION INTRAVENOUS at 04:05

## 2025-05-21 RX ADMIN — PROPOFOL 40 MG: 10 INJECTION, EMULSION INTRAVENOUS at 04:05

## 2025-05-21 RX ADMIN — LISINOPRIL 20 MG: 20 TABLET ORAL at 09:05

## 2025-05-21 RX ADMIN — PANTOPRAZOLE SODIUM 40 MG: 40 INJECTION, POWDER, FOR SOLUTION INTRAVENOUS at 08:05

## 2025-05-21 RX ADMIN — PANTOPRAZOLE SODIUM 40 MG: 40 INJECTION, POWDER, FOR SOLUTION INTRAVENOUS at 09:05

## 2025-05-21 RX ADMIN — LIDOCAINE HYDROCHLORIDE 75 MG: 20 INJECTION, SOLUTION INTRAVENOUS at 04:05

## 2025-05-21 RX ADMIN — MORPHINE SULFATE 2 MG: 2 INJECTION, SOLUTION INTRAMUSCULAR; INTRAVENOUS at 07:05

## 2025-05-21 RX ADMIN — PROPOFOL 60 MG: 10 INJECTION, EMULSION INTRAVENOUS at 04:05

## 2025-05-21 RX ADMIN — METRONIDAZOLE 500 MG: 500 TABLET ORAL at 08:05

## 2025-05-21 RX ADMIN — POTASSIUM CHLORIDE 10 MEQ: 7.46 INJECTION, SOLUTION INTRAVENOUS at 10:05

## 2025-05-21 RX ADMIN — ATORVASTATIN CALCIUM 80 MG: 40 TABLET, FILM COATED ORAL at 09:05

## 2025-05-21 RX ADMIN — POTASSIUM CHLORIDE 10 MEQ: 7.46 INJECTION, SOLUTION INTRAVENOUS at 09:05

## 2025-05-21 NOTE — ANESTHESIA PREPROCEDURE EVALUATION
[facility]  Anesthesia Pre-Operative Evaluation         Patient Name: Aury Felix  YOB: 1970  MRN: [id number]    SUBJECTIVE:     05/21/2025    Procedure(s) (LRB):  EGD (ESOPHAGOGASTRODUODENOSCOPY) (N/A)    Aury Felix is a 54 y.o. female here for Procedure(s) (LRB):  EGD (ESOPHAGOGASTRODUODENOSCOPY) (N/A)    Drips:     Problem List[1]    Review of patient's allergies indicates:  No Known Allergies    Medications Ordered Prior to Encounter[2]    Past Surgical History:   Procedure Laterality Date    ESOPHAGOGASTRODUODENOSCOPY N/A 4/10/2025    Procedure: EGD (ESOPHAGOGASTRODUODENOSCOPY);  Surgeon: Manny Calles MD;  Location: [facility];  Service: Endoscopy;  Laterality: N/A;    HYSTERECTOMY         Social History[3]      OBJECTIVE:     Vital Signs Range (Last 24H):  Temp:  [36.7 °C (98 °F)-37.6 °C (99.7 °F)] 36.7 °C (98 °F)  Pulse:  [] 100  Resp:  [16-24] 19  SpO2:  [94 %-100 %] 100 %  BP: (115-150)/(74-99) 137/99    Significant Labs:  Lab Results   Component Value Date    WBC 10.87 05/21/2025    HGB 8.6 (L) 05/21/2025    HCT 27.7 (L) 05/21/2025     05/21/2025    CHOL 175 09/14/2024    TRIG 96 09/14/2024    HDL 37 (L) 09/14/2024    ALT 15 05/21/2025    AST 20 05/21/2025     05/21/2025    K 2.9 (L) 05/21/2025     (H) 05/21/2025    CREATININE 0.8 05/21/2025    BUN 16 05/21/2025    CO2 24 05/21/2025    TSH 0.844 12/30/2024    INR 1.0 04/09/2025    HGBA1C 6.0 (H) 05/02/2025       Diagnostic Studies:    EKG:   Results for orders placed or performed during the hospital encounter of 05/20/25   EKG 12-lead    Collection Time: 05/20/25  9:53 AM   Result Value Ref Range    QRS Duration 74 ms    [facility] QTC Calculation 471 ms    Narrative    Test Reason : K92.2,    Vent. Rate : 114 BPM     Atrial Rate : 114 BPM     P-R Int : 126 ms          QRS Dur :  74 ms      QT Int : 342 ms       P-R-T Axes :  61  64  36 degrees    QTcB Int : 471 ms    Sinus  tachycardia  Otherwise normal ECG  When compared with ECG of 07-Feb-2020 19:55,  No significant change was found  Confirmed by Parish Sheriff ([id number]) on 5/20/2025 2:11:39 PM    Referred By: AAAREFERRAL SELF           Confirmed By: Parish Sheriff         Pre-op Assessment    I have reviewed the Patient Summary Reports.     I have reviewed the Nursing Notes. I have reviewed the NPO Status.   I have reviewed the Medications.     Review of Systems  Anesthesia Hx:   History of prior surgery of interest to airway management or planning:            Denies Personal Hx of Anesthesia complications.                    Social:  Non-Smoker, Social Alcohol Use       Hematology/Oncology:       -- Anemia:                                  Cardiovascular:     Hypertension           hyperlipidemia            Shortness of Breath                    Hypertension         Pulmonary:      Shortness of breath                  Hepatic/GI:        Duodenal mass              Endocrine:  Diabetes    Diabetes                          Physical Exam  General: Well nourished, Cooperative, Alert and Oriented    Airway:  Mallampati: II   Mouth Opening: Normal  TM Distance: Normal  Tongue: Normal  Neck ROM: Normal ROM        Anesthesia Plan  Type of Anesthesia, risks & benefits discussed:    Anesthesia Type: Gen Natural Airway  Intra-op Monitoring Plan: Standard ASA Monitors  Post Op Pain Control Plan: multimodal analgesia  Induction:  IV  Informed Consent: Informed consent signed with the Patient and all parties understand the risks and agree with anesthesia plan.  All questions answered. Patient consented to blood products? Yes  ASA Score: 3  Day of Surgery Review of History & Physical: H&P Update referred to the surgeon/provider.    Ready For Surgery From Anesthesia Perspective.     .           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     Current Outpatient Medications on File Prior to Encounter   Medication Sig Dispense Refill    aspirin (ECOTRIN) 81 MG EC tablet Take 1 tablet (81 mg total) by mouth once daily. 90 tablet 3    dicyclomine (BENTYL) 10 MG capsule Take 1 capsule (10 mg total) by mouth 4 (four) times daily as needed. 120 capsule 3    empagliflozin (JARDIANCE) 25 mg tablet Take 1 tablet (25 mg total) by mouth once daily. 90 tablet 1    insulin glargine U-100, Lantus, 100 unit/mL (3 mL) SubQ InPn pen Inject 20 Units into the skin every evening. 15 mL 1    lisinopriL (PRINIVIL,ZESTRIL) 20 MG tablet Take 1 tablet (20 mg total) by mouth once daily. 90 tablet 3    metFORMIN (GLUCOPHAGE-XR) 500 MG ER 24hr tablet Take 1 tablet (500 mg total) by mouth 2 (two) times daily with meals. 180 tablet 3    rosuvastatin (CRESTOR) 40 MG Tab Take 1 tablet (40 mg total) by mouth once daily. 90 tablet 3    blood-glucose meter kit Use as instructed 1 each 0    cetirizine (ZYRTEC) 10 MG tablet Take 1 tablet (10 mg total) by mouth once daily. 30 tablet 2    diclofenac sodium (VOLTAREN ARTHRITIS PAIN) 1 % Gel Apply 2 g topically 2 (two) times daily. 60 g 0   [3]   Social History  Socioeconomic History    Marital status: Single   Tobacco Use    Smoking status: Never    Smokeless tobacco: Never   Substance and Sexual Activity    Alcohol use: Yes    Drug use: Never    Sexual activity: Yes      Social Drivers of Health     Financial Resource Strain: Low Risk  (5/20/2025)    Overall Financial Resource Strain (CARDIA)     Difficulty of Paying Living Expenses: Not hard at all   Recent Concern: Financial Resource Strain - High Risk (4/10/2025)    Overall Financial Resource Strain (CARDIA)     Difficulty of Paying Living Expenses: Hard   Food Insecurity: No Food Insecurity (5/20/2025)    Hunger Vital Sign     Worried About Running Out of Food in the Last Year: Never true     Ran Out of Food in the Last Year: Never true   Transportation Needs: No Transportation Needs (5/20/2025)    PRAPARE - Transportation     Lack of Transportation (Medical): No     Lack of Transportation (Non-Medical): No   Physical Activity: Inactive (4/10/2025)    Exercise Vital Sign     Days of Exercise per Week: 0 days     Minutes of Exercise per Session: 0 min   Stress: No Stress Concern Present (5/20/2025)    [employer] - Occupational Stress Questionnaire     Feeling of Stress : Not at all   Housing Stability: Low Risk  (5/20/2025)    Housing Stability Vital Sign     Unable to Pay for Housing in the Last Year: No     Number of Times Moved in the Last Year: 0     Homeless in the Last Year: No

## 2025-05-21 NOTE — TRANSFER OF CARE
"Anesthesia Transfer of Care Note    Patient: Aury Felix    Procedure(s) Performed: Procedure(s) (LRB):  EGD (ESOPHAGOGASTRODUODENOSCOPY) (N/A)    Patient location: GI    Anesthesia Type: general    Transport from OR: Transported from OR on room air with adequate spontaneous ventilation    Post pain: adequate analgesia    Post assessment: no apparent anesthetic complications and tolerated procedure well    Post vital signs: stable    Level of consciousness: responds to stimulation and sedated    Nausea/Vomiting: no nausea/vomiting    Complications: none    Transfer of care protocol was followed      Last vitals: Visit Vitals  BP (!) 158/98 (Patient Position: Lying)   Pulse (!) 122   Temp 36.7 °C (98.1 °F) (Temporal)   Resp 20   Ht 5' 8" (1.727 m)   Wt 90 kg (198 lb 6.6 oz)   SpO2 95%   Breastfeeding No   BMI 30.17 kg/m²     "

## 2025-05-22 LAB
ABO + RH BLD: NORMAL
ABSOLUTE EOSINOPHIL (OHS): 0.08 K/UL
ABSOLUTE MONOCYTE (OHS): 1.08 K/UL (ref 0.3–1)
ABSOLUTE NEUTROPHIL COUNT (OHS): 5.62 K/UL (ref 1.8–7.7)
ANION GAP (OHS): 11 MMOL/L (ref 8–16)
BASOPHILS # BLD AUTO: 0.06 K/UL
BASOPHILS NFR BLD AUTO: 0.6 %
BLD PROD TYP BPU: NORMAL
BLOOD UNIT EXPIRATION DATE: NORMAL
BLOOD UNIT TYPE CODE: 9500
BUN SERPL-MCNC: 10 MG/DL (ref 6–20)
CALCIUM SERPL-MCNC: 8.8 MG/DL (ref 8.7–10.5)
CHLORIDE SERPL-SCNC: 111 MMOL/L (ref 95–110)
CO2 SERPL-SCNC: 20 MMOL/L (ref 23–29)
CREAT SERPL-MCNC: 0.7 MG/DL (ref 0.5–1.4)
CROSSMATCH INTERPRETATION: NORMAL
DISPENSE STATUS: NORMAL
ERYTHROCYTE [DISTWIDTH] IN BLOOD BY AUTOMATED COUNT: 17.6 % (ref 11.5–14.5)
ERYTHROCYTE [DISTWIDTH] IN BLOOD BY AUTOMATED COUNT: 17.9 % (ref 11.5–14.5)
GFR SERPLBLD CREATININE-BSD FMLA CKD-EPI: >60 ML/MIN/1.73/M2
GLUCOSE SERPL-MCNC: 101 MG/DL (ref 70–110)
HCT VFR BLD AUTO: 21.1 % (ref 37–48.5)
HCT VFR BLD AUTO: 24.2 % (ref 37–48.5)
HGB BLD-MCNC: 6.7 GM/DL (ref 12–16)
HGB BLD-MCNC: 7.9 GM/DL (ref 12–16)
IMM GRANULOCYTES # BLD AUTO: 0.07 K/UL (ref 0–0.04)
IMM GRANULOCYTES NFR BLD AUTO: 0.7 % (ref 0–0.5)
INDIRECT COOMBS: NORMAL
LYMPHOCYTES # BLD AUTO: 2.5 K/UL (ref 1–4.8)
MCH RBC QN AUTO: 25.7 PG (ref 27–31)
MCH RBC QN AUTO: 26.7 PG (ref 27–31)
MCHC RBC AUTO-ENTMCNC: 31.8 G/DL (ref 32–36)
MCHC RBC AUTO-ENTMCNC: 32.6 G/DL (ref 32–36)
MCV RBC AUTO: 81 FL (ref 82–98)
MCV RBC AUTO: 82 FL (ref 82–98)
NUCLEATED RBC (/100WBC) (OHS): 0 /100 WBC
PLATELET # BLD AUTO: 373 K/UL (ref 150–450)
PLATELET # BLD AUTO: 394 K/UL (ref 150–450)
PMV BLD AUTO: 10.1 FL (ref 9.2–12.9)
PMV BLD AUTO: 9.6 FL (ref 9.2–12.9)
POCT GLUCOSE: 106 MG/DL (ref 70–110)
POCT GLUCOSE: 116 MG/DL (ref 70–110)
POCT GLUCOSE: 120 MG/DL (ref 70–110)
POTASSIUM SERPL-SCNC: 3.4 MMOL/L (ref 3.5–5.1)
RBC # BLD AUTO: 2.61 M/UL (ref 4–5.4)
RBC # BLD AUTO: 2.96 M/UL (ref 4–5.4)
RELATIVE EOSINOPHIL (OHS): 0.9 %
RELATIVE LYMPHOCYTE (OHS): 26.6 % (ref 18–48)
RELATIVE MONOCYTE (OHS): 11.5 % (ref 4–15)
RELATIVE NEUTROPHIL (OHS): 59.7 % (ref 38–73)
RH BLD: NORMAL
SODIUM SERPL-SCNC: 142 MMOL/L (ref 136–145)
SPECIMEN OUTDATE: NORMAL
UNIT NUMBER: NORMAL
WBC # BLD AUTO: 11.12 K/UL (ref 3.9–12.7)
WBC # BLD AUTO: 9.41 K/UL (ref 3.9–12.7)

## 2025-05-22 PROCEDURE — 99232 SBSQ HOSP IP/OBS MODERATE 35: CPT | Mod: ,,, | Performed by: SURGERY

## 2025-05-22 PROCEDURE — 25000003 PHARM REV CODE 250

## 2025-05-22 PROCEDURE — 63600175 PHARM REV CODE 636 W HCPCS: Performed by: NURSE PRACTITIONER

## 2025-05-22 PROCEDURE — 80048 BASIC METABOLIC PNL TOTAL CA: CPT

## 2025-05-22 PROCEDURE — 36415 COLL VENOUS BLD VENIPUNCTURE: CPT | Performed by: NURSE PRACTITIONER

## 2025-05-22 PROCEDURE — 30233N1 TRANSFUSION OF NONAUTOLOGOUS RED BLOOD CELLS INTO PERIPHERAL VEIN, PERCUTANEOUS APPROACH: ICD-10-PCS | Performed by: STUDENT IN AN ORGANIZED HEALTH CARE EDUCATION/TRAINING PROGRAM

## 2025-05-22 PROCEDURE — 99232 SBSQ HOSP IP/OBS MODERATE 35: CPT | Mod: ,,, | Performed by: INTERNAL MEDICINE

## 2025-05-22 PROCEDURE — 36415 COLL VENOUS BLD VENIPUNCTURE: CPT

## 2025-05-22 PROCEDURE — 36430 TRANSFUSION BLD/BLD COMPNT: CPT

## 2025-05-22 PROCEDURE — P9016 RBC LEUKOCYTES REDUCED: HCPCS

## 2025-05-22 PROCEDURE — 25000003 PHARM REV CODE 250: Performed by: NURSE PRACTITIONER

## 2025-05-22 PROCEDURE — 86920 COMPATIBILITY TEST SPIN: CPT

## 2025-05-22 PROCEDURE — 11000001 HC ACUTE MED/SURG PRIVATE ROOM

## 2025-05-22 PROCEDURE — 85025 COMPLETE CBC W/AUTO DIFF WBC: CPT | Performed by: NURSE PRACTITIONER

## 2025-05-22 PROCEDURE — 86850 RBC ANTIBODY SCREEN: CPT

## 2025-05-22 PROCEDURE — 85027 COMPLETE CBC AUTOMATED: CPT

## 2025-05-22 RX ORDER — POTASSIUM CHLORIDE 20 MEQ/1
40 TABLET, EXTENDED RELEASE ORAL ONCE
Status: COMPLETED | OUTPATIENT
Start: 2025-05-22 | End: 2025-05-22

## 2025-05-22 RX ORDER — IBUPROFEN 200 MG
16 TABLET ORAL
Status: DISCONTINUED | OUTPATIENT
Start: 2025-05-22 | End: 2025-05-23 | Stop reason: HOSPADM

## 2025-05-22 RX ORDER — INSULIN ASPART 100 [IU]/ML
0-5 INJECTION, SOLUTION INTRAVENOUS; SUBCUTANEOUS
Status: DISCONTINUED | OUTPATIENT
Start: 2025-05-22 | End: 2025-05-23 | Stop reason: HOSPADM

## 2025-05-22 RX ORDER — HYDROCODONE BITARTRATE AND ACETAMINOPHEN 500; 5 MG/1; MG/1
TABLET ORAL
Status: DISCONTINUED | OUTPATIENT
Start: 2025-05-22 | End: 2025-05-23 | Stop reason: HOSPADM

## 2025-05-22 RX ORDER — IBUPROFEN 200 MG
24 TABLET ORAL
Status: DISCONTINUED | OUTPATIENT
Start: 2025-05-22 | End: 2025-05-23 | Stop reason: HOSPADM

## 2025-05-22 RX ORDER — LANOLIN ALCOHOL/MO/W.PET/CERES
400 CREAM (GRAM) TOPICAL ONCE
Status: COMPLETED | OUTPATIENT
Start: 2025-05-22 | End: 2025-05-22

## 2025-05-22 RX ORDER — GLUCAGON 1 MG
1 KIT INJECTION
Status: DISCONTINUED | OUTPATIENT
Start: 2025-05-22 | End: 2025-05-23 | Stop reason: HOSPADM

## 2025-05-22 RX ADMIN — POTASSIUM CHLORIDE 40 MEQ: 1500 TABLET, EXTENDED RELEASE ORAL at 11:05

## 2025-05-22 RX ADMIN — LISINOPRIL 20 MG: 20 TABLET ORAL at 08:05

## 2025-05-22 RX ADMIN — ATORVASTATIN CALCIUM 80 MG: 40 TABLET, FILM COATED ORAL at 08:05

## 2025-05-22 RX ADMIN — PANTOPRAZOLE SODIUM 40 MG: 40 INJECTION, POWDER, FOR SOLUTION INTRAVENOUS at 08:05

## 2025-05-22 RX ADMIN — PANTOPRAZOLE SODIUM 40 MG: 40 INJECTION, POWDER, FOR SOLUTION INTRAVENOUS at 09:05

## 2025-05-22 RX ADMIN — METRONIDAZOLE 500 MG: 500 TABLET ORAL at 08:05

## 2025-05-22 RX ADMIN — MAGNESIUM OXIDE TAB 400 MG (241.3 MG ELEMENTAL MG) 400 MG: 400 (241.3 MG) TAB at 11:05

## 2025-05-22 RX ADMIN — MORPHINE SULFATE 2 MG: 2 INJECTION, SOLUTION INTRAMUSCULAR; INTRAVENOUS at 03:05

## 2025-05-23 ENCOUNTER — ANESTHESIA EVENT (OUTPATIENT)
Dept: SURGERY | Facility: HOSPITAL | Age: 55
End: 2025-05-23

## 2025-05-23 VITALS
BODY MASS INDEX: 30.07 KG/M2 | DIASTOLIC BLOOD PRESSURE: 89 MMHG | RESPIRATION RATE: 18 BRPM | HEART RATE: 96 BPM | HEIGHT: 68 IN | TEMPERATURE: 98 F | OXYGEN SATURATION: 99 % | WEIGHT: 198.44 LBS | SYSTOLIC BLOOD PRESSURE: 144 MMHG

## 2025-05-23 LAB
ABSOLUTE EOSINOPHIL (OHS): 0.07 K/UL
ABSOLUTE MONOCYTE (OHS): 1.03 K/UL (ref 0.3–1)
ABSOLUTE NEUTROPHIL COUNT (OHS): 6.17 K/UL (ref 1.8–7.7)
ANION GAP (OHS): 10 MMOL/L (ref 8–16)
BASOPHILS # BLD AUTO: 0.05 K/UL
BASOPHILS NFR BLD AUTO: 0.5 %
BUN SERPL-MCNC: 8 MG/DL (ref 6–20)
CALCIUM SERPL-MCNC: 8.9 MG/DL (ref 8.7–10.5)
CHLORIDE SERPL-SCNC: 109 MMOL/L (ref 95–110)
CO2 SERPL-SCNC: 19 MMOL/L (ref 23–29)
CREAT SERPL-MCNC: 0.6 MG/DL (ref 0.5–1.4)
ERYTHROCYTE [DISTWIDTH] IN BLOOD BY AUTOMATED COUNT: 18.7 % (ref 11.5–14.5)
GFR SERPLBLD CREATININE-BSD FMLA CKD-EPI: >60 ML/MIN/1.73/M2
GLUCOSE SERPL-MCNC: 112 MG/DL (ref 70–110)
HCT VFR BLD AUTO: 25.3 % (ref 37–48.5)
HGB BLD-MCNC: 8.1 GM/DL (ref 12–16)
IMM GRANULOCYTES # BLD AUTO: 0.16 K/UL (ref 0–0.04)
IMM GRANULOCYTES NFR BLD AUTO: 1.6 % (ref 0–0.5)
LYMPHOCYTES # BLD AUTO: 2.48 K/UL (ref 1–4.8)
MCH RBC QN AUTO: 26.4 PG (ref 27–31)
MCHC RBC AUTO-ENTMCNC: 32 G/DL (ref 32–36)
MCV RBC AUTO: 82 FL (ref 82–98)
NUCLEATED RBC (/100WBC) (OHS): 0 /100 WBC
PLATELET # BLD AUTO: 426 K/UL (ref 150–450)
PMV BLD AUTO: 10.5 FL (ref 9.2–12.9)
POCT GLUCOSE: 122 MG/DL (ref 70–110)
POCT GLUCOSE: 126 MG/DL (ref 70–110)
POTASSIUM SERPL-SCNC: 3.4 MMOL/L (ref 3.5–5.1)
RBC # BLD AUTO: 3.07 M/UL (ref 4–5.4)
RELATIVE EOSINOPHIL (OHS): 0.7 %
RELATIVE LYMPHOCYTE (OHS): 24.9 % (ref 18–48)
RELATIVE MONOCYTE (OHS): 10.3 % (ref 4–15)
RELATIVE NEUTROPHIL (OHS): 62 % (ref 38–73)
SODIUM SERPL-SCNC: 138 MMOL/L (ref 136–145)
WBC # BLD AUTO: 9.96 K/UL (ref 3.9–12.7)

## 2025-05-23 PROCEDURE — 25000003 PHARM REV CODE 250

## 2025-05-23 PROCEDURE — 36415 COLL VENOUS BLD VENIPUNCTURE: CPT

## 2025-05-23 PROCEDURE — 85025 COMPLETE CBC W/AUTO DIFF WBC: CPT

## 2025-05-23 PROCEDURE — 25000003 PHARM REV CODE 250: Performed by: NURSE PRACTITIONER

## 2025-05-23 PROCEDURE — 80048 BASIC METABOLIC PNL TOTAL CA: CPT

## 2025-05-23 PROCEDURE — 63600175 PHARM REV CODE 636 W HCPCS: Performed by: NURSE PRACTITIONER

## 2025-05-23 PROCEDURE — 99231 SBSQ HOSP IP/OBS SF/LOW 25: CPT | Mod: ,,, | Performed by: SURGERY

## 2025-05-23 RX ORDER — PANTOPRAZOLE SODIUM 40 MG/1
40 TABLET, DELAYED RELEASE ORAL DAILY
Qty: 90 TABLET | Refills: 3 | Status: SHIPPED | OUTPATIENT
Start: 2025-05-23 | End: 2026-05-23

## 2025-05-23 RX ORDER — POTASSIUM CHLORIDE 20 MEQ/1
40 TABLET, EXTENDED RELEASE ORAL EVERY 4 HOURS
Status: COMPLETED | OUTPATIENT
Start: 2025-05-23 | End: 2025-05-23

## 2025-05-23 RX ORDER — OXYCODONE AND ACETAMINOPHEN 5; 325 MG/1; MG/1
1 TABLET ORAL EVERY 6 HOURS PRN
Qty: 16 TABLET | Refills: 0 | Status: SHIPPED | OUTPATIENT
Start: 2025-05-23

## 2025-05-23 RX ORDER — LANOLIN ALCOHOL/MO/W.PET/CERES
400 CREAM (GRAM) TOPICAL ONCE
Status: COMPLETED | OUTPATIENT
Start: 2025-05-23 | End: 2025-05-23

## 2025-05-23 RX ORDER — METRONIDAZOLE 500 MG/1
500 TABLET ORAL EVERY 12 HOURS
Qty: 6 TABLET | Refills: 0 | Status: ON HOLD | OUTPATIENT
Start: 2025-05-23 | End: 2025-05-31 | Stop reason: HOSPADM

## 2025-05-23 RX ADMIN — POTASSIUM CHLORIDE 40 MEQ: 1500 TABLET, EXTENDED RELEASE ORAL at 01:05

## 2025-05-23 RX ADMIN — DICYCLOMINE HYDROCHLORIDE 10 MG: 10 CAPSULE ORAL at 08:05

## 2025-05-23 RX ADMIN — POTASSIUM CHLORIDE 40 MEQ: 1500 TABLET, EXTENDED RELEASE ORAL at 11:05

## 2025-05-23 RX ADMIN — PANTOPRAZOLE SODIUM 40 MG: 40 INJECTION, POWDER, FOR SOLUTION INTRAVENOUS at 09:05

## 2025-05-23 RX ADMIN — LISINOPRIL 20 MG: 20 TABLET ORAL at 08:05

## 2025-05-23 RX ADMIN — DICYCLOMINE HYDROCHLORIDE 10 MG: 10 CAPSULE ORAL at 12:05

## 2025-05-23 RX ADMIN — MAGNESIUM OXIDE TAB 400 MG (241.3 MG ELEMENTAL MG) 400 MG: 400 (241.3 MG) TAB at 11:05

## 2025-05-23 RX ADMIN — METRONIDAZOLE 500 MG: 500 TABLET ORAL at 08:05

## 2025-05-23 RX ADMIN — ATORVASTATIN CALCIUM 80 MG: 40 TABLET, FILM COATED ORAL at 08:05

## 2025-05-23 NOTE — ANESTHESIA POSTPROCEDURE EVALUATION
Anesthesia Post Evaluation    Patient: Aury Felix    Procedure(s) Performed: Procedure(s) (LRB):  EGD (ESOPHAGOGASTRODUODENOSCOPY) (N/A)    Final Anesthesia Type: general      Patient location during evaluation: PACU  Patient participation: Yes- Able to Participate  Level of consciousness: awake and alert, oriented and awake  Post-procedure vital signs: reviewed and stable  Pain management: adequate  Airway patency: patent  GUANAKITO mitigation strategies: Multimodal analgesia, Extubation while patient is awake and Verification of full reversal of neuromuscular block  PONV status at discharge: No PONV  Anesthetic complications: no      Cardiovascular status: hemodynamically stable  Respiratory status: spontaneous ventilation and room air  Hydration status: euvolemic  Follow-up not needed.              Vitals Value Taken Time   /89 05/23/25 11:14   Temp 36.8 °C (98.3 °F) 05/23/25 11:14   Pulse 96 05/23/25 11:39   Resp 18 05/23/25 11:14   SpO2 99 % 05/23/25 11:14         Event Time   Out of Recovery 05/21/2025 17:37:52         Pain/Vita Score: Pain Rating Prior to Med Admin: 7 (5/22/2025  3:01 PM)

## 2025-05-24 LAB
ABO + RH BLD: NORMAL
ABO + RH BLD: NORMAL
BLD PROD TYP BPU: NORMAL
BLD PROD TYP BPU: NORMAL
BLOOD UNIT EXPIRATION DATE: NORMAL
BLOOD UNIT EXPIRATION DATE: NORMAL
BLOOD UNIT TYPE CODE: 600
BLOOD UNIT TYPE CODE: 600
CROSSMATCH INTERPRETATION: NORMAL
CROSSMATCH INTERPRETATION: NORMAL
DISPENSE STATUS: NORMAL
DISPENSE STATUS: NORMAL
UNIT NUMBER: NORMAL
UNIT NUMBER: NORMAL

## 2025-05-26 ENCOUNTER — TELEPHONE (OUTPATIENT)
Dept: SURGERY | Facility: CLINIC | Age: 55
End: 2025-05-26

## 2025-05-26 RX ORDER — GLUCAGON 1 MG
1 KIT INJECTION
Status: CANCELLED | OUTPATIENT
Start: 2025-05-26

## 2025-05-26 RX ORDER — HALOPERIDOL LACTATE 5 MG/ML
0.5 INJECTION, SOLUTION INTRAMUSCULAR EVERY 10 MIN PRN
Status: CANCELLED | OUTPATIENT
Start: 2025-05-26

## 2025-05-26 RX ORDER — ACETAMINOPHEN 500 MG
1000 TABLET ORAL
Status: CANCELLED | OUTPATIENT
Start: 2025-05-26 | End: 2025-05-27

## 2025-05-26 RX ORDER — SODIUM CHLORIDE 0.9 % (FLUSH) 0.9 %
10 SYRINGE (ML) INJECTION
Status: CANCELLED | OUTPATIENT
Start: 2025-05-26

## 2025-05-26 RX ORDER — HYDROMORPHONE HYDROCHLORIDE 2 MG/ML
0.2 INJECTION, SOLUTION INTRAMUSCULAR; INTRAVENOUS; SUBCUTANEOUS EVERY 5 MIN PRN
Refills: 0 | Status: CANCELLED | OUTPATIENT
Start: 2025-05-26

## 2025-05-26 NOTE — ANESTHESIA PREPROCEDURE EVALUATION
[facility]  Anesthesia Pre-Operative Evaluation         Patient Name: Aury Felix  YOB: 1970  MRN: [id number]    SUBJECTIVE:     Pre-operative evaluation for Procedure(s) (LRB):  WHIPPLE PROCEDURE (Bilateral)     05/26/2025    Aury Felix is a 54 y.o. female w/ a significant PMHx of duodenal adenocarcinoma with intermittent GI bleeding, HTN, DM2, BMI 30, HLD, CARNES, anemia (H/H 8.1/25.3).    Patient now presents for the above procedure(s).    Prev airway: None documented.      Problem List[1]    Review of patient's allergies indicates:  No Known Allergies    Current Inpatient Medications:      Medications Ordered Prior to Encounter[2]    Past Surgical History:   Procedure Laterality Date    ESOPHAGOGASTRODUODENOSCOPY N/A 4/10/2025    Procedure: EGD (ESOPHAGOGASTRODUODENOSCOPY);  Surgeon: Manny Calles MD;  Location: [facility];  Service: Endoscopy;  Laterality: N/A;    ESOPHAGOGASTRODUODENOSCOPY N/A 5/21/2025    Procedure: EGD (ESOPHAGOGASTRODUODENOSCOPY);  Surgeon: Manny Calles MD;  Location: [facility];  Service: Endoscopy;  Laterality: N/A;    HYSTERECTOMY         Social History[3]    OBJECTIVE:     Vital Signs Range (Last 24H):         Significant Labs:  Lab Results   Component Value Date    WBC 9.96 05/23/2025    HGB 8.1 (L) 05/23/2025    HCT 25.3 (L) 05/23/2025     05/23/2025    CHOL 175 09/14/2024    TRIG 96 09/14/2024    HDL 37 (L) 09/14/2024    ALT 15 05/21/2025    AST 20 05/21/2025     05/23/2025    K 3.4 (L) 05/23/2025     05/23/2025    CREATININE 0.6 05/23/2025    BUN 8 05/23/2025    CO2 19 (L) 05/23/2025    TSH 0.844 12/30/2024    INR 1.0 04/09/2025    HGBA1C 6.0 (H) 05/02/2025       Diagnostic Studies: No relevant studies.    EKG:   Results for orders placed or performed during the hospital encounter of  05/20/25   EKG 12-lead    Collection Time: 05/20/25  9:53 AM   Result Value Ref Range    QRS Duration 74 ms    [facility] QTC Calculation 471 ms    Narrative    Test Reason : K92.2,    Vent. Rate : 114 BPM     Atrial Rate : 114 BPM     P-R Int : 126 ms          QRS Dur :  74 ms      QT Int : 342 ms       P-R-T Axes :  61  64  36 degrees    QTcB Int : 471 ms    Sinus tachycardia  Otherwise normal ECG  When compared with ECG of 07-Feb-2020 19:55,  No significant change was found  Confirmed by Parish Sheriff ([id number]) on 5/20/2025 2:11:39 PM    Referred By: AAAREFERRAL SELF           Confirmed By: Parish Sheriff       2D ECHO:  TTE:  No results found for this or any previous visit.    DEBBI:  No results found for this or any previous visit.    ASSESSMENT/PLAN:           Pre-op Assessment    I have reviewed the Patient Summary Reports.     I have reviewed the Nursing Notes. I have reviewed the NPO Status.   I have reviewed the Medications.     Review of Systems  Anesthesia Hx:  No problems with previous Anesthesia   History of prior surgery of interest to airway management or planning:          Denies Family Hx of Anesthesia complications.    Denies Personal Hx of Anesthesia complications.                    Social:  Non-Smoker, Alcohol Use       Hematology/Oncology:       -- Anemia:                  Current/Recent Cancer.                Cardiovascular:     Denies Pacemaker. Hypertension   Denies MI.  Denies CAD.    Denies CABG/stent.     Denies CHF.    hyperlipidemia CARNES                              Pulmonary:    Denies COPD.  Denies Asthma.                    Renal/:   Denies Chronic Renal Disease.                Hepatic/GI:      Denies GERD.    Not Taking GLP-1 Agonists            Neurological:    Denies CVA.    Denies Seizures.                                Endocrine:  Diabetes, type 2         Obesity / BMI > 30Denies Morbid Obesity / BMI > 40      Physical Exam  General: Well nourished, Cooperative and  Alert    Airway:  Mallampati: III / II  Mouth Opening: Normal  TM Distance: Normal  Tongue: Normal  Neck ROM: Normal ROM    Dental:  Intact    Chest/Lungs:  Normal Respiratory Rate    Heart:  Rate: Normal  Rhythm: Regular Rhythm        Anesthesia Plan  Type of Anesthesia, risks & benefits discussed:    Anesthesia Type: Gen ETT  Intra-op Monitoring Plan: Standard ASA Monitors and Art Line  Post Op Pain Control Plan: multimodal analgesia and IV/PO Opioids PRN  Induction:  IV  Airway Plan: Direct and Video, Post-Induction  Informed Consent: Informed consent signed with the Patient and all parties understand the risks and agree with anesthesia plan.  All questions answered.   ASA Score: 3  Day of Surgery Review of History & Physical: H&P Update referred to the surgeon/provider.    Ready For Surgery From Anesthesia Perspective.     .           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 90 tablet 3    blood-glucose meter kit Use as instructed 1 each 0    cetirizine (ZYRTEC) 10 MG tablet Take 1 tablet (10 mg total) by mouth once daily. 30 tablet 2    diclofenac sodium (VOLTAREN ARTHRITIS PAIN) 1 % Gel Apply 2 g topically 2  (two) times daily. 60 g 0    dicyclomine (BENTYL) 10 MG capsule Take 1 capsule (10 mg total) by mouth 4 (four) times daily as needed. 120 capsule 3    empagliflozin (JARDIANCE) 25 mg tablet Take 1 tablet (25 mg total) by mouth once daily. 90 tablet 1    insulin glargine U-100, Lantus, 100 unit/mL (3 mL) SubQ InPn pen Inject 20 Units into the skin every evening. 15 mL 1    lisinopriL (PRINIVIL,ZESTRIL) 20 MG tablet Take 1 tablet (20 mg total) by mouth once daily. 90 tablet 3    metFORMIN (GLUCOPHAGE-XR) 500 MG ER 24hr tablet Take 1 tablet (500 mg total) by mouth 2 (two) times daily with meals. 180 tablet 3    metroNIDAZOLE (FLAGYL) 500 MG tablet Take 1 tablet (500 mg total) by mouth every 12 (twelve) hours. for 3 days 6 tablet 0    oxyCODONE-acetaminophen (PERCOCET) 5-325 mg per tablet Take 1 tablet by mouth every 6 (six) hours as needed for Pain. 16 tablet 0    pantoprazole (PROTONIX) 40 MG tablet Take 1 tablet (40 mg total) by mouth once daily. 90 tablet 3    rosuvastatin (CRESTOR) 40 MG Tab Take 1 tablet (40 mg total) by mouth once daily. 90 tablet 3     No current facility-administered medications on file prior to visit.   [3]   Social History  Socioeconomic History    Marital status: Single   Tobacco Use    Smoking status: Never    Smokeless tobacco: Never   Substance and Sexual Activity    Alcohol use: Yes    Drug use: Never    Sexual activity: Yes     Social Drivers of Health     Financial Resource Strain: Low Risk  (5/21/2025)    Overall Financial Resource Strain (CARDIA)     Difficulty of Paying Living Expenses: Not hard at all   Recent Concern: Financial Resource Strain - High Risk (4/10/2025)    Overall Financial Resource Strain (CARDIA)     Difficulty of Paying Living Expenses: Hard   Food Insecurity: No Food Insecurity (5/21/2025)    Hunger Vital Sign     Worried About Running Out of Food in the Last Year: Never true     Ran Out of Food in the Last Year: Never true   Transportation Needs: No  Transportation Needs (5/21/2025)    PRAPARE - Transportation     Lack of Transportation (Medical): No     Lack of Transportation (Non-Medical): No   Physical Activity: Inactive (4/10/2025)    Exercise Vital Sign     Days of Exercise per Week: 0 days     Minutes of Exercise per Session: 0 min   Stress: No Stress Concern Present (5/21/2025)    [employer] - Occupational Stress Questionnaire     Feeling of Stress : Not at all   Housing Stability: Low Risk  (5/21/2025)    Housing Stability Vital Sign     Unable to Pay for Housing in the Last Year: No     Number of Times Moved in the Last Year: 0     Homeless in the Last Year: No

## 2025-05-26 NOTE — TELEPHONE ENCOUNTER
----- Message from Devi sent at 5/26/2025  8:20 AM CDT -----  .Type:  Needs Medical AdviceWho Called: ptWould the patient rather a call back or a response via [facility]? Call Armin Call Back Number: [phone] Information: Pt stated she would like a call back for her procedure time for tomorrow          05/26/2025                        1336  Spoke to patient regarding the above message. Let her know the pre-op department will contact her before the end of the day to provide her with a surgery and arrival time for tomorrow. However, if she does not hear from anyone, she is to arrive at the hospital for 0700. Patient verbalized understanding.

## 2025-05-27 ENCOUNTER — HOSPITAL ENCOUNTER (INPATIENT)
Facility: HOSPITAL | Age: 55
LOS: 4 days | Discharge: HOME HEALTH | DRG: 327 | End: 2025-05-31
Attending: SURGERY | Admitting: SURGERY

## 2025-05-27 ENCOUNTER — ANESTHESIA (OUTPATIENT)
Dept: SURGERY | Facility: HOSPITAL | Age: 55
End: 2025-05-27

## 2025-05-27 DIAGNOSIS — Z90.49 HISTORY OF WHIPPLE PROCEDURE: ICD-10-CM

## 2025-05-27 DIAGNOSIS — Z90.410 HISTORY OF WHIPPLE PROCEDURE: ICD-10-CM

## 2025-05-27 DIAGNOSIS — R00.0 TACHYCARDIA: ICD-10-CM

## 2025-05-27 DIAGNOSIS — C80.1 ADENOCARCINOMA: ICD-10-CM

## 2025-05-27 LAB
ABSOLUTE EOSINOPHIL (OHS): 0.01 K/UL
ABSOLUTE MONOCYTE (OHS): 0.34 K/UL (ref 0.3–1)
ABSOLUTE NEUTROPHIL COUNT (OHS): 9.43 K/UL (ref 1.8–7.7)
ALBUMIN SERPL BCP-MCNC: 3.2 G/DL (ref 3.5–5.2)
ALP SERPL-CCNC: 87 UNIT/L (ref 40–150)
ALT SERPL W/O P-5'-P-CCNC: 24 UNIT/L (ref 10–44)
ANION GAP (OHS): 8 MMOL/L (ref 8–16)
ANISOCYTOSIS BLD QL SMEAR: SLIGHT
AST SERPL-CCNC: 49 UNIT/L (ref 11–45)
BASOPHILS # BLD AUTO: 0.04 K/UL
BASOPHILS NFR BLD AUTO: 0.3 %
BILIRUB SERPL-MCNC: 0.8 MG/DL (ref 0.1–1)
BLOOD GROUP ANTIBODIES SERPL: NORMAL
BUN SERPL-MCNC: 6 MG/DL (ref 6–20)
CALCIUM SERPL-MCNC: 8.5 MG/DL (ref 8.7–10.5)
CHLORIDE SERPL-SCNC: 105 MMOL/L (ref 95–110)
CO2 SERPL-SCNC: 26 MMOL/L (ref 23–29)
CREAT SERPL-MCNC: 1 MG/DL (ref 0.5–1.4)
DACRYOCYTES BLD QL SMEAR: ABNORMAL
DAT IGG-SP REAG RBC-IMP: NORMAL
ERYTHROCYTE [DISTWIDTH] IN BLOOD BY AUTOMATED COUNT: 22.8 % (ref 11.5–14.5)
GFR SERPLBLD CREATININE-BSD FMLA CKD-EPI: >60 ML/MIN/1.73/M2
GLUCOSE SERPL-MCNC: 169 MG/DL (ref 70–110)
HCT VFR BLD AUTO: 24.3 % (ref 37–48.5)
HGB BLD-MCNC: 7.6 GM/DL (ref 12–16)
HYPOCHROMIA BLD QL SMEAR: ABNORMAL
IMM GRANULOCYTES # BLD AUTO: 0.16 K/UL (ref 0–0.04)
IMM GRANULOCYTES NFR BLD AUTO: 1.3 % (ref 0–0.5)
INDIRECT COOMBS: ABNORMAL
LYMPHOCYTES # BLD AUTO: 1.89 K/UL (ref 1–4.8)
MAGNESIUM SERPL-MCNC: 1.7 MG/DL (ref 1.6–2.6)
MCH RBC QN AUTO: 26.5 PG (ref 27–31)
MCHC RBC AUTO-ENTMCNC: 31.3 G/DL (ref 32–36)
MCV RBC AUTO: 85 FL (ref 82–98)
NUCLEATED RBC (/100WBC) (OHS): 0 /100 WBC
PHOSPHATE SERPL-MCNC: 3.7 MG/DL (ref 2.7–4.5)
PLATELET # BLD AUTO: 534 K/UL (ref 150–450)
PLATELET BLD QL SMEAR: ABNORMAL
PMV BLD AUTO: 9.1 FL (ref 9.2–12.9)
POCT GLUCOSE: 152 MG/DL (ref 70–110)
POCT GLUCOSE: 192 MG/DL (ref 70–110)
POIKILOCYTOSIS BLD QL SMEAR: SLIGHT
POLYCHROMASIA BLD QL SMEAR: ABNORMAL
POTASSIUM SERPL-SCNC: 3.8 MMOL/L (ref 3.5–5.1)
PROT SERPL-MCNC: 7.4 GM/DL (ref 6–8.4)
RBC # BLD AUTO: 2.87 M/UL (ref 4–5.4)
RELATIVE EOSINOPHIL (OHS): 0.1 %
RELATIVE LYMPHOCYTE (OHS): 15.9 % (ref 18–48)
RELATIVE MONOCYTE (OHS): 2.9 % (ref 4–15)
RELATIVE NEUTROPHIL (OHS): 79.5 % (ref 38–73)
RH BLD: ABNORMAL
SCHISTOCYTES BLD QL SMEAR: ABNORMAL
SODIUM SERPL-SCNC: 139 MMOL/L (ref 136–145)
SPECIMEN OUTDATE: ABNORMAL
STOMATOCYTES BLD QL SMEAR: PRESENT
TARGETS BLD QL SMEAR: ABNORMAL
WBC # BLD AUTO: 11.87 K/UL (ref 3.9–12.7)

## 2025-05-27 PROCEDURE — 36415 COLL VENOUS BLD VENIPUNCTURE: CPT | Performed by: SURGERY

## 2025-05-27 PROCEDURE — 88341 IMHCHEM/IMCYTCHM EA ADD ANTB: CPT | Mod: TC | Performed by: SURGERY

## 2025-05-27 PROCEDURE — 25000003 PHARM REV CODE 250: Performed by: UROLOGY

## 2025-05-27 PROCEDURE — 63600175 PHARM REV CODE 636 W HCPCS

## 2025-05-27 PROCEDURE — 0D160ZA BYPASS STOMACH TO JEJUNUM, OPEN APPROACH: ICD-10-PCS | Performed by: SURGERY

## 2025-05-27 PROCEDURE — 63600175 PHARM REV CODE 636 W HCPCS: Performed by: ANESTHESIOLOGY

## 2025-05-27 PROCEDURE — 86901 BLOOD TYPING SEROLOGIC RH(D): CPT | Performed by: SURGERY

## 2025-05-27 PROCEDURE — 83735 ASSAY OF MAGNESIUM: CPT

## 2025-05-27 PROCEDURE — 11000001 HC ACUTE MED/SURG PRIVATE ROOM

## 2025-05-27 PROCEDURE — 63600175 PHARM REV CODE 636 W HCPCS: Mod: JZ,TB | Performed by: SURGERY

## 2025-05-27 PROCEDURE — 36000709 HC OR TIME LEV III EA ADD 15 MIN: Performed by: SURGERY

## 2025-05-27 PROCEDURE — 36000708 HC OR TIME LEV III 1ST 15 MIN: Performed by: SURGERY

## 2025-05-27 PROCEDURE — 27201423 OPTIME MED/SURG SUP & DEVICES STERILE SUPPLY: Performed by: SURGERY

## 2025-05-27 PROCEDURE — 82374 ASSAY BLOOD CARBON DIOXIDE: CPT

## 2025-05-27 PROCEDURE — 85025 COMPLETE CBC W/AUTO DIFF WBC: CPT

## 2025-05-27 PROCEDURE — 25000003 PHARM REV CODE 250

## 2025-05-27 PROCEDURE — 86870 RBC ANTIBODY IDENTIFICATION: CPT | Performed by: SURGERY

## 2025-05-27 PROCEDURE — 71000033 HC RECOVERY, INTIAL HOUR: Performed by: SURGERY

## 2025-05-27 PROCEDURE — 0FB20ZX EXCISION OF LEFT LOBE LIVER, OPEN APPROACH, DIAGNOSTIC: ICD-10-PCS | Performed by: SURGERY

## 2025-05-27 PROCEDURE — 86922 COMPATIBILITY TEST ANTIGLOB: CPT

## 2025-05-27 PROCEDURE — C1729 CATH, DRAINAGE: HCPCS | Performed by: SURGERY

## 2025-05-27 PROCEDURE — 37000009 HC ANESTHESIA EA ADD 15 MINS: Performed by: SURGERY

## 2025-05-27 PROCEDURE — 84100 ASSAY OF PHOSPHORUS: CPT

## 2025-05-27 PROCEDURE — 86905 BLOOD TYPING RBC ANTIGENS: CPT | Performed by: SURGERY

## 2025-05-27 PROCEDURE — 37000008 HC ANESTHESIA 1ST 15 MINUTES: Performed by: SURGERY

## 2025-05-27 PROCEDURE — 71000039 HC RECOVERY, EACH ADD'L HOUR: Performed by: SURGERY

## 2025-05-27 PROCEDURE — 36415 COLL VENOUS BLD VENIPUNCTURE: CPT

## 2025-05-27 PROCEDURE — 86880 COOMBS TEST DIRECT: CPT | Performed by: SURGERY

## 2025-05-27 RX ORDER — BUPIVACAINE HYDROCHLORIDE 2.5 MG/ML
INJECTION, SOLUTION EPIDURAL; INFILTRATION; INTRACAUDAL; PERINEURAL
Status: COMPLETED | OUTPATIENT
Start: 2025-05-27 | End: 2025-05-27

## 2025-05-27 RX ORDER — GLUCAGON 1 MG
1 KIT INJECTION
Status: DISCONTINUED | OUTPATIENT
Start: 2025-05-27 | End: 2025-05-31 | Stop reason: HOSPADM

## 2025-05-27 RX ORDER — SODIUM CHLORIDE, SODIUM LACTATE, POTASSIUM CHLORIDE, CALCIUM CHLORIDE 600; 310; 30; 20 MG/100ML; MG/100ML; MG/100ML; MG/100ML
INJECTION, SOLUTION INTRAVENOUS CONTINUOUS
Status: DISCONTINUED | OUTPATIENT
Start: 2025-05-27 | End: 2025-05-28

## 2025-05-27 RX ORDER — GLUCAGON 1 MG
1 KIT INJECTION
Status: DISCONTINUED | OUTPATIENT
Start: 2025-05-27 | End: 2025-05-27 | Stop reason: HOSPADM

## 2025-05-27 RX ORDER — ACETAMINOPHEN 10 MG/ML
1000 INJECTION, SOLUTION INTRAVENOUS EVERY 8 HOURS
Status: COMPLETED | OUTPATIENT
Start: 2025-05-27 | End: 2025-05-28

## 2025-05-27 RX ORDER — METOPROLOL TARTRATE 1 MG/ML
5 INJECTION, SOLUTION INTRAVENOUS ONCE
Status: COMPLETED | OUTPATIENT
Start: 2025-05-27 | End: 2025-05-27

## 2025-05-27 RX ORDER — LIDOCAINE HYDROCHLORIDE 20 MG/ML
INJECTION, SOLUTION EPIDURAL; INFILTRATION; INTRACAUDAL; PERINEURAL
Status: DISCONTINUED | OUTPATIENT
Start: 2025-05-27 | End: 2025-05-27

## 2025-05-27 RX ORDER — BUPIVACAINE 13.3 MG/ML
INJECTION, SUSPENSION, LIPOSOMAL INFILTRATION
Status: DISCONTINUED | OUTPATIENT
Start: 2025-05-27 | End: 2025-05-27 | Stop reason: HOSPADM

## 2025-05-27 RX ORDER — HYDROMORPHONE HYDROCHLORIDE 2 MG/ML
0.2 INJECTION, SOLUTION INTRAMUSCULAR; INTRAVENOUS; SUBCUTANEOUS
Status: DISCONTINUED | OUTPATIENT
Start: 2025-05-27 | End: 2025-05-28

## 2025-05-27 RX ORDER — DEXAMETHASONE SODIUM PHOSPHATE 4 MG/ML
INJECTION, SOLUTION INTRA-ARTICULAR; INTRALESIONAL; INTRAMUSCULAR; INTRAVENOUS; SOFT TISSUE
Status: DISCONTINUED | OUTPATIENT
Start: 2025-05-27 | End: 2025-05-27

## 2025-05-27 RX ORDER — TALC
6 POWDER (GRAM) TOPICAL NIGHTLY PRN
Status: DISCONTINUED | OUTPATIENT
Start: 2025-05-27 | End: 2025-05-31 | Stop reason: HOSPADM

## 2025-05-27 RX ORDER — HYDROMORPHONE HYDROCHLORIDE 2 MG/ML
INJECTION, SOLUTION INTRAMUSCULAR; INTRAVENOUS; SUBCUTANEOUS
Status: DISCONTINUED | OUTPATIENT
Start: 2025-05-27 | End: 2025-05-27

## 2025-05-27 RX ORDER — HALOPERIDOL LACTATE 5 MG/ML
0.5 INJECTION, SOLUTION INTRAMUSCULAR EVERY 10 MIN PRN
Status: DISCONTINUED | OUTPATIENT
Start: 2025-05-27 | End: 2025-05-27 | Stop reason: HOSPADM

## 2025-05-27 RX ORDER — ESMOLOL HYDROCHLORIDE 10 MG/ML
INJECTION INTRAVENOUS
Status: DISCONTINUED | OUTPATIENT
Start: 2025-05-27 | End: 2025-05-27

## 2025-05-27 RX ORDER — ONDANSETRON 8 MG/1
8 TABLET, ORALLY DISINTEGRATING ORAL EVERY 8 HOURS PRN
Status: DISCONTINUED | OUTPATIENT
Start: 2025-05-27 | End: 2025-05-31 | Stop reason: HOSPADM

## 2025-05-27 RX ORDER — FENTANYL CITRATE 50 UG/ML
INJECTION, SOLUTION INTRAMUSCULAR; INTRAVENOUS
Status: DISCONTINUED | OUTPATIENT
Start: 2025-05-27 | End: 2025-05-27

## 2025-05-27 RX ORDER — SODIUM CHLORIDE 9 MG/ML
INJECTION, SOLUTION INTRAVENOUS CONTINUOUS
Status: DISCONTINUED | OUTPATIENT
Start: 2025-05-27 | End: 2025-05-28

## 2025-05-27 RX ORDER — SODIUM CHLORIDE 0.9 % (FLUSH) 0.9 %
10 SYRINGE (ML) INJECTION
Status: DISCONTINUED | OUTPATIENT
Start: 2025-05-27 | End: 2025-05-31 | Stop reason: HOSPADM

## 2025-05-27 RX ORDER — INSULIN ASPART 100 [IU]/ML
0-5 INJECTION, SOLUTION INTRAVENOUS; SUBCUTANEOUS EVERY 6 HOURS PRN
Status: DISCONTINUED | OUTPATIENT
Start: 2025-05-27 | End: 2025-05-31 | Stop reason: HOSPADM

## 2025-05-27 RX ORDER — PROPOFOL 10 MG/ML
VIAL (ML) INTRAVENOUS
Status: DISCONTINUED | OUTPATIENT
Start: 2025-05-27 | End: 2025-05-27

## 2025-05-27 RX ORDER — HYDROMORPHONE HYDROCHLORIDE 2 MG/ML
0.5 INJECTION, SOLUTION INTRAMUSCULAR; INTRAVENOUS; SUBCUTANEOUS
Status: DISCONTINUED | OUTPATIENT
Start: 2025-05-27 | End: 2025-05-31 | Stop reason: HOSPADM

## 2025-05-27 RX ORDER — PANTOPRAZOLE SODIUM 40 MG/10ML
40 INJECTION, POWDER, LYOPHILIZED, FOR SOLUTION INTRAVENOUS DAILY
Status: DISCONTINUED | OUTPATIENT
Start: 2025-05-27 | End: 2025-05-28

## 2025-05-27 RX ORDER — ACETAMINOPHEN 325 MG/1
650 TABLET ORAL EVERY 8 HOURS PRN
Status: DISCONTINUED | OUTPATIENT
Start: 2025-05-27 | End: 2025-05-29

## 2025-05-27 RX ORDER — ONDANSETRON HYDROCHLORIDE 2 MG/ML
INJECTION, SOLUTION INTRAVENOUS
Status: DISCONTINUED | OUTPATIENT
Start: 2025-05-27 | End: 2025-05-27

## 2025-05-27 RX ORDER — KETAMINE HCL IN 0.9 % NACL 50 MG/5 ML
SYRINGE (ML) INTRAVENOUS
Status: DISCONTINUED | OUTPATIENT
Start: 2025-05-27 | End: 2025-05-27

## 2025-05-27 RX ORDER — SODIUM CHLORIDE 0.9 % (FLUSH) 0.9 %
10 SYRINGE (ML) INJECTION
Status: DISCONTINUED | OUTPATIENT
Start: 2025-05-27 | End: 2025-05-27 | Stop reason: HOSPADM

## 2025-05-27 RX ORDER — MIDAZOLAM HYDROCHLORIDE 1 MG/ML
INJECTION, SOLUTION INTRAMUSCULAR; INTRAVENOUS
Status: DISCONTINUED | OUTPATIENT
Start: 2025-05-27 | End: 2025-05-27

## 2025-05-27 RX ORDER — HYDROMORPHONE HYDROCHLORIDE 2 MG/ML
0.2 INJECTION, SOLUTION INTRAMUSCULAR; INTRAVENOUS; SUBCUTANEOUS EVERY 5 MIN PRN
Status: DISCONTINUED | OUTPATIENT
Start: 2025-05-27 | End: 2025-05-27 | Stop reason: HOSPADM

## 2025-05-27 RX ORDER — ACETAMINOPHEN 500 MG
1000 TABLET ORAL
Status: COMPLETED | OUTPATIENT
Start: 2025-05-27 | End: 2025-05-27

## 2025-05-27 RX ORDER — PHENYLEPHRINE HCL IN 0.9% NACL 1 MG/10 ML
SYRINGE (ML) INTRAVENOUS
Status: DISCONTINUED | OUTPATIENT
Start: 2025-05-27 | End: 2025-05-27

## 2025-05-27 RX ORDER — ROCURONIUM BROMIDE 10 MG/ML
INJECTION, SOLUTION INTRAVENOUS
Status: DISCONTINUED | OUTPATIENT
Start: 2025-05-27 | End: 2025-05-27

## 2025-05-27 RX ORDER — ENOXAPARIN SODIUM 100 MG/ML
40 INJECTION SUBCUTANEOUS EVERY 24 HOURS
Status: DISCONTINUED | OUTPATIENT
Start: 2025-05-28 | End: 2025-05-27

## 2025-05-27 RX ADMIN — HYDROMORPHONE HYDROCHLORIDE 0.6 MG: 2 INJECTION, SOLUTION INTRAMUSCULAR; INTRAVENOUS; SUBCUTANEOUS at 11:05

## 2025-05-27 RX ADMIN — ESMOLOL HYDROCHLORIDE 20 MG: 10 INJECTION, SOLUTION INTRAVENOUS at 01:05

## 2025-05-27 RX ADMIN — FENTANYL CITRATE 100 MCG: 50 INJECTION INTRAMUSCULAR; INTRAVENOUS at 10:05

## 2025-05-27 RX ADMIN — SODIUM CHLORIDE, SODIUM LACTATE, POTASSIUM CHLORIDE, AND CALCIUM CHLORIDE: .6; .31; .03; .02 INJECTION, SOLUTION INTRAVENOUS at 10:05

## 2025-05-27 RX ADMIN — HYDROMORPHONE HYDROCHLORIDE 0.2 MG: 2 INJECTION, SOLUTION INTRAMUSCULAR; INTRAVENOUS; SUBCUTANEOUS at 11:05

## 2025-05-27 RX ADMIN — Medication 150 MCG: at 10:05

## 2025-05-27 RX ADMIN — ONDANSETRON 4 MG: 2 INJECTION, SOLUTION INTRAMUSCULAR; INTRAVENOUS at 12:05

## 2025-05-27 RX ADMIN — ESMOLOL HYDROCHLORIDE 40 MG: 10 INJECTION, SOLUTION INTRAVENOUS at 01:05

## 2025-05-27 RX ADMIN — CEFOXITIN 2 G: 1 INJECTION, POWDER, FOR SOLUTION INTRAVENOUS at 01:05

## 2025-05-27 RX ADMIN — HYDROMORPHONE HYDROCHLORIDE 0.5 MG: 2 INJECTION INTRAMUSCULAR; INTRAVENOUS; SUBCUTANEOUS at 09:05

## 2025-05-27 RX ADMIN — HYDROMORPHONE HYDROCHLORIDE 0.2 MG: 2 INJECTION, SOLUTION INTRAMUSCULAR; INTRAVENOUS; SUBCUTANEOUS at 03:05

## 2025-05-27 RX ADMIN — DEXAMETHASONE SODIUM PHOSPHATE 8 MG: 4 INJECTION, SOLUTION INTRA-ARTICULAR; INTRALESIONAL; INTRAMUSCULAR; INTRAVENOUS; SOFT TISSUE at 11:05

## 2025-05-27 RX ADMIN — PROPOFOL 150 MG: 10 INJECTION, EMULSION INTRAVENOUS at 10:05

## 2025-05-27 RX ADMIN — ACETAMINOPHEN 1000 MG: 500 TABLET ORAL at 08:05

## 2025-05-27 RX ADMIN — SODIUM CHLORIDE: 0.9 INJECTION, SOLUTION INTRAVENOUS at 10:05

## 2025-05-27 RX ADMIN — SODIUM CHLORIDE: 0.9 INJECTION, SOLUTION INTRAVENOUS at 03:05

## 2025-05-27 RX ADMIN — PIPERACILLIN SODIUM AND TAZOBACTAM SODIUM 4.5 G: 4; .5 INJECTION, POWDER, LYOPHILIZED, FOR SOLUTION INTRAVENOUS at 09:05

## 2025-05-27 RX ADMIN — ROCURONIUM BROMIDE 50 MG: 10 INJECTION, SOLUTION INTRAVENOUS at 10:05

## 2025-05-27 RX ADMIN — ACETAMINOPHEN 1000 MG: 10 INJECTION INTRAVENOUS at 09:05

## 2025-05-27 RX ADMIN — METOROPROLOL TARTRATE 5 MG: 5 INJECTION, SOLUTION INTRAVENOUS at 03:05

## 2025-05-27 RX ADMIN — MIDAZOLAM 2 MG: 1 INJECTION INTRAMUSCULAR; INTRAVENOUS at 10:05

## 2025-05-27 RX ADMIN — Medication 10 MG: at 12:05

## 2025-05-27 RX ADMIN — HYDROMORPHONE HYDROCHLORIDE 0.5 MG: 2 INJECTION INTRAMUSCULAR; INTRAVENOUS; SUBCUTANEOUS at 06:05

## 2025-05-27 RX ADMIN — HYDROMORPHONE HYDROCHLORIDE 0.2 MG: 2 INJECTION, SOLUTION INTRAMUSCULAR; INTRAVENOUS; SUBCUTANEOUS at 12:05

## 2025-05-27 RX ADMIN — Medication 10 MG: at 11:05

## 2025-05-27 RX ADMIN — ESMOLOL HYDROCHLORIDE 10 MG: 10 INJECTION, SOLUTION INTRAVENOUS at 12:05

## 2025-05-27 RX ADMIN — CEFOXITIN 2 G: 1 INJECTION, POWDER, FOR SOLUTION INTRAVENOUS at 11:05

## 2025-05-27 RX ADMIN — Medication 20 MG: at 11:05

## 2025-05-27 RX ADMIN — Medication 150 MCG: at 11:05

## 2025-05-27 RX ADMIN — ESMOLOL HYDROCHLORIDE 40 MG: 10 INJECTION, SOLUTION INTRAVENOUS at 10:05

## 2025-05-27 RX ADMIN — BUPIVACAINE HYDROCHLORIDE 60 ML: 2.5 INJECTION, SOLUTION EPIDURAL; INFILTRATION; INTRACAUDAL; PERINEURAL at 10:05

## 2025-05-27 RX ADMIN — HYDROMORPHONE HYDROCHLORIDE 0.2 MG: 2 INJECTION, SOLUTION INTRAMUSCULAR; INTRAVENOUS; SUBCUTANEOUS at 02:05

## 2025-05-27 RX ADMIN — SUGAMMADEX 200 MG: 100 INJECTION, SOLUTION INTRAVENOUS at 01:05

## 2025-05-27 RX ADMIN — PIPERACILLIN SODIUM AND TAZOBACTAM SODIUM 4.5 G: 4; .5 INJECTION, POWDER, LYOPHILIZED, FOR SOLUTION INTRAVENOUS at 03:05

## 2025-05-27 RX ADMIN — Medication 100 MCG: at 10:05

## 2025-05-27 RX ADMIN — SODIUM CHLORIDE, POTASSIUM CHLORIDE, SODIUM LACTATE AND CALCIUM CHLORIDE: 600; 310; 30; 20 INJECTION, SOLUTION INTRAVENOUS at 04:05

## 2025-05-27 RX ADMIN — ROCURONIUM BROMIDE 20 MG: 10 INJECTION, SOLUTION INTRAVENOUS at 11:05

## 2025-05-27 RX ADMIN — LIDOCAINE HYDROCHLORIDE 100 MG: 20 INJECTION, SOLUTION EPIDURAL; INFILTRATION; INTRACAUDAL; PERINEURAL at 10:05

## 2025-05-27 RX ADMIN — ESMOLOL HYDROCHLORIDE 20 MG: 10 INJECTION, SOLUTION INTRAVENOUS at 11:05

## 2025-05-27 RX ADMIN — PANTOPRAZOLE SODIUM 40 MG: 40 INJECTION, POWDER, FOR SOLUTION INTRAVENOUS at 02:05

## 2025-05-27 RX ADMIN — ACETAMINOPHEN 1000 MG: 10 INJECTION INTRAVENOUS at 03:05

## 2025-05-27 RX ADMIN — ROCURONIUM BROMIDE 30 MG: 10 INJECTION, SOLUTION INTRAVENOUS at 12:05

## 2025-05-27 RX ADMIN — PROPOFOL 50 MG: 10 INJECTION, EMULSION INTRAVENOUS at 10:05

## 2025-05-27 NOTE — ANESTHESIA PROCEDURE NOTES
Bilateral SERENA Single Shots    Patient location during procedure: OR   Block not for primary anesthetic.  Reason for block: at surgeon's request and post-op pain management   Post-op Pain Location: Bilateral abdominal pain   Start time: 5/27/2025 10:25 AM  Timeout: 5/27/2025 10:20 AM   End time: 5/27/2025 10:30 AM    Staffing  Authorizing Provider: MARKOS Huffman MD  Performing Provider: Mine Burton MD    Staffing  Performed by: Mine Burton MD  Authorized by: MARKOS Huffman MD    Preanesthetic Checklist  Completed: patient identified, IV checked, site marked, risks and benefits discussed, surgical consent, monitors and equipment checked, pre-op evaluation and timeout performed  Peripheral Block  Patient position: sitting  Prep: ChloraPrep  Patient monitoring: heart rate, cardiac monitor, continuous pulse ox, continuous capnometry and frequent blood pressure checks  Block type: erector spinae plane  Laterality: bilateral  Injection technique: single shot  Interspace: T8-9    Needle  Needle type: Stimuplex   Needle gauge: 21 G  Needle length: 4 in  Needle localization: anatomical landmarks and ultrasound guidance   -ultrasound image captured on disc.  Assessment  Injection assessment: negative aspiration, negative parasthesia and local visualized surrounding nerve  Paresthesia pain: none  Heart rate change: no  Slow fractionated injection: yes  Pain Tolerance: comfortable throughout block and no complaints  Medications:    Medications: bupivacaine (pf) (MARCAINE) injection 0.25% - Perineural   60 mL - 5/27/2025 10:30:00 AM    Additional Notes  Patient tolerated well. 30cc of 0.25% bupivacaine with epi administered via bilateral SERENA single shots for a total of 60cc administered.

## 2025-05-27 NOTE — ANESTHESIA POSTPROCEDURE EVALUATION
Anesthesia Post Evaluation    Patient: Aury Felix    Procedure(s) Performed: Procedure(s) (LRB):  LAPAROTOMY, EXPLORATORY (Bilateral)  BIOPSY, LIVER  GASTROJEJUNOSTOMY (N/A)    Final Anesthesia Type: general      Patient location during evaluation: PACU  Patient participation: Yes- Able to Participate  Level of consciousness: awake and alert  Post-procedure vital signs: reviewed and stable  Pain management: adequate  Airway patency: patent    PONV status at discharge: No PONV  Anesthetic complications: no      Cardiovascular status: hypertensive, tachycardic and hemodynamically stable  Respiratory status: unassisted, spontaneous ventilation and room air  Hydration status: euvolemic                Vitals Value Taken Time   /98 05/27/25 15:31   Temp 36.8 °C (98.2 °F) 05/27/25 13:45   Pulse 104 05/27/25 15:32   Resp 18 05/27/25 15:32   SpO2 100 % 05/27/25 15:32   Vitals shown include unfiled device data.      No case tracking events are documented in the log.      Pain/Vita Score: Pain Rating Prior to Med Admin: 7 (5/27/2025  3:22 PM)  Vita Score: 10 (5/27/2025  2:10 PM)

## 2025-05-27 NOTE — ANESTHESIA PROCEDURE NOTES
Arterial    Diagnosis: duodenal adenocarcinoma    Patient location during procedure: done in OR  Timeout: 5/27/2025 10:45 AM  Procedure end time: 5/27/2025 10:50 AM    Staffing  Authorizing Provider: MARKOS Huffman MD  Performing Provider: Mine Burton MD    Staffing  Performed by: Mine Burton MD  Authorized by: MARKOS Huffman MD    Anesthesiologist was present at the time of the procedure.    Preanesthetic Checklist  Completed: patient identified, IV checked, site marked, risks and benefits discussed, surgical consent, monitors and equipment checked, pre-op evaluation, timeout performed and anesthesia consent givenArterial  Skin Prep: chlorhexidine gluconate and isopropyl alcohol  Local Infiltration: none  Orientation: right  Location: radial    Catheter Size: 20 G  Catheter placement by Ultrasound guidance. Heme positive aspiration all ports.   Vessel Caliber: patent  Needle advanced into vessel with real time Ultrasound guidance.Insertion Attempts: 1  Assessment  Dressing: secured with tape and tegaderm  Patient: Tolerated well (under general anesthesia)

## 2025-05-27 NOTE — ANESTHESIA PROCEDURE NOTES
Intubation    Date/Time: 5/27/2025 10:40 AM    Performed by: Mine Burton MD  Authorized by: MARKOS Huffman MD    Intubation:     Induction:  Intravenous    Intubated:  Postinduction    Mask Ventilation:  Easy with oral airway    Attempts:  1    Attempted By:  Resident anesthesiologist    Method of Intubation:  Direct    Blade:  Aguirre 2    Laryngeal View Grade: Grade I - full view of cords      Difficult Airway Encountered?: No      Complications:  None    Airway Device:  Oral endotracheal tube    Airway Device Size:  7.0    Style/Cuff Inflation:  Cuffed (inflated to minimal occlusive pressure)    Tube secured:  22    Secured at:  The lips    Placement Verified By:  Capnometry and Revisualization with laryngoscopy    Complicating Factors:  None    Findings Post-Intubation:  BS equal bilateral and atraumatic/condition of teeth unchanged

## 2025-05-27 NOTE — TRANSFER OF CARE
Anesthesia Transfer of Care Note    Patient: Aury Felix    Procedure(s) Performed: Procedure(s) (LRB):  LAPAROTOMY, EXPLORATORY (Bilateral)  BIOPSY, LIVER  GASTROJEJUNOSTOMY (N/A)    Patient location: PACU    Anesthesia Type: general    Transport from OR: Transported from OR on 6-10 L/min O2 by face mask with adequate spontaneous ventilation    Post pain: adequate analgesia    Post assessment: no apparent anesthetic complications and tolerated procedure well    Post vital signs: stable    Level of consciousness: awake and alert    Nausea/Vomiting: no nausea/vomiting    Complications: none    Transfer of care protocol was followed      Last vitals: Visit Vitals  /85   Breastfeeding No

## 2025-05-28 LAB
ABSOLUTE EOSINOPHIL (OHS): 0.01 K/UL
ABSOLUTE MONOCYTE (OHS): 1.02 K/UL (ref 0.3–1)
ABSOLUTE NEUTROPHIL COUNT (OHS): 8.68 K/UL (ref 1.8–7.7)
ALBUMIN SERPL BCP-MCNC: 2.8 G/DL (ref 3.5–5.2)
ALP SERPL-CCNC: 70 UNIT/L (ref 40–150)
ALT SERPL W/O P-5'-P-CCNC: 21 UNIT/L (ref 10–44)
ANION GAP (OHS): 2 MMOL/L (ref 8–16)
AST SERPL-CCNC: 28 UNIT/L (ref 11–45)
BASOPHILS # BLD AUTO: 0.02 K/UL
BASOPHILS NFR BLD AUTO: 0.2 %
BILIRUB SERPL-MCNC: 1 MG/DL (ref 0.1–1)
BUN SERPL-MCNC: 5 MG/DL (ref 6–20)
CALCIUM SERPL-MCNC: 8.4 MG/DL (ref 8.7–10.5)
CHLORIDE SERPL-SCNC: 106 MMOL/L (ref 95–110)
CO2 SERPL-SCNC: 27 MMOL/L (ref 23–29)
CREAT SERPL-MCNC: 0.6 MG/DL (ref 0.5–1.4)
ERYTHROCYTE [DISTWIDTH] IN BLOOD BY AUTOMATED COUNT: 22 % (ref 11.5–14.5)
GFR SERPLBLD CREATININE-BSD FMLA CKD-EPI: >60 ML/MIN/1.73/M2
GLUCOSE SERPL-MCNC: 170 MG/DL (ref 70–110)
HCT VFR BLD AUTO: 21.3 % (ref 37–48.5)
HGB BLD-MCNC: 6.5 GM/DL (ref 12–16)
IMM GRANULOCYTES # BLD AUTO: 0.06 K/UL (ref 0–0.04)
IMM GRANULOCYTES NFR BLD AUTO: 0.5 % (ref 0–0.5)
LYMPHOCYTES # BLD AUTO: 1.8 K/UL (ref 1–4.8)
MAGNESIUM SERPL-MCNC: 1.7 MG/DL (ref 1.6–2.6)
MCH RBC QN AUTO: 26.1 PG (ref 27–31)
MCHC RBC AUTO-ENTMCNC: 30.5 G/DL (ref 32–36)
MCV RBC AUTO: 86 FL (ref 82–98)
NUCLEATED RBC (/100WBC) (OHS): 0 /100 WBC
PHOSPHATE SERPL-MCNC: 3.8 MG/DL (ref 2.7–4.5)
PLATELET # BLD AUTO: 526 K/UL (ref 150–450)
PMV BLD AUTO: 10.1 FL (ref 9.2–12.9)
POCT GLUCOSE: 127 MG/DL (ref 70–110)
POCT GLUCOSE: 136 MG/DL (ref 70–110)
POCT GLUCOSE: 145 MG/DL (ref 70–110)
POCT GLUCOSE: 147 MG/DL (ref 70–110)
POTASSIUM SERPL-SCNC: 4.4 MMOL/L (ref 3.5–5.1)
PROT SERPL-MCNC: 6.8 GM/DL (ref 6–8.4)
RBC # BLD AUTO: 2.49 M/UL (ref 4–5.4)
RELATIVE EOSINOPHIL (OHS): 0.1 %
RELATIVE LYMPHOCYTE (OHS): 15.5 % (ref 18–48)
RELATIVE MONOCYTE (OHS): 8.8 % (ref 4–15)
RELATIVE NEUTROPHIL (OHS): 74.9 % (ref 38–73)
SODIUM SERPL-SCNC: 135 MMOL/L (ref 136–145)
WBC # BLD AUTO: 11.59 K/UL (ref 3.9–12.7)

## 2025-05-28 PROCEDURE — 21400001 HC TELEMETRY ROOM

## 2025-05-28 PROCEDURE — 85025 COMPLETE CBC W/AUTO DIFF WBC: CPT

## 2025-05-28 PROCEDURE — 63600175 PHARM REV CODE 636 W HCPCS

## 2025-05-28 PROCEDURE — 83735 ASSAY OF MAGNESIUM: CPT

## 2025-05-28 PROCEDURE — 36430 TRANSFUSION BLD/BLD COMPNT: CPT

## 2025-05-28 PROCEDURE — 25000003 PHARM REV CODE 250

## 2025-05-28 PROCEDURE — 80053 COMPREHEN METABOLIC PANEL: CPT

## 2025-05-28 PROCEDURE — 36415 COLL VENOUS BLD VENIPUNCTURE: CPT

## 2025-05-28 PROCEDURE — 84100 ASSAY OF PHOSPHORUS: CPT

## 2025-05-28 PROCEDURE — P9016 RBC LEUKOCYTES REDUCED: HCPCS

## 2025-05-28 PROCEDURE — 30233N1 TRANSFUSION OF NONAUTOLOGOUS RED BLOOD CELLS INTO PERIPHERAL VEIN, PERCUTANEOUS APPROACH: ICD-10-PCS | Performed by: SURGERY

## 2025-05-28 PROCEDURE — 25000003 PHARM REV CODE 250: Performed by: SURGERY

## 2025-05-28 RX ORDER — PANTOPRAZOLE SODIUM 40 MG/1
40 TABLET, DELAYED RELEASE ORAL 2 TIMES DAILY
Status: DISCONTINUED | OUTPATIENT
Start: 2025-05-28 | End: 2025-05-31 | Stop reason: HOSPADM

## 2025-05-28 RX ORDER — OXYCODONE AND ACETAMINOPHEN 5; 325 MG/1; MG/1
1 TABLET ORAL EVERY 4 HOURS PRN
Refills: 0 | Status: DISCONTINUED | OUTPATIENT
Start: 2025-05-28 | End: 2025-05-29

## 2025-05-28 RX ORDER — HYDROCODONE BITARTRATE AND ACETAMINOPHEN 500; 5 MG/1; MG/1
TABLET ORAL
Status: DISCONTINUED | OUTPATIENT
Start: 2025-05-28 | End: 2025-05-31 | Stop reason: HOSPADM

## 2025-05-28 RX ADMIN — Medication 6 MG: at 08:05

## 2025-05-28 RX ADMIN — ACETAMINOPHEN 1000 MG: 10 INJECTION INTRAVENOUS at 05:05

## 2025-05-28 RX ADMIN — HYDROMORPHONE HYDROCHLORIDE 0.5 MG: 2 INJECTION INTRAMUSCULAR; INTRAVENOUS; SUBCUTANEOUS at 01:05

## 2025-05-28 RX ADMIN — PANTOPRAZOLE SODIUM 40 MG: 40 TABLET, DELAYED RELEASE ORAL at 08:05

## 2025-05-28 RX ADMIN — OXYCODONE HYDROCHLORIDE AND ACETAMINOPHEN 1 TABLET: 5; 325 TABLET ORAL at 12:05

## 2025-05-28 RX ADMIN — HYDROMORPHONE HYDROCHLORIDE 0.5 MG: 2 INJECTION INTRAMUSCULAR; INTRAVENOUS; SUBCUTANEOUS at 05:05

## 2025-05-28 RX ADMIN — OXYCODONE HYDROCHLORIDE AND ACETAMINOPHEN 1 TABLET: 5; 325 TABLET ORAL at 06:05

## 2025-05-28 RX ADMIN — PANTOPRAZOLE SODIUM 40 MG: 40 TABLET, DELAYED RELEASE ORAL at 10:05

## 2025-05-28 RX ADMIN — HYDROMORPHONE HYDROCHLORIDE 0.5 MG: 2 INJECTION INTRAMUSCULAR; INTRAVENOUS; SUBCUTANEOUS at 08:05

## 2025-05-28 RX ADMIN — PIPERACILLIN SODIUM AND TAZOBACTAM SODIUM 4.5 G: 4; .5 INJECTION, POWDER, LYOPHILIZED, FOR SOLUTION INTRAVENOUS at 05:05

## 2025-05-29 LAB
ABO + RH BLD: NORMAL
ABO + RH BLD: NORMAL
ABSOLUTE EOSINOPHIL (OHS): 0.03 K/UL
ABSOLUTE MONOCYTE (OHS): 1.13 K/UL (ref 0.3–1)
ABSOLUTE NEUTROPHIL COUNT (OHS): 12.88 K/UL (ref 1.8–7.7)
ALBUMIN SERPL BCP-MCNC: 3 G/DL (ref 3.5–5.2)
ALP SERPL-CCNC: 89 UNIT/L (ref 40–150)
ALT SERPL W/O P-5'-P-CCNC: 22 UNIT/L (ref 10–44)
ANION GAP (OHS): 7 MMOL/L (ref 8–16)
AST SERPL-CCNC: 38 UNIT/L (ref 11–45)
BASOPHILS # BLD AUTO: 0.05 K/UL
BASOPHILS NFR BLD AUTO: 0.3 %
BILIRUB SERPL-MCNC: 5 MG/DL (ref 0.1–1)
BLD PROD TYP BPU: NORMAL
BLD PROD TYP BPU: NORMAL
BLOOD UNIT EXPIRATION DATE: NORMAL
BLOOD UNIT EXPIRATION DATE: NORMAL
BLOOD UNIT TYPE CODE: 600
BLOOD UNIT TYPE CODE: 600
BUN SERPL-MCNC: 8 MG/DL (ref 6–20)
CALCIUM SERPL-MCNC: 9.6 MG/DL (ref 8.7–10.5)
CHLORIDE SERPL-SCNC: 102 MMOL/L (ref 95–110)
CO2 SERPL-SCNC: 27 MMOL/L (ref 23–29)
CREAT SERPL-MCNC: 0.6 MG/DL (ref 0.5–1.4)
CROSSMATCH INTERPRETATION: NORMAL
CROSSMATCH INTERPRETATION: NORMAL
DISPENSE STATUS: NORMAL
DISPENSE STATUS: NORMAL
ERYTHROCYTE [DISTWIDTH] IN BLOOD BY AUTOMATED COUNT: 19.3 % (ref 11.5–14.5)
GFR SERPLBLD CREATININE-BSD FMLA CKD-EPI: >60 ML/MIN/1.73/M2
GLUCOSE SERPL-MCNC: 121 MG/DL (ref 70–110)
HCT VFR BLD AUTO: 32.2 % (ref 37–48.5)
HGB BLD-MCNC: 10.3 GM/DL (ref 12–16)
IMM GRANULOCYTES # BLD AUTO: 0.15 K/UL (ref 0–0.04)
IMM GRANULOCYTES NFR BLD AUTO: 0.9 % (ref 0–0.5)
LYMPHOCYTES # BLD AUTO: 2.07 K/UL (ref 1–4.8)
MAGNESIUM SERPL-MCNC: 1.9 MG/DL (ref 1.6–2.6)
MCH RBC QN AUTO: 27.1 PG (ref 27–31)
MCHC RBC AUTO-ENTMCNC: 32 G/DL (ref 32–36)
MCV RBC AUTO: 85 FL (ref 82–98)
NUCLEATED RBC (/100WBC) (OHS): 0 /100 WBC
PHOSPHATE SERPL-MCNC: 3.2 MG/DL (ref 2.7–4.5)
PLATELET # BLD AUTO: 584 K/UL (ref 150–450)
PMV BLD AUTO: 10.2 FL (ref 9.2–12.9)
POCT GLUCOSE: 107 MG/DL (ref 70–110)
POCT GLUCOSE: 126 MG/DL (ref 70–110)
POCT GLUCOSE: 127 MG/DL (ref 70–110)
POCT GLUCOSE: 176 MG/DL (ref 70–110)
POTASSIUM SERPL-SCNC: 3.8 MMOL/L (ref 3.5–5.1)
PROT SERPL-MCNC: 8.2 GM/DL (ref 6–8.4)
RBC # BLD AUTO: 3.8 M/UL (ref 4–5.4)
RELATIVE EOSINOPHIL (OHS): 0.2 %
RELATIVE LYMPHOCYTE (OHS): 12.7 % (ref 18–48)
RELATIVE MONOCYTE (OHS): 6.9 % (ref 4–15)
RELATIVE NEUTROPHIL (OHS): 79 % (ref 38–73)
SODIUM SERPL-SCNC: 136 MMOL/L (ref 136–145)
UNIT NUMBER: NORMAL
UNIT NUMBER: NORMAL
WBC # BLD AUTO: 16.31 K/UL (ref 3.9–12.7)

## 2025-05-29 PROCEDURE — 97161 PT EVAL LOW COMPLEX 20 MIN: CPT

## 2025-05-29 PROCEDURE — 63600175 PHARM REV CODE 636 W HCPCS

## 2025-05-29 PROCEDURE — 21400001 HC TELEMETRY ROOM

## 2025-05-29 PROCEDURE — 97530 THERAPEUTIC ACTIVITIES: CPT

## 2025-05-29 PROCEDURE — P9016 RBC LEUKOCYTES REDUCED: HCPCS

## 2025-05-29 PROCEDURE — 97165 OT EVAL LOW COMPLEX 30 MIN: CPT

## 2025-05-29 PROCEDURE — 93010 ELECTROCARDIOGRAM REPORT: CPT | Mod: ,,, | Performed by: STUDENT IN AN ORGANIZED HEALTH CARE EDUCATION/TRAINING PROGRAM

## 2025-05-29 PROCEDURE — 97116 GAIT TRAINING THERAPY: CPT

## 2025-05-29 PROCEDURE — 85025 COMPLETE CBC W/AUTO DIFF WBC: CPT | Performed by: SURGERY

## 2025-05-29 PROCEDURE — 84100 ASSAY OF PHOSPHORUS: CPT

## 2025-05-29 PROCEDURE — 84132 ASSAY OF SERUM POTASSIUM: CPT

## 2025-05-29 PROCEDURE — 83735 ASSAY OF MAGNESIUM: CPT

## 2025-05-29 PROCEDURE — 93005 ELECTROCARDIOGRAM TRACING: CPT

## 2025-05-29 PROCEDURE — 36415 COLL VENOUS BLD VENIPUNCTURE: CPT

## 2025-05-29 PROCEDURE — 25000003 PHARM REV CODE 250: Performed by: SURGERY

## 2025-05-29 PROCEDURE — 97535 SELF CARE MNGMENT TRAINING: CPT

## 2025-05-29 PROCEDURE — 36430 TRANSFUSION BLD/BLD COMPNT: CPT

## 2025-05-29 RX ORDER — ACETAMINOPHEN 10 MG/ML
1000 INJECTION, SOLUTION INTRAVENOUS EVERY 8 HOURS
Status: COMPLETED | OUTPATIENT
Start: 2025-05-29 | End: 2025-05-30

## 2025-05-29 RX ORDER — ACETAMINOPHEN 10 MG/ML
1000 INJECTION, SOLUTION INTRAVENOUS EVERY 8 HOURS
Status: DISCONTINUED | OUTPATIENT
Start: 2025-05-29 | End: 2025-05-29

## 2025-05-29 RX ORDER — OXYCODONE HYDROCHLORIDE 5 MG/1
5 TABLET ORAL EVERY 4 HOURS PRN
Refills: 0 | Status: DISCONTINUED | OUTPATIENT
Start: 2025-05-29 | End: 2025-05-31 | Stop reason: HOSPADM

## 2025-05-29 RX ADMIN — HYDROMORPHONE HYDROCHLORIDE 0.5 MG: 2 INJECTION INTRAMUSCULAR; INTRAVENOUS; SUBCUTANEOUS at 03:05

## 2025-05-29 RX ADMIN — ACETAMINOPHEN 1000 MG: 10 INJECTION INTRAVENOUS at 09:05

## 2025-05-29 RX ADMIN — HYDROMORPHONE HYDROCHLORIDE 0.5 MG: 2 INJECTION INTRAMUSCULAR; INTRAVENOUS; SUBCUTANEOUS at 06:05

## 2025-05-29 RX ADMIN — PANTOPRAZOLE SODIUM 40 MG: 40 TABLET, DELAYED RELEASE ORAL at 09:05

## 2025-05-29 RX ADMIN — HYDROMORPHONE HYDROCHLORIDE 0.5 MG: 2 INJECTION INTRAMUSCULAR; INTRAVENOUS; SUBCUTANEOUS at 12:05

## 2025-05-29 RX ADMIN — ACETAMINOPHEN 1000 MG: 10 INJECTION INTRAVENOUS at 05:05

## 2025-05-30 LAB
ABSOLUTE EOSINOPHIL (OHS): 0.09 K/UL
ABSOLUTE MONOCYTE (OHS): 1.11 K/UL (ref 0.3–1)
ABSOLUTE NEUTROPHIL COUNT (OHS): 9.95 K/UL (ref 1.8–7.7)
ALBUMIN SERPL BCP-MCNC: 2.5 G/DL (ref 3.5–5.2)
ALP SERPL-CCNC: 77 UNIT/L (ref 40–150)
ALT SERPL W/O P-5'-P-CCNC: 16 UNIT/L (ref 10–44)
ANION GAP (OHS): 12 MMOL/L (ref 8–16)
AST SERPL-CCNC: 29 UNIT/L (ref 11–45)
BASOPHILS # BLD AUTO: 0.07 K/UL
BASOPHILS NFR BLD AUTO: 0.5 %
BILIRUB DIRECT SERPL-MCNC: 3.7 MG/DL (ref 0.1–0.3)
BILIRUB SERPL-MCNC: 5.4 MG/DL (ref 0.1–1)
BUN SERPL-MCNC: 6 MG/DL (ref 6–20)
CALCIUM SERPL-MCNC: 8.9 MG/DL (ref 8.7–10.5)
CHLORIDE SERPL-SCNC: 103 MMOL/L (ref 95–110)
CO2 SERPL-SCNC: 22 MMOL/L (ref 23–29)
CREAT SERPL-MCNC: 0.6 MG/DL (ref 0.5–1.4)
DHEA SERPL-MCNC: NORMAL
ERYTHROCYTE [DISTWIDTH] IN BLOOD BY AUTOMATED COUNT: 19.9 % (ref 11.5–14.5)
ESTROGEN SERPL-MCNC: NORMAL PG/ML
GFR SERPLBLD CREATININE-BSD FMLA CKD-EPI: >60 ML/MIN/1.73/M2
GLUCOSE SERPL-MCNC: 92 MG/DL (ref 70–110)
HCT VFR BLD AUTO: 28.6 % (ref 37–48.5)
HGB BLD-MCNC: 9.3 GM/DL (ref 12–16)
IMM GRANULOCYTES # BLD AUTO: 0.1 K/UL (ref 0–0.04)
IMM GRANULOCYTES NFR BLD AUTO: 0.7 % (ref 0–0.5)
INSULIN SERPL-ACNC: NORMAL U[IU]/ML
LAB AP CLINICAL INFORMATION: NORMAL
LAB AP GROSS DESCRIPTION: NORMAL
LAB AP INTRA OP: NORMAL
LAB AP PERFORMING LOCATION(S): NORMAL
LAB AP REPORT FOOTNOTES: NORMAL
LYMPHOCYTES # BLD AUTO: 2.09 K/UL (ref 1–4.8)
MAGNESIUM SERPL-MCNC: 1.8 MG/DL (ref 1.6–2.6)
MCH RBC QN AUTO: 27 PG (ref 27–31)
MCHC RBC AUTO-ENTMCNC: 32.5 G/DL (ref 32–36)
MCV RBC AUTO: 83 FL (ref 82–98)
NUCLEATED RBC (/100WBC) (OHS): 0 /100 WBC
PHOSPHATE SERPL-MCNC: 3.2 MG/DL (ref 2.7–4.5)
PLATELET # BLD AUTO: 514 K/UL (ref 150–450)
PMV BLD AUTO: 10.3 FL (ref 9.2–12.9)
POCT GLUCOSE: 102 MG/DL (ref 70–110)
POCT GLUCOSE: 113 MG/DL (ref 70–110)
POCT GLUCOSE: 91 MG/DL (ref 70–110)
POTASSIUM SERPL-SCNC: 3.5 MMOL/L (ref 3.5–5.1)
PROT SERPL-MCNC: 7.1 GM/DL (ref 6–8.4)
RBC # BLD AUTO: 3.44 M/UL (ref 4–5.4)
RELATIVE EOSINOPHIL (OHS): 0.7 %
RELATIVE LYMPHOCYTE (OHS): 15.6 % (ref 18–48)
RELATIVE MONOCYTE (OHS): 8.3 % (ref 4–15)
RELATIVE NEUTROPHIL (OHS): 74.2 % (ref 38–73)
SODIUM SERPL-SCNC: 137 MMOL/L (ref 136–145)
WBC # BLD AUTO: 13.41 K/UL (ref 3.9–12.7)

## 2025-05-30 PROCEDURE — 82248 BILIRUBIN DIRECT: CPT | Performed by: SURGERY

## 2025-05-30 PROCEDURE — 84100 ASSAY OF PHOSPHORUS: CPT

## 2025-05-30 PROCEDURE — 21400001 HC TELEMETRY ROOM

## 2025-05-30 PROCEDURE — 97530 THERAPEUTIC ACTIVITIES: CPT

## 2025-05-30 PROCEDURE — 63600175 PHARM REV CODE 636 W HCPCS

## 2025-05-30 PROCEDURE — 85025 COMPLETE CBC W/AUTO DIFF WBC: CPT

## 2025-05-30 PROCEDURE — 82040 ASSAY OF SERUM ALBUMIN: CPT

## 2025-05-30 PROCEDURE — 36415 COLL VENOUS BLD VENIPUNCTURE: CPT

## 2025-05-30 PROCEDURE — 97110 THERAPEUTIC EXERCISES: CPT | Mod: CQ

## 2025-05-30 PROCEDURE — 97116 GAIT TRAINING THERAPY: CPT | Mod: CQ

## 2025-05-30 PROCEDURE — 94761 N-INVAS EAR/PLS OXIMETRY MLT: CPT

## 2025-05-30 PROCEDURE — 83735 ASSAY OF MAGNESIUM: CPT

## 2025-05-30 PROCEDURE — 36415 COLL VENOUS BLD VENIPUNCTURE: CPT | Performed by: SURGERY

## 2025-05-30 PROCEDURE — 25000003 PHARM REV CODE 250: Performed by: SURGERY

## 2025-05-30 PROCEDURE — 99900035 HC TECH TIME PER 15 MIN (STAT)

## 2025-05-30 PROCEDURE — 25000003 PHARM REV CODE 250

## 2025-05-30 RX ORDER — ACETAMINOPHEN 325 MG/1
650 TABLET ORAL EVERY 6 HOURS
Status: DISCONTINUED | OUTPATIENT
Start: 2025-05-30 | End: 2025-05-31 | Stop reason: HOSPADM

## 2025-05-30 RX ADMIN — PANTOPRAZOLE SODIUM 40 MG: 40 TABLET, DELAYED RELEASE ORAL at 07:05

## 2025-05-30 RX ADMIN — OXYCODONE 5 MG: 5 TABLET ORAL at 04:05

## 2025-05-30 RX ADMIN — PANTOPRAZOLE SODIUM 40 MG: 40 TABLET, DELAYED RELEASE ORAL at 09:05

## 2025-05-30 RX ADMIN — ACETAMINOPHEN 650 MG: 325 TABLET ORAL at 05:05

## 2025-05-30 RX ADMIN — ACETAMINOPHEN 1000 MG: 10 INJECTION INTRAVENOUS at 05:05

## 2025-05-30 RX ADMIN — ACETAMINOPHEN 650 MG: 325 TABLET ORAL at 10:05

## 2025-05-30 RX ADMIN — HYDROMORPHONE HYDROCHLORIDE 0.5 MG: 2 INJECTION INTRAMUSCULAR; INTRAVENOUS; SUBCUTANEOUS at 05:05

## 2025-05-30 RX ADMIN — OXYCODONE 5 MG: 5 TABLET ORAL at 01:05

## 2025-05-30 RX ADMIN — OXYCODONE 5 MG: 5 TABLET ORAL at 10:05

## 2025-05-30 NOTE — ADDENDUM NOTE
Addendum  created 05/30/25 1358 by Mine Burton MD    Clinical Note Signed, Diagnosis association updated, Intraprocedure Blocks edited, Intraprocedure Event edited, Intraprocedure Staff edited

## 2025-05-31 VITALS
WEIGHT: 188.5 LBS | BODY MASS INDEX: 28.57 KG/M2 | DIASTOLIC BLOOD PRESSURE: 88 MMHG | TEMPERATURE: 98 F | OXYGEN SATURATION: 99 % | HEART RATE: 119 BPM | RESPIRATION RATE: 18 BRPM | HEIGHT: 68 IN | SYSTOLIC BLOOD PRESSURE: 134 MMHG

## 2025-05-31 LAB
ABSOLUTE EOSINOPHIL (OHS): 0.12 K/UL
ABSOLUTE MONOCYTE (OHS): 1.12 K/UL (ref 0.3–1)
ABSOLUTE NEUTROPHIL COUNT (OHS): 7.34 K/UL (ref 1.8–7.7)
ALBUMIN SERPL BCP-MCNC: 2.4 G/DL (ref 3.5–5.2)
ALP SERPL-CCNC: 87 UNIT/L (ref 40–150)
ALT SERPL W/O P-5'-P-CCNC: 21 UNIT/L (ref 10–44)
ANION GAP (OHS): 11 MMOL/L (ref 8–16)
AST SERPL-CCNC: 34 UNIT/L (ref 11–45)
BASOPHILS # BLD AUTO: 0.04 K/UL
BASOPHILS NFR BLD AUTO: 0.4 %
BILIRUB SERPL-MCNC: 3.7 MG/DL (ref 0.1–1)
BUN SERPL-MCNC: 7 MG/DL (ref 6–20)
CALCIUM SERPL-MCNC: 8.8 MG/DL (ref 8.7–10.5)
CHLORIDE SERPL-SCNC: 104 MMOL/L (ref 95–110)
CO2 SERPL-SCNC: 22 MMOL/L (ref 23–29)
CREAT SERPL-MCNC: 0.6 MG/DL (ref 0.5–1.4)
ERYTHROCYTE [DISTWIDTH] IN BLOOD BY AUTOMATED COUNT: 20.9 % (ref 11.5–14.5)
GFR SERPLBLD CREATININE-BSD FMLA CKD-EPI: >60 ML/MIN/1.73/M2
GLUCOSE SERPL-MCNC: 100 MG/DL (ref 70–110)
HCT VFR BLD AUTO: 28.2 % (ref 37–48.5)
HGB BLD-MCNC: 8.9 GM/DL (ref 12–16)
IMM GRANULOCYTES # BLD AUTO: 0.1 K/UL (ref 0–0.04)
IMM GRANULOCYTES NFR BLD AUTO: 0.9 % (ref 0–0.5)
LYMPHOCYTES # BLD AUTO: 2.59 K/UL (ref 1–4.8)
MAGNESIUM SERPL-MCNC: 1.8 MG/DL (ref 1.6–2.6)
MCH RBC QN AUTO: 25.8 PG (ref 27–31)
MCHC RBC AUTO-ENTMCNC: 31.6 G/DL (ref 32–36)
MCV RBC AUTO: 82 FL (ref 82–98)
NUCLEATED RBC (/100WBC) (OHS): 0 /100 WBC
PHOSPHATE SERPL-MCNC: 3.7 MG/DL (ref 2.7–4.5)
PLATELET # BLD AUTO: 552 K/UL (ref 150–450)
PMV BLD AUTO: 9.9 FL (ref 9.2–12.9)
POCT GLUCOSE: 106 MG/DL (ref 70–110)
POCT GLUCOSE: 114 MG/DL (ref 70–110)
POCT GLUCOSE: 163 MG/DL (ref 70–110)
POTASSIUM SERPL-SCNC: 3.1 MMOL/L (ref 3.5–5.1)
PROT SERPL-MCNC: 7.1 GM/DL (ref 6–8.4)
RBC # BLD AUTO: 3.45 M/UL (ref 4–5.4)
RELATIVE EOSINOPHIL (OHS): 1.1 %
RELATIVE LYMPHOCYTE (OHS): 22.9 % (ref 18–48)
RELATIVE MONOCYTE (OHS): 9.9 % (ref 4–15)
RELATIVE NEUTROPHIL (OHS): 64.8 % (ref 38–73)
SODIUM SERPL-SCNC: 137 MMOL/L (ref 136–145)
WBC # BLD AUTO: 11.31 K/UL (ref 3.9–12.7)

## 2025-05-31 PROCEDURE — 25000003 PHARM REV CODE 250: Performed by: SURGERY

## 2025-05-31 PROCEDURE — 84100 ASSAY OF PHOSPHORUS: CPT

## 2025-05-31 PROCEDURE — 63600175 PHARM REV CODE 636 W HCPCS

## 2025-05-31 PROCEDURE — 25000003 PHARM REV CODE 250

## 2025-05-31 PROCEDURE — 80053 COMPREHEN METABOLIC PANEL: CPT

## 2025-05-31 PROCEDURE — 85025 COMPLETE CBC W/AUTO DIFF WBC: CPT

## 2025-05-31 PROCEDURE — 83735 ASSAY OF MAGNESIUM: CPT

## 2025-05-31 PROCEDURE — 94761 N-INVAS EAR/PLS OXIMETRY MLT: CPT

## 2025-05-31 PROCEDURE — 99900035 HC TECH TIME PER 15 MIN (STAT)

## 2025-05-31 PROCEDURE — 36415 COLL VENOUS BLD VENIPUNCTURE: CPT

## 2025-05-31 RX ORDER — AMOXICILLIN 250 MG
1 CAPSULE ORAL DAILY PRN
Status: DISCONTINUED | OUTPATIENT
Start: 2025-05-31 | End: 2025-05-31 | Stop reason: HOSPADM

## 2025-05-31 RX ORDER — POLYETHYLENE GLYCOL 3350 17 G/17G
17 POWDER, FOR SOLUTION ORAL DAILY
Status: DISCONTINUED | OUTPATIENT
Start: 2025-05-31 | End: 2025-05-31 | Stop reason: HOSPADM

## 2025-05-31 RX ORDER — OXYCODONE HYDROCHLORIDE 5 MG/1
5 TABLET ORAL EVERY 6 HOURS PRN
Qty: 10 TABLET | Refills: 0 | Status: SHIPPED | OUTPATIENT
Start: 2025-05-31

## 2025-05-31 RX ADMIN — HYDROMORPHONE HYDROCHLORIDE 0.5 MG: 2 INJECTION INTRAMUSCULAR; INTRAVENOUS; SUBCUTANEOUS at 01:05

## 2025-05-31 RX ADMIN — PANTOPRAZOLE SODIUM 40 MG: 40 TABLET, DELAYED RELEASE ORAL at 08:05

## 2025-05-31 RX ADMIN — ACETAMINOPHEN 650 MG: 325 TABLET ORAL at 05:05

## 2025-05-31 RX ADMIN — HYDROMORPHONE HYDROCHLORIDE 0.5 MG: 2 INJECTION INTRAMUSCULAR; INTRAVENOUS; SUBCUTANEOUS at 10:05

## 2025-05-31 RX ADMIN — ACETAMINOPHEN 650 MG: 325 TABLET ORAL at 10:05

## 2025-05-31 RX ADMIN — ACETAMINOPHEN 650 MG: 325 TABLET ORAL at 12:05

## 2025-05-31 RX ADMIN — POLYETHYLENE GLYCOL 3350 17 G: 17 POWDER, FOR SOLUTION ORAL at 10:05

## 2025-05-31 RX ADMIN — POTASSIUM BICARBONATE 40 MEQ: 391 TABLET, EFFERVESCENT ORAL at 08:05

## 2025-06-02 LAB
OHS QRS DURATION: 72 MS
OHS QTC CALCULATION: 450 MS

## 2025-06-04 DIAGNOSIS — C17.0 DUODENAL CANCER: Primary | ICD-10-CM

## 2025-06-04 DIAGNOSIS — C78.7 METASTATIC ADENOCARCINOMA TO LIVER: ICD-10-CM

## 2025-06-12 ENCOUNTER — OFFICE VISIT (OUTPATIENT)
Dept: SURGERY | Facility: CLINIC | Age: 55
End: 2025-06-12

## 2025-06-12 VITALS
BODY MASS INDEX: 27.17 KG/M2 | HEIGHT: 68 IN | WEIGHT: 179.25 LBS | TEMPERATURE: 99 F | DIASTOLIC BLOOD PRESSURE: 88 MMHG | HEART RATE: 108 BPM | SYSTOLIC BLOOD PRESSURE: 122 MMHG

## 2025-06-12 DIAGNOSIS — C17.0 DUODENAL CANCER: Primary | ICD-10-CM

## 2025-06-12 PROCEDURE — 99214 OFFICE O/P EST MOD 30 MIN: CPT | Mod: PBBFAC,PN | Performed by: SURGERY

## 2025-06-12 PROCEDURE — 99999 PR PBB SHADOW E&M-EST. PATIENT-LVL IV: CPT | Mod: PBBFAC,,, | Performed by: SURGERY

## 2025-06-12 NOTE — LETTER
June 12, 2025        Dori Prince MD  [address]  [address]  [facility] [address]             [facility]  [address]    [facility] [address]  Phone: [phone]   Patient: Aury Felix   MR Number: [id number]   YOB: 1970   Date of Visit: 6/12/2025       Dear Dr. Prince:    Thank you for referring Aury Felix to me for evaluation. Below are the relevant portions of my assessment and plan of care.            If you have questions, please do not hesitate to call me. I look forward to following Aury along with you.    Sincerely,      Wililam Reyes MD           CC  No Recipients

## 2025-06-12 NOTE — PROGRESS NOTES
[facility]  POST-OP NOTE    HPI: Aury Felix is a 54 y.o. female doing well.  Eating,  no N/V.  No bleeding episodes.  Widely metastatic disease unfortunately found during surgery.      VITALS:  Vitals:    06/12/25 0816   BP: 122/88   Pulse: 108   Temp: 98.8 °F (37.1 °C)       PHYSICAL EXAM:  GEN: NAD  Abd: incision C/D/I    PATHOLOGY:  Reviewed      ASSESSMENT & PLAN:  54 y.o. female, staples removed.  Pt has f/u with medical oncology soon.      William Reyes M.D., F.A.C.S.  [facility]  [facility]

## 2025-06-20 ENCOUNTER — TELEPHONE (OUTPATIENT)
Dept: SURGERY | Facility: CLINIC | Age: 55
End: 2025-06-20

## 2025-06-20 NOTE — TELEPHONE ENCOUNTER
Source   Aury Felix (Patient)    Subject   Aury Felix (Patient)    Topic   General Inquiry - Patient Advice      Summary   Doctors note needed   Communication   Type:  Needs Medical Advice            Who Called: Patient            Best Call Back Number: [phone]            Additional Information: Patient is requesting a call back in regards to a doctors note.  [email or web address]           06/20/2025                    1122  Spoke to patient regarding the above message. Patient is needing a letter to give her employer. Let her know we could send it to her [facility] portal and she can print it whenever she needs. Patient verbalized understanding.

## 2025-07-03 ENCOUNTER — PATIENT MESSAGE (OUTPATIENT)
Dept: HEMATOLOGY/ONCOLOGY | Facility: CLINIC | Age: 55
End: 2025-07-03

## 2025-07-14 ENCOUNTER — TELEPHONE (OUTPATIENT)
Dept: SURGERY | Facility: CLINIC | Age: 55
End: 2025-07-14

## 2025-07-14 ENCOUNTER — LAB VISIT (OUTPATIENT)
Dept: LAB | Facility: HOSPITAL | Age: 55
End: 2025-07-14
Attending: SURGERY
Payer: MEDICAID

## 2025-07-14 ENCOUNTER — TELEPHONE (OUTPATIENT)
Dept: FAMILY MEDICINE | Facility: HOSPITAL | Age: 55
End: 2025-07-14
Payer: MEDICAID

## 2025-07-14 ENCOUNTER — OFFICE VISIT (OUTPATIENT)
Dept: SURGERY | Facility: CLINIC | Age: 55
End: 2025-07-14
Payer: MEDICAID

## 2025-07-14 ENCOUNTER — HOSPITAL ENCOUNTER (EMERGENCY)
Facility: HOSPITAL | Age: 55
Discharge: ANOTHER HOSPITAL | End: 2025-07-15
Payer: MEDICAID

## 2025-07-14 VITALS
WEIGHT: 165.13 LBS | HEIGHT: 68 IN | SYSTOLIC BLOOD PRESSURE: 95 MMHG | DIASTOLIC BLOOD PRESSURE: 64 MMHG | HEART RATE: 65 BPM | BODY MASS INDEX: 25.03 KG/M2 | TEMPERATURE: 98 F

## 2025-07-14 DIAGNOSIS — D64.9 ANEMIA, UNSPECIFIED TYPE: Primary | ICD-10-CM

## 2025-07-14 DIAGNOSIS — C80.1 ADENOCARCINOMA: Primary | ICD-10-CM

## 2025-07-14 DIAGNOSIS — K92.2 UPPER GI BLEED: ICD-10-CM

## 2025-07-14 DIAGNOSIS — R06.02 SHORTNESS OF BREATH: ICD-10-CM

## 2025-07-14 DIAGNOSIS — C80.1 ADENOCARCINOMA: ICD-10-CM

## 2025-07-14 DIAGNOSIS — K31.89 DUODENAL MASS: Primary | ICD-10-CM

## 2025-07-14 LAB
ABSOLUTE EOSINOPHIL (OHS): 0.04 K/UL
ABSOLUTE EOSINOPHIL (OHS): 0.12 K/UL
ABSOLUTE MONOCYTE (OHS): 0.93 K/UL (ref 0.3–1)
ABSOLUTE MONOCYTE (OHS): 1.01 K/UL (ref 0.3–1)
ABSOLUTE NEUTROPHIL COUNT (OHS): 11.06 K/UL (ref 1.8–7.7)
ABSOLUTE NEUTROPHIL COUNT (OHS): 12.57 K/UL (ref 1.8–7.7)
ALBUMIN SERPL BCP-MCNC: 2.4 G/DL (ref 3.5–5.2)
ALBUMIN SERPL BCP-MCNC: 2.5 G/DL (ref 3.5–5.2)
ALP SERPL-CCNC: 373 UNIT/L (ref 40–150)
ALP SERPL-CCNC: 388 UNIT/L (ref 40–150)
ALT SERPL W/O P-5'-P-CCNC: 76 UNIT/L (ref 10–44)
ALT SERPL W/O P-5'-P-CCNC: 77 UNIT/L (ref 10–44)
ANION GAP (OHS): 15 MMOL/L (ref 8–16)
ANION GAP (OHS): 16 MMOL/L (ref 8–16)
ANISOCYTOSIS BLD QL SMEAR: NORMAL
AST SERPL-CCNC: 71 UNIT/L (ref 11–45)
AST SERPL-CCNC: 74 UNIT/L (ref 11–45)
BASOPHILS # BLD AUTO: 0.04 K/UL
BASOPHILS # BLD AUTO: 0.07 K/UL
BASOPHILS NFR BLD AUTO: 0.3 %
BASOPHILS NFR BLD AUTO: 0.4 %
BILIRUB DIRECT SERPL-MCNC: 4.5 MG/DL (ref 0.1–0.3)
BILIRUB SERPL-MCNC: 6.1 MG/DL (ref 0.1–1)
BILIRUB SERPL-MCNC: 6.2 MG/DL (ref 0.1–1)
BLOOD GROUP ANTIBODIES SERPL: NORMAL
BUN SERPL-MCNC: 9 MG/DL (ref 6–20)
BUN SERPL-MCNC: 9 MG/DL (ref 6–20)
CALCIUM SERPL-MCNC: 8.2 MG/DL (ref 8.7–10.5)
CALCIUM SERPL-MCNC: 8.7 MG/DL (ref 8.7–10.5)
CHLORIDE SERPL-SCNC: 103 MMOL/L (ref 95–110)
CHLORIDE SERPL-SCNC: 104 MMOL/L (ref 95–110)
CO2 SERPL-SCNC: 19 MMOL/L (ref 23–29)
CO2 SERPL-SCNC: 22 MMOL/L (ref 23–29)
CREAT SERPL-MCNC: 0.7 MG/DL (ref 0.5–1.4)
CREAT SERPL-MCNC: 0.7 MG/DL (ref 0.5–1.4)
DACRYOCYTES BLD QL SMEAR: NORMAL
ERYTHROCYTE [DISTWIDTH] IN BLOOD BY AUTOMATED COUNT: 21 % (ref 11.5–14.5)
ERYTHROCYTE [DISTWIDTH] IN BLOOD BY AUTOMATED COUNT: 21.1 % (ref 11.5–14.5)
GFR SERPLBLD CREATININE-BSD FMLA CKD-EPI: >60 ML/MIN/1.73/M2
GFR SERPLBLD CREATININE-BSD FMLA CKD-EPI: >60 ML/MIN/1.73/M2
GLUCOSE SERPL-MCNC: 130 MG/DL (ref 70–110)
GLUCOSE SERPL-MCNC: 135 MG/DL (ref 70–110)
HCT VFR BLD AUTO: 13.8 % (ref 37–48.5)
HCT VFR BLD AUTO: 15.7 % (ref 37–48.5)
HGB BLD-MCNC: 4.2 GM/DL (ref 12–16)
HGB BLD-MCNC: 4.7 GM/DL (ref 12–16)
HOLD SPECIMEN: NORMAL
HYPOCHROMIA BLD QL SMEAR: NORMAL
IMM GRANULOCYTES # BLD AUTO: 0.09 K/UL (ref 0–0.04)
IMM GRANULOCYTES # BLD AUTO: 0.11 K/UL (ref 0–0.04)
IMM GRANULOCYTES NFR BLD AUTO: 0.6 % (ref 0–0.5)
IMM GRANULOCYTES NFR BLD AUTO: 0.8 % (ref 0–0.5)
INDIRECT COOMBS: ABNORMAL
IRON SATN MFR SERPL: 5 % (ref 20–50)
IRON SERPL-MCNC: 12 UG/DL (ref 30–160)
LYMPHOCYTES # BLD AUTO: 1.77 K/UL (ref 1–4.8)
LYMPHOCYTES # BLD AUTO: 2.27 K/UL (ref 1–4.8)
MCH RBC QN AUTO: 21.2 PG (ref 27–31)
MCH RBC QN AUTO: 21.4 PG (ref 27–31)
MCHC RBC AUTO-ENTMCNC: 29.9 G/DL (ref 32–36)
MCHC RBC AUTO-ENTMCNC: 30.4 G/DL (ref 32–36)
MCV RBC AUTO: 70 FL (ref 82–98)
MCV RBC AUTO: 71 FL (ref 82–98)
NUCLEATED RBC (/100WBC) (OHS): 0 /100 WBC
NUCLEATED RBC (/100WBC) (OHS): 0 /100 WBC
OB PNL STL: POSITIVE
PLATELET # BLD AUTO: 614 K/UL (ref 150–450)
PLATELET # BLD AUTO: 700 K/UL (ref 150–450)
PLATELET BLD QL SMEAR: NORMAL
PMV BLD AUTO: 10.5 FL (ref 9.2–12.9)
PMV BLD AUTO: 9.9 FL (ref 9.2–12.9)
POIKILOCYTOSIS BLD QL SMEAR: SLIGHT
POLYCHROMASIA BLD QL SMEAR: NORMAL
POTASSIUM SERPL-SCNC: 3.1 MMOL/L (ref 3.5–5.1)
POTASSIUM SERPL-SCNC: 3.3 MMOL/L (ref 3.5–5.1)
PROT SERPL-MCNC: 6.7 GM/DL (ref 6–8.4)
PROT SERPL-MCNC: 7.1 GM/DL (ref 6–8.4)
RBC # BLD AUTO: 1.98 M/UL (ref 4–5.4)
RBC # BLD AUTO: 2.2 M/UL (ref 4–5.4)
RELATIVE EOSINOPHIL (OHS): 0.3 %
RELATIVE EOSINOPHIL (OHS): 0.7 %
RELATIVE LYMPHOCYTE (OHS): 12.7 % (ref 18–48)
RELATIVE LYMPHOCYTE (OHS): 14.1 % (ref 18–48)
RELATIVE MONOCYTE (OHS): 6.3 % (ref 4–15)
RELATIVE MONOCYTE (OHS): 6.7 % (ref 4–15)
RELATIVE NEUTROPHIL (OHS): 77.9 % (ref 38–73)
RELATIVE NEUTROPHIL (OHS): 79.2 % (ref 38–73)
RETICS/RBC NFR AUTO: 6.2 % (ref 0.5–2.5)
RH BLD: ABNORMAL
SCHISTOCYTES BLD QL SMEAR: NORMAL
SODIUM SERPL-SCNC: 139 MMOL/L (ref 136–145)
SODIUM SERPL-SCNC: 140 MMOL/L (ref 136–145)
SPECIMEN OUTDATE: ABNORMAL
TARGETS BLD QL SMEAR: NORMAL
TIBC SERPL-MCNC: 252 UG/DL (ref 250–450)
TRANSFERRIN SERPL-MCNC: 170 MG/DL (ref 200–375)
WBC # BLD AUTO: 13.95 K/UL (ref 3.9–12.7)
WBC # BLD AUTO: 16.13 K/UL (ref 3.9–12.7)

## 2025-07-14 PROCEDURE — 96374 THER/PROPH/DIAG INJ IV PUSH: CPT | Mod: 59

## 2025-07-14 PROCEDURE — 82248 BILIRUBIN DIRECT: CPT

## 2025-07-14 PROCEDURE — 84466 ASSAY OF TRANSFERRIN: CPT

## 2025-07-14 PROCEDURE — P9016 RBC LEUKOCYTES REDUCED: HCPCS

## 2025-07-14 PROCEDURE — 99999 PR PBB SHADOW E&M-EST. PATIENT-LVL III: CPT | Mod: PBBFAC,,, | Performed by: SURGERY

## 2025-07-14 PROCEDURE — 36430 TRANSFUSION BLD/BLD COMPNT: CPT

## 2025-07-14 PROCEDURE — 36415 COLL VENOUS BLD VENIPUNCTURE: CPT

## 2025-07-14 PROCEDURE — 25500020 PHARM REV CODE 255

## 2025-07-14 PROCEDURE — 96361 HYDRATE IV INFUSION ADD-ON: CPT

## 2025-07-14 PROCEDURE — 85045 AUTOMATED RETICULOCYTE COUNT: CPT

## 2025-07-14 PROCEDURE — 80053 COMPREHEN METABOLIC PANEL: CPT

## 2025-07-14 PROCEDURE — 99213 OFFICE O/P EST LOW 20 MIN: CPT | Mod: PBBFAC,25,PN | Performed by: SURGERY

## 2025-07-14 PROCEDURE — 85025 COMPLETE CBC W/AUTO DIFF WBC: CPT

## 2025-07-14 PROCEDURE — 93005 ELECTROCARDIOGRAM TRACING: CPT

## 2025-07-14 PROCEDURE — 25000003 PHARM REV CODE 250

## 2025-07-14 PROCEDURE — 86922 COMPATIBILITY TEST ANTIGLOB: CPT

## 2025-07-14 PROCEDURE — 99285 EMERGENCY DEPT VISIT HI MDM: CPT | Mod: 25,27

## 2025-07-14 PROCEDURE — 93010 ELECTROCARDIOGRAM REPORT: CPT | Mod: ,,, | Performed by: INTERNAL MEDICINE

## 2025-07-14 PROCEDURE — 96365 THER/PROPH/DIAG IV INF INIT: CPT

## 2025-07-14 PROCEDURE — 99024 POSTOP FOLLOW-UP VISIT: CPT | Mod: ,,, | Performed by: SURGERY

## 2025-07-14 PROCEDURE — 63600175 PHARM REV CODE 636 W HCPCS: Performed by: SURGERY

## 2025-07-14 PROCEDURE — 96375 TX/PRO/DX INJ NEW DRUG ADDON: CPT

## 2025-07-14 PROCEDURE — 82272 OCCULT BLD FECES 1-3 TESTS: CPT

## 2025-07-14 PROCEDURE — 86870 RBC ANTIBODY IDENTIFICATION: CPT

## 2025-07-14 PROCEDURE — 84075 ASSAY ALKALINE PHOSPHATASE: CPT

## 2025-07-14 PROCEDURE — 63600175 PHARM REV CODE 636 W HCPCS

## 2025-07-14 PROCEDURE — 86850 RBC ANTIBODY SCREEN: CPT

## 2025-07-14 RX ORDER — HYDROMORPHONE HYDROCHLORIDE 1 MG/ML
1 INJECTION, SOLUTION INTRAMUSCULAR; INTRAVENOUS; SUBCUTANEOUS
Status: COMPLETED | OUTPATIENT
Start: 2025-07-14 | End: 2025-07-14

## 2025-07-14 RX ORDER — POTASSIUM CHLORIDE 20 MEQ/1
40 TABLET, EXTENDED RELEASE ORAL
Status: COMPLETED | OUTPATIENT
Start: 2025-07-14 | End: 2025-07-14

## 2025-07-14 RX ORDER — OXYCODONE HYDROCHLORIDE 5 MG/1
5 TABLET ORAL EVERY 4 HOURS PRN
Qty: 15 TABLET | Refills: 0 | Status: ON HOLD | OUTPATIENT
Start: 2025-07-14 | End: 2025-07-19 | Stop reason: HOSPADM

## 2025-07-14 RX ORDER — OXYCODONE AND ACETAMINOPHEN 5; 325 MG/1; MG/1
1 TABLET ORAL
Refills: 0 | Status: COMPLETED | OUTPATIENT
Start: 2025-07-14 | End: 2025-07-14

## 2025-07-14 RX ORDER — PANTOPRAZOLE SODIUM 40 MG/10ML
80 INJECTION, POWDER, LYOPHILIZED, FOR SOLUTION INTRAVENOUS
Status: COMPLETED | OUTPATIENT
Start: 2025-07-14 | End: 2025-07-14

## 2025-07-14 RX ORDER — HYDROCODONE BITARTRATE AND ACETAMINOPHEN 500; 5 MG/1; MG/1
TABLET ORAL
Status: DISCONTINUED | OUTPATIENT
Start: 2025-07-14 | End: 2025-07-15 | Stop reason: HOSPADM

## 2025-07-14 RX ORDER — CALCIUM GLUCONATE 20 MG/ML
1 INJECTION, SOLUTION INTRAVENOUS
Status: COMPLETED | OUTPATIENT
Start: 2025-07-14 | End: 2025-07-14

## 2025-07-14 RX ORDER — ONDANSETRON HYDROCHLORIDE 2 MG/ML
4 INJECTION, SOLUTION INTRAVENOUS
Status: COMPLETED | OUTPATIENT
Start: 2025-07-14 | End: 2025-07-14

## 2025-07-14 RX ADMIN — SODIUM CHLORIDE 1000 ML: 9 INJECTION, SOLUTION INTRAVENOUS at 12:07

## 2025-07-14 RX ADMIN — OXYCODONE HYDROCHLORIDE AND ACETAMINOPHEN 1 TABLET: 5; 325 TABLET ORAL at 05:07

## 2025-07-14 RX ADMIN — HYDROMORPHONE HYDROCHLORIDE 1 MG: 1 INJECTION, SOLUTION INTRAMUSCULAR; INTRAVENOUS; SUBCUTANEOUS at 06:07

## 2025-07-14 RX ADMIN — IOHEXOL 100 ML: 350 INJECTION, SOLUTION INTRAVENOUS at 01:07

## 2025-07-14 RX ADMIN — PANTOPRAZOLE SODIUM 80 MG: 40 INJECTION, POWDER, FOR SOLUTION INTRAVENOUS at 12:07

## 2025-07-14 RX ADMIN — ONDANSETRON 4 MG: 2 INJECTION INTRAMUSCULAR; INTRAVENOUS at 06:07

## 2025-07-14 RX ADMIN — CALCIUM GLUCONATE 1 G: 20 INJECTION, SOLUTION INTRAVENOUS at 02:07

## 2025-07-14 RX ADMIN — POTASSIUM CHLORIDE 40 MEQ: 1500 TABLET, EXTENDED RELEASE ORAL at 02:07

## 2025-07-14 NOTE — PROGRESS NOTES
[facility]  POST-OP NOTE    HPI: Aury Felix is a 54 y.o. female no abdominal pain, complains of back pain.  Labs on 6/30 noted.  LFTs were elevated.  She looks jaundiced.   No GI bleeding.      VITALS:  Vitals:    07/14/25 0847   BP: 95/64   Pulse: 65   Temp: 98.2 °F (36.8 °C)       PHYSICAL EXAM:  GEN: NAD  abd: incision C/D/I, no TTP or distension    PATHOLOGY:  Reviewed      ASSESSMENT & PLAN:  54 y.o. female, has appt with Medical oncology soon, will repeat labs today      William Reyes M.D., F.A.C.S.  [facility]  [facility]

## 2025-07-14 NOTE — ED NOTES
Phone call in regards to blood. Lab reports blood has to come from main campus and will take over an hour. Notified MD and primary RN

## 2025-07-14 NOTE — ED TRIAGE NOTES
Pt arrived to ED c/o low H&H, weakness, and shortness of breath that started today. Pt was sent to ER by provider in [place].

## 2025-07-14 NOTE — ED PROVIDER NOTES
"Encounter Date: 7/14/2025    Source of History:   Patient and medical record, without language barrier or      Chief complaint:  Abnormal Lab (Pt arrived to ED c/o low H&H, weakness, and shortness of breath that started today. Pt was sent to ER by provider in [place]. )    HPI:  Aury Felix is a 54 y.o. female with with PMH of HTN, HLD, GI bleed, duodenal malignancy, iron deficiency anemia, and obesity sent to the ED from clinic with Dr. Reyes due to low hemoglobin of 4.7. She reports that she began feeling weak on Friday (3 days ago), that she did not have energy to complete her normal daily tasks, and feeling "short-winded." She endorses experiencing back pain as well which is currently well controlled after having a taken a 5mg percocet approx. 3 hours ago. Reports occasional pain at incision site of prior ex lap. Denies any other pain currently. She denies hematemesis or hemoptysis and also denies any changes in bowel habits, seeing any blood in her stool, dark stool, or blood in the toilet bowl. Does however endorse having a painful hemorrhoid. Denies preceding illness or sick contacts. Reports noticing yesterday that her palms were "pale" compared to normal but when asked about jaundice she is unsure of its onset.     This is the extent to the patients complaints today here in the emergency department.    ROS: A review of systems was conducted with pertinent positive and negative findings documented in HPI with all other systems reviewed and negative.  ROS    Review of patient's allergies indicates:  No Known Allergies    PMH:  As per HPI and below:  Past Medical History:   Diagnosis Date    Cancer     Diabetes mellitus     Hyperlipidemia     Hypertension      Past Surgical History:   Procedure Laterality Date    ESOPHAGOGASTRODUODENOSCOPY N/A 4/10/2025    Procedure: EGD (ESOPHAGOGASTRODUODENOSCOPY);  Surgeon: Manny Calles MD;  Location: [facility];  Service: Endoscopy;  " Laterality: N/A;    ESOPHAGOGASTRODUODENOSCOPY N/A 5/21/2025    Procedure: EGD (ESOPHAGOGASTRODUODENOSCOPY);  Surgeon: Manny Calles MD;  Location: [facility] ENDO;  Service: Endoscopy;  Laterality: N/A;    GASTROJEJUNOSTOMY N/A 5/27/2025    Procedure: GASTROJEJUNOSTOMY;  Surgeon: William Reyes MD;  Location: [facility] OR;  Service: General;  Laterality: N/A;    HYSTERECTOMY      LAPAROTOMY, EXPLORATORY Bilateral 5/27/2025    Procedure: LAPAROTOMY, EXPLORATORY;  Surgeon: William Reyes MD;  Location: [facility] OR;  Service: General;  Laterality: Bilateral;  will need BK US Guide probe BK US T probe [id number] AM 5/23    LIVER BIOPSY  5/27/2025    Procedure: BIOPSY, LIVER;  Surgeon: William Reyes MD;  Location: [facility];  Service: General;;     Social History     Socioeconomic History    Marital status: Single   Tobacco Use    Smoking status: Never    Smokeless tobacco: Never   Substance and Sexual Activity    Alcohol use: Yes    Drug use: Never    Sexual activity: Yes     Social Drivers of Health     Financial Resource Strain: Patient Unable To Answer (5/28/2025)    Overall Financial Resource Strain (CARDIA)     Difficulty of Paying Living Expenses: Patient unable to answer   Recent Concern: Financial Resource Strain - High Risk (4/10/2025)    Overall Financial Resource Strain (CARDIA)     Difficulty of Paying Living Expenses: Hard   Food Insecurity: Patient Unable To Answer (5/28/2025)    Hunger Vital Sign     Worried About Running Out of Food in the Last Year: Patient unable to answer     Ran Out of Food in the Last Year: Patient unable to answer   Transportation Needs: Patient Unable To Answer (5/28/2025)    PRAPARE - Transportation     Lack of Transportation (Medical): Patient unable to answer     Lack of Transportation (Non-Medical): Patient unable to answer   Physical Activity: Patient Unable To Answer (5/28/2025)    Exercise Vital Sign     Days of Exercise per Week: Patient unable to answer     Minutes  "of Exercise per Session: Patient unable to answer   Recent Concern: Physical Activity - Inactive (4/10/2025)    Exercise Vital Sign     Days of Exercise per Week: 0 days     Minutes of Exercise per Session: 0 min   Stress: Patient Unable To Answer (5/28/2025)    [employer] - Occupational Stress Questionnaire     Feeling of Stress : Patient unable to answer   Housing Stability: Patient Unable To Answer (5/28/2025)    Housing Stability Vital Sign     Unable to Pay for Housing in the Last Year: Patient unable to answer     Number of Times Moved in the Last Year: 0     Homeless in the Last Year: Patient unable to answer     Vitals:    /80   Pulse 108   Temp 98.7 °F (37.1 °C) (Oral)   Resp (!) 21   Ht 5' 8" (1.727 m)   Wt 74.7 kg (164 lb 10.9 oz)   SpO2 99%   Breastfeeding No   BMI 25.04 kg/m²     Physical Exam  Vitals and nursing note reviewed.   Constitutional:       General: She is not in acute distress.     Appearance: Normal appearance. She is not toxic-appearing or diaphoretic.   HENT:      Head: Normocephalic and atraumatic.      Mouth/Throat:      Mouth: Mucous membranes are moist.   Eyes:      General: No scleral icterus.  Cardiovascular:      Rate and Rhythm: Regular rhythm. Tachycardia present.      Pulses: Normal pulses.      Heart sounds: Normal heart sounds. No murmur heard.     No friction rub. No gallop.   Pulmonary:      Effort: Pulmonary effort is normal. No respiratory distress.      Breath sounds: Normal breath sounds. No stridor. No wheezing, rhonchi or rales.   Abdominal:      General: Abdomen is flat. There is no distension.      Palpations: Abdomen is soft.      Tenderness: There is no abdominal tenderness. There is no guarding.   Musculoskeletal:         General: No swelling or deformity.      Cervical back: Normal range of motion and neck supple.   Skin:     General: Skin is warm and dry.      Coloration: Skin is pale. Skin is not jaundiced.      " Findings: No rash.   Neurological:      Mental Status: She is alert. Mental status is at baseline.       Procedures    Laboratory Studies:  Labs that have been ordered have been independently reviewed and interpreted by myself.  Labs Reviewed   COMPREHENSIVE METABOLIC PANEL - Abnormal       Result Value    Sodium 140      Potassium 3.1 (*)     Chloride 103      CO2 22 (*)     Glucose 130 (*)     BUN 9      Creatinine 0.7      Calcium 8.2 (*)     Protein Total 6.7      Albumin 2.4 (*)     Bilirubin Total 6.1 (*)      (*)     AST 71 (*)     ALT 76 (*)     Anion Gap 15      eGFR >60     RETICULOCYTES - Abnormal    Retic Count, Automated 6.2 (*)    OCCULT BLOOD X 1, STOOL - Abnormal    OCCULT BLOOD STOOL Positive (*)    CBC WITH DIFFERENTIAL - Abnormal    WBC 13.95 (*)     RBC 1.98 (*)     HGB 4.2 (*)     HCT 13.8 (*)     MCV 70 (*)     MCH 21.2 (*)     MCHC 30.4 (*)     RDW 21.1 (*)     Platelet Count 614 (*)     MPV 9.9      Nucleated RBC 0      Neut % 79.2 (*)     Lymph % 12.7 (*)     Mono % 6.7      Eos % 0.3      Basophil % 0.3      Imm Grans % 0.8 (*)     Neut # 11.06 (*)     Lymph # 1.77      Mono # 0.93      Eos # 0.04      Baso # 0.04      Imm Grans # 0.11 (*)     Narrative:     This is an appended report.  These results have been appended to a previously verified report.   BILIRUBIN, DIRECT - Abnormal    Bilirubin Direct 4.5 (*)    TYPE & SCREEN - Abnormal    Specimen Outdate 07/17/2025 23:59      Group & Rh AB NEG      Indirect Thao POS (*)    CBC W/ AUTO DIFFERENTIAL    Narrative:     The following orders were created for panel order CBC auto differential.  Procedure                               Abnormality         Status                     ---------                               -----------         ------                     CBC with Differential[[id number]]       Abnormal            Final result                 Please view results for these tests on the individual orders.   EXTRA TUBES     Narrative:     The following orders were created for panel order EXTRA TUBES.  Procedure                               Abnormality         Status                     ---------                               -----------         ------                     Light Blue Top Hold[[id number]]                             Final result                 Please view results for these tests on the individual orders.   LIGHT BLUE TOP HOLD    Extra Tube Hold for add-ons.     MORPHOLOGY    Platelet Estimate Appears Normal      Fragmented Cells Occasional      Anisocytosis Moderate      Poik Slight      Polychromasia Occasional      Hypochromia Occasional      Target Cell Occasional      Tear drop cell Moderate     IRON AND TIBC   ANTIBODY IDENTIFICATION   PREPARE RBC SOFT     Imaging Results              CTA Acute GI Bleed, Abdomen and Pelvis (Final result)  Result time 07/14/25 14:30:37      Final result by Millicent Roth MD (07/14/25 14:30:37)                   Impression:      Interval postoperative change consistent with gastrojejunostomy.  Large ill-defined mass in the region the head of the pancreas and duodenum is more ill defined today with some surrounding fat stranding most likely neoplastic.  Interval development also small volume of ascites and of innumerable lesions throughout the liver consistent with extensive metastatic disease.  No active extravasation of contrast indicate acute hemorrhage.    Results called directly by myself to Dr. Reuben Mata 14:16 07/14/2025      Electronically signed by: Millicent Roth MD  Date:    07/14/2025  Time:    14:30               Narrative:    EXAMINATION:  CTA abdomen and pelvis without and with IV contrast    CLINICAL HISTORY:  Severe knee me a, low H and H, weakness, shortness of breath    TECHNIQUE:  Axial scans abdomen and pelvis were obtained prior to and following IV contrast, 100 mL Omnipaque 350 injected intravenously multiplanar and MIP reformatted images obtained.   No p.o. contrast.  Delay portal venous imaging also obtained    COMPARISON:  CTA abdomen and pelvis 05/20/2025    FINDINGS:  Hypoventilatory change and stranding platelike atelectatic change in visualized lung bases    Diffuse heterogenicity with innumerable masses throughout the liver.    High density suggesting sludge with no calcified stones or CT findings of acute cholecystitis.  There is biliary duct dilatation which has become apparent since the prior exam.  Common duct measures 12 mm.    The spleen is not enlarged    The abdominal aorta tapers without aneurysmal dilatation    The adrenal glands unremarkable appearance    Renal enhancement is symmetrical and there is no hydronephrosis    Interval postoperative change suggesting gastrojejunostomy.  The ill-defined mass in the region of the head the pancreas and duodenum the area appears more ill-defined today than on the prior exam with surrounding fat stranding fracture for which includes inflammatory change as well as progression of neoplastic disease.  There is small volume of ascites which is also new compared to the prior exam.    Osseous structures show degenerative change.  SI joint sclerosis.    There is good arterial enhancement and there is no active extravasation of contrast indicate active, acute bleed.  No pooling of contrast in bowel on the portal venous imaging to indicate active, acute gastrointestinal bleed                                      EKG (independently interpreted by me): Rhythm: Sinus tachy, rate of  114 BPM, no ST elevations or depressions, QTc 463    I decided to obtain the patient's medical records.  Summary of Medical Records:    Medications   0.9%  NaCl infusion (for blood administration) (has no administration in time range)   pantoprazole injection 80 mg (80 mg Intravenous Given 7/14/25 1216)   sodium chloride 0.9% bolus 1,000 mL 1,000 mL (0 mLs Intravenous Stopped 7/14/25 1331)   iohexoL (OMNIPAQUE 350) injection 100 mL (100  mLs Intravenous Given 7/14/25 1348)   potassium chloride SA CR tablet 40 mEq (40 mEq Oral Given 7/14/25 1436)   calcium gluconate 1 g in NS IVPB (premixed) (0 g Intravenous Stopped 7/14/25 1533)   oxyCODONE-acetaminophen 5-325 mg per tablet 1 tablet (1 tablet Oral Given 7/14/25 1704)     MDM:    54 y.o. female with severe anemia    Differential Dx:  Differential includes but is not limited to upper GI bleed, less likely lower GI bleed, doubt hemolytic anemia    ED Management:  Anemia/GI bleed workup started for acute presentation of an emergent condition. IV fluids for symptomatic treatment.  CBC demonstrating significant anemia with hemoglobin of 4.2.  2 units of PRBCs ordered.  Protonix for likely upper GI bleed.  Fecal occult blood positive with melanotic stool.  IV calcium given for borderline low calcium with anticipation of PRBC administration.  Delay and administration of PRBC secondary to patient having positive indirect Thao test.  Patient also noted to have elevated bilirubin predominantly direct.  Her CT scan did not show any active contrast extravasation but did demonstrate worsening metastatic disease which was recently discovered on her ex lap a few months ago.  Due to significant anemia and likely upper GI bleed, patient will need transfer to facility with GI services.  Discussed with transfer center regarding transfer.  Patient is signed out to oncoming physician pending acceptance and transfer to outside hospital    Medical Decision Making  Amount and/or Complexity of Data Reviewed  Labs: ordered. Decision-making details documented in ED Course.  Radiology: ordered.  ECG/medicine tests: ordered and independent interpretation performed.    Risk  Prescription drug management.    Critical Care ED Physician Time (minutes):  -- Performed by: Reuben Mata M.D.  -- Date/Time: 5:55 PM 7/14/2025   -- Direct Patient Care (Face Time): 5  -- Additional History from Records or Taking Additional History:  5  -- Ordering, Reviewing, and Interpreting Diagnostic Studies: 5  -- Total Time in Documentation: 11  -- Consultation with Other Physicians: 5  -- Consultation with Family Related to Condition: 0  -- Total Critical Care Time: 31  -- Critical care was necessary to treat the following conditions:  Severe anemia requiring transfusion of multiple units of PRBCs  -- Critical care was time spent personally by me on the following activities:   -- blood draw for specimens discussions with consultants,   -- development of treatment plan with patient or surrogate,   -- examination of patient, ordering and performing treatments   -- review of radiographic studies, re-evaluation of pt's condition  -- review of labs and evaluation of response to treatment      ED Course as of 07/14/25 1756 Mon Jul 14, 2025 1747 Bilirubin Direct(!): 4.5 [AW]   1747 INDIRECT RUPESH(!): POS [AW]   1747 Potassium(!): 3.1  Repleted [AW]   1747 Retic(!): 6.2 [AW]      ED Course User Index  [AW] Reuben Mata MD     Diagnostic Impression:    Final diagnoses:  [R06.02] Shortness of breath  [D64.9] Anemia, unspecified type (Primary)  [K92.2] Upper GI bleed     ED Disposition Condition    Transfer to Another Facility Stable                   Reuben Mata MD  07/14/25 1756

## 2025-07-14 NOTE — PROVIDER TRANSFER
"   Outside Transfer Acceptance Note / Regional Referral Center    Referring facility:  [facility]ly [facility], later [facility]  Referring provider: GILLES DIAZ  Accepting facility: [facility]  Accepting provider: MARYELLEN BOBO  Admitting provider:  INNOCENT  Reason for transfer: Higher Level of Care   Transfer diagnosis:  GIB  Transfer specialty requested: Gastroenterology  Transfer specialty notified: Yes  Transfer level: NUMBER 1-5: 2  Bed type requested: ICU  Isolation status: No active isolations   Admission class or status: IP- Inpatient    Narrative     Ms. 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  Pathology: Invasive adenocarcinoma, poorly differentiated.  Surrounding small intestinal mucosa without dysplasia.     She was last admitted at [facility] on 5/27 - 5/31 due to hematemesis and underwent emergent liver biopsy, gastrojujenostomy, and exploratory laparotomy.  She followed up with Dr. Reyes, General Surgery, in clinic today due to back pain.  She was noted to have elevated LFT's and jaundice.  He sent her to the ED from clinic for admission after labs showed anemia, Hg 4.7g.    In the ED she had repeat labs with the Hg dropping to 4.2g.  Per ED, 2 units has been ordered but this patient has a multiple antibody issue.  They are awaiting blood from [place].    VS in the ED were BP 98/65 -> 158/95 -> 11/59 -> 120/80   Pulse 124 -> 108   Temp 98.7 °F (37.1 °C) (Oral)   Resp 20   Ht 5' 8" (1.727 m)   Wt 74.7 kg (164 lb 10.9 oz)   SpO2 99%   Breastfeeding No   BMI 25.04 kg/m².    Labs: WBC 16 -> 14, Hg 4.7 -> 4.2, , retic 6.2, K 3.1, Cr 0.7, Gluc 130, , Alb 2.4, TB 6.1, DB 4.5, AST 71, ALT 76, no UA, heme-occult positive.    CTA GIB study 7/14: Interval postoperative change consistent with gastrojejunostomy. Large ill-defined mass in the region the head of the pancreas and duodenum is more ill defined today with " some surrounding fat stranding most likely neoplastic. Interval development also small volume of ascites and of innumerable lesions throughout the liver consistent with extensive metastatic disease. No active extravasation of contrast indicate acute hemorrhage.    In the ED she was treated with:  Medications   0.9%  NaCl infusion (for blood administration) (has no administration in time range)   pantoprazole injection 80 mg (80 mg Intravenous Given 7/14/25 1216)   sodium chloride 0.9% bolus 1,000 mL 1,000 mL (0 mLs Intravenous Stopped 7/14/25 1331)   iohexoL (OMNIPAQUE 350) injection 100 mL (100 mLs Intravenous Given 7/14/25 1348)   potassium chloride SA CR tablet 40 mEq (40 mEq Oral Given 7/14/25 1436)   calcium gluconate 1 g in NS IVPB (premixed) (0 g Intravenous Stopped 7/14/25 1533)   oxyCODONE-acetaminophen 5-325 mg per tablet 1 tablet (1 tablet Oral Given 7/14/25 1704)   HYDROmorphone injection 1 mg (1 mg Intravenous Given 7/14/25 1807)   ondansetron injection 4 mg (4 mg Intravenous Given 7/14/25 1807)         Objective     Vitals:   Temp:  [98.2 °F (36.8 °C)-98.7 °F (37.1 °C)]   Pulse:  []   Resp:  [18-22]   BP: ()/(59-95)   SpO2:  [96 %-100 %]      Recent Labs: All pertinent labs within the past 24 hours have been reviewed.  Recent Results (from the past 24 hours)   Comprehensive Metabolic Panel    Collection Time: 07/14/25  9:39 AM   Result Value Ref Range    Sodium 139 136 - 145 mmol/L    Potassium 3.3 (L) 3.5 - 5.1 mmol/L    Chloride 104 95 - 110 mmol/L    CO2 19 (L) 23 - 29 mmol/L    Glucose 135 (H) 70 - 110 mg/dL    BUN 9 6 - 20 mg/dL    Creatinine 0.7 0.5 - 1.4 mg/dL    Calcium 8.7 8.7 - 10.5 mg/dL    Protein Total 7.1 6.0 - 8.4 gm/dL    Albumin 2.5 (L) 3.5 - 5.2 g/dL    Bilirubin Total 6.2 (H) 0.1 - 1.0 mg/dL     (H) 40 - 150 unit/L    AST 74 (H) 11 - 45 unit/L    ALT 77 (H) 10 - 44 unit/L    Anion Gap 16 8 - 16 mmol/L    eGFR >60 >60 mL/min/1.73/m2   CBC with Differential     Collection Time: 07/14/25  9:39 AM   Result Value Ref Range    WBC 16.13 (H) 3.90 - 12.70 K/uL    RBC 2.20 (L) 4.00 - 5.40 M/uL    HGB 4.7 (LL) 12.0 - 16.0 gm/dL    HCT 15.7 (LL) 37.0 - 48.5 %    MCV 71 (L) 82 - 98 fL    MCH 21.4 (L) 27.0 - 31.0 pg    MCHC 29.9 (L) 32.0 - 36.0 g/dL    RDW 21.0 (H) 11.5 - 14.5 %    Platelet Count 700 (H) 150 - 450 K/uL    MPV 10.5 9.2 - 12.9 fL    Nucleated RBC 0 <=0 /100 WBC    Neut % 77.9 (H) 38 - 73 %    Lymph % 14.1 (L) 18 - 48 %    Mono % 6.3 4 - 15 %    Eos % 0.7 <=8 %    Basophil % 0.4 <=1.9 %    Imm Grans % 0.6 (H) 0.0 - 0.5 %    Neut # 12.57 (H) 1.8 - 7.7 K/uL    Lymph # 2.27 1 - 4.8 K/uL    Mono # 1.01 (H) 0.3 - 1 K/uL    Eos # 0.12 <=0.5 K/uL    Baso # 0.07 <=0.2 K/uL    Imm Grans # 0.09 (H) 0.00 - 0.04 K/uL   Comprehensive metabolic panel    Collection Time: 07/14/25 12:06 PM   Result Value Ref Range    Sodium 140 136 - 145 mmol/L    Potassium 3.1 (L) 3.5 - 5.1 mmol/L    Chloride 103 95 - 110 mmol/L    CO2 22 (L) 23 - 29 mmol/L    Glucose 130 (H) 70 - 110 mg/dL    BUN 9 6 - 20 mg/dL    Creatinine 0.7 0.5 - 1.4 mg/dL    Calcium 8.2 (L) 8.7 - 10.5 mg/dL    Protein Total 6.7 6.0 - 8.4 gm/dL    Albumin 2.4 (L) 3.5 - 5.2 g/dL    Bilirubin Total 6.1 (H) 0.1 - 1.0 mg/dL     (H) 40 - 150 unit/L    AST 71 (H) 11 - 45 unit/L    ALT 76 (H) 10 - 44 unit/L    Anion Gap 15 8 - 16 mmol/L    eGFR >60 >60 mL/min/1.73/m2   Reticulocytes    Collection Time: 07/14/25 12:06 PM   Result Value Ref Range    Retic Count, Automated 6.2 (H) 0.5 - 2.5 %   Type & Screen    Collection Time: 07/14/25 12:06 PM   Result Value Ref Range    Specimen Outdate 07/17/2025 23:59     Group & Rh AB NEG     Indirect Thao POS (A)    CBC with Differential    Collection Time: 07/14/25 12:06 PM   Result Value Ref Range    WBC 13.95 (H) 3.90 - 12.70 K/uL    RBC 1.98 (L) 4.00 - 5.40 M/uL    HGB 4.2 (LL) 12.0 - 16.0 gm/dL    HCT 13.8 (LL) 37.0 - 48.5 %    MCV 70 (L) 82 - 98 fL    MCH 21.2 (L) 27.0 - 31.0 pg     MCHC 30.4 (L) 32.0 - 36.0 g/dL    RDW 21.1 (H) 11.5 - 14.5 %    Platelet Count 614 (H) 150 - 450 K/uL    MPV 9.9 9.2 - 12.9 fL    Nucleated RBC 0 <=0 /100 WBC    Neut % 79.2 (H) 38 - 73 %    Lymph % 12.7 (L) 18 - 48 %    Mono % 6.7 4 - 15 %    Eos % 0.3 <=8 %    Basophil % 0.3 <=1.9 %    Imm Grans % 0.8 (H) 0.0 - 0.5 %    Neut # 11.06 (H) 1.8 - 7.7 K/uL    Lymph # 1.77 1 - 4.8 K/uL    Mono # 0.93 0.3 - 1 K/uL    Eos # 0.04 <=0.5 K/uL    Baso # 0.04 <=0.2 K/uL    Imm Grans # 0.11 (H) 0.00 - 0.04 K/uL   Light Blue Top Hold    Collection Time: 07/14/25 12:06 PM   Result Value Ref Range    Extra Tube Hold for add-ons.    Morphology    Collection Time: 07/14/25 12:06 PM    Specimen: Blood   Result Value Ref Range    Platelet Estimate Appears Normal      Fragmented Cells Occasional     Anisocytosis Moderate     Poik Slight     Polychromasia Occasional     Hypochromia Occasional     Target Cell Occasional     Tear drop cell Moderate    Bilirubin, Direct    Collection Time: 07/14/25 12:06 PM   Result Value Ref Range    Bilirubin Direct 4.5 (H) 0.1 - 0.3 mg/dL   Occult blood x 1, stool    Collection Time: 07/14/25  1:12 PM    Specimen: Stool   Result Value Ref Range    OCCULT BLOOD STOOL Positive (A) Negative            IV access:   Peripheral IV 07/14/25 1154 20 G Right Antecubital (Active)   Site Assessment Clean;Dry;Intact 07/14/25 1157   Extremity Assessment Distal to IV No abnormal discoloration;No swelling;No redness 07/14/25 1157   Dressing Status Clean;Dry;Intact 07/14/25 1157   Dressing Intervention First dressing 07/14/25 1157       Peripheral IV 07/14/25 1205 18 G Anterior;Left Forearm (Active)   Site Assessment Clean;Dry;Intact 07/14/25 1206   Extremity Assessment Distal to IV No abnormal discoloration;No redness;No swelling;No warmth 07/14/25 1206   Dressing Status Clean;Dry;Intact 07/14/25 1206   Dressing Intervention First dressing 07/14/25 1206     Infusions: NONE  Allergies: Review of patient's allergies  indicates:  No Known Allergies   NPO: No    Anticoagulation:   Anticoagulants       None             Instructions      [facility]  Admit to Hospital Medicine  Upon patient arrival to floor, please contact Hospital Medicine on call.

## 2025-07-14 NOTE — TELEPHONE ENCOUNTER
Melani from [facility] lab called with a Critical lab result of Hemoglobin- 4.7 and Hematocrit- 15.7. High Priority message sent to Dr.J. Reyes and staff. Value entered into Flowsheets.

## 2025-07-14 NOTE — TELEPHONE ENCOUNTER
07/14/2025                1109  Spoke to patient regarding her labs that were done today. Informed patient that per Dr. Reyes, she needs to report to the ER right now. Patient stated she would go to the ER at [place] [facility] in [place] since that was closer. Ms. Felix verbalized understanding.

## 2025-07-15 ENCOUNTER — HOSPITAL ENCOUNTER (INPATIENT)
Facility: HOSPITAL | Age: 55
LOS: 4 days | Discharge: HOME | DRG: 445 | End: 2025-07-19
Attending: FAMILY MEDICINE | Admitting: FAMILY MEDICINE
Payer: MEDICAID

## 2025-07-15 VITALS
TEMPERATURE: 98 F | OXYGEN SATURATION: 99 % | DIASTOLIC BLOOD PRESSURE: 81 MMHG | HEIGHT: 68 IN | HEART RATE: 102 BPM | RESPIRATION RATE: 17 BRPM | SYSTOLIC BLOOD PRESSURE: 114 MMHG | BODY MASS INDEX: 24.96 KG/M2 | WEIGHT: 164.69 LBS

## 2025-07-15 DIAGNOSIS — K92.2 GIB (GASTROINTESTINAL BLEEDING): ICD-10-CM

## 2025-07-15 DIAGNOSIS — C17.0 DUODENAL CANCER: Primary | ICD-10-CM

## 2025-07-15 PROBLEM — D62 ABLA (ACUTE BLOOD LOSS ANEMIA): Status: ACTIVE | Noted: 2025-07-15

## 2025-07-15 PROBLEM — R74.01 TRANSAMINITIS: Status: ACTIVE | Noted: 2025-07-15

## 2025-07-15 LAB
ABO + RH BLD: NORMAL
ABSOLUTE EOSINOPHIL (OHS): 0.27 K/UL
ABSOLUTE EOSINOPHIL (OHS): 0.31 K/UL
ABSOLUTE MONOCYTE (OHS): 1.38 K/UL (ref 0.3–1)
ABSOLUTE MONOCYTE (OHS): 1.41 K/UL (ref 0.3–1)
ABSOLUTE NEUTROPHIL COUNT (OHS): 12.73 K/UL (ref 1.8–7.7)
ABSOLUTE NEUTROPHIL COUNT (OHS): 15.63 K/UL (ref 1.8–7.7)
ALBUMIN SERPL BCP-MCNC: 2.2 G/DL (ref 3.5–5.2)
ALP SERPL-CCNC: 338 UNIT/L (ref 40–150)
ALT SERPL W/O P-5'-P-CCNC: 66 UNIT/L (ref 10–44)
AMMONIA PLAS-SCNC: 50 UMOL/L (ref 10–50)
ANION GAP (OHS): 8 MMOL/L (ref 8–16)
APTT PPP: 31.6 SECONDS (ref 21–32)
AST SERPL-CCNC: 83 UNIT/L (ref 11–45)
BASOPHILS # BLD AUTO: 0.08 K/UL
BASOPHILS # BLD AUTO: 0.09 K/UL
BASOPHILS NFR BLD AUTO: 0.5 %
BASOPHILS NFR BLD AUTO: 0.5 %
BILIRUB SERPL-MCNC: 9 MG/DL (ref 0.1–1)
BLD PROD TYP BPU: NORMAL
BLOOD GROUP ANTIBODIES SERPL: NORMAL
BLOOD UNIT EXPIRATION DATE: NORMAL
BLOOD UNIT TYPE CODE: 2800
BLOOD UNIT TYPE CODE: 600
BLOOD UNIT TYPE CODE: 600
BUN SERPL-MCNC: 7 MG/DL (ref 6–20)
CALCIUM SERPL-MCNC: 8.1 MG/DL (ref 8.7–10.5)
CHLORIDE SERPL-SCNC: 109 MMOL/L (ref 95–110)
CO2 SERPL-SCNC: 20 MMOL/L (ref 23–29)
CREAT SERPL-MCNC: 0.6 MG/DL (ref 0.5–1.4)
CROSSMATCH INTERPRETATION: NORMAL
DAT IGG-SP REAG RBC-IMP: NORMAL
DISPENSE STATUS: NORMAL
ERYTHROCYTE [DISTWIDTH] IN BLOOD BY AUTOMATED COUNT: 19.1 % (ref 11.5–14.5)
ERYTHROCYTE [DISTWIDTH] IN BLOOD BY AUTOMATED COUNT: 19.4 % (ref 11.5–14.5)
GFR SERPLBLD CREATININE-BSD FMLA CKD-EPI: >60 ML/MIN/1.73/M2
GLUCOSE SERPL-MCNC: 100 MG/DL (ref 70–110)
HCT VFR BLD AUTO: 21.5 % (ref 37–48.5)
HCT VFR BLD AUTO: 23 % (ref 37–48.5)
HGB BLD-MCNC: 6.9 GM/DL (ref 12–16)
HGB BLD-MCNC: 7.4 GM/DL (ref 12–16)
HGB BLD-MCNC: 7.4 GM/DL (ref 12–16)
HGB BLD-MCNC: 7.5 GM/DL (ref 12–16)
IMM GRANULOCYTES # BLD AUTO: 0.15 K/UL (ref 0–0.04)
IMM GRANULOCYTES # BLD AUTO: 0.17 K/UL (ref 0–0.04)
IMM GRANULOCYTES NFR BLD AUTO: 0.9 % (ref 0–0.5)
IMM GRANULOCYTES NFR BLD AUTO: 0.9 % (ref 0–0.5)
INDIRECT COOMBS: ABNORMAL
INR PPP: 1.9 (ref 0.8–1.2)
LIPASE SERPL-CCNC: 234 U/L (ref 4–60)
LYMPHOCYTES # BLD AUTO: 1.78 K/UL (ref 1–4.8)
LYMPHOCYTES # BLD AUTO: 2.1 K/UL (ref 1–4.8)
MAGNESIUM SERPL-MCNC: 2.1 MG/DL (ref 1.6–2.6)
MCH RBC QN AUTO: 24.4 PG (ref 27–31)
MCH RBC QN AUTO: 24.8 PG (ref 27–31)
MCHC RBC AUTO-ENTMCNC: 32.1 G/DL (ref 32–36)
MCHC RBC AUTO-ENTMCNC: 32.6 G/DL (ref 32–36)
MCV RBC AUTO: 76 FL (ref 82–98)
MCV RBC AUTO: 76 FL (ref 82–98)
NUCLEATED RBC (/100WBC) (OHS): 1 /100 WBC
NUCLEATED RBC (/100WBC) (OHS): 1 /100 WBC
OHS QRS DURATION: 84 MS
OHS QTC CALCULATION: 463 MS
PLATELET # BLD AUTO: 458 K/UL (ref 150–450)
PLATELET # BLD AUTO: 489 K/UL (ref 150–450)
PMV BLD AUTO: 10 FL (ref 9.2–12.9)
PMV BLD AUTO: 10.1 FL (ref 9.2–12.9)
POCT GLUCOSE: 104 MG/DL (ref 70–110)
POCT GLUCOSE: 106 MG/DL (ref 70–110)
POCT GLUCOSE: 119 MG/DL (ref 70–110)
POCT GLUCOSE: 143 MG/DL (ref 70–110)
POCT GLUCOSE: 97 MG/DL (ref 70–110)
POTASSIUM SERPL-SCNC: 4.5 MMOL/L (ref 3.5–5.1)
PROT SERPL-MCNC: 6 GM/DL (ref 6–8.4)
PROTHROMBIN TIME: 20.6 SECONDS (ref 9–12.5)
RBC # BLD AUTO: 2.83 M/UL (ref 4–5.4)
RBC # BLD AUTO: 3.02 M/UL (ref 4–5.4)
RELATIVE EOSINOPHIL (OHS): 1.6 %
RELATIVE EOSINOPHIL (OHS): 1.6 %
RELATIVE LYMPHOCYTE (OHS): 12.6 % (ref 18–48)
RELATIVE LYMPHOCYTE (OHS): 9.2 % (ref 18–48)
RELATIVE MONOCYTE (OHS): 7.3 % (ref 4–15)
RELATIVE MONOCYTE (OHS): 8.3 % (ref 4–15)
RELATIVE NEUTROPHIL (OHS): 76.1 % (ref 38–73)
RELATIVE NEUTROPHIL (OHS): 80.5 % (ref 38–73)
RETICS/RBC NFR AUTO: 5 % (ref 0.5–2.5)
RH BLD: ABNORMAL
SODIUM SERPL-SCNC: 137 MMOL/L (ref 136–145)
SPECIMEN OUTDATE: ABNORMAL
UNIT NUMBER: NORMAL
WBC # BLD AUTO: 16.71 K/UL (ref 3.9–12.7)
WBC # BLD AUTO: 19.39 K/UL (ref 3.9–12.7)

## 2025-07-15 PROCEDURE — 86901 BLOOD TYPING SEROLOGIC RH(D): CPT | Performed by: REGISTERED NURSE

## 2025-07-15 PROCEDURE — 82040 ASSAY OF SERUM ALBUMIN: CPT | Performed by: REGISTERED NURSE

## 2025-07-15 PROCEDURE — 99222 1ST HOSP IP/OBS MODERATE 55: CPT | Mod: ,,, | Performed by: INTERNAL MEDICINE

## 2025-07-15 PROCEDURE — 36415 COLL VENOUS BLD VENIPUNCTURE: CPT | Performed by: REGISTERED NURSE

## 2025-07-15 PROCEDURE — 25000003 PHARM REV CODE 250: Performed by: SURGERY

## 2025-07-15 PROCEDURE — 36430 TRANSFUSION BLD/BLD COMPNT: CPT

## 2025-07-15 PROCEDURE — 85610 PROTHROMBIN TIME: CPT | Performed by: HOSPITALIST

## 2025-07-15 PROCEDURE — 85025 COMPLETE CBC W/AUTO DIFF WBC: CPT | Performed by: REGISTERED NURSE

## 2025-07-15 PROCEDURE — 63600175 PHARM REV CODE 636 W HCPCS: Performed by: REGISTERED NURSE

## 2025-07-15 PROCEDURE — 25000003 PHARM REV CODE 250

## 2025-07-15 PROCEDURE — 82140 ASSAY OF AMMONIA: CPT | Performed by: REGISTERED NURSE

## 2025-07-15 PROCEDURE — 85730 THROMBOPLASTIN TIME PARTIAL: CPT | Performed by: HOSPITALIST

## 2025-07-15 PROCEDURE — P9016 RBC LEUKOCYTES REDUCED: HCPCS

## 2025-07-15 PROCEDURE — 86922 COMPATIBILITY TEST ANTIGLOB: CPT

## 2025-07-15 PROCEDURE — 30233N1 TRANSFUSION OF NONAUTOLOGOUS RED BLOOD CELLS INTO PERIPHERAL VEIN, PERCUTANEOUS APPROACH: ICD-10-PCS

## 2025-07-15 PROCEDURE — 63600175 PHARM REV CODE 636 W HCPCS

## 2025-07-15 PROCEDURE — 83690 ASSAY OF LIPASE: CPT | Performed by: REGISTERED NURSE

## 2025-07-15 PROCEDURE — 85045 AUTOMATED RETICULOCYTE COUNT: CPT

## 2025-07-15 PROCEDURE — 36415 COLL VENOUS BLD VENIPUNCTURE: CPT | Mod: XB | Performed by: HOSPITALIST

## 2025-07-15 PROCEDURE — 86870 RBC ANTIBODY IDENTIFICATION: CPT | Performed by: REGISTERED NURSE

## 2025-07-15 PROCEDURE — 86880 COOMBS TEST DIRECT: CPT | Performed by: REGISTERED NURSE

## 2025-07-15 PROCEDURE — 83735 ASSAY OF MAGNESIUM: CPT | Performed by: REGISTERED NURSE

## 2025-07-15 PROCEDURE — 85018 HEMOGLOBIN: CPT | Performed by: HOSPITALIST

## 2025-07-15 PROCEDURE — 11000001 HC ACUTE MED/SURG PRIVATE ROOM

## 2025-07-15 RX ORDER — HYDROCODONE BITARTRATE AND ACETAMINOPHEN 500; 5 MG/1; MG/1
TABLET ORAL
Status: DISCONTINUED | OUTPATIENT
Start: 2025-07-15 | End: 2025-07-19 | Stop reason: HOSPADM

## 2025-07-15 RX ORDER — INSULIN ASPART 100 [IU]/ML
0-5 INJECTION, SOLUTION INTRAVENOUS; SUBCUTANEOUS EVERY 6 HOURS PRN
Status: DISCONTINUED | OUTPATIENT
Start: 2025-07-15 | End: 2025-07-15

## 2025-07-15 RX ORDER — GLUCAGON 1 MG
1 KIT INJECTION
Status: DISCONTINUED | OUTPATIENT
Start: 2025-07-15 | End: 2025-07-15

## 2025-07-15 RX ORDER — INSULIN ASPART 100 [IU]/ML
0-10 INJECTION, SOLUTION INTRAVENOUS; SUBCUTANEOUS EVERY 6 HOURS PRN
Status: DISCONTINUED | OUTPATIENT
Start: 2025-07-15 | End: 2025-07-15

## 2025-07-15 RX ORDER — MUPIROCIN 20 MG/G
OINTMENT TOPICAL 2 TIMES DAILY
Status: DISCONTINUED | OUTPATIENT
Start: 2025-07-15 | End: 2025-07-19 | Stop reason: HOSPADM

## 2025-07-15 RX ORDER — IBUPROFEN 200 MG
24 TABLET ORAL
Status: DISCONTINUED | OUTPATIENT
Start: 2025-07-15 | End: 2025-07-19 | Stop reason: HOSPADM

## 2025-07-15 RX ORDER — SODIUM CHLORIDE 0.9 % (FLUSH) 0.9 %
10 SYRINGE (ML) INJECTION
Status: DISCONTINUED | OUTPATIENT
Start: 2025-07-15 | End: 2025-07-19 | Stop reason: HOSPADM

## 2025-07-15 RX ORDER — INSULIN ASPART 100 [IU]/ML
0-10 INJECTION, SOLUTION INTRAVENOUS; SUBCUTANEOUS
Status: DISCONTINUED | OUTPATIENT
Start: 2025-07-15 | End: 2025-07-19 | Stop reason: HOSPADM

## 2025-07-15 RX ORDER — HYDROCODONE BITARTRATE AND ACETAMINOPHEN 5; 325 MG/1; MG/1
1 TABLET ORAL
Refills: 0 | Status: COMPLETED | OUTPATIENT
Start: 2025-07-15 | End: 2025-07-15

## 2025-07-15 RX ORDER — PANTOPRAZOLE SODIUM 40 MG/10ML
40 INJECTION, POWDER, LYOPHILIZED, FOR SOLUTION INTRAVENOUS 2 TIMES DAILY
Status: DISCONTINUED | OUTPATIENT
Start: 2025-07-15 | End: 2025-07-18

## 2025-07-15 RX ORDER — HYDROMORPHONE HYDROCHLORIDE 1 MG/ML
1 INJECTION, SOLUTION INTRAMUSCULAR; INTRAVENOUS; SUBCUTANEOUS EVERY 6 HOURS PRN
Status: DISCONTINUED | OUTPATIENT
Start: 2025-07-15 | End: 2025-07-19 | Stop reason: HOSPADM

## 2025-07-15 RX ORDER — OXYCODONE HYDROCHLORIDE 5 MG/1
5 TABLET ORAL EVERY 6 HOURS PRN
Status: DISCONTINUED | OUTPATIENT
Start: 2025-07-15 | End: 2025-07-18

## 2025-07-15 RX ORDER — MORPHINE SULFATE 4 MG/ML
4 INJECTION, SOLUTION INTRAMUSCULAR; INTRAVENOUS ONCE
Status: COMPLETED | OUTPATIENT
Start: 2025-07-15 | End: 2025-07-15

## 2025-07-15 RX ORDER — GLUCAGON 1 MG
1 KIT INJECTION
Status: DISCONTINUED | OUTPATIENT
Start: 2025-07-15 | End: 2025-07-19 | Stop reason: HOSPADM

## 2025-07-15 RX ORDER — ONDANSETRON HYDROCHLORIDE 2 MG/ML
4 INJECTION, SOLUTION INTRAVENOUS EVERY 8 HOURS PRN
Status: DISCONTINUED | OUTPATIENT
Start: 2025-07-15 | End: 2025-07-19 | Stop reason: HOSPADM

## 2025-07-15 RX ORDER — IBUPROFEN 200 MG
16 TABLET ORAL
Status: DISCONTINUED | OUTPATIENT
Start: 2025-07-15 | End: 2025-07-19 | Stop reason: HOSPADM

## 2025-07-15 RX ADMIN — PANTOPRAZOLE SODIUM 40 MG: 40 INJECTION, POWDER, FOR SOLUTION INTRAVENOUS at 08:07

## 2025-07-15 RX ADMIN — HYDROMORPHONE HYDROCHLORIDE 1 MG: 1 INJECTION, SOLUTION INTRAMUSCULAR; INTRAVENOUS; SUBCUTANEOUS at 10:07

## 2025-07-15 RX ADMIN — MORPHINE SULFATE 4 MG: 4 INJECTION INTRAVENOUS at 04:07

## 2025-07-15 RX ADMIN — OXYCODONE HYDROCHLORIDE 5 MG: 5 TABLET ORAL at 03:07

## 2025-07-15 RX ADMIN — MUPIROCIN: 20 OINTMENT TOPICAL at 08:07

## 2025-07-15 RX ADMIN — HYDROMORPHONE HYDROCHLORIDE 1 MG: 1 INJECTION, SOLUTION INTRAMUSCULAR; INTRAVENOUS; SUBCUTANEOUS at 06:07

## 2025-07-15 RX ADMIN — OXYCODONE HYDROCHLORIDE 5 MG: 5 TABLET ORAL at 10:07

## 2025-07-15 RX ADMIN — HYDROCODONE BITARTRATE AND ACETAMINOPHEN 1 TABLET: 5; 325 TABLET ORAL at 01:07

## 2025-07-15 NOTE — PROGRESS NOTES
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  Pathology: Invasive adenocarcinoma, poorly differentiated.  Surrounding small intestinal mucosa without dysplasia.   She is here with Hb 4.7.     Video assessment :  Lying comfortably in bed     Vitals reviewed   Afebrile, stable vitals     LABs reviewed       Radiology reviewed   CTA Abd/Pelvis  Interval postoperative change consistent with gastrojejunostomy. Large ill-defined mass in the region the head of the pancreas and duodenum is more ill defined today with some surrounding fat stranding most likely neoplastic. Interval development also small volume of ascites and of innumerable lesions throughout the liver consistent with extensive metastatic disease. No active extravasation of contrast indicate acute hemorrhage.         Assessment / Plan :  # GI bleed, severe anemia 4.2 ; stool occult blood positive   # h/o invasive adenocarcinoma of poor differential on 2nd part of duodenum  # thrombocytosis   # electrolyte imbalance   # elevated AlkPhos, Bilirubin; also mild elevation in LFTs   # h/o DM2, Dyslipidemia, HTN   - s/p 2 PRBCs ; monitor Hb Q6 ; on PPI BID   - f/u surgery ; consult GI & Oncology   - SSI       DVT Px : SCD  GI Px : PPI

## 2025-07-15 NOTE — PLAN OF CARE
"  Care Plan    POC reviewed with Aury Felix. Questions and concerns addressed. No acute events today. Pt progressing toward goals. Will continue to monitor. See below and flowsheets for full assessment and VS info.       Neuro:  Prasad Coma Scale  Best Eye Response: 4-->(E4) spontaneous  Best Motor Response: 6-->(M6) obeys commands  Best Verbal Response: 5-->(V5) oriented  Catlett Coma Scale Score: 15  Assessment Qualifiers: Patient not sedated/intubated, No eye obstruction present  Pupil PERRLA: yes  24 hr Temp:  [97.8 °F (36.6 °C)-98.7 °F (37.1 °C)]      CV:  Rhythm: sinus tachycardia  DVT prophylaxis:      Resp:          GI/:  GI prophylaxis: no  Diet/Nutrition Received: NPO  Last Bowel Movement: 07/13/25  Voiding Characteristics: external catheter No intake or output data in the 24 hours ending 07/15/25 0535    Labs/Accuchecks:  Recent Labs   Lab 07/14/25  1206   WBC 13.95*   RBC 1.98*   HGB 4.2*   HCT 13.8*   *      Recent Labs   Lab 07/14/25  1206      K 3.1*   CO2 22*      BUN 9   CREATININE 0.7   ALKPHOS 373*   ALT 76*   AST 71*   BILITOT 6.1*    No results for input(s): "PROTIME", "INR", "APTT", "HEPANTIXA" in the last 168 hours. No results for input(s): "CPK", "CPKMB", "TROPONINI", "MB" in the last 168 hours.    Electrolytes: No replacement orders  Accuchecks: none    Gtts/LDAs:      Lines/Drains/Airways       Drain  Duration             Female External Urinary Catheter w/ Suction 07/15/25 0327 <1 day              Peripheral Intravenous Line  Duration             Peripheral IV 07/14/25 1154 20 G Right Antecubital <1 day    Peripheral IV 07/14/25 1205 18 G Anterior;Left Forearm <1 day                    Skin/Wounds     Wounds: No  Wound care consulted: No    Consults  Consults (From admission, onward)          Status Ordering Provider     General Surgery  Once        Provider:  William Reyes MD    Acknowledged KYLE GONZALEZ            "

## 2025-07-15 NOTE — ED NOTES
Reinforced pt teaching on Blood Transfusion Reaction Signs and Symptoms. Pt verbalizes understanding.     The blood you have received has been matched for you as carefully as possible. Most patients who receive a blood transfusion do not experience problems. However, there can be a delayed reaction that happens a few weeks after your blood transfusion. Contact your physician immediately if you experience any NEW SYMPTOMS listed below:     Fever greater than 100.4 degrees   Chills  Yellow color to your skin or eyes(Jaundice)  Back pain, chest pain, or pain at the infusion site  Weakness (more than usual)  Discomfort or uneasiness more than usual (Malaise)  Nausea or vomiting  Shortness of breath, wheezing, or coughing  Higher or lower blood pressure than normal  Skin rash, itching, skin redness, or localized swelling (example: hands or feet)  Urinating less than normal  Urine appears reddish or orange and is darker than normal

## 2025-07-15 NOTE — HPI
Aury Felix is a 54 y.o. 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 Was planned to have whipple 5/27/25 and was found to be local advanced with innumerable liver metastases. GJ was performed.  She was seen in clinic yesterday with Dr. Reyes. Stated she felt weak for which she went to the evening and was found to have hemoglobin of 4.2 and with elevated LFTs. She was transferred from [place] ED and received 2 units by arrival.     On evaluation, patient had no major complaints this morning. Denies any abdominal pain, no episodes of melena, nor hematemesis recently. Tachycardic. Imaging without any active extravasation, possibly bleeding . GJ intact and patent.

## 2025-07-15 NOTE — EICU
"Virtual ICU Admission    Admit Date: 7/15/2025  LOS: 0  Code Status: Prior   : 1970  Bed: K563/K563 A:     Diagnosis: <principal problem not specified>    Patient  has a past medical history of Cancer, Diabetes mellitus, Hyperlipidemia, and Hypertension.    Last VS: /83 (BP Location: Left arm, Patient Position: Lying)   Pulse 104   Temp 98.7 °F (37.1 °C) (Oral)   Resp (!) 21   Ht 5' 5" (1.651 m)   Wt 79 kg (174 lb 2.6 oz)   SpO2 100%   BMI 28.98 kg/m²       VICU Review    VICU nurse assessment :  Venetie completed, LDA documentation reconciliation completed, and VTE prophylaxis review    No skin breakdown visualized    Nursing orders placed : IP HARDEEP Peripheral IV Access          "

## 2025-07-15 NOTE — CONSULTS
Consult Note  Hematology/Oncology      Admit Date: 7/15/2025   LOS: 0 days       SUBJECTIVE:     Aury Felix is a 54 y.o. 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 Was planned to have whipple 5/27/25 and was found to be local advanced with innumerable liver metastases. GJ was performed.  I had seen patient in early May of this year and now she is to have an appointment in 2 days with an oncologist in [facility] closer to where she lives to consider palliative treatment.    Patient currently is not having nausea or vomiting, has chronic fatigue with exacerbation of course with her low blood count but transfusion has improved her energy.  She is not having chest pains pleuritic nor anginal.  She denies tenderness of calves or thighs or pleuritic chest pain.  She has no rash or itch.  She has no lateralizing weakness.  She notes no fever or shakes.  See sees no bleeding in her stool, nose mouth in spite of obvious blood loss it seems.  She has no hematuria or dysuria.  She has no dysphagia odynophagia or choking.  She is not having headache, confusion, sudden change in eyesight or hearing a blurry vision and has no stiff neck or neck pain with motion.  Patient has chronic dyspnea on exertion but was not panicky until this presentation with severe anemia and again it is improving.  She has not been coughing up blood or having unusual cough.    Review of Systems:  See HPI    OBJECTIVE:     Vital Signs (Most Recent)  Temp: 98.2 °F (36.8 °C) (07/15/25 1515)  Pulse: 99 (07/15/25 1630)  Resp: 16 (07/15/25 1630)  BP: 106/68 (07/15/25 1630)  SpO2: (!) 94 % (07/15/25 1630)    Vital Signs Range (Last 24H):  Temp:  [97.8 °F (36.6 °C)-98.8 °F (37.1 °C)]   Pulse:  []   Resp:  [12-22]   BP: ()/(59-96)   SpO2:  [92 %-100 %]     I&O (Last 24H):  07/14 0701 - 07/15 0700  In: -   Out: 250 [Urine:250]    Physical Exam:  Patient has had of bed  elevated about 30°.  She appears in no acute distress psych or physcial and has no appearance of pain or anxiety. Smiling/calm.She has no plethora diaphoresis cough or labored breathing at rest.  She exhibits no pleuritic chest pain.  She has no cool or cyanotic extremities.  She has no rhonchi wheezing or stridor..  Eyesight grossly intact to normal sensory confrontation.  Skull is intact nontender neck is supple without tenderness upon palpation or flexion.  Skin turgor is normal.  She has no rash petechiae purpura and does not appear jaundice in his light complected somewhat  woman who is indeed anicteric.  Cardiac regular rate and rhythm, pulses 2+ over 4 upper and lower extremities, no erythematous or pitting peripheral edema is noted and she has a negative Homans exam is.  Abdomen patient is obese, normoactive bowel sounds soft nontender upon palpation and has no rebound and she has no focus of suprapubic CVA or subxiphoid tenderness.  She has a questionable fluid wave.  Extremities calves and thighs nontender, no cords palpable, negative Homans exam.  Again she has no petechiae.  She has perhaps some slight pretibial hyperpigmentation.  She has no lateralizing weakness upper or lower extremities.  Neurologic oriented x4, see neck exam above, absent clonus.  Psychiatric patient is smiling, coping well, feels relieved that her hemoglobin is improved and did feel some sense of panic when she was severely anemic.  She hopes to see her oncologist and get on some treatment that could decrease symptoms and hopefully make her live longer.  She had no family present at the time of the visit.  Lymphatics no tender adenopathy head and neck and no significant adenopathy either location on somewhat cursory exam.    Laboratory:  CBC with Differential:  Recent Labs   Lab 07/15/25  1522   WBC 19.39*   LYMPH 1.78  9.2*   BASOPHIL 0.5  0.09   RBC 3.02*   HCT 23.0*   HGB 7.4*  7.5*   MCV 76*   MCH 24.8*   RDW  19.4*   *   MPV 10.0   Presenting hg very low--lower than 5  CMP:  Recent Labs   Lab 07/15/25  0719      CALCIUM 8.1*   ALBUMIN 2.2*   PROT 6.0      K 4.5   CO2 20*      BUN 7   CREATININE 0.6   ALKPHOS 338*   ALT 66*   AST 83*   BILITOT 9.0*     See above    Diagnostic Results:  Cta yesterday:   Interval postoperative change consistent with gastrojejunostomy.  Large ill-defined mass in the region the head of the pancreas and duodenum is more ill defined today with some surrounding fat stranding most likely neoplastic.  Interval development also small volume of ascites and of innumerable lesions throughout the liver consistent with extensive metastatic disease.  No active extravasation of contrast indicate acute hemorrhage.     Pathology--rev about 5/27/25--adenocarcinoma duodenum--liver bx--metastatic disease as expected on some imaging    Recent imaging--pancreatic mass-head, liver lesions--see above    PET scan May 2025--not storngly suggestive of met disease    ASSESSMENT/PLAN:     Active Hospital Problems    Diagnosis  POA    *GIB (gastrointestinal bleeding) [K92.2]  Yes    ABLA (acute blood loss anemia) [D62]  Yes    Transaminitis [R74.01]  Yes    Duodenal cancer [C17.0]  Yes    Essential hypertension [I10]  Yes     Taking ACEi         Resolved Hospital Problems   No resolved problems to display.     Duodenal cancer with diffuse hepatic Mets, pancreatic mass for which palliative chemotherapy is to be decided by her new oncologist if she is able to see him in 2 days in her local area-see HPI  --counseling--to be sure and see her doctor and co  tx soon or performance status may become to poor to tolerate tx. She may choose comfort but needs to get estblished w an ongoing oncologist.    GI bleeding for which laboratory with reactive thrombocytosis etc. suggest iron deficiency and I recommend evaluating and replacing. CTA--no evidence of acute blood loss--pt reports no visible  bleeding.    Once hg stable, I rec discharge to where she se her new oncologist in 2 days    chart rev, path rev, xray, lab 15 min-15 min w pt, documentation 17 -getting results in note--47 min    Pt exhibits good understanding of counseling, no family present    Dori Prince M.D  Hematology/Oncology  [facility] - [address], [address]  [place] [address]  Phone: [phone]  Fax: [phone]

## 2025-07-15 NOTE — ASSESSMENT & PLAN NOTE
Pathology: Invasive adenocarcinoma, poorly differentiated.  Surrounding small intestinal mucosa without dysplasia.     Has initial appointment on the 17th in [place]   We will consult heme Onc while inpatient

## 2025-07-15 NOTE — SUBJECTIVE & OBJECTIVE
Past Medical History:   Diagnosis Date    Cancer     Diabetes mellitus     Hyperlipidemia     Hypertension        Past Surgical History:   Procedure Laterality Date    ESOPHAGOGASTRODUODENOSCOPY N/A 4/10/2025    Procedure: EGD (ESOPHAGOGASTRODUODENOSCOPY);  Surgeon: Manny Calles MD;  Location: [facility] ENDO;  Service: Endoscopy;  Laterality: N/A;    ESOPHAGOGASTRODUODENOSCOPY N/A 5/21/2025    Procedure: EGD (ESOPHAGOGASTRODUODENOSCOPY);  Surgeon: Manny Calles MD;  Location: [facility] ENDO;  Service: Endoscopy;  Laterality: N/A;    GASTROJEJUNOSTOMY N/A 5/27/2025    Procedure: GASTROJEJUNOSTOMY;  Surgeon: William Reyes MD;  Location: [facility] OR;  Service: General;  Laterality: N/A;    HYSTERECTOMY      LAPAROTOMY, EXPLORATORY Bilateral 5/27/2025    Procedure: LAPAROTOMY, EXPLORATORY;  Surgeon: William Reyes MD;  Location: [facility] OR;  Service: General;  Laterality: Bilateral;  will need BK US Guide probe BK US T probe [id number] AM 5/23    LIVER BIOPSY  5/27/2025    Procedure: BIOPSY, LIVER;  Surgeon: William Reyes MD;  Location: [facility] OR;  Service: General;;       Review of patient's allergies indicates:  No Known Allergies    Current Facility-Administered Medications on File Prior to Encounter   Medication    [COMPLETED] calcium gluconate 1 g in NS IVPB (premixed)    [COMPLETED] HYDROcodone-acetaminophen 5-325 mg per tablet 1 tablet    [COMPLETED] HYDROmorphone injection 1 mg    [COMPLETED] iohexoL (OMNIPAQUE 350) injection 100 mL    [COMPLETED] ondansetron injection 4 mg    [COMPLETED] oxyCODONE-acetaminophen 5-325 mg per tablet 1 tablet    [COMPLETED] pantoprazole injection 80 mg    [COMPLETED] potassium chloride SA CR tablet 40 mEq    [COMPLETED] sodium chloride 0.9% bolus 1,000 mL 1,000 mL    [DISCONTINUED] 0.9%  NaCl infusion (for blood administration)     Current Outpatient Medications on File Prior to Encounter   Medication Sig    aspirin (ECOTRIN) 81 MG EC tablet Take 1 tablet (81 mg  total) by mouth once daily. (Patient not taking: Reported on 7/14/2025)    blood-glucose meter kit Use as instructed (Patient not taking: Reported on 7/14/2025)    dicyclomine (BENTYL) 10 MG capsule Take 1 capsule (10 mg total) by mouth 4 (four) times daily as needed. (Patient not taking: Reported on 7/14/2025)    empagliflozin (JARDIANCE) 25 mg tablet Take 1 tablet (25 mg total) by mouth once daily.    insulin glargine U-100, Lantus, 100 unit/mL (3 mL) SubQ InPn pen Inject 20 Units into the skin every evening.    lisinopriL (PRINIVIL,ZESTRIL) 20 MG tablet Take 1 tablet (20 mg total) by mouth once daily.    oxyCODONE (ROXICODONE) 5 MG immediate release tablet Take 1 tablet (5 mg total) by mouth every 6 (six) hours as needed. (Patient not taking: Reported on 7/14/2025)    oxyCODONE (ROXICODONE) 5 MG immediate release tablet Take 1 tablet (5 mg total) by mouth every 4 (four) hours as needed for Pain.    oxyCODONE-acetaminophen (PERCOCET) 5-325 mg per tablet Take 1 tablet by mouth every 6 (six) hours as needed for Pain. (Patient not taking: Reported on 7/14/2025)    pantoprazole (PROTONIX) 40 MG tablet Take 1 tablet (40 mg total) by mouth once daily.    rosuvastatin (CRESTOR) 40 MG Tab Take 1 tablet (40 mg total) by mouth once daily.     Family History    None       Tobacco Use    Smoking status: Never    Smokeless tobacco: Never   Substance and Sexual Activity    Alcohol use: Yes    Drug use: Never    Sexual activity: Yes     Review of Systems   Constitutional:  Positive for fatigue.   Musculoskeletal:  Positive for back pain.   Neurological:  Positive for weakness.   All other systems reviewed and are negative.    Objective:     Vital Signs (Most Recent):  Temp: 98.7 °F (37.1 °C) (07/15/25 0325)  Pulse: 98 (07/15/25 0530)  Resp: 13 (07/15/25 0530)  BP: 104/69 (07/15/25 0505)  SpO2: 100 % (07/15/25 0530) Vital Signs (24h Range):  Temp:  [97.8 °F (36.6 °C)-98.7 °F (37.1 °C)] 98.7 °F (37.1 °C)  Pulse:  []  98  Resp:  [13-22] 13  SpO2:  [92 %-100 %] 100 %  BP: ()/(59-96) 104/69     Weight: 79 kg (174 lb 2.6 oz)  Body mass index is 28.98 kg/m².     Physical Exam  Vitals reviewed.   Constitutional:       Appearance: She is ill-appearing.   HENT:      Head: Normocephalic.      Mouth/Throat:      Mouth: Mucous membranes are dry.      Pharynx: Oropharynx is clear.   Eyes:      General: Scleral icterus present.   Cardiovascular:      Rate and Rhythm: Normal rate and regular rhythm.      Pulses: Normal pulses.      Heart sounds: Normal heart sounds.   Pulmonary:      Effort: Pulmonary effort is normal.      Breath sounds: Normal breath sounds.   Abdominal:      General: There is distension.      Tenderness: There is abdominal tenderness.   Musculoskeletal:         General: Normal range of motion.      Cervical back: Normal range of motion.   Skin:     Capillary Refill: Capillary refill takes less than 2 seconds.      Coloration: Skin is jaundiced and pale.   Neurological:      Mental Status: She is alert and oriented to person, place, and time.   Psychiatric:         Mood and Affect: Mood normal.         Behavior: Behavior normal.                Significant Labs: All pertinent labs within the past 24 hours have been reviewed.  Recent Lab Results         07/14/25  1312   07/14/25  1206   07/14/25  0939        Unit Blood Type Code   2800            0600  [P]         Unit Expiration   [id number]            [id number]  [P]         Albumin   2.4   2.5       ALP   373   388       ALT   76   77       Anion Gap   15   16       Aniso   Moderate         Antibody ID   POS  Comment: Anti-JkB (Oliver)         AST   71   74       Baso #   0.04   0.07       Basophil %   0.3   0.4       Bilirubin Direct   4.5         BILIRUBIN TOTAL   6.1  Comment: For infants and newborns, interpretation of results should be based   on gestational age, weight and in agreement with clinical   observations.    Premature Infant recommended reference  ranges:   0-24 hours:  <8.0 mg/dL   24-48 hours: <12.0 mg/dL   3-5 days:    <15.0 mg/dL   6-29 days:   <15.0 mg/dL   6.2  Comment: For infants and newborns, interpretation of results should be based   on gestational age, weight and in agreement with clinical   observations.    Premature Infant recommended reference ranges:   0-24 hours:  <8.0 mg/dL   24-48 hours: <12.0 mg/dL   3-5 days:    <15.0 mg/dL   6-29 days:   <15.0 mg/dL       BUN   9   9       Calcium   8.2   8.7       Chloride   103   104       CO2   22   19       Creatinine   0.7   0.7       Crossmatch Interpretation   Compatible            Compatible  [P]         DISPENSE STATUS   Transfused            Issued  [P]         eGFR   >60  Comment: Estimated GFR calculated using the CKD-EPI creatinine (2021) equation.   >60  Comment: Estimated GFR calculated using the CKD-EPI creatinine (2021) equation.       Eos #   0.04   0.12       Eos %   0.3   0.7       Fragmented Cells   Occasional         Glucose   130   135       Gran # (ANC)   11.06   12.57       Group & Rh   AB NEG         Hematocrit   13.8   15.7       Hemoglobin   4.2   4.7       Extra Tube   Hold for add-ons.  Comment: Auto resulted.            Hypo   Occasional         Immature Grans (Abs)   0.11  Comment: Mild elevation in immature granulocytes is non specific and can be seen in a variety of conditions including stress response, acute inflammation, trauma and pregnancy. Correlation with other laboratory and clinical findings is essential.   0.09  Comment: Mild elevation in immature granulocytes is non specific and can be seen in a variety of conditions including stress response, acute inflammation, trauma and pregnancy. Correlation with other laboratory and clinical findings is essential.       Immature Granulocytes   0.8   0.6       INDIRECT RUPESH   POS         Iron   12         Iron Saturation   5         Lymph #   1.77   2.27       Lymph %   12.7   14.1       MCH   21.2   21.4       MCHC    30.4   29.9       MCV   70   71       Mono #   0.93   1.01       Mono %   6.7   6.3       MPV   9.9   10.5       Neut %   79.2   77.9       nRBC   0   0       Occult Blood Positive           Platelet Estimate   Appears Normal         Platelet Count   614   700       Poikilocytosis   Slight         Poly   Occasional         Potassium   3.1   3.3       Product Code   [id number]            [id number]  [P]         PROTEIN TOTAL   6.7   7.1       RBC   1.98   2.20       RDW   21.1   21.0       Retic   6.2         Sodium   140   139       Specimen Outdate   07/17/2025 23:59         Target Cells   Occasional         Teardrop Cells   Moderate         TIBC   252         Transferrin   170         Unit ABO/Rh   AB NEG            A NEG  [P]         UNIT NUMBER   [id number]            [id number]  [P]         WBC   13.95   16.13                [P] - Preliminary Result               Significant Imaging: I have reviewed all pertinent imaging results/findings within the past 24 hours.  I have reviewed and interpreted all pertinent imaging results/findings within the past 24 hours.

## 2025-07-15 NOTE — CONSULTS
"[facility] Consult      Consult: c/f GIB    HPI (medical records reviewed and summarized below)   54 y.o. 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 Denies melena, hematochezia, BRBPR, hematemesis or any other overt bleeding. No NSAID usage. Was told she was iron deficient in the past, not on any supplementation.     Past Medical History  Duodenal adenocarcinoma  HTN  HLD  DM2    Past Surgical History  Gastrojejunostomy 5/2025  Liver bx 5/2025  Exlap 5/25  Hysterectomy       Family History  No family hx of gastric or colonic malignancy.       Physical Examination  /70   Pulse 102   Temp 98.8 °F (37.1 °C)   Resp 15   Ht 5' 5" (1.651 m)   Wt 79 kg (174 lb 2.6 oz)   SpO2 (!) 94%   Breastfeeding No   BMI 28.98 kg/m²   General appearance: awake, alert, no distress  Eyes: Normal conjunctiva, no scleral icterus  Abdomen: soft, non-tender; non-distended  Skin: No petechiae or bruising     Endoscopic Hx:  EGD 4/2025- medium ulcerated mass with stigmata recent bleeding in second portion of duodenum  EGD 5/2025-Malignant duodenal mass with active bleeding treated with purastat gel for hemostasis.     Labs:  Recent Labs   Lab 07/14/25  0939 07/14/25  1206 07/15/25  0719 07/15/25  0812   WBC 16.13* 13.95*  --  16.71*   HGB 4.7* 4.2*  --  6.9*   HCT 15.7* 13.8*  --  21.5*   * 614*  --  458*    140 137  --    K 3.3* 3.1* 4.5  --     103 109  --    CREATININE 0.7 0.7 0.6  --    BUN 9 9 7  --    CO2 19* 22* 20*  --    ALT 77* 76* 66*  --    AST 74* 71* 83*  --      5/7 Ferritin 8.0  7/14 % sat 5    Imaging:  No extravasation of contrast, postoperative change consistent with gastrojejunostomy.  Large ill-defined mass in the region the head of the pancreas and duodenum is more ill defined today with some surrounding fat " stranding most likely neoplastic.  Innumerable lesions throughout the liver consistent with extensive metastatic disease.  per my independent interpretation.    Assessment:   Aury Felix is a 54 year old female with past medical history of known adenocarcinoma of 2nd portion duodenum s/p gastrojujenostomy presenting with dyspnea found to be profoundly anemic with hgb 4.2, MCV 70, ferritin 1 month ago 8 s/p pRBC. No signs of overt bleeding. Not on any NSAIDs. May warrant EGD to evaluate anastomosis for ulceration.  (Acute on Chronic illness that poses a threat to life/bodily function )    Plan:   -Maintain 2 large bore IVs   -Trend CBC q8 hrs, transfuse for hgb<7, check response to transfusions   -likely needs parental iron in setting of DAVID  -Continue PPI IV BID  -Strict I/Os, monitor for blood in stool   -Replete electrolytes PRN, including Mg/Phos/K   -SCDs for DVT ppx   -Please notify GI team if any recurrent overt bleeding, to reassess for timing of endoscopy      Case to be discussed with Dr. Hoang.    Nae Da Silva, DO  [facility] PGY2

## 2025-07-15 NOTE — SUBJECTIVE & OBJECTIVE
Current Facility-Administered Medications on File Prior to Encounter   Medication    [COMPLETED] calcium gluconate 1 g in NS IVPB (premixed)    [COMPLETED] HYDROcodone-acetaminophen 5-325 mg per tablet 1 tablet    [COMPLETED] HYDROmorphone injection 1 mg    [COMPLETED] iohexoL (OMNIPAQUE 350) injection 100 mL    [COMPLETED] ondansetron injection 4 mg    [COMPLETED] oxyCODONE-acetaminophen 5-325 mg per tablet 1 tablet    [COMPLETED] potassium chloride SA CR tablet 40 mEq    [COMPLETED] sodium chloride 0.9% bolus 1,000 mL 1,000 mL    [DISCONTINUED] 0.9%  NaCl infusion (for blood administration)     Current Outpatient Medications on File Prior to Encounter   Medication Sig    aspirin (ECOTRIN) 81 MG EC tablet Take 1 tablet (81 mg total) by mouth once daily. (Patient not taking: Reported on 7/14/2025)    blood-glucose meter kit Use as instructed (Patient not taking: Reported on 7/14/2025)    dicyclomine (BENTYL) 10 MG capsule Take 1 capsule (10 mg total) by mouth 4 (four) times daily as needed. (Patient not taking: Reported on 7/14/2025)    empagliflozin (JARDIANCE) 25 mg tablet Take 1 tablet (25 mg total) by mouth once daily.    insulin glargine U-100, Lantus, 100 unit/mL (3 mL) SubQ InPn pen Inject 20 Units into the skin every evening.    lisinopriL (PRINIVIL,ZESTRIL) 20 MG tablet Take 1 tablet (20 mg total) by mouth once daily.    oxyCODONE (ROXICODONE) 5 MG immediate release tablet Take 1 tablet (5 mg total) by mouth every 6 (six) hours as needed. (Patient not taking: Reported on 7/14/2025)    oxyCODONE (ROXICODONE) 5 MG immediate release tablet Take 1 tablet (5 mg total) by mouth every 4 (four) hours as needed for Pain.    oxyCODONE-acetaminophen (PERCOCET) 5-325 mg per tablet Take 1 tablet by mouth every 6 (six) hours as needed for Pain. (Patient not taking: Reported on 7/14/2025)    pantoprazole (PROTONIX) 40 MG tablet Take 1 tablet (40 mg total) by mouth once daily.    rosuvastatin (CRESTOR) 40 MG Tab Take 1  tablet (40 mg total) by mouth once daily.       Review of patient's allergies indicates:  No Known Allergies    Past Medical History:   Diagnosis Date    Cancer     Diabetes mellitus     Hyperlipidemia     Hypertension      Past Surgical History:   Procedure Laterality Date    ESOPHAGOGASTRODUODENOSCOPY N/A 4/10/2025    Procedure: EGD (ESOPHAGOGASTRODUODENOSCOPY);  Surgeon: Manny Calles MD;  Location: [facility];  Service: Endoscopy;  Laterality: N/A;    ESOPHAGOGASTRODUODENOSCOPY N/A 5/21/2025    Procedure: EGD (ESOPHAGOGASTRODUODENOSCOPY);  Surgeon: Manny Calles MD;  Location: [facility] ENDO;  Service: Endoscopy;  Laterality: N/A;    GASTROJEJUNOSTOMY N/A 5/27/2025    Procedure: GASTROJEJUNOSTOMY;  Surgeon: William Reyes MD;  Location: [facility] OR;  Service: General;  Laterality: N/A;    HYSTERECTOMY      LAPAROTOMY, EXPLORATORY Bilateral 5/27/2025    Procedure: LAPAROTOMY, EXPLORATORY;  Surgeon: William Reyes MD;  Location: [facility] OR;  Service: General;  Laterality: Bilateral;  will need BK US Guide probe BK US T probe [id number] AM 5/23    LIVER BIOPSY  5/27/2025    Procedure: BIOPSY, LIVER;  Surgeon: William Reyes MD;  Location: [facility];  Service: General;;     Family History    None       Tobacco Use    Smoking status: Never    Smokeless tobacco: Never   Substance and Sexual Activity    Alcohol use: Yes    Drug use: Never    Sexual activity: Yes     Review of Systems   Constitutional:  Positive for fatigue. Negative for fever.   Gastrointestinal:  Negative for abdominal pain, blood in stool, nausea and vomiting.   Skin:         Pruritus      Objective:     Vital Signs (Most Recent):  Temp: 98.8 °F (37.1 °C) (07/15/25 1101)  Pulse: 102 (07/15/25 1101)  Resp: 15 (07/15/25 1101)  BP: 111/70 (07/15/25 1101)  SpO2: (!) 94 % (07/15/25 1101) Vital Signs (24h Range):  Temp:  [97.8 °F (36.6 °C)-98.8 °F (37.1 °C)] 98.8 °F (37.1 °C)  Pulse:  [] 102  Resp:  [13-22] 15  SpO2:  [92 %-100 %]  94 %  BP: ()/(59-96) 111/70     Weight: 79 kg (174 lb 2.6 oz)  Body mass index is 28.98 kg/m².     Physical Exam  Constitutional:       Appearance: Normal appearance.   Eyes:      General: Scleral icterus present.   Cardiovascular:      Rate and Rhythm: Tachycardia present.   Pulmonary:      Effort: Pulmonary effort is normal.   Abdominal:      General: Abdomen is flat. A surgical scar is present.      Palpations: Abdomen is soft.      Comments: Well healed surgical scar   Neurological:      Mental Status: She is alert.            I have reviewed all pertinent lab results within the past 24 hours.  CBC:   Recent Labs   Lab 07/15/25  0812   WBC 16.71*   RBC 2.83*   HGB 6.9*   HCT 21.5*   *   MCV 76*   MCH 24.4*   MCHC 32.1       Significant Diagnostics:  I have reviewed all pertinent imaging results/findings within the past 24 hours.

## 2025-07-15 NOTE — PLAN OF CARE
TN met with pt at bedside to discuss discharge planning. Pt stated she has cancer and will start chemo soon. Also stated she is waiting to get her Medicaid approved. Presently uninsured. Pt is independent with ADL's. Pt has a glucometer machine and a pulse ox. Not current with HH services. Upon discharge, pts family member Rafaela Bledsoe (sister) will provide transport home. Pt made aware to contact CM with any questions or concerns. CM name and number available on pt white board and will continue to follow and assist with any discharge needs.       Future Appointments   Date Time Provider Department Center   7/28/2025  6:05 AM [facility] LAB [facility]   7/28/2025 10:00 AM Say Stevens MD [facility] CCCLIN [facility] CCC   11/4/2025  2:00 PM Nicolette Murillo NP [facility] ENDOCRN [facility] ACC   12/22/2025  8:30 AM OPHTHALMOLOGY GENERAL, [facility] JOSH       Iron - Intensive Care  Initial Discharge Assessment       Primary Care Provider: Jimbo Menard II, MD    Admission Diagnosis: Severe anemia [D64.9]  GIB (gastrointestinal bleeding) [K92.2]    Admission Date: 7/15/2025  Expected Discharge Date: 7/18/2025    Transition of Care Barriers: (P) Unisured    Payor: /     Extended Emergency Contact Information  Primary Emergency Contact: Neftali Felix  Address: [address] [place] [address]           [place] [address] [facility]  Home Phone: [phone]  Mobile Phone: [phone]  Relation: Daughter    Discharge Plan A: (P) Home, Home with family         [id number] - [place] - [address]  [address] [place], [place] [address]  [place] [address]  Phone: [phone] Fax: [phone]    [id number] - [place] - [address]  [address]  [place] [address]  Phone: [phone] Fax: [phone]    [facility]  [address]  [place] [address]  Phone: [phone] Fax: [phone]      Initial Assessment (most recent)       Adult Discharge Assessment -  07/15/25 1327          Discharge Assessment    Assessment Type Discharge Planning Assessment     Confirmed/corrected address, phone number and insurance Yes     Confirmed Demographics Correct on Facesheet     Source of Information patient     Communicated MAIDA with patient/caregiver Date not available/Unable to determine     People in Home significant other;child(shona), adult     Name(s) of People in Home Gigi(BEAR) and adult son Kimo     Facility Arrived From: [facility]     Do you expect to return to your current living situation? Yes     Do you have help at home or someone to help you manage your care at home? Yes     Who are your caregiver(s) and their phone number(s)? Neftali Felix (daughter) [phone]     Prior to hospitilization cognitive status: Alert/Oriented     Current cognitive status: Alert/Oriented     Walking or Climbing Stairs Difficulty no     Dressing/Bathing Difficulty no     Equipment Currently Used at Home glucometer   Pulse Ox    Readmission within 30 days? No (P)      Patient currently being followed by outpatient case management? No (P)      Do you currently have service(s) that help you manage your care at home? No (P)      Do you take prescription medications? Yes (P)      Do you have prescription coverage? No (P)      Do you have any problems affording any of your prescribed medications? TBD (P)      Is the patient taking medications as prescribed? yes (P)      Who is going to help you get home at discharge? Rafaela Felix (sister) (P)      How do you get to doctors appointments? car, drives self (P)      Are you on dialysis? No (P)      Do you take coumadin? No (P)      Discharge Plan A Home;Home with family (P)      DME Needed Upon Discharge  none (P)      Discharge Plan discussed with: Patient (P)      Transition of Care Barriers Unisured (P)         Physical Activity    On average, how many days per week do you engage in moderate to strenuous exercise (like a brisk walk)?  0 days (P)      On average, how many minutes do you engage in exercise at this level? 0 min (P)         Financial Resource Strain    How hard is it for you to pay for the very basics like food, housing, medical care, and heating? Somewhat hard (P)         Housing Stability    In the last 12 months, was there a time when you were not able to pay the mortgage or rent on time? No (P)      At any time in the past 12 months, were you homeless or living in a shelter (including now)? No (P)         Transportation Needs    In the past 12 months, has lack of transportation kept you from medical appointments or from getting medications? No (P)      In the past 12 months, has lack of transportation kept you from meetings, work, or from getting things needed for daily living? No (P)         Food Insecurity    Within the past 12 months, you worried that your food would run out before you got the money to buy more. Never true (P)      Within the past 12 months, the food you bought just didn't last and you didn't have money to get more. Never true (P)         Stress    Do you feel stress - tense, restless, nervous, or anxious, or unable to sleep at night because your mind is troubled all the time - these days? To some extent (P)         Social Isolation    How often do you feel lonely or isolated from those around you?  Never (P)         Alcohol Use    Q1: How often do you have a drink containing alcohol? Never (P)      Q2: How many drinks containing alcohol do you have on a typical day when you are drinking? Patient does not drink (P)      Q3: How often do you have six or more drinks on one occasion? Never (P)         [employer]    In the past 12 months has the electric, gas, oil, or water company threatened to shut off services in your home? No (P)         Health Literacy    How often do you need to have someone help you when you read instructions, pamphlets, or other written material from your doctor or pharmacy? Never (P)

## 2025-07-15 NOTE — CONSULTS
Consult  [facility] Medicine Note  Admit Date: 7/15/2025  Today's Date: 07/15/2025    SUBJECTIVE:   Brief HPI: Aury Felix is a 54 y.o. with a PMHx of invasive adenocarcinoma of duodenum, T2DM, HLD, HTN, and anemia who presents as a transfer for [facility] for symptomatic anemia 2/ to possible GI bleed. She was last admitted at [facility] on  -  due to hematemesis and underwent emergent liver biopsy, gastrojujenostomy, and exploratory laparotomy. Patient was seen in clinic with Dr. Reyes for a post op appointment and was noted to have a low hemoglobin of 4.7 on follow-up labs. She reported back pain and occasional incisional pain. She endorsed weakness and SOB that began last Friday ().  She denies chest pain, palpitations, syncope, vaginal bleeding, hematemesis, bloody stool or urine. She has received 2u of pRBC. Patient has been hemodynamically stable since admit.     Plan:   Neuro/Psych  No known issues at this time.  Alert and oriented      CV  HTN/HLD  BP: ()/(59-96) 104/69   Home meds:  lisinopril and statin   Plan:  Home meds held due to low BP and acute blood loss     Pulm  No known issues at this time.     FEN/GI  E: Na:137  K:4.5 M.1 Calcium 8.7 (corrected)  N:Heart healthy   Stress ulcer prophylaxis: Protonix  Possible GI bleed  Patient received 2u pRBCs. Post transfusion H/H 6.9/21.5  CT:Postoperative changes. Evidence of metastatic disease. No indication of acute hemorrhage.   Plan:  Will transfuse if Hgb is <7 or if patient has rapid bleeding leading to hemodynamic instability  Protonix 40mg BID   GI consulted.   Invasive adenocarcinoma of duodenum  S/p gastrojejunostomy and exploratory laparotomy (2025)  Plan:  Heme/onc and gen surg consulted.     RENAL  BUN/Cr: 9/0.7 baseline 0.7 ; stable   Plan:  Continue to monitor renal status and urine output     Heme  H/H 4.2/13.8 > 2u pRBCs > 6.9/21.5  WBC 14  DVT prophylaxis: SCDs  Acute blood loss  anemia/DAVID  Will transfuse if Hgb is <7 or if patient has rapid bleeding leading to hemodynamic instability  Consider PO iron     Endo  T2DM  Last A1C 6.0  Plan:  BG goal 140-180  Home regimen:Jardiance 25 mg, Lantus 20u   SSI while inpatient.     ID  No known issues at this time.    Rheum/MSK  Back pain  Plan:  Home regimen: Percocet 5mg , Oxy 5mg PRN Oxy   Oxy 5 mg, Dilaudid 1mg PRN inpatient     Feeding: Heart Healthy   Analgesia: Oxy 5 mg, Dilaudid 1mg PRN  Sedation: none  Thrombo PPX: SCDs  Head of Bed: > 30 degrees  Ulcer PPX: Protonix  Glucose: goal 140-180s  SBT/SAT: none  Bowel Regimen: N/A  Indwelling Lines: 2 PIVs  Abx: not indicated      Code Status: Full code    Disp: Step down to floor.     Octavia Chan MD  [facility] - PGY1     OBJECTIVE:     Vital Signs Trends/Hx Reviewed  Vitals:    07/15/25 0500 07/15/25 0505 07/15/25 0515 07/15/25 0530   BP: 104/69 104/69     BP Location:  Left arm     Patient Position:  Lying     Pulse: 100 100 98 98   Resp: 13 14 13 13   Temp:       TempSrc:       SpO2: 100% 100% 100% 100%   Weight:       Height:                    Physical Exam  Constitutional:       Appearance: Normal appearance.   Cardiovascular:      Rate and Rhythm: Normal rate and regular rhythm.   Pulmonary:      Effort: Pulmonary effort is normal. No respiratory distress.      Breath sounds: Normal breath sounds.   Abdominal:      General: There is no distension.      Palpations: Abdomen is soft.      Tenderness: There is no abdominal tenderness.      Comments: Incision c/di    Skin:     General: Skin is warm and dry.   Neurological:      Mental Status: She is alert and oriented to person, place, and time.         Laboratory:  Recent Labs   Lab 07/14/25  1206   WBC 13.95*   RBC 1.98*   HGB 4.2*   HCT 13.8*   *   MCV 70*   MCH 21.2*   MCHC 30.4*     Recent Labs   Lab 07/14/25  1206      K 3.1*      CO2 22*   BUN 9   CREATININE 0.7   CALCIUM 8.2*     No results for input(s):  ""PH", "PCO2", "PO2", "HCO3", "POCSATURATED", "BE" in the last 24 hours.      CTA: Interval postoperative change consistent with gastrojejunostomy. Large ill-defined mass in the region the head of the pancreas and duodenum is more ill defined today with some surrounding fat stranding most likely neoplastic. Interval development also small volume of ascites and of innumerable lesions throughout the liver consistent with extensive metastatic disease. No active extravasation of contrast indicate acute hemorrhage.      "

## 2025-07-15 NOTE — H&P
"  [facility] Medicine  History & Physical    Patient Name: Aury Felix  MRN: [id number]  Patient Class: IP- Inpatient  Admission Date: 7/15/2025  Attending Physician: Tiny Palm*   Primary Care Provider: Jimbo Menard II, MD         Patient information was obtained from patient and ER records.     Subjective:     Principal Problem:<principal problem not specified>    Chief Complaint: No chief complaint on file.       HPI:   Ms. 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  Pathology: Invasive adenocarcinoma, poorly differentiated.  Surrounding small intestinal mucosa without dysplasia.      She was last admitted at [facility] on 5/27 - 5/31 due to hematemesis and underwent emergent liver biopsy, gastrojujenostomy, and exploratory laparotomy.  She followed up with Dr. Reyes, General Surgery, in clinic today due to back pain.  She was noted to have elevated LFT's and jaundice.  He sent her to the ED from clinic for admission after labs showed anemia, Hg 4.7g.     In the ED she had repeat labs with the Hgb dropping to 4.2g.  Per ED, 2 units has been ordered but this patient has a multiple antibody issue.  They are awaiting blood from [place].     VS in the ED were BP 98/65 -> 158/95 -> 11/59 -> 120/80   Pulse 124 -> 108   Temp 98.7 °F (37.1 °C) (Oral)   Resp 20   Ht 5' 8" (1.727 m)   Wt 74.7 kg (164 lb 10.9 oz)   SpO2 99%   Breastfeeding No   BMI 25.04 kg/m².     Labs: WBC 16 -> 14, Hg 4.7 -> 4.2, , retic 6.2, K 3.1, Cr 0.7, Gluc 130, , Alb 2.4, TB 6.1, DB 4.5, AST 71, ALT 76, no UA, heme-occult positive.     CTA GIB study 7/14: Interval postoperative change consistent with gastrojejunostomy. Large ill-defined mass in the region the head of the pancreas and duodenum is more ill defined today with some surrounding fat stranding most likely neoplastic. Interval development also " small volume of ascites and of innumerable lesions throughout the liver consistent with extensive metastatic disease. No active extravasation of contrast indicate acute hemorrhage    On arrival to [place] patient completing 2nd unit of PRBC.  Only complaint is back pain.  Reports she saw General surgery today for postop follow up as she has been having shortness a breath.  Reports she has heme Onc appointment on the 17th.  Patient will be admitted to Hospital Medicine we will consult GI    Past Medical History:   Diagnosis Date    Cancer     Diabetes mellitus     Hyperlipidemia     Hypertension        Past Surgical History:   Procedure Laterality Date    ESOPHAGOGASTRODUODENOSCOPY N/A 4/10/2025    Procedure: EGD (ESOPHAGOGASTRODUODENOSCOPY);  Surgeon: Manny Calles MD;  Location: [facility];  Service: Endoscopy;  Laterality: N/A;    ESOPHAGOGASTRODUODENOSCOPY N/A 5/21/2025    Procedure: EGD (ESOPHAGOGASTRODUODENOSCOPY);  Surgeon: Manny Calles MD;  Location: [facility] ENDO;  Service: Endoscopy;  Laterality: N/A;    GASTROJEJUNOSTOMY N/A 5/27/2025    Procedure: GASTROJEJUNOSTOMY;  Surgeon: William Reyes MD;  Location: [facility] OR;  Service: General;  Laterality: N/A;    HYSTERECTOMY      LAPAROTOMY, EXPLORATORY Bilateral 5/27/2025    Procedure: LAPAROTOMY, EXPLORATORY;  Surgeon: William Reyes MD;  Location: [facility] OR;  Service: General;  Laterality: Bilateral;  will need BK US Guide probe BK US T probe [id number] AM 5/23    LIVER BIOPSY  5/27/2025    Procedure: BIOPSY, LIVER;  Surgeon: William Reyes MD;  Location: [facility] OR;  Service: General;;       Review of patient's allergies indicates:  No Known Allergies    Current Facility-Administered Medications on File Prior to Encounter   Medication    [COMPLETED] calcium gluconate 1 g in NS IVPB (premixed)    [COMPLETED] HYDROcodone-acetaminophen 5-325 mg per tablet 1 tablet    [COMPLETED] HYDROmorphone injection 1 mg    [COMPLETED] iohexoL  (OMNIPAQUE 350) injection 100 mL    [COMPLETED] ondansetron injection 4 mg    [COMPLETED] oxyCODONE-acetaminophen 5-325 mg per tablet 1 tablet    [COMPLETED] pantoprazole injection 80 mg    [COMPLETED] potassium chloride SA CR tablet 40 mEq    [COMPLETED] sodium chloride 0.9% bolus 1,000 mL 1,000 mL    [DISCONTINUED] 0.9%  NaCl infusion (for blood administration)     Current Outpatient Medications on File Prior to Encounter   Medication Sig    aspirin (ECOTRIN) 81 MG EC tablet Take 1 tablet (81 mg total) by mouth once daily. (Patient not taking: Reported on 7/14/2025)    blood-glucose meter kit Use as instructed (Patient not taking: Reported on 7/14/2025)    dicyclomine (BENTYL) 10 MG capsule Take 1 capsule (10 mg total) by mouth 4 (four) times daily as needed. (Patient not taking: Reported on 7/14/2025)    empagliflozin (JARDIANCE) 25 mg tablet Take 1 tablet (25 mg total) by mouth once daily.    insulin glargine U-100, Lantus, 100 unit/mL (3 mL) SubQ InPn pen Inject 20 Units into the skin every evening.    lisinopriL (PRINIVIL,ZESTRIL) 20 MG tablet Take 1 tablet (20 mg total) by mouth once daily.    oxyCODONE (ROXICODONE) 5 MG immediate release tablet Take 1 tablet (5 mg total) by mouth every 6 (six) hours as needed. (Patient not taking: Reported on 7/14/2025)    oxyCODONE (ROXICODONE) 5 MG immediate release tablet Take 1 tablet (5 mg total) by mouth every 4 (four) hours as needed for Pain.    oxyCODONE-acetaminophen (PERCOCET) 5-325 mg per tablet Take 1 tablet by mouth every 6 (six) hours as needed for Pain. (Patient not taking: Reported on 7/14/2025)    pantoprazole (PROTONIX) 40 MG tablet Take 1 tablet (40 mg total) by mouth once daily.    rosuvastatin (CRESTOR) 40 MG Tab Take 1 tablet (40 mg total) by mouth once daily.     Family History    None       Tobacco Use    Smoking status: Never    Smokeless tobacco: Never   Substance and Sexual Activity    Alcohol use: Yes    Drug use: Never    Sexual activity: Yes      Review of Systems   Constitutional:  Positive for fatigue.   Musculoskeletal:  Positive for back pain.   Neurological:  Positive for weakness.   All other systems reviewed and are negative.    Objective:     Vital Signs (Most Recent):  Temp: 98.7 °F (37.1 °C) (07/15/25 0325)  Pulse: 98 (07/15/25 0530)  Resp: 13 (07/15/25 0530)  BP: 104/69 (07/15/25 0505)  SpO2: 100 % (07/15/25 0530) Vital Signs (24h Range):  Temp:  [97.8 °F (36.6 °C)-98.7 °F (37.1 °C)] 98.7 °F (37.1 °C)  Pulse:  [] 98  Resp:  [13-22] 13  SpO2:  [92 %-100 %] 100 %  BP: ()/(59-96) 104/69     Weight: 79 kg (174 lb 2.6 oz)  Body mass index is 28.98 kg/m².     Physical Exam  Vitals reviewed.   Constitutional:       Appearance: She is ill-appearing.   HENT:      Head: Normocephalic.      Mouth/Throat:      Mouth: Mucous membranes are dry.      Pharynx: Oropharynx is clear.   Eyes:      General: Scleral icterus present.   Cardiovascular:      Rate and Rhythm: Normal rate and regular rhythm.      Pulses: Normal pulses.      Heart sounds: Normal heart sounds.   Pulmonary:      Effort: Pulmonary effort is normal.      Breath sounds: Normal breath sounds.   Abdominal:      General: There is distension.      Tenderness: There is abdominal tenderness.   Musculoskeletal:         General: Normal range of motion.      Cervical back: Normal range of motion.   Skin:     Capillary Refill: Capillary refill takes less than 2 seconds.      Coloration: Skin is jaundiced and pale.   Neurological:      Mental Status: She is alert and oriented to person, place, and time.   Psychiatric:         Mood and Affect: Mood normal.         Behavior: Behavior normal.                Significant Labs: All pertinent labs within the past 24 hours have been reviewed.  Recent Lab Results         07/14/25  1312   07/14/25  1206   07/14/25  0939        Unit Blood Type Code   2800            0600  [P]         Unit Expiration   [id number]            [id number]  [P]          Albumin   2.4   2.5       ALP   373   388       ALT   76   77       Anion Gap   15   16       Aniso   Moderate         Antibody ID   POS  Comment: Anti-JkB ([place])         AST   71   74       Baso #   0.04   0.07       Basophil %   0.3   0.4       Bilirubin Direct   4.5         BILIRUBIN TOTAL   6.1  Comment: For infants and newborns, interpretation of results should be based   on gestational age, weight and in agreement with clinical   observations.    Premature Infant recommended reference ranges:   0-24 hours:  <8.0 mg/dL   24-48 hours: <12.0 mg/dL   3-5 days:    <15.0 mg/dL   6-29 days:   <15.0 mg/dL   6.2  Comment: For infants and newborns, interpretation of results should be based   on gestational age, weight and in agreement with clinical   observations.    Premature Infant recommended reference ranges:   0-24 hours:  <8.0 mg/dL   24-48 hours: <12.0 mg/dL   3-5 days:    <15.0 mg/dL   6-29 days:   <15.0 mg/dL       BUN   9   9       Calcium   8.2   8.7       Chloride   103   104       CO2   22   19       Creatinine   0.7   0.7       Crossmatch Interpretation   Compatible            Compatible  [P]         DISPENSE STATUS   Transfused            Issued  [P]         eGFR   >60  Comment: Estimated GFR calculated using the CKD-EPI creatinine (2021) equation.   >60  Comment: Estimated GFR calculated using the CKD-EPI creatinine (2021) equation.       Eos #   0.04   0.12       Eos %   0.3   0.7       Fragmented Cells   Occasional         Glucose   130   135       Gran # (ANC)   11.06   12.57       Group & Rh   AB NEG         Hematocrit   13.8   15.7       Hemoglobin   4.2   4.7       Extra Tube   Hold for add-ons.  Comment: Auto resulted.            Hypo   Occasional         Immature Grans (Abs)   0.11  Comment: Mild elevation in immature granulocytes is non specific and can be seen in a variety of conditions including stress response, acute inflammation, trauma and pregnancy. Correlation with other laboratory and  clinical findings is essential.   0.09  Comment: Mild elevation in immature granulocytes is non specific and can be seen in a variety of conditions including stress response, acute inflammation, trauma and pregnancy. Correlation with other laboratory and clinical findings is essential.       Immature Granulocytes   0.8   0.6       INDIRECT RUPESH   POS         Iron   12         Iron Saturation   5         Lymph #   1.77   2.27       Lymph %   12.7   14.1       MCH   21.2   21.4       MCHC   30.4   29.9       MCV   70   71       Mono #   0.93   1.01       Mono %   6.7   6.3       MPV   9.9   10.5       Neut %   79.2   77.9       nRBC   0   0       Occult Blood Positive           Platelet Estimate   Appears Normal         Platelet Count   614   700       Poikilocytosis   Slight         Poly   Occasional         Potassium   3.1   3.3       Product Code   E0897A21            [id number]  [P]         PROTEIN TOTAL   6.7   7.1       RBC   1.98   2.20       RDW   21.1   21.0       Retic   6.2         Sodium   140   139       Specimen Outdate   07/17/2025 23:59         Target Cells   Occasional         Teardrop Cells   Moderate         TIBC   252         Transferrin   170         Unit ABO/Rh   AB NEG            A NEG  [P]         UNIT NUMBER   [id number]            [id number]  [P]         WBC   13.95   16.13                [P] - Preliminary Result               Significant Imaging: I have reviewed all pertinent imaging results/findings within the past 24 hours.  I have reviewed and interpreted all pertinent imaging results/findings within the past 24 hours.  Assessment/Plan:     Assessment & Plan  Essential hypertension  Patient's blood pressure range in the last 24 hours was: BP  Min: 94/60  Max: 158/95.The patient's inpatient anti-hypertensive regimen is listed below:  Current Antihypertensives       Plan  - BP is controlled, no changes needed to their regimen  - hold antihypertensive given acute blood loss  GIB  (gastrointestinal bleeding)  Patient's hemorrhage is due to gastrointestinal bleed, this bleeding is not associated with a medication or a coagulopathy. Patients most recent Hgb, Hct, platelets, and INR are listed below.  Recent Labs     07/14/25  0939 07/14/25  1206   HGB 4.7* 4.2*   HCT 15.7* 13.8*   * 614*     Plan  - Will trend hemoglobin/hematocrit Every 8 hours  - Will monitor and correct any coagulation defects  - Will transfuse if Hgb is <7g/dl (<8g/dl in cases of active ACS) or if patient has rapid bleeding leading to hemodynamic instability  - given 2 units PRBC prior to transfer  -patient with positive indirect Thao  -started on PPI  -consult placed to GI  Duodenal cancer   Pathology: Invasive adenocarcinoma, poorly differentiated.  Surrounding small intestinal mucosa without dysplasia.     Has initial appointment on the 17th in [place]   We will consult heme Onc while inpatient    ABLA (acute blood loss anemia)  Anemia is likely due to acute blood loss which was from GI bleed. Most recent hemoglobin and hematocrit are listed below.  Recent Labs     07/14/25  0939 07/14/25  1206   HGB 4.7* 4.2*   HCT 15.7* 13.8*     Plan  - Monitor serial CBC: Every 8 hours  - Transfuse PRBC if patient becomes hemodynamically unstable, symptomatic or H/H drops below 7/21.  - Patient has received 2 units of PRBCs on 7/14/2025  - Patient's anemia is currently stable  - awaiting post transfusion H&H  -GI consulted  Transaminitis  Likely secondary to extensive metastatic disease in the liver with innumerable lesions.  LFTs    Heme Onc and GI both consulted    VTE Risk Mitigation (From admission, onward)           Ordered     IP VTE HIGH RISK PATIENT  Once         07/15/25 0629     Place sequential compression device  Until discontinued         07/15/25 0629     Place sequential compression device  Until discontinued         07/15/25 0354                    SDOH Screening:  The patient was screened for utility  difficulties, food insecurity, transport difficulties, housing insecurity, and interpersonal safety and there were no concerns identified this admission.            Critical care time spent on the evaluation and treatment of severe organ dysfunction, review of pertinent labs and imaging studies, discussions with consulting providers and discussions with patient/family: 25 minutes.             eICU Brief Admission Note       Briefly,         Video assessment :    Vitals reviewed   Afebrile, stable vitals     LABs reviewed       Radiology reviewed         Assessment / Plan :  #            DVT Px : Heparin SQ / Lovenox SQ   GI Px : PPI       Patient seen over video :   Chart reviewed :  Spoke with RN :                    Rakan Perkins NP  Department of Hospital Medicine  [place] - Intensive Care

## 2025-07-15 NOTE — ASSESSMENT & PLAN NOTE
Patient's hemorrhage is due to gastrointestinal bleed, this bleeding is not associated with a medication or a coagulopathy. Patients most recent Hgb, Hct, platelets, and INR are listed below.  Recent Labs     07/14/25  0939 07/14/25  1206   HGB 4.7* 4.2*   HCT 15.7* 13.8*   * 614*     Plan  - Will trend hemoglobin/hematocrit Every 8 hours  - Will monitor and correct any coagulation defects  - Will transfuse if Hgb is <7g/dl (<8g/dl in cases of active ACS) or if patient has rapid bleeding leading to hemodynamic instability  - given 2 units PRBC prior to transfer  -patient with positive indirect Thao  -started on PPI  -consult placed to GI

## 2025-07-15 NOTE — ASSESSMENT & PLAN NOTE
Aury Felix is a 54 y.o. female with with PMH of HTN, HLD, GI bleed with a nonresectable pancreatic adenocarcinoma with mets sp ex lap with GJ 5/27. T Bili 9. Prognosis is poor.     - No acute surgical intervention at this time  - GI evaluation for stent vs IR evaluation for yanira tube for biliary decompression  - recommend palliative care consult

## 2025-07-15 NOTE — EICU
Virtual ICU Quality Rounds    Admit Date: 7/15/2025  Hospital Day: 0    ICU Day: 12h    24H Vital Sign Range:  Temp:  [97.8 °F (36.6 °C)-98.8 °F (37.1 °C)]   Pulse:  []   Resp:  [12-22]   BP: ()/(59-96)   SpO2:  [92 %-100 %]     VICU Surveillance Screening                    LDA reconciliation : Yes

## 2025-07-15 NOTE — Clinical Note
CRNA and anesthesia MD present for intubation   A pre-sedation assessment was completed by the physician immediately prior to sedation start.

## 2025-07-15 NOTE — HPI
"  Ms. 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  Pathology: Invasive adenocarcinoma, poorly differentiated.  Surrounding small intestinal mucosa without dysplasia.      She was last admitted at [place] on 5/27 - 5/31 due to hematemesis and underwent emergent liver biopsy, gastrojujenostomy, and exploratory laparotomy.  She followed up with Dr. Reyes, General Surgery, in clinic today due to back pain.  She was noted to have elevated LFT's and jaundice.  He sent her to the ED from clinic for admission after labs showed anemia, Hg 4.7g.     In the ED she had repeat labs with the Hgb dropping to 4.2g.  Per ED, 2 units has been ordered but this patient has a multiple antibody issue.  They are awaiting blood from [place].     VS in the ED were BP 98/65 -> 158/95 -> 11/59 -> 120/80   Pulse 124 -> 108   Temp 98.7 °F (37.1 °C) (Oral)   Resp 20   Ht 5' 8" (1.727 m)   Wt 74.7 kg (164 lb 10.9 oz)   SpO2 99%   Breastfeeding No   BMI 25.04 kg/m².     Labs: WBC 16 -> 14, Hg 4.7 -> 4.2, , retic 6.2, K 3.1, Cr 0.7, Gluc 130, , Alb 2.4, TB 6.1, DB 4.5, AST 71, ALT 76, no UA, heme-occult positive.     CTA GIB study 7/14: Interval postoperative change consistent with gastrojejunostomy. Large ill-defined mass in the region the head of the pancreas and duodenum is more ill defined today with some surrounding fat stranding most likely neoplastic. Interval development also small volume of ascites and of innumerable lesions throughout the liver consistent with extensive metastatic disease. No active extravasation of contrast indicate acute hemorrhage    On arrival to [place] patient completing 2nd unit of PRBC.  Only complaint is back pain.  Reports she saw General surgery today for postop follow up as she has been having shortness a breath.  Reports she has heme Onc appointment on the 17th.  Patient will be admitted to Hospital Medicine " we will consult GI

## 2025-07-15 NOTE — PROGRESS NOTES
Virtual ICU Daily Checklist     Admit Date: 7/15/2025  Hospital Length of Stay:  LOS: 0 days     ICU Length of Stay 5h       ICU Daily Goals Checklist    F Feeding  Current Diet Order   No orders of the defined types were placed in this encounter.     Goal:    Status:         Appropriate       A Analgesia  Preferred Pain Scale: number (Numeric Rating Pain Scale)  PAIN : none   Appropriate     S Sedation  Target Arousal:    Last sedation level:       Appropriate     T Thromboembolism Prophylaxis      VTE Risk Mitigation (From admission, onward)           Ordered     IP VTE HIGH RISK PATIENT  Once         07/15/25 0629     Place sequential compression device  Until discontinued         07/15/25 0629     Place sequential compression device  Until discontinued         07/15/25 0354                ,   Anticoagulants       None            Appropriate     H Head of Bed/Mobility  HOB > 300 Head of Bed (HOB) Positioning: HOB at 30-45 degrees Appropriate   U Stress Ulcer Prophylaxis (if needed)  PPI    GI Medications (From admission, onward)      Start     Stop Route Frequency Ordered    07/15/25 0900  pantoprazole injection 40 mg         -- IV 2 times daily 07/15/25 0630    07/15/25 0451  ondansetron injection 4 mg         -- IV Every 8 hours PRN 07/15/25 0354           Appropriate   G Glucose control     Glycemic Management: blood glucose monitored  Recent Labs   Lab 07/15/25  0751   POCTGLUCOSE 104     managed    Appropriate       B Bowel Movement   Last Bowel Movement: 07/13/25     Appropriate   I Indwelling Lines  Lines/Drains/Airways       Drain  Duration             Female External Urinary Catheter w/ Suction 07/15/25 0327 <1 day              Peripheral Intravenous Line  Duration             Peripheral IV 07/14/25 1154 20 G Right Antecubital <1 day    Peripheral IV 07/14/25 1205 18 G Anterior;Left Forearm <1 day                    Appropriate    Addison score   Addison Score: 22   Appropriate   D Deescalation of  Antibiotics  Antibiotics (From admission, onward)      None         Appropriate      Goals of Care  Full Code    Appropriate    PLAN FOR THE DAY Summary  Monitoring H/H  2.  3.         Peggy Gandara RN

## 2025-07-15 NOTE — ASSESSMENT & PLAN NOTE
Anemia is likely due to acute blood loss which was from GI bleed. Most recent hemoglobin and hematocrit are listed below.  Recent Labs     07/14/25  0939 07/14/25  1206   HGB 4.7* 4.2*   HCT 15.7* 13.8*     Plan  - Monitor serial CBC: Every 8 hours  - Transfuse PRBC if patient becomes hemodynamically unstable, symptomatic or H/H drops below 7/21.  - Patient has received 2 units of PRBCs on 7/14/2025  - Patient's anemia is currently stable  - awaiting post transfusion H&H  -GI consulted

## 2025-07-15 NOTE — ASSESSMENT & PLAN NOTE
Patient's blood pressure range in the last 24 hours was: BP  Min: 94/60  Max: 158/95.The patient's inpatient anti-hypertensive regimen is listed below:  Current Antihypertensives       Plan  - BP is controlled, no changes needed to their regimen  - hold antihypertensive given acute blood loss

## 2025-07-16 PROBLEM — E87.1 HYPONATREMIA: Status: ACTIVE | Noted: 2025-07-16

## 2025-07-16 LAB
ABSOLUTE EOSINOPHIL (OHS): 0.15 K/UL
ABSOLUTE MONOCYTE (OHS): 1.46 K/UL (ref 0.3–1)
ABSOLUTE NEUTROPHIL COUNT (OHS): 17.23 K/UL (ref 1.8–7.7)
ALBUMIN SERPL BCP-MCNC: 2.2 G/DL (ref 3.5–5.2)
ALP SERPL-CCNC: 413 UNIT/L (ref 40–150)
ALT SERPL W/O P-5'-P-CCNC: 78 UNIT/L (ref 10–44)
ANION GAP (OHS): 11 MMOL/L (ref 8–16)
AST SERPL-CCNC: 94 UNIT/L (ref 11–45)
BASOPHILS # BLD AUTO: 0.07 K/UL
BASOPHILS NFR BLD AUTO: 0.3 %
BILIRUB SERPL-MCNC: 10.7 MG/DL (ref 0.1–1)
BILIRUB UR QL STRIP.AUTO: ABNORMAL
BUN SERPL-MCNC: 8 MG/DL (ref 6–20)
CALCIUM SERPL-MCNC: 8.5 MG/DL (ref 8.7–10.5)
CHLORIDE SERPL-SCNC: 103 MMOL/L (ref 95–110)
CLARITY UR: CLEAR
CO2 SERPL-SCNC: 20 MMOL/L (ref 23–29)
COLOR UR AUTO: YELLOW
CREAT SERPL-MCNC: 0.6 MG/DL (ref 0.5–1.4)
ERYTHROCYTE [DISTWIDTH] IN BLOOD BY AUTOMATED COUNT: 21.1 % (ref 11.5–14.5)
GFR SERPLBLD CREATININE-BSD FMLA CKD-EPI: >60 ML/MIN/1.73/M2
GLUCOSE SERPL-MCNC: 111 MG/DL (ref 70–110)
GLUCOSE UR QL STRIP: NEGATIVE
HCT VFR BLD AUTO: 28.2 % (ref 37–48.5)
HGB BLD-MCNC: 10.9 GM/DL (ref 12–16)
HGB BLD-MCNC: 9.1 GM/DL (ref 12–16)
HGB BLD-MCNC: 9.1 GM/DL (ref 12–16)
HGB BLD-MCNC: 9.3 GM/DL (ref 12–16)
HGB BLD-MCNC: 9.9 GM/DL (ref 12–16)
HGB UR QL STRIP: NEGATIVE
IMM GRANULOCYTES # BLD AUTO: 0.2 K/UL (ref 0–0.04)
IMM GRANULOCYTES NFR BLD AUTO: 1 % (ref 0–0.5)
KETONES UR QL STRIP: NEGATIVE
LEUKOCYTE ESTERASE UR QL STRIP: ABNORMAL
LYMPHOCYTES # BLD AUTO: 1.56 K/UL (ref 1–4.8)
MCH RBC QN AUTO: 24.7 PG (ref 27–31)
MCHC RBC AUTO-ENTMCNC: 32.3 G/DL (ref 32–36)
MCV RBC AUTO: 76 FL (ref 82–98)
MICROSCOPIC COMMENT: ABNORMAL
NITRITE UR QL STRIP: NEGATIVE
NUCLEATED RBC (/100WBC) (OHS): 0 /100 WBC
PH UR STRIP: 6 [PH]
PLATELET # BLD AUTO: 458 K/UL (ref 150–450)
PMV BLD AUTO: 10.1 FL (ref 9.2–12.9)
POCT GLUCOSE: 125 MG/DL (ref 70–110)
POCT GLUCOSE: 130 MG/DL (ref 70–110)
POCT GLUCOSE: 131 MG/DL (ref 70–110)
POCT GLUCOSE: 131 MG/DL (ref 70–110)
POTASSIUM SERPL-SCNC: 4.9 MMOL/L (ref 3.5–5.1)
PROT SERPL-MCNC: 6.5 GM/DL (ref 6–8.4)
PROT UR QL STRIP: NEGATIVE
RBC # BLD AUTO: 3.69 M/UL (ref 4–5.4)
RBC #/AREA URNS AUTO: <1 /HPF (ref 0–4)
RELATIVE EOSINOPHIL (OHS): 0.7 %
RELATIVE LYMPHOCYTE (OHS): 7.5 % (ref 18–48)
RELATIVE MONOCYTE (OHS): 7.1 % (ref 4–15)
RELATIVE NEUTROPHIL (OHS): 83.4 % (ref 38–73)
SODIUM SERPL-SCNC: 134 MMOL/L (ref 136–145)
SP GR UR STRIP: 1.02
SQUAMOUS #/AREA URNS AUTO: 6 /HPF
UROBILINOGEN UR STRIP-ACNC: NEGATIVE EU/DL
WBC # BLD AUTO: 20.67 K/UL (ref 3.9–12.7)
WBC #/AREA URNS AUTO: 9 /HPF (ref 0–5)

## 2025-07-16 PROCEDURE — 36415 COLL VENOUS BLD VENIPUNCTURE: CPT | Performed by: HOSPITALIST

## 2025-07-16 PROCEDURE — 85018 HEMOGLOBIN: CPT | Performed by: HOSPITALIST

## 2025-07-16 PROCEDURE — 80053 COMPREHEN METABOLIC PANEL: CPT | Performed by: REGISTERED NURSE

## 2025-07-16 PROCEDURE — 11000001 HC ACUTE MED/SURG PRIVATE ROOM

## 2025-07-16 PROCEDURE — 36415 COLL VENOUS BLD VENIPUNCTURE: CPT

## 2025-07-16 PROCEDURE — 63600175 PHARM REV CODE 636 W HCPCS

## 2025-07-16 PROCEDURE — 82040 ASSAY OF SERUM ALBUMIN: CPT | Performed by: REGISTERED NURSE

## 2025-07-16 PROCEDURE — 85025 COMPLETE CBC W/AUTO DIFF WBC: CPT | Performed by: REGISTERED NURSE

## 2025-07-16 PROCEDURE — 36415 COLL VENOUS BLD VENIPUNCTURE: CPT | Performed by: REGISTERED NURSE

## 2025-07-16 PROCEDURE — 63600175 PHARM REV CODE 636 W HCPCS: Performed by: REGISTERED NURSE

## 2025-07-16 PROCEDURE — 81003 URINALYSIS AUTO W/O SCOPE: CPT

## 2025-07-16 PROCEDURE — 87040 BLOOD CULTURE FOR BACTERIA: CPT

## 2025-07-16 PROCEDURE — 25000003 PHARM REV CODE 250

## 2025-07-16 RX ORDER — LANOLIN ALCOHOL/MO/W.PET/CERES
1 CREAM (GRAM) TOPICAL DAILY
Status: DISCONTINUED | OUTPATIENT
Start: 2025-07-17 | End: 2025-07-19 | Stop reason: HOSPADM

## 2025-07-16 RX ORDER — TALC
6 POWDER (GRAM) TOPICAL NIGHTLY PRN
Status: DISCONTINUED | OUTPATIENT
Start: 2025-07-17 | End: 2025-07-19 | Stop reason: HOSPADM

## 2025-07-16 RX ADMIN — OXYCODONE HYDROCHLORIDE 5 MG: 5 TABLET ORAL at 08:07

## 2025-07-16 RX ADMIN — PIPERACILLIN SODIUM AND TAZOBACTAM SODIUM 4.5 G: 4; .5 INJECTION, POWDER, LYOPHILIZED, FOR SOLUTION INTRAVENOUS at 05:07

## 2025-07-16 RX ADMIN — HYDROMORPHONE HYDROCHLORIDE 1 MG: 1 INJECTION, SOLUTION INTRAMUSCULAR; INTRAVENOUS; SUBCUTANEOUS at 01:07

## 2025-07-16 RX ADMIN — PANTOPRAZOLE SODIUM 40 MG: 40 INJECTION, POWDER, FOR SOLUTION INTRAVENOUS at 08:07

## 2025-07-16 RX ADMIN — MUPIROCIN: 20 OINTMENT TOPICAL at 09:07

## 2025-07-16 RX ADMIN — HYDROMORPHONE HYDROCHLORIDE 1 MG: 1 INJECTION, SOLUTION INTRAMUSCULAR; INTRAVENOUS; SUBCUTANEOUS at 09:07

## 2025-07-16 RX ADMIN — HYDROMORPHONE HYDROCHLORIDE 1 MG: 1 INJECTION, SOLUTION INTRAMUSCULAR; INTRAVENOUS; SUBCUTANEOUS at 05:07

## 2025-07-16 RX ADMIN — MUPIROCIN: 20 OINTMENT TOPICAL at 08:07

## 2025-07-16 RX ADMIN — SODIUM CHLORIDE 125 MG: 9 INJECTION, SOLUTION INTRAVENOUS at 09:07

## 2025-07-16 RX ADMIN — PIPERACILLIN SODIUM AND TAZOBACTAM SODIUM 4.5 G: 4; .5 INJECTION, POWDER, LYOPHILIZED, FOR SOLUTION INTRAVENOUS at 09:07

## 2025-07-16 NOTE — PROGRESS NOTES
Pharmacist Renal Dose Adjustment Note    Aury Felix is a 54 y.o. female being treated with the medication Piperacillin-tazobactam    Patient Data:    Vital Signs (Most Recent):  Temp: 98.2 °F (36.8 °C) (07/16/25 0715)  Pulse: (!) 115 (07/16/25 0804)  Resp: (!) 21 (07/16/25 0804)  BP: 113/84 (07/16/25 0804)  SpO2: 96 % (07/16/25 0804) Vital Signs (72h Range):  Temp:  [97.8 °F (36.6 °C)-98.8 °F (37.1 °C)]   Pulse:  []   Resp:  [12-34]   BP: ()/(59-96)   SpO2:  [86 %-100 %]      Recent Labs   Lab 07/14/25  1206 07/15/25  0719 07/16/25  0432   CREATININE 0.7 0.6 0.6     Serum creatinine: 0.6 mg/dL 07/16/25 0432  Estimated creatinine clearance: 110 mL/min    Medication:Piperacillin-tazobactam dose: 4.5g frequency q6h will be changed to medication:Piperacillin-tazobactam dose:4.5g frequency:q8h    Pharmacist's Name: Xin Young  Pharmacist's Extension: [phone]

## 2025-07-16 NOTE — EICU
Virtual ICU Quality Rounds    Admit Date: 7/15/2025  Hospital Day: 1    ICU Day: 1d 7h    24H Vital Sign Range:  Temp:  [98 °F (36.7 °C)-98.8 °F (37.1 °C)]   Pulse:  []   Resp:  [12-34]   BP: ()/(62-87)   SpO2:  [86 %-99 %]     VICU Surveillance Screening    LDA reconciliation : Yes

## 2025-07-16 NOTE — ASSESSMENT & PLAN NOTE
Anemia is likely due to acute blood loss which was from GI bleed. Most recent hemoglobin and hematocrit are listed below.  Recent Labs     07/15/25  0812 07/15/25  1522 07/15/25  1954 07/16/25  0432 07/16/25  1202   HGB 6.9* 7.4*  7.5* 7.4* 9.1*  9.1* 9.3*   HCT 21.5* 23.0*  --  28.2*  --      Plan  - Monitor serial CBC: Every 8 hours  - Transfuse PRBC if patient becomes hemodynamically unstable, symptomatic or H/H drops below 7/21.  - Patient has received 2 units of PRBCs on 7/14/2025  - Patient's anemia is currently stable  - awaiting post transfusion H&H  -GI consulted

## 2025-07-16 NOTE — EICU
Intervention Initiated From:  COR / DYLANU    Maureen intervened regarding:  Rounding (Video assessment)  VICU Night Rounds Checklist  24H Vital Sign Range:  Temp:  [97.8 °F (36.6 °C)-98.8 °F (37.1 °C)]   Pulse:  []   Resp:  [12-28]   BP: ()/(62-96)   SpO2:  [90 %-100 %]     Video rounds

## 2025-07-16 NOTE — CONSULTS
Radiology Consult    Aury Felix is a 54 y.o. 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 IR consulted for cholecystostomy tube placement.    Past Medical History:   Diagnosis Date    Cancer     Diabetes mellitus     Hyperlipidemia     Hypertension      Past Surgical History:   Procedure Laterality Date    ESOPHAGOGASTRODUODENOSCOPY N/A 4/10/2025    Procedure: EGD (ESOPHAGOGASTRODUODENOSCOPY);  Surgeon: Manny Calles MD;  Location: [facility] ENDO;  Service: Endoscopy;  Laterality: N/A;    ESOPHAGOGASTRODUODENOSCOPY N/A 5/21/2025    Procedure: EGD (ESOPHAGOGASTRODUODENOSCOPY);  Surgeon: Manny Calles MD;  Location: [facility] ENDO;  Service: Endoscopy;  Laterality: N/A;    GASTROJEJUNOSTOMY N/A 5/27/2025    Procedure: GASTROJEJUNOSTOMY;  Surgeon: William Reyes MD;  Location: [facility] OR;  Service: General;  Laterality: N/A;    HYSTERECTOMY      LAPAROTOMY, EXPLORATORY Bilateral 5/27/2025    Procedure: LAPAROTOMY, EXPLORATORY;  Surgeon: William Reyse MD;  Location: [facility] OR;  Service: General;  Laterality: Bilateral;  will need BK US Guide probe BK US T probe [id number] AM 5/23    LIVER BIOPSY  5/27/2025    Procedure: BIOPSY, LIVER;  Surgeon: William Reyes MD;  Location: [facility] OR;  Service: General;;         Scheduled Meds:    [START ON 7/17/2025] ferrous sulfate  1 tablet Oral Daily    mupirocin   Nasal BID    pantoprazole  40 mg Intravenous BID    piperacillin-tazobactam (Zosyn) IV (PEDS and ADULTS) (extended infusion is not appropriate)  4.5 g Intravenous Q8H     Continuous Infusions:   PRN Meds:  Current Facility-Administered Medications:     0.9%  NaCl infusion (for blood administration), , Intravenous, Q24H PRN    dextrose 50%, 12.5 g, Intravenous, PRN    dextrose 50%, 25 g, Intravenous, PRN    glucagon (human recombinant), 1 mg, Intramuscular, PRN    glucose, 16 g, Oral,  PRN    glucose, 24 g, Oral, PRN    HYDROmorphone, 1 mg, Intravenous, Q6H PRN    insulin aspart U-100, 0-10 Units, Subcutaneous, QID (AC + HS) PRN    ondansetron, 4 mg, Intravenous, Q8H PRN    oxyCODONE, 5 mg, Oral, Q6H PRN    sodium chloride 0.9%, 10 mL, Intravenous, PRN    sodium chloride 0.9%, 10 mL, Intravenous, PRN    Allergies: Review of patient's allergies indicates:  No Known Allergies    Labs:  Recent Labs   Lab 07/15/25  0719   INR 1.9*       Recent Labs   Lab 07/16/25  0432 07/16/25  1202   WBC 20.67*  --    HGB 9.1*  9.1* 9.3*   HCT 28.2*  --    MCV 76*  --    *  --       Recent Labs   Lab 07/14/25  1206 07/15/25  0719 07/16/25  0432   * 100 111*    137 134*   K 3.1* 4.5 4.9    109 103   CO2 22* 20* 20*   BUN 9 7 8   CREATININE 0.7 0.6 0.6   CALCIUM 8.2* 8.1* 8.5*   MG  --  2.1  --    ALT 76* 66* 78*   AST 71* 83* 94*   ALBUMIN 2.4* 2.2* 2.2*   BILITOT 6.1* 9.0* 10.7*   BILIDIR 4.5*  --   --          Vitals (Most Recent):  Temp: 98.8 °F (37.1 °C) (07/16/25 1115)  Pulse: (!) 122 (07/16/25 1315)  Resp: 19 (07/16/25 1315)  BP: 96/73 (07/16/25 1300)  SpO2: (!) 94 % (07/16/25 1315)    Plan:   1. No evidence of acute cholecystitis on imaging. Given biliary obstruction from duodenal adenocarcinoma, placement of an internal/external biliary drainage catheter would be most appropriate and would give access for palliative CBD stenting in the future if indicated.   2. Plan for biliary drainage catheter placement tomorrow with general anesthesia. Please make patient NPO after midnight tonight.     Aron Ramírez MD  Interventional Radiology  Department of Radiology

## 2025-07-16 NOTE — PROGRESS NOTES
"[facility]  [facility] Medicine  Progress Note    Patient Name: Aury Felix  MRN: [id number]  Patient Class: IP- Inpatient   Admission Date: 7/15/2025  Length of Stay: 1 days  Attending Physician: Layton Hayward MD  Primary Care Provider: Jimbo Menard II, MD        Subjective     Principal Problem:GIB (gastrointestinal bleeding)        HPI:    Ms. 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  Pathology: Invasive adenocarcinoma, poorly differentiated.  Surrounding small intestinal mucosa without dysplasia.      She was last admitted at [place] on 5/27 - 5/31 due to hematemesis and underwent emergent liver biopsy, gastrojujenostomy, and exploratory laparotomy.  She followed up with Dr. Reyes, General Surgery, in clinic today due to back pain.  She was noted to have elevated LFT's and jaundice.  He sent her to the ED from clinic for admission after labs showed anemia, Hg 4.7g.     In the ED she had repeat labs with the Hgb dropping to 4.2g.  Per ED, 2 units has been ordered but this patient has a multiple antibody issue.  They are awaiting blood from [place].     VS in the ED were BP 98/65 -> 158/95 -> 11/59 -> 120/80   Pulse 124 -> 108   Temp 98.7 °F (37.1 °C) (Oral)   Resp 20   Ht 5' 8" (1.727 m)   Wt 74.7 kg (164 lb 10.9 oz)   SpO2 99%   Breastfeeding No   BMI 25.04 kg/m².     Labs: WBC 16 -> 14, Hg 4.7 -> 4.2, , retic 6.2, K 3.1, Cr 0.7, Gluc 130, , Alb 2.4, TB 6.1, DB 4.5, AST 71, ALT 76, no UA, heme-occult positive.     CTA GIB study 7/14: Interval postoperative change consistent with gastrojejunostomy. Large ill-defined mass in the region the head of the pancreas and duodenum is more ill defined today with some surrounding fat stranding most likely neoplastic. Interval development also small volume of ascites and of innumerable lesions throughout the liver consistent with extensive " metastatic disease. No active extravasation of contrast indicate acute hemorrhage    On arrival to [place] patient completing 2nd unit of PRBC.  Only complaint is back pain.  Reports she saw General surgery today for postop follow up as she has been having shortness a breath.  Reports she has heme Onc appointment on the 17th.  Patient will be admitted to Hospital Medicine we will consult GI    Overview/Hospital Course:  No notes on file    Interval History:  Patient was seen in the morning A&O x3 complaining of abdominal and back pain, received IV iron, hemoglobin is stable, IR was consulted for Plan for biliary drainage catheter placement tomorrow with general anesthesia.     Review of Systems   Constitutional:  Positive for fatigue.   Musculoskeletal:  Positive for back pain.   Neurological:  Positive for weakness.   All other systems reviewed and are negative.    Objective:     Vital Signs (Most Recent):  Temp: 98.3 °F (36.8 °C) (07/16/25 1515)  Pulse: (!) 119 (07/16/25 1545)  Resp: (!) 30 (07/16/25 1545)  BP: 121/87 (07/16/25 1530)  SpO2: 96 % (07/16/25 1545) Vital Signs (24h Range):  Temp:  [98 °F (36.7 °C)-98.8 °F (37.1 °C)] 98.3 °F (36.8 °C)  Pulse:  [] 119  Resp:  [12-34] 30  SpO2:  [86 %-99 %] 96 %  BP: ()/(66-87) 121/87     Weight: 79 kg (174 lb 2.6 oz)  Body mass index is 28.98 kg/m².    Intake/Output Summary (Last 24 hours) at 7/16/2025 1601  Last data filed at 7/16/2025 0625  Gross per 24 hour   Intake 281.25 ml   Output 1500 ml   Net -1218.75 ml         Physical Exam  Constitutional:       Appearance: Normal appearance.   Eyes:      General: Scleral icterus present.   Cardiovascular:      Rate and Rhythm: Tachycardia present.   Pulmonary:      Effort: Pulmonary effort is normal.   Abdominal:      General: Abdomen is flat. A surgical scar is present.      Palpations: Abdomen is soft.      Comments: Well healed surgical scar   Neurological:      Mental Status: She is alert.                Significant Labs: All pertinent labs within the past 24 hours have been reviewed.  Recent Lab Results  (Last 5 results in the past 24 hours)        07/16/25  1202   07/16/25  1135   07/16/25  0953   07/16/25  0728   07/16/25  0432        Albumin         2.2       ALP         413       ALT         78       Anion Gap         11       Appearance, UA     Clear           AST         94       Baso #         0.07       Basophil %         0.3       Bilirubin (UA)     2+  Comment: Positive urine bilirubin is not confirmed. Correlate with serum bilirubin and clinical presentation.           BILIRUBIN TOTAL         10.7  Comment: For infants and newborns, interpretation of results should be based   on gestational age, weight and in agreement with clinical   observations.    Premature Infant recommended reference ranges:   0-24 hours:  <8.0 mg/dL   24-48 hours: <12.0 mg/dL   3-5 days:    <15.0 mg/dL   6-29 days:   <15.0 mg/dL       BUN         8       Calcium         8.5       Chloride         103       CO2         20       Color, UA     Yellow           Creatinine         0.6       eGFR         >60  Comment: Estimated GFR calculated using the CKD-EPI creatinine (2021) equation.       Eos #         0.15       Eos %         0.7       Glucose         111       Glucose, UA     Negative           Gran # (ANC)         17.23       Hematocrit         28.2       Hemoglobin 9.3         9.1                9.1       Immature Grans (Abs)         0.20  Comment: Mild elevation in immature granulocytes is non specific and can be seen in a variety of conditions including stress response, acute inflammation, trauma and pregnancy. Correlation with other laboratory and clinical findings is essential.       Immature Granulocytes         1.0       Ketones, UA     Negative           Leukocyte Esterase, UA     1+           Lymph #         1.56       Lymph %         7.5       MCH         24.7       MCHC         32.3       MCV         76        Microscopic Comment       Comment: Other formed elements not mentioned in the report are not present in the microscopic examination.           Mono #         1.46       Mono %         7.1       MPV         10.1       Neut %         83.4       NITRITE UA     Negative           nRBC         0       Blood, UA     Negative           pH, UA     6.0           Platelet Count         458       POCT Glucose   131     130         Potassium         4.9       PROTEIN TOTAL         6.5       Protein, UA     Negative  Comment: Recommend a 24 hour urine protein or a urine protein/creatinine ratio if globulin induced proteinuria is clinically suspected.           RBC         3.69       RBC, UA     <1           RDW         21.1       Sodium         134       Spec Grav UA     1.020           Squam Epithel, UA     6           Urobilinogen, UA     Negative           WBC, UA     9           WBC         20.67                              Significant Imaging: I have reviewed all pertinent imaging results/findings within the past 24 hours.          Assessment & Plan  Essential hypertension  Patient's blood pressure range in the last 24 hours was: BP  Min: 96/73  Max: 136/77.The patient's inpatient anti-hypertensive regimen is listed below:  Current Antihypertensives       Plan  - BP is controlled, no changes needed to their regimen  - hold antihypertensive given acute blood loss  GIB (gastrointestinal bleeding)  Patient's hemorrhage is due to gastrointestinal bleed, this bleeding is not associated with a medication or a coagulopathy. Patients most recent Hgb, Hct, platelets, and INR are listed below.  Recent Labs     07/15/25  0719 07/15/25  0812 07/15/25  1522 07/15/25  1954 07/16/25  0432 07/16/25  1202   HGB  --  6.9* 7.4*  7.5* 7.4* 9.1*  9.1* 9.3*   HCT  --  21.5* 23.0*  --  28.2*  --    PLT  --  458* 489*  --  458*  --    INR 1.9*  --   --   --   --   --      Plan  - Will trend hemoglobin/hematocrit Every 8 hours  - Will monitor  and correct any coagulation defects  - Will transfuse if Hgb is <7g/dl (<8g/dl in cases of active ACS) or if patient has rapid bleeding leading to hemodynamic instability  - given 2 units PRBC prior to transfer  -patient with positive indirect Thao  -started on PPI  -consult placed to GI no intervention at this moment  Received IV iron and we will continue on p.o. iron, hemoglobin is stable  Duodenal cancer   Pathology: Invasive adenocarcinoma, poorly differentiated.  Surrounding small intestinal mucosa without dysplasia.     Has initial appointment on the 17th in [place]   We will consult heme Onc while inpatient  IR is consulted on 07/16Plan for biliary drainage catheter placement tomorrow with general anesthesia.   ABLA (acute blood loss anemia)  Anemia is likely due to acute blood loss which was from GI bleed. Most recent hemoglobin and hematocrit are listed below.  Recent Labs     07/15/25  0812 07/15/25  1522 07/15/25  1954 07/16/25  0432 07/16/25  1202   HGB 6.9* 7.4*  7.5* 7.4* 9.1*  9.1* 9.3*   HCT 21.5* 23.0*  --  28.2*  --      Plan  - Monitor serial CBC: Every 8 hours  - Transfuse PRBC if patient becomes hemodynamically unstable, symptomatic or H/H drops below 7/21.  - Patient has received 2 units of PRBCs on 7/14/2025  - Patient's anemia is currently stable  - awaiting post transfusion H&H  -GI consulted  Transaminitis  Likely secondary to extensive metastatic disease in the liver with innumerable lesions.  LFTs    Heme Onc and GI both consulted  IR was consultedPlan for biliary drainage catheter placement tomorrow 7/17 with general anesthesia.   Hyponatremia  Hyponatremia is likely due to Dehydration/hypovolemia. The patient's most recent sodium results are listed below.  Recent Labs     07/14/25  1206 07/15/25  0719 07/16/25  0432    137 134*     Plan  - Correct the sodium by 4-6mEq in 24 hours.   - Obtain the following studies: Urine sodium, urine osmolality, serum osmolality.  - Will treat  the hyponatremia with IV fluids as follows:  On admission bolus NS and maintenance  - Monitor sodium Daily.   - Patient hyponatremia is stable     VTE Risk Mitigation (From admission, onward)           Ordered     IP VTE HIGH RISK PATIENT  Once         07/15/25 0629     Place sequential compression device  Until discontinued         07/15/25 0629     Place sequential compression device  Until discontinued         07/15/25 0354                    Discharge Planning   MAIDA: 7/20/2025     Code Status: Full Code   Medical Readiness for Discharge Date:   Discharge Plan A: Home              Critical care time spent on the evaluation and treatment of severe organ dysfunction, review of pertinent labs and imaging studies, discussions with consulting providers and discussions with patient/family:  45 minutes.          Layton Hamilton MD  Department of Hospital Medicine   [facility] - Intensive Care

## 2025-07-16 NOTE — PROGRESS NOTES
"[facility] Progress Note    Interval  Laying in bed. No overt bleeding overnight, has not had BM but states this is not abnormal for her. Overall feeling OK since transfusion.     Physical Examination  /79   Pulse (!) 120   Temp 98.1 °F (36.7 °C) (Oral)   Resp 19   Ht 5' 5" (1.651 m)   Wt 79 kg (174 lb 2.6 oz)   SpO2 (!) 94%   Breastfeeding No   BMI 28.98 kg/m²   General appearance: awake, alert, no distress  Abdomen: soft, non-tender; non-distended. 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 She is now admitted with recurrent, symptomatic anemia which can be attributed to chronic GI blood loss from existing duodenal adenocarcinoma.     Lab evaluation is remarkable for progressive rise in T bili over the past 30 days along with rise in INR to 1.9. This finding is surely related to tumor burden from metastatic disease. Multifocal biliary obstruction may be present due to compression of intrahepatic bile ducts from tumor.     Recommend:   Daily PPI   One dose of parenteral iron   Regular diet (from a GI standpoint)   No plans for repeat endoscopy this admission  Further management per oncology     Alcides Hoang MD   [facility]             Nae Da Silva,   [facility] IM PGY2  "

## 2025-07-16 NOTE — ASSESSMENT & PLAN NOTE
Patient's blood pressure range in the last 24 hours was: BP  Min: 96/73  Max: 136/77.The patient's inpatient anti-hypertensive regimen is listed below:  Current Antihypertensives       Plan  - BP is controlled, no changes needed to their regimen  - hold antihypertensive given acute blood loss

## 2025-07-16 NOTE — ASSESSMENT & PLAN NOTE
Pathology: Invasive adenocarcinoma, poorly differentiated.  Surrounding small intestinal mucosa without dysplasia.     Has initial appointment on the 17th in [place]   We will consult heme Onc while inpatient  IR is consulted on 07/16Plan for biliary drainage catheter placement tomorrow with general anesthesia.

## 2025-07-16 NOTE — ASSESSMENT & PLAN NOTE
Hyponatremia is likely due to Dehydration/hypovolemia. The patient's most recent sodium results are listed below.  Recent Labs     07/14/25  1206 07/15/25  0719 07/16/25  0432    137 134*     Plan  - Correct the sodium by 4-6mEq in 24 hours.   - Obtain the following studies: Urine sodium, urine osmolality, serum osmolality.  - Will treat the hyponatremia with IV fluids as follows:  On admission bolus NS and maintenance  - Monitor sodium Daily.   - Patient hyponatremia is stable

## 2025-07-16 NOTE — SUBJECTIVE & OBJECTIVE
Interval History:  Patient was seen in the morning A&O x3 complaining of abdominal and back pain, received IV iron, hemoglobin is stable, IR was consulted for Plan for biliary drainage catheter placement tomorrow with general anesthesia.     Review of Systems   Constitutional:  Positive for fatigue.   Musculoskeletal:  Positive for back pain.   Neurological:  Positive for weakness.   All other systems reviewed and are negative.    Objective:     Vital Signs (Most Recent):  Temp: 98.3 °F (36.8 °C) (07/16/25 1515)  Pulse: (!) 119 (07/16/25 1545)  Resp: (!) 30 (07/16/25 1545)  BP: 121/87 (07/16/25 1530)  SpO2: 96 % (07/16/25 1545) Vital Signs (24h Range):  Temp:  [98 °F (36.7 °C)-98.8 °F (37.1 °C)] 98.3 °F (36.8 °C)  Pulse:  [] 119  Resp:  [12-34] 30  SpO2:  [86 %-99 %] 96 %  BP: ()/(66-87) 121/87     Weight: 79 kg (174 lb 2.6 oz)  Body mass index is 28.98 kg/m².    Intake/Output Summary (Last 24 hours) at 7/16/2025 1601  Last data filed at 7/16/2025 0625  Gross per 24 hour   Intake 281.25 ml   Output 1500 ml   Net -1218.75 ml         Physical Exam  Constitutional:       Appearance: Normal appearance.   Eyes:      General: Scleral icterus present.   Cardiovascular:      Rate and Rhythm: Tachycardia present.   Pulmonary:      Effort: Pulmonary effort is normal.   Abdominal:      General: Abdomen is flat. A surgical scar is present.      Palpations: Abdomen is soft.      Comments: Well healed surgical scar   Neurological:      Mental Status: She is alert.               Significant Labs: All pertinent labs within the past 24 hours have been reviewed.  Recent Lab Results  (Last 5 results in the past 24 hours)        07/16/25  1202   07/16/25  1135   07/16/25  0953   07/16/25  0728   07/16/25  0432        Albumin         2.2       ALP         413       ALT         78       Anion Gap         11       Appearance, UA     Clear           AST         94       Baso #         0.07       Basophil %         0.3        Bilirubin (UA)     2+  Comment: Positive urine bilirubin is not confirmed. Correlate with serum bilirubin and clinical presentation.           BILIRUBIN TOTAL         10.7  Comment: For infants and newborns, interpretation of results should be based   on gestational age, weight and in agreement with clinical   observations.    Premature Infant recommended reference ranges:   0-24 hours:  <8.0 mg/dL   24-48 hours: <12.0 mg/dL   3-5 days:    <15.0 mg/dL   6-29 days:   <15.0 mg/dL       BUN         8       Calcium         8.5       Chloride         103       CO2         20       Color, UA     Yellow           Creatinine         0.6       eGFR         >60  Comment: Estimated GFR calculated using the CKD-EPI creatinine (2021) equation.       Eos #         0.15       Eos %         0.7       Glucose         111       Glucose, UA     Negative           Gran # (ANC)         17.23       Hematocrit         28.2       Hemoglobin 9.3         9.1                9.1       Immature Grans (Abs)         0.20  Comment: Mild elevation in immature granulocytes is non specific and can be seen in a variety of conditions including stress response, acute inflammation, trauma and pregnancy. Correlation with other laboratory and clinical findings is essential.       Immature Granulocytes         1.0       Ketones, UA     Negative           Leukocyte Esterase, UA     1+           Lymph #         1.56       Lymph %         7.5       MCH         24.7       MCHC         32.3       MCV         76       Microscopic Comment       Comment: Other formed elements not mentioned in the report are not present in the microscopic examination.           Mono #         1.46       Mono %         7.1       MPV         10.1       Neut %         83.4       NITRITE UA     Negative           nRBC         0       Blood, UA     Negative           pH, UA     6.0           Platelet Count         458       POCT Glucose   131     130         Potassium         4.9        PROTEIN TOTAL         6.5       Protein, UA     Negative  Comment: Recommend a 24 hour urine protein or a urine protein/creatinine ratio if globulin induced proteinuria is clinically suspected.           RBC         3.69       RBC, UA     <1           RDW         21.1       Sodium         134       Spec Grav UA     1.020           Squam Epithel, UA     6           Urobilinogen, UA     Negative           WBC, UA     9           WBC         20.67                              Significant Imaging: I have reviewed all pertinent imaging results/findings within the past 24 hours.

## 2025-07-16 NOTE — PLAN OF CARE
went to meet with patient on rounds. No family at bedside. Will follow-up on consult recommendations. Patient is currently Medicaid Pending. Patient encouraged to call with any questions or concerns.  will continue to follow patient through transitions of care and assist with any discharge needs.    -- went to round on patient again. Facesheet information correct. Patient lives with her significant other and son. Her daughter lives in [place]. I inquiried with patient who her Oncologist is and she reports she is suppose to have an appointment Thursday, but she does not recall the name. She tells me it is with [facility].  did not see any appointments scheduled this week. Will review chart again.     [facility] - Hematology Oncology--Dr. Prince Last visit in May. Per notes, Oncologist in [place] but cannot find name in chart. Will have to ask patient again.      --Patient is Medicaid Pending.     Emergency Contacts    Name Relation Home Work Mobile   Gigi Garcia Significant other   [phone]   Neftali Felix Daughter [phone]     Future Appointments   Date Time Provider Department Center   7/28/2025  6:05 AM [facility]   7/28/2025 10:00 AM [facility]   11/4/2025  2:00 PM Nicolette Murillo NP [facility] ENDOCRN [facility]   12/22/2025  8:30 AM OPHTHALMOLOGY GENERAL, [facility] OPHTHAL FE [facility]         07/16/25 1109   Rounds   Attendance Provider;Nurse    Discharge Plan A Home   Why the patient remains in the hospital Requires continued medical care   Transition of Care Barriers None     Batsheva Roy RN    [phone]

## 2025-07-16 NOTE — ASSESSMENT & PLAN NOTE
Likely secondary to extensive metastatic disease in the liver with innumerable lesions.  LFTs    Heme Onc and GI both consulted  IR was consultedPlan for biliary drainage catheter placement tomorrow 7/17 with general anesthesia.

## 2025-07-16 NOTE — PLAN OF CARE
"  Care Plan    Pt received 1 unit of blood this shift, tolerated well. Pt's pain controlled with PRN meds. VSS, NSR noted on monitor. On RA, O2 sats WNL. POC reviewed with Aury Felix. Questions and concerns addressed. No acute events today. Pt progressing toward goals. Will continue to monitor. See below and flowsheets for full assessment and VS info.       Neuro:  [place] Coma Scale  Best Eye Response: 4-->(E4) spontaneous  Best Motor Response: 6-->(M6) obeys commands  Best Verbal Response: 5-->(V5) oriented  Prasad Coma Scale Score: 15  Assessment Qualifiers: Patient not sedated/intubated, No eye obstruction present  Pupil PERRLA: yes  24 hr Temp:  [98 °F (36.7 °C)-98.8 °F (37.1 °C)]      CV:  Rhythm: sinus tachycardia  DVT prophylaxis:      Resp:          GI/:  GI prophylaxis: yes  Diet/Nutrition Received: low saturated fat/low cholesterol  Last Bowel Movement: 07/13/25  Voiding Characteristics: external catheter   Intake/Output Summary (Last 24 hours) at 7/16/2025 0708  Last data filed at 7/16/2025 0625  Gross per 24 hour   Intake 757.25 ml   Output 1500 ml   Net -742.75 ml       Labs/Accuchecks:  Recent Labs   Lab 07/16/25  0432   WBC 20.67*   RBC 3.69*   HGB 9.1*  9.1*   HCT 28.2*   *      Recent Labs   Lab 07/16/25  0432   *   K 4.9   CO2 20*      BUN 8   CREATININE 0.6   ALKPHOS 413*   ALT 78*   AST 94*   BILITOT 10.7*      Recent Labs   Lab 07/15/25  0719   PROTIME 20.6*   INR 1.9*   APTT 31.6    No results for input(s): "CPK", "CPKMB", "TROPONINI", "MB" in the last 168 hours.    Electrolytes: No replacement orders  Accuchecks: Q1H    Gtts/LDAs:      Lines/Drains/Airways       Drain  Duration             Female External Urinary Catheter w/ Suction 07/15/25 0327 1 day              Peripheral Intravenous Line  Duration             Peripheral IV 07/14/25 1205 18 G Anterior;Left Forearm 1 day                    Skin/Wounds     Wounds: No  Wound care consulted: " No    Consults  Consults (From admission, onward)          Status Ordering Provider     Inpatient consult to Hematology/Oncology  Once        Provider:  (Not yet assigned)    Completed KYLE GONZALEZ     Inpatient consult to Gastroenterology-[facility]  Once        Provider:  (Not yet assigned)    Completed KYLE GONZALEZ     General Surgery  Once        Provider:  William Reyes MD    Completed KYLE GONZALEZ             Alert-The patient is alert, awake and responds to voice. The patient is oriented to time, place, and person. The triage nurse is able to obtain subjective information.

## 2025-07-16 NOTE — ASSESSMENT & PLAN NOTE
Patient's hemorrhage is due to gastrointestinal bleed, this bleeding is not associated with a medication or a coagulopathy. Patients most recent Hgb, Hct, platelets, and INR are listed below.  Recent Labs     07/15/25  0719 07/15/25  0812 07/15/25  1522 07/15/25  1954 07/16/25  0432 07/16/25  1202   HGB  --  6.9* 7.4*  7.5* 7.4* 9.1*  9.1* 9.3*   HCT  --  21.5* 23.0*  --  28.2*  --    PLT  --  458* 489*  --  458*  --    INR 1.9*  --   --   --   --   --      Plan  - Will trend hemoglobin/hematocrit Every 8 hours  - Will monitor and correct any coagulation defects  - Will transfuse if Hgb is <7g/dl (<8g/dl in cases of active ACS) or if patient has rapid bleeding leading to hemodynamic instability  - given 2 units PRBC prior to transfer  -patient with positive indirect Thao  -started on PPI  -consult placed to GI no intervention at this moment  Received IV iron and we will continue on p.o. iron, hemoglobin is stable

## 2025-07-17 ENCOUNTER — ANESTHESIA (OUTPATIENT)
Dept: INTERVENTIONAL RADIOLOGY/VASCULAR | Facility: HOSPITAL | Age: 55
End: 2025-07-17
Payer: MEDICAID

## 2025-07-17 LAB
ALBUMIN SERPL BCP-MCNC: 1.9 G/DL (ref 3.5–5.2)
ALP SERPL-CCNC: 485 UNIT/L (ref 40–150)
ALT SERPL W/O P-5'-P-CCNC: 80 UNIT/L (ref 10–44)
ANION GAP (OHS): 10 MMOL/L (ref 8–16)
AST SERPL-CCNC: 105 UNIT/L (ref 11–45)
BILIRUB SERPL-MCNC: 10.4 MG/DL (ref 0.1–1)
BUN SERPL-MCNC: 9 MG/DL (ref 6–20)
CALCIUM SERPL-MCNC: 8.3 MG/DL (ref 8.7–10.5)
CHLORIDE SERPL-SCNC: 102 MMOL/L (ref 95–110)
CO2 SERPL-SCNC: 21 MMOL/L (ref 23–29)
CREAT SERPL-MCNC: 0.6 MG/DL (ref 0.5–1.4)
ERYTHROCYTE [DISTWIDTH] IN BLOOD BY AUTOMATED COUNT: 23.2 % (ref 11.5–14.5)
GFR SERPLBLD CREATININE-BSD FMLA CKD-EPI: >60 ML/MIN/1.73/M2
GLUCOSE SERPL-MCNC: 136 MG/DL (ref 70–110)
HCT VFR BLD AUTO: 29.3 % (ref 37–48.5)
HGB BLD-MCNC: 9.4 GM/DL (ref 12–16)
HGB BLD-MCNC: 9.9 GM/DL (ref 12–16)
MAGNESIUM SERPL-MCNC: 1.8 MG/DL (ref 1.6–2.6)
MCH RBC QN AUTO: 24.7 PG (ref 27–31)
MCHC RBC AUTO-ENTMCNC: 32.1 G/DL (ref 32–36)
MCV RBC AUTO: 77 FL (ref 82–98)
PLATELET # BLD AUTO: 482 K/UL (ref 150–450)
PMV BLD AUTO: 10.3 FL (ref 9.2–12.9)
POCT GLUCOSE: 136 MG/DL (ref 70–110)
POCT GLUCOSE: 141 MG/DL (ref 70–110)
POCT GLUCOSE: 149 MG/DL (ref 70–110)
POCT GLUCOSE: 181 MG/DL (ref 70–110)
POTASSIUM SERPL-SCNC: 4.5 MMOL/L (ref 3.5–5.1)
PROT SERPL-MCNC: 6.5 GM/DL (ref 6–8.4)
RBC # BLD AUTO: 3.81 M/UL (ref 4–5.4)
SODIUM SERPL-SCNC: 133 MMOL/L (ref 136–145)
WBC # BLD AUTO: 20.19 K/UL (ref 3.9–12.7)

## 2025-07-17 PROCEDURE — 36415 COLL VENOUS BLD VENIPUNCTURE: CPT | Performed by: REGISTERED NURSE

## 2025-07-17 PROCEDURE — 80053 COMPREHEN METABOLIC PANEL: CPT | Performed by: REGISTERED NURSE

## 2025-07-17 PROCEDURE — 25000003 PHARM REV CODE 250

## 2025-07-17 PROCEDURE — 11000001 HC ACUTE MED/SURG PRIVATE ROOM

## 2025-07-17 PROCEDURE — 63600175 PHARM REV CODE 636 W HCPCS: Performed by: RADIOLOGY

## 2025-07-17 PROCEDURE — 36415 COLL VENOUS BLD VENIPUNCTURE: CPT | Performed by: HOSPITALIST

## 2025-07-17 PROCEDURE — 25000003 PHARM REV CODE 250: Performed by: NURSE ANESTHETIST, CERTIFIED REGISTERED

## 2025-07-17 PROCEDURE — 83735 ASSAY OF MAGNESIUM: CPT

## 2025-07-17 PROCEDURE — 63600175 PHARM REV CODE 636 W HCPCS

## 2025-07-17 PROCEDURE — 85027 COMPLETE CBC AUTOMATED: CPT

## 2025-07-17 PROCEDURE — 63600175 PHARM REV CODE 636 W HCPCS: Performed by: NURSE ANESTHETIST, CERTIFIED REGISTERED

## 2025-07-17 PROCEDURE — 85018 HEMOGLOBIN: CPT | Performed by: HOSPITALIST

## 2025-07-17 PROCEDURE — BF111ZZ FLUOROSCOPY OF BILIARY AND PANCREATIC DUCTS USING LOW OSMOLAR CONTRAST: ICD-10-PCS | Performed by: RADIOLOGY

## 2025-07-17 PROCEDURE — 25000003 PHARM REV CODE 250: Performed by: FAMILY MEDICINE

## 2025-07-17 PROCEDURE — 63600175 PHARM REV CODE 636 W HCPCS: Performed by: REGISTERED NURSE

## 2025-07-17 PROCEDURE — 0F9930Z DRAINAGE OF COMMON BILE DUCT WITH DRAINAGE DEVICE, PERCUTANEOUS APPROACH: ICD-10-PCS | Performed by: RADIOLOGY

## 2025-07-17 RX ORDER — POLYETHYLENE GLYCOL 3350 17 G/17G
17 POWDER, FOR SOLUTION ORAL DAILY
Status: DISCONTINUED | OUTPATIENT
Start: 2025-07-17 | End: 2025-07-19 | Stop reason: HOSPADM

## 2025-07-17 RX ORDER — MIDAZOLAM HYDROCHLORIDE 1 MG/ML
INJECTION INTRAMUSCULAR; INTRAVENOUS
Status: DISCONTINUED | OUTPATIENT
Start: 2025-07-17 | End: 2025-07-17

## 2025-07-17 RX ORDER — ROCURONIUM BROMIDE 10 MG/ML
INJECTION, SOLUTION INTRAVENOUS
Status: DISCONTINUED | OUTPATIENT
Start: 2025-07-17 | End: 2025-07-17

## 2025-07-17 RX ORDER — METOPROLOL TARTRATE 25 MG/1
25 TABLET, FILM COATED ORAL 2 TIMES DAILY
Status: DISCONTINUED | OUTPATIENT
Start: 2025-07-17 | End: 2025-07-19

## 2025-07-17 RX ORDER — SIMETHICONE 125 MG
125 TABLET,CHEWABLE ORAL EVERY 6 HOURS PRN
Status: DISCONTINUED | OUTPATIENT
Start: 2025-07-17 | End: 2025-07-19 | Stop reason: HOSPADM

## 2025-07-17 RX ORDER — PROPOFOL 10 MG/ML
VIAL (ML) INTRAVENOUS
Status: DISCONTINUED | OUTPATIENT
Start: 2025-07-17 | End: 2025-07-17

## 2025-07-17 RX ORDER — LIDOCAINE HYDROCHLORIDE 20 MG/ML
INJECTION INTRAVENOUS
Status: DISCONTINUED | OUTPATIENT
Start: 2025-07-17 | End: 2025-07-17

## 2025-07-17 RX ORDER — PHENYLEPHRINE HYDROCHLORIDE 10 MG/ML
INJECTION INTRAVENOUS
Status: DISCONTINUED | OUTPATIENT
Start: 2025-07-17 | End: 2025-07-17

## 2025-07-17 RX ORDER — FENTANYL CITRATE 50 UG/ML
INJECTION, SOLUTION INTRAMUSCULAR; INTRAVENOUS
Status: DISCONTINUED | OUTPATIENT
Start: 2025-07-17 | End: 2025-07-17

## 2025-07-17 RX ORDER — ONDANSETRON HYDROCHLORIDE 2 MG/ML
INJECTION, SOLUTION INTRAVENOUS
Status: DISCONTINUED | OUTPATIENT
Start: 2025-07-17 | End: 2025-07-17

## 2025-07-17 RX ORDER — DEXAMETHASONE SODIUM PHOSPHATE 4 MG/ML
INJECTION, SOLUTION INTRA-ARTICULAR; INTRALESIONAL; INTRAMUSCULAR; INTRAVENOUS; SOFT TISSUE
Status: DISCONTINUED | OUTPATIENT
Start: 2025-07-17 | End: 2025-07-17

## 2025-07-17 RX ORDER — CEFTRIAXONE 1 G/1
INJECTION, POWDER, FOR SOLUTION INTRAMUSCULAR; INTRAVENOUS
Status: COMPLETED | OUTPATIENT
Start: 2025-07-17 | End: 2025-07-17

## 2025-07-17 RX ADMIN — METOPROLOL TARTRATE 25 MG: 25 TABLET, FILM COATED ORAL at 12:07

## 2025-07-17 RX ADMIN — MIDAZOLAM HYDROCHLORIDE 2 MG: 1 INJECTION, SOLUTION INTRAMUSCULAR; INTRAVENOUS at 08:07

## 2025-07-17 RX ADMIN — PHENYLEPHRINE HYDROCHLORIDE 0.2 MCG/KG/MIN: 10 INJECTION INTRAVENOUS at 08:07

## 2025-07-17 RX ADMIN — PIPERACILLIN SODIUM AND TAZOBACTAM SODIUM 4.5 G: 4; .5 INJECTION, POWDER, LYOPHILIZED, FOR SOLUTION INTRAVENOUS at 01:07

## 2025-07-17 RX ADMIN — PROPOFOL 120 MG: 10 INJECTION, EMULSION INTRAVENOUS at 08:07

## 2025-07-17 RX ADMIN — OXYCODONE HYDROCHLORIDE 5 MG: 5 TABLET ORAL at 09:07

## 2025-07-17 RX ADMIN — SODIUM CHLORIDE, SODIUM LACTATE, POTASSIUM CHLORIDE, AND CALCIUM CHLORIDE: .6; .31; .03; .02 INJECTION, SOLUTION INTRAVENOUS at 08:07

## 2025-07-17 RX ADMIN — SUGAMMADEX 200 MG: 100 INJECTION, SOLUTION INTRAVENOUS at 09:07

## 2025-07-17 RX ADMIN — Medication 6 MG: at 12:07

## 2025-07-17 RX ADMIN — PANTOPRAZOLE SODIUM 40 MG: 40 INJECTION, POWDER, FOR SOLUTION INTRAVENOUS at 09:07

## 2025-07-17 RX ADMIN — HYDROMORPHONE HYDROCHLORIDE 1 MG: 1 INJECTION, SOLUTION INTRAMUSCULAR; INTRAVENOUS; SUBCUTANEOUS at 10:07

## 2025-07-17 RX ADMIN — INSULIN ASPART 2 UNITS: 100 INJECTION, SOLUTION INTRAVENOUS; SUBCUTANEOUS at 05:07

## 2025-07-17 RX ADMIN — SIMETHICONE 125 MG: 125 TABLET, CHEWABLE ORAL at 12:07

## 2025-07-17 RX ADMIN — CEFTRIAXONE SODIUM 1 G: 1 INJECTION, POWDER, FOR SOLUTION INTRAMUSCULAR; INTRAVENOUS at 08:07

## 2025-07-17 RX ADMIN — FENTANYL CITRATE 100 MCG: 50 INJECTION INTRAMUSCULAR; INTRAVENOUS at 08:07

## 2025-07-17 RX ADMIN — PANTOPRAZOLE SODIUM 40 MG: 40 INJECTION, POWDER, FOR SOLUTION INTRAVENOUS at 10:07

## 2025-07-17 RX ADMIN — PIPERACILLIN SODIUM AND TAZOBACTAM SODIUM 4.5 G: 4; .5 INJECTION, POWDER, LYOPHILIZED, FOR SOLUTION INTRAVENOUS at 05:07

## 2025-07-17 RX ADMIN — HYDROMORPHONE HYDROCHLORIDE 1 MG: 1 INJECTION, SOLUTION INTRAMUSCULAR; INTRAVENOUS; SUBCUTANEOUS at 07:07

## 2025-07-17 RX ADMIN — LIDOCAINE HYDROCHLORIDE 100 MG: 20 INJECTION, SOLUTION INTRAVENOUS at 08:07

## 2025-07-17 RX ADMIN — METOPROLOL TARTRATE 25 MG: 25 TABLET, FILM COATED ORAL at 09:07

## 2025-07-17 RX ADMIN — POLYETHYLENE GLYCOL 3350 17 G: 17 POWDER, FOR SOLUTION ORAL at 12:07

## 2025-07-17 RX ADMIN — OXYCODONE HYDROCHLORIDE 5 MG: 5 TABLET ORAL at 02:07

## 2025-07-17 RX ADMIN — PHENYLEPHRINE HYDROCHLORIDE 200 MCG: 10 INJECTION INTRAVENOUS at 08:07

## 2025-07-17 RX ADMIN — PIPERACILLIN SODIUM AND TAZOBACTAM SODIUM 4.5 G: 4; .5 INJECTION, POWDER, LYOPHILIZED, FOR SOLUTION INTRAVENOUS at 10:07

## 2025-07-17 RX ADMIN — ONDANSETRON 4 MG: 2 INJECTION, SOLUTION INTRAMUSCULAR; INTRAVENOUS at 09:07

## 2025-07-17 RX ADMIN — ROCURONIUM BROMIDE 50 MG: 10 INJECTION, SOLUTION INTRAVENOUS at 08:07

## 2025-07-17 RX ADMIN — DEXAMETHASONE SODIUM PHOSPHATE 4 MG: 4 INJECTION, SOLUTION INTRA-ARTICULAR; INTRALESIONAL; INTRAMUSCULAR; INTRAVENOUS; SOFT TISSUE at 08:07

## 2025-07-17 RX ADMIN — MUPIROCIN: 20 OINTMENT TOPICAL at 09:07

## 2025-07-17 RX ADMIN — HYDROMORPHONE HYDROCHLORIDE 1 MG: 1 INJECTION, SOLUTION INTRAMUSCULAR; INTRAVENOUS; SUBCUTANEOUS at 12:07

## 2025-07-17 NOTE — ASSESSMENT & PLAN NOTE
Anemia is likely due to acute blood loss which was from GI bleed. Most recent hemoglobin and hematocrit are listed below.  Recent Labs     07/15/25  1522 07/15/25  1954 07/16/25  0432 07/16/25  1202 07/16/25  2059 07/17/25  1217 07/17/25  1443   HGB 7.4*  7.5*   < > 9.1*  9.1*   < > 10.9* 9.4* 9.9*   HCT 23.0*  --  28.2*  --   --  29.3*  --     < > = values in this interval not displayed.     Plan  - Monitor serial CBC: Every 8 hours  - Transfuse PRBC if patient becomes hemodynamically unstable, symptomatic or H/H drops below 7/21.  - Patient has received 2 units of PRBCs on 7/14/2025  - Patient's anemia is currently stable  - awaiting post transfusion H&H  -GI consulted

## 2025-07-17 NOTE — PROGRESS NOTES
"[facility]  [facility] Medicine  Progress Note    Patient Name: Aury Felix  MRN: [id number]  Patient Class: IP- Inpatient   Admission Date: 7/15/2025  Length of Stay: 2 days  Attending Physician: Layton Hayward MD  Primary Care Provider: Jimbo Menard II, MD        Subjective     Principal Problem:GIB (gastrointestinal bleeding)        HPI:    Ms. 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  Pathology: Invasive adenocarcinoma, poorly differentiated.  Surrounding small intestinal mucosa without dysplasia.      She was last admitted at [place] on 5/27 - 5/31 due to hematemesis and underwent emergent liver biopsy, gastrojujenostomy, and exploratory laparotomy.  She followed up with Dr. Reyes, General Surgery, in clinic today due to back pain.  She was noted to have elevated LFT's and jaundice.  He sent her to the ED from clinic for admission after labs showed anemia, Hg 4.7g.     In the ED she had repeat labs with the Hgb dropping to 4.2g.  Per ED, 2 units has been ordered but this patient has a multiple antibody issue.  They are awaiting blood from [place].     VS in the ED were BP 98/65 -> 158/95 -> 11/59 -> 120/80   Pulse 124 -> 108   Temp 98.7 °F (37.1 °C) (Oral)   Resp 20   Ht 5' 8" (1.727 m)   Wt 74.7 kg (164 lb 10.9 oz)   SpO2 99%   Breastfeeding No   BMI 25.04 kg/m².     Labs: WBC 16 -> 14, Hg 4.7 -> 4.2, , retic 6.2, K 3.1, Cr 0.7, Gluc 130, , Alb 2.4, TB 6.1, DB 4.5, AST 71, ALT 76, no UA, heme-occult positive.     CTA GIB study 7/14: Interval postoperative change consistent with gastrojejunostomy. Large ill-defined mass in the region the head of the pancreas and duodenum is more ill defined today with some surrounding fat stranding most likely neoplastic. Interval development also small volume of ascites and of innumerable lesions throughout the liver consistent with extensive " metastatic disease. No active extravasation of contrast indicate acute hemorrhage    On arrival to [place] patient completing 2nd unit of PRBC.  Only complaint is back pain.  Reports she saw General surgery today for postop follow up as she has been having shortness a breath.  Reports she has heme Onc appointment on the 17th.  Patient will be admitted to Hospital Medicine we will consult GI    Overview/Hospital Course:  No notes on file    Interval History:  Patient was seen in the morning A&O x4 had severe diffuse biliary dilatation by IR documentation with long segment a regular narrowing of the common bile duct, patient had internal/external biliary drainage catheter, IR recommended to leave the catheter to bag drainage until bilirubin normalize and mid in cap the drain as tolerated patient told to return to IR for 10-12 weeks for routine catheter exchange       Review of Systems   Constitutional:  Positive for fatigue.   Musculoskeletal:  Positive for back pain.   Neurological:  Positive for weakness.   All other systems reviewed and are negative.    Objective:     Vital Signs (Most Recent):  Temp: 98.2 °F (36.8 °C) (07/17/25 1100)  Pulse: (!) 120 (07/17/25 1400)  Resp: (!) 24 (07/17/25 1456)  BP: 107/73 (07/17/25 1400)  SpO2: (!) 92 % (07/17/25 1400) Vital Signs (24h Range):  Temp:  [98.1 °F (36.7 °C)-98.8 °F (37.1 °C)] 98.2 °F (36.8 °C)  Pulse:  [117-135] 120  Resp:  [15-32] 24  SpO2:  [88 %-100 %] 92 %  BP: ()/(66-87) 107/73     Weight: 79 kg (174 lb 2.6 oz)  Body mass index is 28.98 kg/m².    Intake/Output Summary (Last 24 hours) at 7/17/2025 1545  Last data filed at 7/17/2025 1130  Gross per 24 hour   Intake 408.58 ml   Output 700 ml   Net -291.42 ml         Physical Exam  Constitutional:       Appearance: Normal appearance.   Eyes:      General: Scleral icterus present.   Cardiovascular:      Rate and Rhythm: Tachycardia present.   Pulmonary:      Effort: Pulmonary effort is normal.   Abdominal:       General: Abdomen is flat. A surgical scar is present.      Palpations: Abdomen is soft.      Comments: Well healed surgical scar   Neurological:      Mental Status: She is alert.               Significant Labs: All pertinent labs within the past 24 hours have been reviewed.  Recent Lab Results  (Last 5 results in the past 24 hours)        07/17/25  1443   07/17/25  1217   07/17/25  1216   07/17/25  1120   07/17/25  0728        Albumin     1.9           ALP     485           ALT     80           Anion Gap     10           AST     105           BILIRUBIN TOTAL     10.4  Comment: For infants and newborns, interpretation of results should be based   on gestational age, weight and in agreement with clinical   observations.    Premature Infant recommended reference ranges:   0-24 hours:  <8.0 mg/dL   24-48 hours: <12.0 mg/dL   3-5 days:    <15.0 mg/dL   6-29 days:   <15.0 mg/dL           BUN     9           Calcium     8.3           Chloride     102           CO2     21           Creatinine     0.6           eGFR     >60  Comment: Estimated GFR calculated using the CKD-EPI creatinine (2021) equation.           Glucose     136           Hematocrit   29.3             Hemoglobin 9.9   9.4             Magnesium      1.8           MCH   24.7             MCHC   32.1             MCV   77             MPV   10.3             Platelet Count   482             POCT Glucose       141   136       Potassium     4.5           PROTEIN TOTAL     6.5           RBC   3.81             RDW   23.2             Sodium     133           WBC   20.19                                    Significant Imaging: I have reviewed all pertinent imaging results/findings within the past 24 hours.          Assessment & Plan  Essential hypertension  Patient's blood pressure range in the last 24 hours was: BP  Min: 95/73  Max: 130/84.The patient's inpatient anti-hypertensive regimen is listed below:  Current Antihypertensives  metoprolol tartrate (LOPRESSOR) tablet  25 mg, 2 times daily, Oral    Plan  - BP is controlled, no changes needed to their regimen  - hold antihypertensive given acute blood loss  GIB (gastrointestinal bleeding)  Patient's hemorrhage is due to gastrointestinal bleed, this bleeding is not associated with a medication or a coagulopathy. Patients most recent Hgb, Hct, platelets, and INR are listed below.  Recent Labs     07/15/25  0719 07/15/25  0812 07/15/25  1522 07/15/25  1954 07/16/25  0432 07/16/25  1202 07/16/25  2059 07/17/25  1217 07/17/25  1443   HGB  --    < > 7.4*  7.5*   < > 9.1*  9.1*   < > 10.9* 9.4* 9.9*   HCT  --    < > 23.0*  --  28.2*  --   --  29.3*  --    PLT  --    < > 489*  --  458*  --   --  482*  --    INR 1.9*  --   --   --   --   --   --   --   --     < > = values in this interval not displayed.     Plan  - Will trend hemoglobin/hematocrit Every 8 hours  - Will monitor and correct any coagulation defects  - Will transfuse if Hgb is <7g/dl (<8g/dl in cases of active ACS) or if patient has rapid bleeding leading to hemodynamic instability  - given 2 units PRBC prior to transfer  -patient with positive indirect Thao  -started on PPI  -consult placed to GI no intervention at this moment  Received IV iron and we will continue on p.o. iron, hemoglobin is stable  Duodenal cancer   Pathology: Invasive adenocarcinoma, poorly differentiated.  Surrounding small intestinal mucosa without dysplasia.     Has initial appointment on the 17th in [place]   We will consult heme Onc while inpatient  IR is consulted on 07/16Plan for biliary drainage catheter placement tomorrow with general anesthesia.   /7/17  Patient had severe diffuse biliary dilatation with long segment irregular narrowing of the common bile duct, patient had internal and external biliary drainage catheter placed by IR, who recommended to leave the catheter to bag drainage and total bilirubin normalizes, may then CABG drain as tolerated, patient to return to IR in 10-12 weeks for  routine catheter exchange  ABLA (acute blood loss anemia)  Anemia is likely due to acute blood loss which was from GI bleed. Most recent hemoglobin and hematocrit are listed below.  Recent Labs     07/15/25  1522 07/15/25  1954 07/16/25  0432 07/16/25  1202 07/16/25  2059 07/17/25  1217 07/17/25  1443   HGB 7.4*  7.5*   < > 9.1*  9.1*   < > 10.9* 9.4* 9.9*   HCT 23.0*  --  28.2*  --   --  29.3*  --     < > = values in this interval not displayed.     Plan  - Monitor serial CBC: Every 8 hours  - Transfuse PRBC if patient becomes hemodynamically unstable, symptomatic or H/H drops below 7/21.  - Patient has received 2 units of PRBCs on 7/14/2025  - Patient's anemia is currently stable  - awaiting post transfusion H&H  -GI consulted  Transaminitis  Likely secondary to extensive metastatic disease in the liver with innumerable lesions.  LFTs    Heme Onc and GI both consulted  IR was consultedPlan for biliary drainage catheter placement tomorrow 7/17 with general anesthesia.   7/17  Patient had severe diffuse biliary dilatation with long segment irregular narrowing of the common bile duct, patient had internal and external biliary drainage catheter placed by IR, who recommended to leave the catheter to bag drainage and total bilirubin normalizes, may then CABG drain as tolerated, patient to return to IR in 10-12 weeks for routine catheter exchange  Hyponatremia  Hyponatremia is likely due to Dehydration/hypovolemia. The patient's most recent sodium results are listed below.  Recent Labs     07/15/25  0719 07/16/25  0432 07/17/25  1216    134* 133*     Plan  - Correct the sodium by 4-6mEq in 24 hours.   - Obtain the following studies: Urine sodium, urine osmolality, serum osmolality.  - Will treat the hyponatremia with IV fluids as follows:  On admission bolus NS and maintenance  - Monitor sodium Daily.   - Patient hyponatremia is stable     VTE Risk Mitigation (From admission, onward)           Ordered     IP VTE  HIGH RISK PATIENT  Once         07/15/25 0629     Place sequential compression device  Until discontinued         07/15/25 0629     Place sequential compression device  Until discontinued         07/15/25 0354                    Discharge Planning   MAIDA: 7/21/2025     Code Status: Full Code   Medical Readiness for Discharge Date:   Discharge Plan A: Home with family   Discharge Delays: None known at this time          Critical care time spent on the evaluation and treatment of severe organ dysfunction, review of pertinent labs and imaging studies, discussions with consulting providers and discussions with patient/family:  45 minutes.          Layton Hamilton MD  Department of Hospital Medicine   [place] - Intensive Care

## 2025-07-17 NOTE — PLAN OF CARE
Pt back in room, drowsy, groggy, trying to talk but words slurred, heart  remains st 128, elevated heart rate during procedure, 120's , bp 116 sys, crna stated needed some beata sticks during procedure for soft bp, biliary tube, to gravity drainage, brownish with sediment, And mucous strands, dressing dry And intact,Abd soft, no co of pain at this time, bowel sounds audible

## 2025-07-17 NOTE — NURSING
Virtual ICU Daily Checklist     Admit Date: 7/15/2025  Hospital Length of Stay:  LOS: 2 days     ICU Length of Stay 2d 4h       ICU Daily Goals Checklist    F Feeding  Current Diet Order   Procedures    Diet NPO Except for: Sips with Medication, Ice Chips     Except for::   Sips with Medication     Except for::   Ice Chips     Goal:    Status:         Consider intervention  Currently in procedure  Consider diet after completion       A Analgesia  Preferred Pain Scale: number (Numeric Rating Pain Scale)     Appropriate     S Sedation  Target Arousal:    Last sedation level:       N/A     T Thromboembolism Prophylaxis  Mechanical    VTE Risk Mitigation (From admission, onward)           Ordered     IP VTE HIGH RISK PATIENT  Once         07/15/25 0629     Place sequential compression device  Until discontinued         07/15/25 0629     Place sequential compression device  Until discontinued         07/15/25 0354                ,   Anticoagulants       None            Consider intervention     H Head of Bed/Mobility  HOB > 300 Head of Bed (HOB) Positioning: HOB at 30-45 degrees Appropriate   U Stress Ulcer Prophylaxis (if needed)      GI Medications (From admission, onward)      Start     Stop Route Frequency Ordered    07/17/25 0118  simethicone chewable tablet 125 mg         -- Oral Every 6 hours PRN 07/17/25 0018    07/15/25 0900  pantoprazole injection 40 mg         -- IV 2 times daily 07/15/25 0630    07/15/25 0451  ondansetron injection 4 mg         -- IV Every 8 hours PRN 07/15/25 0354           Consider intervention  Consider changing to PO   G Glucose control     Glycemic Management: blood glucose monitored  Recent Labs   Lab 07/16/25  1135 07/16/25  1610 07/16/25  2119 07/17/25  0728   POCTGLUCOSE 131* 131* 125* 136*     managed    Appropriate       B Bowel Movement   Last Bowel Movement: 07/13/25     Consider intervention  Miralax added    I Indwelling Lines  Lines/Drains/Airways       Drain  Duration              Female External Urinary Catheter w/ Suction 07/15/25 0327 2 days              Peripheral Intravenous Line  Duration             Peripheral IV 07/14/25 1205 18 G Anterior;Left Forearm 2 days    Peripheral IV 07/16/25 0935 20 G 1 in Anterior;Left;Proximal Forearm <1 day                    Appropriate    Addison score   Addison Score: 14   Appropriate   D Deescalation of Antibiotics  Antibiotics (From admission, onward)      Start     Stop Route Frequency Ordered    07/17/25 0813  cefTRIAXone injection         --  Intra-op PRN 07/17/25 0813    07/16/25 1015  piperacillin-tazobactam (ZOSYN) 4.5 g in D5W 100 mL IVPB (MB+)         -- IV Every 8 hours (non-standard times) 07/16/25 0915    07/15/25 2100  mupirocin 2 % ointment         07/20/25 2059 Nasl 2 times daily 07/15/25 1315         Consider intervention      Goals of Care  Full Code    Appropriate    PLAN FOR THE DAY Summary  1.  2.  3.         Funmilayo Duckworth RN

## 2025-07-17 NOTE — EICU
VIC Night Rounds Checklist  24H Vital Sign Range:  Temp:  [98.1 °F (36.7 °C)-98.8 °F (37.1 °C)]   Pulse:  [103-134]   Resp:  [14-34]   BP: ()/(66-87)   SpO2:  [86 %-97 %]             Nursing orders placed : IP HARDEEP Peripheral IV Access

## 2025-07-17 NOTE — ASSESSMENT & PLAN NOTE
Hyponatremia is likely due to Dehydration/hypovolemia. The patient's most recent sodium results are listed below.  Recent Labs     07/15/25  0719 07/16/25  0432 07/17/25  1216    134* 133*     Plan  - Correct the sodium by 4-6mEq in 24 hours.   - Obtain the following studies: Urine sodium, urine osmolality, serum osmolality.  - Will treat the hyponatremia with IV fluids as follows:  On admission bolus NS and maintenance  - Monitor sodium Daily.   - Patient hyponatremia is stable

## 2025-07-17 NOTE — PROCEDURES
Radiology Post-Procedure Note    Pre Op Diagnosis: Biliary obstruction  Post Op Diagnosis: Same    Procedure: Percutaneous cholangiogram and placement of an internal/external biliary drainage catheter    Procedure performed by: Aron Ramírez MD    Written Informed Consent Obtained: Yes  Specimen Removed: NO  Estimated Blood Loss: Minimal    Findings:   Severe diffuse biliary dilation with long segment irregular narrowing of the CBD. 10 Fr right internal/external biliary drainage catheter.     Patient tolerated procedure well.    Leave catheter to bag drainage until bilirubin normalizes, may then cap drain as tolerated.   Patient to return to IR in 10-12 weeks for routine catheter exchange.     Aron Ramírez MD  Interventional Radiology  Department of Radiology

## 2025-07-17 NOTE — PLAN OF CARE
Medicated with dilaudid for pain at insertion site of drain, area sore, tight, upper abd pain, more so when Area palpated,   Does not want to try to drink anything or eat, ice chips given

## 2025-07-17 NOTE — SUBJECTIVE & OBJECTIVE
Interval History:  Patient was seen in the morning A&O x4 had severe diffuse biliary dilatation by IR documentation with long segment a regular narrowing of the common bile duct, patient had internal/external biliary drainage catheter, IR recommended to leave the catheter to bag drainage until bilirubin normalize and mid in cap the drain as tolerated patient told to return to IR for 10-12 weeks for routine catheter exchange       Review of Systems   Constitutional:  Positive for fatigue.   Musculoskeletal:  Positive for back pain.   Neurological:  Positive for weakness.   All other systems reviewed and are negative.    Objective:     Vital Signs (Most Recent):  Temp: 98.2 °F (36.8 °C) (07/17/25 1100)  Pulse: (!) 120 (07/17/25 1400)  Resp: (!) 24 (07/17/25 1456)  BP: 107/73 (07/17/25 1400)  SpO2: (!) 92 % (07/17/25 1400) Vital Signs (24h Range):  Temp:  [98.1 °F (36.7 °C)-98.8 °F (37.1 °C)] 98.2 °F (36.8 °C)  Pulse:  [117-135] 120  Resp:  [15-32] 24  SpO2:  [88 %-100 %] 92 %  BP: ()/(66-87) 107/73     Weight: 79 kg (174 lb 2.6 oz)  Body mass index is 28.98 kg/m².    Intake/Output Summary (Last 24 hours) at 7/17/2025 1545  Last data filed at 7/17/2025 1130  Gross per 24 hour   Intake 408.58 ml   Output 700 ml   Net -291.42 ml         Physical Exam  Constitutional:       Appearance: Normal appearance.   Eyes:      General: Scleral icterus present.   Cardiovascular:      Rate and Rhythm: Tachycardia present.   Pulmonary:      Effort: Pulmonary effort is normal.   Abdominal:      General: Abdomen is flat. A surgical scar is present.      Palpations: Abdomen is soft.      Comments: Well healed surgical scar   Neurological:      Mental Status: She is alert.               Significant Labs: All pertinent labs within the past 24 hours have been reviewed.  Recent Lab Results  (Last 5 results in the past 24 hours)        07/17/25  1443   07/17/25  1217   07/17/25  1216   07/17/25  1120   07/17/25  0728        Albumin      1.9           ALP     485           ALT     80           Anion Gap     10           AST     105           BILIRUBIN TOTAL     10.4  Comment: For infants and newborns, interpretation of results should be based   on gestational age, weight and in agreement with clinical   observations.    Premature Infant recommended reference ranges:   0-24 hours:  <8.0 mg/dL   24-48 hours: <12.0 mg/dL   3-5 days:    <15.0 mg/dL   6-29 days:   <15.0 mg/dL           BUN     9           Calcium     8.3           Chloride     102           CO2     21           Creatinine     0.6           eGFR     >60  Comment: Estimated GFR calculated using the CKD-EPI creatinine (2021) equation.           Glucose     136           Hematocrit   29.3             Hemoglobin 9.9   9.4             Magnesium      1.8           MCH   24.7             MCHC   32.1             MCV   77             MPV   10.3             Platelet Count   482             POCT Glucose       141   136       Potassium     4.5           PROTEIN TOTAL     6.5           RBC   3.81             RDW   23.2             Sodium     133           WBC   20.19                                    Significant Imaging: I have reviewed all pertinent imaging results/findings within the past 24 hours.

## 2025-07-17 NOTE — ASSESSMENT & PLAN NOTE
Pathology: Invasive adenocarcinoma, poorly differentiated.  Surrounding small intestinal mucosa without dysplasia.     Has initial appointment on the 17th in [place]   We will consult heme Onc while inpatient  IR is consulted on 07/16Plan for biliary drainage catheter placement tomorrow with general anesthesia.   /7/17  Patient had severe diffuse biliary dilatation with long segment irregular narrowing of the common bile duct, patient had internal and external biliary drainage catheter placed by IR, who recommended to leave the catheter to bag drainage and total bilirubin normalizes, may then CABG drain as tolerated, patient to return to IR in 10-12 weeks for routine catheter exchange

## 2025-07-17 NOTE — ASSESSMENT & PLAN NOTE
Patient's hemorrhage is due to gastrointestinal bleed, this bleeding is not associated with a medication or a coagulopathy. Patients most recent Hgb, Hct, platelets, and INR are listed below.  Recent Labs     07/15/25  0719 07/15/25  0812 07/15/25  1522 07/15/25  1954 07/16/25  0432 07/16/25  1202 07/16/25  2059 07/17/25  1217 07/17/25  1443   HGB  --    < > 7.4*  7.5*   < > 9.1*  9.1*   < > 10.9* 9.4* 9.9*   HCT  --    < > 23.0*  --  28.2*  --   --  29.3*  --    PLT  --    < > 489*  --  458*  --   --  482*  --    INR 1.9*  --   --   --   --   --   --   --   --     < > = values in this interval not displayed.     Plan  - Will trend hemoglobin/hematocrit Every 8 hours  - Will monitor and correct any coagulation defects  - Will transfuse if Hgb is <7g/dl (<8g/dl in cases of active ACS) or if patient has rapid bleeding leading to hemodynamic instability  - given 2 units PRBC prior to transfer  -patient with positive indirect Thao  -started on PPI  -consult placed to GI no intervention at this moment  Received IV iron and we will continue on p.o. iron, hemoglobin is stable

## 2025-07-17 NOTE — ASSESSMENT & PLAN NOTE
Likely secondary to extensive metastatic disease in the liver with innumerable lesions.  LFTs    Heme Onc and GI both consulted  IR was consultedPlan for biliary drainage catheter placement tomorrow 7/17 with general anesthesia.   7/17  Patient had severe diffuse biliary dilatation with long segment irregular narrowing of the common bile duct, patient had internal and external biliary drainage catheter placed by IR, who recommended to leave the catheter to bag drainage and total bilirubin normalizes, may then CABG drain as tolerated, patient to return to IR in 10-12 weeks for routine catheter exchange

## 2025-07-17 NOTE — ANESTHESIA PREPROCEDURE EVALUATION
07/17/2025  Aury Felix is a 54 y.o., female       Pre-op Assessment       I have reviewed the Medications.     Review of Systems  Anesthesia Hx:             Denies Family Hx of Anesthesia complications.     Hematology/Oncology:       -- Anemia:                                  Cardiovascular:     Hypertension                                          Pulmonary:      Shortness of breath                  Endocrine:  Diabetes, type 2               Physical Exam  General: Well nourished    Airway:  Mallampati: II   TM Distance: Normal  Neck ROM: Normal ROM    Dental:  Intact    Chest/Lungs:  Clear to auscultation    Heart:  Rate: Normal  Rhythm: Regular Rhythm        Anesthesia Plan  Type of Anesthesia, risks & benefits discussed:    Anesthesia Type: Gen ETT  Intra-op Monitoring Plan: Standard ASA Monitors  Post Op Pain Control Plan: multimodal analgesia  Induction:  IV  Informed Consent: Informed consent signed with the Patient and all parties understand the risks and agree with anesthesia plan.  All questions answered.   ASA Score: 3    Ready For Surgery From Anesthesia Perspective.     .

## 2025-07-17 NOTE — PLAN OF CARE
"  Care Plan    VSS. Pt is Aox4. ST on monitor. On RA. Female external cath changed and placed to suction PM UOP of 650cc. Plan for IR in AM, wiped down with orange CHG wipes. PRN hydromorphone x1, melatonin x1, oxycodone x1, and simethicone x1 given. Plan of care ongoing. Report given to AM RN. Safety measures in place. See below and flowsheets for full assessment and VS info.        Neuro:  Prasad Coma Scale  Best Eye Response: 4-->(E4) spontaneous  Best Motor Response: 6-->(M6) obeys commands  Best Verbal Response: 5-->(V5) oriented  Prasad Coma Scale Score: 15  Assessment Qualifiers: Patient not sedated/intubated, No eye obstruction present  Pupil PERRLA: yes  24 hr Temp:  [98.1 °F (36.7 °C)-98.8 °F (37.1 °C)]      CV:  Rhythm: sinus tachycardia  DVT prophylaxis: VTE Core Measure: Pharmacological prophylaxis initiated/maintained    Resp:          GI/:  GI prophylaxis: yes  Diet/Nutrition Received: NPO  Last Bowel Movement: 07/13/25  Voiding Characteristics: external catheter   Intake/Output Summary (Last 24 hours) at 7/17/2025 0426  Last data filed at 7/17/2025 0400  Gross per 24 hour   Intake 361.72 ml   Output 1050 ml   Net -688.28 ml       Labs/Accuchecks:  Recent Labs   Lab 07/16/25  0432 07/16/25  1202 07/16/25  2059   WBC 20.67*  --   --    RBC 3.69*  --   --    HGB 9.1*  9.1*   < > 10.9*   HCT 28.2*  --   --    *  --   --     < > = values in this interval not displayed.      Recent Labs   Lab 07/16/25  0432   *   K 4.9   CO2 20*      BUN 8   CREATININE 0.6   ALKPHOS 413*   ALT 78*   AST 94*   BILITOT 10.7*      Recent Labs   Lab 07/15/25  0719   PROTIME 20.6*   INR 1.9*   APTT 31.6    No results for input(s): "CPK", "CPKMB", "TROPONINI", "MB" in the last 168 hours.    Electrolytes: No replacement orders; AM labs pending  Accuchecks: ACHS    Gtts/LDAs:      Lines/Drains/Airways       Drain  Duration             Female External Urinary Catheter w/ Suction 07/15/25 0327 2 days         "      Peripheral Intravenous Line  Duration             Peripheral IV 07/14/25 1205 18 G Anterior;Left Forearm 2 days    Peripheral IV 07/16/25 0935 20 G 1 in Anterior;Left;Proximal Forearm <1 day                    Skin/Wounds     Wounds: No  Wound care consulted: No    Consults  Consults (From admission, onward)          Status Ordering Provider     Inpatient consult to Interventional Radiology  Once        Provider:  (Not yet assigned)    Completed KAYLYN LEE     Inpatient consult to Hematology/Oncology  Once        Provider:  (Not yet assigned)    Completed KYLE GONZALEZ     Inpatient consult to Gastroenterology-[facility]  Once        Provider:  Alcides Hoang MD    Completed KYLE GONZALEZ     General Surgery  Once        Provider:  Messi Appiah MD    Completed KYLE GONZALEZ

## 2025-07-17 NOTE — PLAN OF CARE
Npo for IR procedure, denies pain at this time, ap 120 st, no co of chest pain, denies sob, but noted some sob with exertion, resolves once settled, diminished breath sounds post bases, encouraged coughing And deeper breaths, abd soft, non distended, bowel sounds audible, passing gas, no nausea, healed abd inc

## 2025-07-17 NOTE — PLAN OF CARE
Pt had fallen asleep earlier, and stated had no pain, now awake, and moving, co of pain again at insertion site of drain, And lateral , side to back, medicated with oxycodone, tried to get her to eat, and drink but refused

## 2025-07-17 NOTE — SEDATION DOCUMENTATION
Pt arrived to Suite for biliary drain with anesthesia. Pt oriented to unit and staff. Plan of care reviewed with patient, patient verbalizes understanding. Comfort measures utilized. Pt safely transferred from stretcher to procedural table. Fall risk reviewed with patient, fall risk interventions maintained. Safety strap applied, positioner pillows utilized to minimize pressure points. Blankets applied. Pt prepped and draped utilizing standard sterile technique. Patient placed on continuous monitoring, as required by sedation policy. Timeouts completed utilizing standard universal time-out, per department and facility policy. RN to remain at bedside, continuous monitoring maintained. Pt resting comfortably. Denies pain/discomfort.

## 2025-07-17 NOTE — HOSPITAL COURSE
Patient with past medical history of duodenal and pancreatic cancer was admitted for severe anemia received blood transfusions, CAT scan abdomen showed metastasis of the cancer with dilated common bile duct, patient had severe leukocytosis and tachycardia, IR, oncology and GI were both consulted, oncology recommended palliative chemotherapy as outpatient, GI indicated no intervention and IR was consulted for catheter placement, on 07/17  Patient had severe diffuse biliary dilatation with long segment irregular narrowing of the common bile duct, patient had internal and external biliary drainage catheter placed by IR, who recommended to leave the catheter to bag drainage and total bilirubin normalizes, may then CABG drain as tolerated, patient to return to IR in 10-12 weeks for routine catheter exchange.  On 07/16 patient was started on broad-spectrum antibiotics, blood cultures remain negative, patient to be discharged home p.o. antibiotics to finish total of 7 days of antibiotics, and to follow up with her oncologist( scheduled the follow up appointment), follow up with IR for placement of the catheter in 2 weeks( still there follow up appointment).  Patient needs to see her PCP within 3-5 days of discharge

## 2025-07-17 NOTE — ANESTHESIA PROCEDURE NOTES
Intubation    Date/Time: 7/17/2025 8:36 AM    Performed by: Genevieve Barber CRNA  Authorized by: Lyndsay Damico MD    Intubation:     Induction:  Intravenous    Intubated:  Postinduction    Mask Ventilation:  Easy mask    Attempts:  1    Attempted By:  Student    Method of Intubation:  Video laryngoscopy    Blade:  Gu 3    Laryngeal View Grade: Grade I - full view of cords      Difficult Airway Encountered?: No      Complications:  None    Airway Device:  Oral endotracheal tube    Airway Device Size:  7.0    Style/Cuff Inflation:  Cuffed    Tube secured:  21    Secured at:  The lips    Placement Verified By:  Capnometry    Complicating Factors:  None    Findings Post-Intubation:  BS equal bilateral and atraumatic/condition of teeth unchanged

## 2025-07-17 NOTE — PLAN OF CARE
Pt had biliary drain inserted , rt upper abd, having pain at insertion site and rt lateral side to back, medicated As needed, repositioned for comfort, back rubs given, did help, heart rate still up, md aware, stated giving antibiotics, for possible infection, elevated wbc, no temp, pt has no appetite, asked her what she wanted to eat, or drink, but nothing, gave a boost supp drink, but did not want to drink it, oral care for dry mouth, encouraged, deeper breaths, monitor sat's when she sleeps sat's down to 90%. Did place on 2lnc , until sAt s up, safety maintained, bed alarm on, side rails up x4, call bell easy reach, bed low position, instructed to call for assistance At all times, understood

## 2025-07-17 NOTE — SEDATION DOCUMENTATION
Procedure completed. Patient tolerated well; VSS. Site CDI. Patient to be transported back to ICU room with IR RN and CRNAs. ICU nurse at bedside as well

## 2025-07-17 NOTE — EICU
Virtual ICU Quality Rounds    Admit Date: 7/15/2025  Hospital Day: 2    ICU Day: 2d 7h    24H Vital Sign Range:  Temp:  [98.1 °F (36.7 °C)-98.8 °F (37.1 °C)]   Pulse:  [115-134]   Resp:  [14-30]   BP: ()/(66-87)   SpO2:  [88 %-97 %]     VICU Surveillance Screening    LDA reconciliation : Yes

## 2025-07-17 NOTE — PLAN OF CARE
went to meet with patient on rounds. No family at bedside. Patient had choley tube placed today. She is currently Medicaid Pending. Patient encouraged to call with any questions or concerns.  will continue to follow patient through transitions of care and assist with any discharge needs.     Emergency Contacts    Name Relation Home Work Mobile   Gigi Garrett Significant other   [phone]   Neftali Felix Daughter [phone]     Future Appointments   Date Time Provider Department Center   7/28/2025  6:05 AM [facility] LAB [facility]   7/28/2025 10:00 AM Say Stevens MD [facility] CCCLIN [facility] CCC   11/4/2025  2:00 PM Nicolette Murillo NP [facility] ENDOCRN FE ACC   12/22/2025  8:30 AM OPHTHALMOLOGY GENERAL, [facility] OPHTHAL FE [facility]         07/17/25 1015   Discharge Reassessment   Assessment Type Discharge Planning Reassessment   Did the patient's condition or plan change since previous assessment? No   Discharge Plan discussed with: Patient   Discharge Plan A Home with family   Discharge Plan B Home   DME Needed Upon Discharge  none   Transition of Care Barriers Unisured   Why the patient remains in the hospital Requires continued medical care   Post-Acute Status   Hospital Resources/Appts/Education Provided Appointments scheduled and added to AVS   Discharge Delays None known at this time     Batsheva Roy RN    [phone]

## 2025-07-17 NOTE — PLAN OF CARE
Noted some sang drainage, along with brown drainage from drain, insertion site dry, clean, will monitor

## 2025-07-17 NOTE — ASSESSMENT & PLAN NOTE
Patient's blood pressure range in the last 24 hours was: BP  Min: 95/73  Max: 130/84.The patient's inpatient anti-hypertensive regimen is listed below:  Current Antihypertensives  metoprolol tartrate (LOPRESSOR) tablet 25 mg, 2 times daily, Oral    Plan  - BP is controlled, no changes needed to their regimen  - hold antihypertensive given acute blood loss

## 2025-07-18 LAB
ALBUMIN SERPL BCP-MCNC: 1.8 G/DL (ref 3.5–5.2)
ALP SERPL-CCNC: 449 UNIT/L (ref 40–150)
ALT SERPL W/O P-5'-P-CCNC: 82 UNIT/L (ref 10–44)
ANION GAP (OHS): 8 MMOL/L (ref 8–16)
AST SERPL-CCNC: 108 UNIT/L (ref 11–45)
BILIRUB SERPL-MCNC: 9.4 MG/DL (ref 0.1–1)
BUN SERPL-MCNC: 12 MG/DL (ref 6–20)
CALCIUM SERPL-MCNC: 8.2 MG/DL (ref 8.7–10.5)
CHLORIDE SERPL-SCNC: 103 MMOL/L (ref 95–110)
CO2 SERPL-SCNC: 22 MMOL/L (ref 23–29)
CREAT SERPL-MCNC: 0.6 MG/DL (ref 0.5–1.4)
ERYTHROCYTE [DISTWIDTH] IN BLOOD BY AUTOMATED COUNT: 24.1 % (ref 11.5–14.5)
GFR SERPLBLD CREATININE-BSD FMLA CKD-EPI: >60 ML/MIN/1.73/M2
GLUCOSE SERPL-MCNC: 139 MG/DL (ref 70–110)
HCT VFR BLD AUTO: 28.7 % (ref 37–48.5)
HGB BLD-MCNC: 9 GM/DL (ref 12–16)
HGB BLD-MCNC: 9.2 GM/DL (ref 12–16)
MCH RBC QN AUTO: 24.9 PG (ref 27–31)
MCHC RBC AUTO-ENTMCNC: 32.1 G/DL (ref 32–36)
MCV RBC AUTO: 78 FL (ref 82–98)
PLATELET # BLD AUTO: 424 K/UL (ref 150–450)
PMV BLD AUTO: 10.2 FL (ref 9.2–12.9)
POCT GLUCOSE: 118 MG/DL (ref 70–110)
POCT GLUCOSE: 141 MG/DL (ref 70–110)
POCT GLUCOSE: 149 MG/DL (ref 70–110)
POCT GLUCOSE: 156 MG/DL (ref 70–110)
POTASSIUM SERPL-SCNC: 4.5 MMOL/L (ref 3.5–5.1)
PROT SERPL-MCNC: 6.1 GM/DL (ref 6–8.4)
RBC # BLD AUTO: 3.69 M/UL (ref 4–5.4)
SODIUM SERPL-SCNC: 133 MMOL/L (ref 136–145)
WBC # BLD AUTO: 21.97 K/UL (ref 3.9–12.7)

## 2025-07-18 PROCEDURE — 63600175 PHARM REV CODE 636 W HCPCS

## 2025-07-18 PROCEDURE — 80053 COMPREHEN METABOLIC PANEL: CPT | Performed by: REGISTERED NURSE

## 2025-07-18 PROCEDURE — 63600175 PHARM REV CODE 636 W HCPCS: Performed by: REGISTERED NURSE

## 2025-07-18 PROCEDURE — 94761 N-INVAS EAR/PLS OXIMETRY MLT: CPT

## 2025-07-18 PROCEDURE — 82947 ASSAY GLUCOSE BLOOD QUANT: CPT | Performed by: REGISTERED NURSE

## 2025-07-18 PROCEDURE — 85027 COMPLETE CBC AUTOMATED: CPT

## 2025-07-18 PROCEDURE — 25000003 PHARM REV CODE 250

## 2025-07-18 PROCEDURE — 36415 COLL VENOUS BLD VENIPUNCTURE: CPT | Performed by: REGISTERED NURSE

## 2025-07-18 PROCEDURE — 11000001 HC ACUTE MED/SURG PRIVATE ROOM

## 2025-07-18 PROCEDURE — 85018 HEMOGLOBIN: CPT

## 2025-07-18 PROCEDURE — 36415 COLL VENOUS BLD VENIPUNCTURE: CPT

## 2025-07-18 RX ORDER — PANTOPRAZOLE SODIUM 40 MG/1
40 TABLET, DELAYED RELEASE ORAL 2 TIMES DAILY
Status: DISCONTINUED | OUTPATIENT
Start: 2025-07-18 | End: 2025-07-19 | Stop reason: HOSPADM

## 2025-07-18 RX ORDER — OXYCODONE HYDROCHLORIDE 5 MG/1
5 TABLET ORAL
Status: DISCONTINUED | OUTPATIENT
Start: 2025-07-18 | End: 2025-07-18

## 2025-07-18 RX ORDER — PROCHLORPERAZINE EDISYLATE 5 MG/ML
5 INJECTION INTRAMUSCULAR; INTRAVENOUS EVERY 30 MIN PRN
Status: DISCONTINUED | OUTPATIENT
Start: 2025-07-18 | End: 2025-07-18

## 2025-07-18 RX ORDER — KETOROLAC TROMETHAMINE 30 MG/ML
15 INJECTION, SOLUTION INTRAMUSCULAR; INTRAVENOUS EVERY 8 HOURS PRN
Status: DISCONTINUED | OUTPATIENT
Start: 2025-07-18 | End: 2025-07-18

## 2025-07-18 RX ORDER — ONDANSETRON HYDROCHLORIDE 2 MG/ML
4 INJECTION, SOLUTION INTRAVENOUS ONCE AS NEEDED
Status: DISCONTINUED | OUTPATIENT
Start: 2025-07-18 | End: 2025-07-18

## 2025-07-18 RX ORDER — GLUCAGON 1 MG
1 KIT INJECTION
Status: DISCONTINUED | OUTPATIENT
Start: 2025-07-18 | End: 2025-07-19 | Stop reason: HOSPADM

## 2025-07-18 RX ORDER — OXYCODONE HYDROCHLORIDE 5 MG/1
5 TABLET ORAL EVERY 6 HOURS PRN
Status: DISCONTINUED | OUTPATIENT
Start: 2025-07-18 | End: 2025-07-19 | Stop reason: HOSPADM

## 2025-07-18 RX ORDER — METRONIDAZOLE 500 MG/1
500 TABLET ORAL EVERY 8 HOURS
Status: DISCONTINUED | OUTPATIENT
Start: 2025-07-18 | End: 2025-07-19 | Stop reason: HOSPADM

## 2025-07-18 RX ORDER — HYDROMORPHONE HYDROCHLORIDE 2 MG/ML
0.2 INJECTION, SOLUTION INTRAMUSCULAR; INTRAVENOUS; SUBCUTANEOUS EVERY 5 MIN PRN
Status: DISCONTINUED | OUTPATIENT
Start: 2025-07-18 | End: 2025-07-18

## 2025-07-18 RX ADMIN — HYDROMORPHONE HYDROCHLORIDE 1 MG: 1 INJECTION, SOLUTION INTRAMUSCULAR; INTRAVENOUS; SUBCUTANEOUS at 08:07

## 2025-07-18 RX ADMIN — CIPROFOLXACIN 750 MG: 250 TABLET ORAL at 01:07

## 2025-07-18 RX ADMIN — HYDROMORPHONE HYDROCHLORIDE 1 MG: 1 INJECTION, SOLUTION INTRAMUSCULAR; INTRAVENOUS; SUBCUTANEOUS at 01:07

## 2025-07-18 RX ADMIN — MUPIROCIN: 20 OINTMENT TOPICAL at 08:07

## 2025-07-18 RX ADMIN — INSULIN ASPART 2 UNITS: 100 INJECTION, SOLUTION INTRAVENOUS; SUBCUTANEOUS at 07:07

## 2025-07-18 RX ADMIN — FERROUS SULFATE TAB 325 MG (65 MG ELEMENTAL FE) 1 EACH: 325 (65 FE) TAB at 08:07

## 2025-07-18 RX ADMIN — PIPERACILLIN SODIUM AND TAZOBACTAM SODIUM 4.5 G: 4; .5 INJECTION, POWDER, LYOPHILIZED, FOR SOLUTION INTRAVENOUS at 09:07

## 2025-07-18 RX ADMIN — PIPERACILLIN SODIUM AND TAZOBACTAM SODIUM 4.5 G: 4; .5 INJECTION, POWDER, LYOPHILIZED, FOR SOLUTION INTRAVENOUS at 01:07

## 2025-07-18 RX ADMIN — METRONIDAZOLE 500 MG: 500 TABLET ORAL at 10:07

## 2025-07-18 RX ADMIN — HYDROMORPHONE HYDROCHLORIDE 1 MG: 1 INJECTION, SOLUTION INTRAMUSCULAR; INTRAVENOUS; SUBCUTANEOUS at 06:07

## 2025-07-18 RX ADMIN — PANTOPRAZOLE SODIUM 40 MG: 40 INJECTION, POWDER, FOR SOLUTION INTRAVENOUS at 08:07

## 2025-07-18 RX ADMIN — MUPIROCIN: 20 OINTMENT TOPICAL at 10:07

## 2025-07-18 RX ADMIN — CIPROFOLXACIN 750 MG: 250 TABLET ORAL at 10:07

## 2025-07-18 RX ADMIN — METOPROLOL TARTRATE 25 MG: 25 TABLET, FILM COATED ORAL at 09:07

## 2025-07-18 RX ADMIN — METRONIDAZOLE 500 MG: 500 TABLET ORAL at 01:07

## 2025-07-18 RX ADMIN — POLYETHYLENE GLYCOL 3350 17 G: 17 POWDER, FOR SOLUTION ORAL at 08:07

## 2025-07-18 RX ADMIN — PANTOPRAZOLE SODIUM 40 MG: 40 TABLET, DELAYED RELEASE ORAL at 10:07

## 2025-07-18 NOTE — PROGRESS NOTES
Virtual ICU Daily Checklist     Admit Date: 7/15/2025  Hospital Length of Stay:  LOS: 3 days     ICU Length of Stay 3d 5h       ICU Daily Goals Checklist    F Feeding  Current Diet Order   Procedures    Diet Heart Healthy     Goal:    Status:     Intake (%): 0% (bites of fruit)   Appropriate       A Analgesia  Preferred Pain Scale: number (Numeric Rating Pain Scale)     Appropriate     S Sedation  Target Arousal:    Last sedation level: RASS (Adam Agitation-Sedation Scale): alert and calm     Appropriate     T Thromboembolism Prophylaxis  Mechanical    VTE Risk Mitigation (From admission, onward)           Ordered     IP VTE HIGH RISK PATIENT  Once         07/15/25 0629     Place sequential compression device  Until discontinued         07/15/25 0629     Place sequential compression device  Until discontinued         07/15/25 0354                ,   Anticoagulants       None            Appropriate     H Head of Bed/Mobility  HOB > 300 Head of Bed (HOB) Positioning: HOB at 30 degrees Appropriate   U Stress Ulcer Prophylaxis (if needed)  PPI    GI Medications (From admission, onward)      Start     Stop Route Frequency Ordered    07/17/25 0930  polyethylene glycol packet 17 g         -- Oral Daily 07/17/25 0819    07/17/25 0118  simethicone chewable tablet 125 mg         -- Oral Every 6 hours PRN 07/17/25 0018    07/15/25 0900  pantoprazole injection 40 mg         -- IV 2 times daily 07/15/25 0630    07/15/25 0451  ondansetron injection 4 mg         -- IV Every 8 hours PRN 07/15/25 0354           Appropriate   G Glucose control     Glycemic Management: blood glucose monitored  Recent Labs   Lab 07/17/25  1120 07/17/25  1658 07/17/25  2134 07/18/25  0724   POCTGLUCOSE 141* 181* 149* 149*     managed    Appropriate       B Bowel Movement   Last Bowel Movement: 07/13/25     Appropriate   I Indwelling Lines  Lines/Drains/Airways       Drain  Duration             Female External Urinary Catheter w/ Suction 07/15/25  0327 3 days         Biliary Tube 07/17/25 0914 10 Fr. RUQ <1 day              Peripheral Intravenous Line  Duration             Peripheral IV Single Lumen 07/14/25 1205 18 G Anterior;Left Forearm 3 days    Peripheral IV Single Lumen 07/16/25 0935 20 G 1 in Anterior;Left;Proximal Forearm 1 day                    Appropriate    Addison score   Addison Score: 18   Appropriate   D Deescalation of Antibiotics  Antibiotics (From admission, onward)      Start     Stop Route Frequency Ordered    07/16/25 1015  piperacillin-tazobactam (ZOSYN) 4.5 g in D5W 100 mL IVPB (MB+)         -- IV Every 8 hours (non-standard times) 07/16/25 0915    07/15/25 2100  mupirocin 2 % ointment         07/20/25 2059 Nasl 2 times daily 07/15/25 1315         Appropriate      Goals of Care  Full Code    Appropriate    PLAN FOR THE DAY Summary  1.transfer to floor  2.  3.         Peggy Gandara RN

## 2025-07-18 NOTE — ASSESSMENT & PLAN NOTE
Pathology: Invasive adenocarcinoma, poorly differentiated.  Surrounding small intestinal mucosa without dysplasia.     Has initial appointment on the 17th in [place]   We will consult heme Onc while inpatient  IR is consulted on 07/16Plan for biliary drainage catheter placement tomorrow with general anesthesia.   /7/17  Patient had severe diffuse biliary dilatation with long segment irregular narrowing of the common bile duct, patient had internal and external biliary drainage catheter placed by IR, who recommended to leave the catheter to bag drainage and total bilirubin normalizes, may then CABG drain as tolerated, patient to return to IR in 10-12 weeks for routine catheter exchange

## 2025-07-18 NOTE — PLAN OF CARE
went to meet with patient on rounds. Patient had a biliary drain/catheter placed by IR yesterday. She will go home with this as well. Refer to IR note.  placed Ambulatory Referral to IR and In-Basket message sent to . PCP and other appointments scheduled. Patient will have family transport home at discharge. She is currently Medicaid Pending. Patient encouraged to call with any questions or concerns.  will continue to follow patient through transitions of care and assist with any discharge needs.     Leave catheter to bag drainage until bilirubin normalizes, may then cap drain as tolerated.   Patient to return to IR in 10-12 weeks for routine catheter exchange.      Aron Ramírez MD  Interventional Radiology  Department of Radiology    0279-- met with patient again to discuss discharge plans. I updated her regarding above information and IR drain. Patient unable to get Home Health due to Medicaid Pending status. Patient and spouse will need to be educated. She reports her family will transport home at discharge. Patient still unable to recall her Oncologist. Per [facility], most appointments scheduled at [facility].  to place referral. Patient noted to be on [facility] Financial Assistance (unsure for how long). Will place Ambulatory Referral for Heme/Onc at [facility] since closer to home.    1450--In-Basket message sent to PCP office for sooner appointment.    Emergency Contacts    Name Relation Home Work Mobile   Gigi Garrett Significant other   [phone]   Neftali Felix Daughter [phone]         Future Appointments   Date Time Provider Department Center   7/21/2025  9:30 AM Milagro Vega MD [facility]   7/28/2025  6:05 AM [facility]   7/28/2025 10:00 AM Say Stevens MD [facility]   8/7/2025 11:20 AM [facility]   11/4/2025  2:00 PM Chidi  SUREKHA Will [facility] ENDOCRN FE ACC   12/22/2025  8:30 AM OPHTHALMOLOGY GENERAL, BETO [facility] OPHTHAL FE GREEN        [facility] Intervenional Radiology ([facility])  Interventional Radiology [phone] [address] [place] [address]      Next Steps: Follow up  Instructions: Ambulatory Referral to Interventional Radiology placed for catheter exchanges.    [facility] Appointment Line   [phone]  [address] [place] [address]     Next Steps: Follow up  Instructions: Ambulatory Referral Placed for Oncology follow-up.    Say Stevens MD  Internal Medicine [phone] [address] [place] [address]     Next Steps: Follow up  Instructions: Message sent to office for sooner follow-up appointment, Primary Care Physician        07/18/25 1105   Discharge Reassessment   Assessment Type Discharge Planning Reassessment   Did the patient's condition or plan change since previous assessment? No   Discharge Plan discussed with: Patient   Discharge Plan A Home with family   Discharge Plan B Home   DME Needed Upon Discharge  none   Transition of Care Barriers Unisured  (Medicaid Pending)   Why the patient remains in the hospital Requires continued medical care   Post-Acute Status   Hospital Resources/Appts/Education Provided Appointments scheduled and added to AVS   Discharge Delays None known at this time     Batsheva Roy RN    [phone]

## 2025-07-18 NOTE — ASSESSMENT & PLAN NOTE
Patient's blood pressure range in the last 24 hours was: BP  Min: 94/71  Max: 119/82.The patient's inpatient anti-hypertensive regimen is listed below:  Current Antihypertensives  metoprolol tartrate (LOPRESSOR) tablet 25 mg, 2 times daily, Oral    Plan  - BP is controlled, no changes needed to their regimen  - hold antihypertensive given acute blood loss

## 2025-07-18 NOTE — EICU
Virtual ICU Quality Rounds    Admit Date: 7/15/2025  Hospital Day: 3    ICU Day: 3d 3h    24H Vital Sign Range:  Temp:  [98.2 °F (36.8 °C)-98.8 °F (37.1 °C)]   Pulse:  [111-135]   Resp:  [15-32]   BP: ()/(65-87)   SpO2:  [88 %-100 %]     VICU Surveillance Screening  LDA reconciliation : Yes

## 2025-07-18 NOTE — PROGRESS NOTES
Pharmacist Intervention IV to PO Note    Aury Felix is a 54 y.o. female being treated with IV medication pantoprazole    Patient Data:    Vital Signs (Most Recent):  Temp: 97.9 °F (36.6 °C) (07/18/25 0715)  Pulse: 107 (07/18/25 0845)  Resp: 13 (07/18/25 0845)  BP: 94/71 (07/18/25 0800)  SpO2: (!) 91 % (07/18/25 0845) Vital Signs (72h Range):  Temp:  [97.9 °F (36.6 °C)-98.8 °F (37.1 °C)]   Pulse:  []   Resp:  [12-38]   BP: ()/(62-87)   SpO2:  [86 %-100 %]      CBC:  Recent Labs   Lab 07/15/25  1522 07/15/25  1954 07/16/25  0432 07/16/25  1202 07/17/25  1217 07/17/25  1443 07/18/25  0538   WBC 19.39*  --  20.67*  --  20.19*  --   --    RBC 3.02*  --  3.69*  --  3.81*  --   --    HGB 7.4*  7.5*   < > 9.1*  9.1*   < > 9.4* 9.9* 9.0*   HCT 23.0*  --  28.2*  --  29.3*  --   --    *  --  458*  --  482*  --   --    MCV 76*  --  76*  --  77*  --   --    MCH 24.8*  --  24.7*  --  24.7*  --   --    MCHC 32.6  --  32.3  --  32.1  --   --     < > = values in this interval not displayed.     CMP:     Recent Labs   Lab 07/16/25  0432 07/17/25  1216 07/18/25  0538   * 136* 139*   CALCIUM 8.5* 8.3* 8.2*   ALBUMIN 2.2* 1.9* 1.8*   PROT 6.5 6.5 6.1   * 133* 133*   K 4.9 4.5 4.5   CO2 20* 21* 22*    102 103   BUN 8 9 12   CREATININE 0.6 0.6 0.6   ALKPHOS 413* 485* 449*   ALT 78* 80* 82*   AST 94* 105* 108*   BILITOT 10.7* 10.4* 9.4*       Dietary Orders:  Diet Orders            Diet Heart Healthy: Heart Healthy starting at 07/17 1101            Based on the following criteria, this patient qualifies for intravenous to oral conversion:  [x] The patients gastrointestinal tract is functioning (tolerating medications via oral or enteral route for 24 hours and tolerating food or enteral feeds for 24 hours).  [x] The patient is hemodynamically stable for 24 hours (heart rate <100 beats per minute, systolic blood pressure >99 mm Hg, and respiratory rate <20 breaths per minute).  [x] The patient  shows clinical improvement (afebrile for at least 24 hours and white blood cell count downtrending or normalized). Additionally, the patient must be non-neutropenic (absolute neutrophil count >500 cells/mm3).  [x] For antimicrobials, the patient has received IV therapy for at least 24 hours.    IV medication pantoprazole will be changed to oral medication pantoprazole    Pharmacist's Name: Andressa Mak  Pharmacist's Extension: [phone]

## 2025-07-18 NOTE — SUBJECTIVE & OBJECTIVE
Interval History:  Patient was seen in the morning A&O x4 had severe diffuse biliary dilatation by IR documentation with long segment a regular narrowing of the common bile duct, patient had internal/external biliary drainage catheter on 07/17, IR recommended to leave the catheter to bag drainage until bilirubin normalize and mid in cap the drain as tolerated patient told to return to IR for 10-12 weeks for routine catheter exchange  Switched IV antibiotics to p.o. patient will finish 7 days of antibiotics overall significant improvement for medical status       Review of Systems   Constitutional:  Positive for fatigue.   Musculoskeletal:  Positive for back pain.   Neurological:  Positive for weakness.   All other systems reviewed and are negative.    Objective:     Vital Signs (Most Recent):  Temp: 97.9 °F (36.6 °C) (07/18/25 0715)  Pulse: 100 (07/18/25 0937)  Resp: 13 (07/18/25 0845)  BP: 99/70 (07/18/25 0937)  SpO2: (!) 91 % (07/18/25 0845) Vital Signs (24h Range):  Temp:  [97.9 °F (36.6 °C)-98.8 °F (37.1 °C)] 97.9 °F (36.6 °C)  Pulse:  [] 100  Resp:  [12-38] 13  SpO2:  [89 %-99 %] 91 %  BP: ()/(65-84) 99/70     Weight: 79 kg (174 lb 2.6 oz)  Body mass index is 28.98 kg/m².    Intake/Output Summary (Last 24 hours) at 7/18/2025 1125  Last data filed at 7/18/2025 0858  Gross per 24 hour   Intake 288.55 ml   Output 330 ml   Net -41.45 ml         Physical Exam  Constitutional:       Appearance: Normal appearance.   Eyes:      General: Scleral icterus present.   Cardiovascular:      Rate and Rhythm: Tachycardia present.   Pulmonary:      Effort: Pulmonary effort is normal.   Abdominal:      General: Abdomen is flat. A surgical scar is present.      Palpations: Abdomen is soft.      Comments: Well healed surgical scar   Neurological:      Mental Status: She is alert.               Significant Labs: All pertinent labs within the past 24 hours have been reviewed.  Recent Lab Results  (Last 5 results in the  past 24 hours)        07/18/25  0724   07/18/25  0538   07/17/25  2134   07/17/25  1658   07/17/25  1443        Albumin   1.8             ALP   449             ALT   82             Anion Gap   8             AST   108             BILIRUBIN TOTAL   9.4  Comment: For infants and newborns, interpretation of results should be based   on gestational age, weight and in agreement with clinical   observations.    Premature Infant recommended reference ranges:   0-24 hours:  <8.0 mg/dL   24-48 hours: <12.0 mg/dL   3-5 days:    <15.0 mg/dL   6-29 days:   <15.0 mg/dL             BUN   12             Calcium   8.2             Chloride   103             CO2   22             Creatinine   0.6             eGFR   >60  Comment: Estimated GFR calculated using the CKD-EPI creatinine (2021) equation.             Glucose   139             Hemoglobin   9.0       9.9       POCT Glucose 149     149   181         Potassium   4.5             PROTEIN TOTAL   6.1             Sodium   133                                    Significant Imaging: I have reviewed all pertinent imaging results/findings within the past 24 hours.

## 2025-07-18 NOTE — ASSESSMENT & PLAN NOTE
Hyponatremia is likely due to Dehydration/hypovolemia. The patient's most recent sodium results are listed below.  Recent Labs     07/16/25  0432 07/17/25  1216 07/18/25  0538   * 133* 133*     Plan  - Correct the sodium by 4-6mEq in 24 hours.   - Obtain the following studies: Urine sodium, urine osmolality, serum osmolality.  - Will treat the hyponatremia with IV fluids as follows:  On admission bolus NS and maintenance  - Monitor sodium Daily.   - Patient hyponatremia is stable

## 2025-07-18 NOTE — ASSESSMENT & PLAN NOTE
Anemia is likely due to acute blood loss which was from GI bleed. Most recent hemoglobin and hematocrit are listed below.  Recent Labs     07/15/25  1522 07/15/25  1954 07/16/25  0432 07/16/25  1202 07/17/25  1217 07/17/25  1443 07/18/25  0538   HGB 7.4*  7.5*   < > 9.1*  9.1*   < > 9.4* 9.9* 9.0*   HCT 23.0*  --  28.2*  --  29.3*  --   --     < > = values in this interval not displayed.     Plan  - Monitor serial CBC: Every 8 hours  - Transfuse PRBC if patient becomes hemodynamically unstable, symptomatic or H/H drops below 7/21.  - Patient has received 2 units of PRBCs on 7/14/2025  - Patient's anemia is currently stable  - awaiting post transfusion H&H  -GI consulted

## 2025-07-18 NOTE — PROGRESS NOTES
"[facility] Care  [facility] Medicine  Progress Note    Patient Name: Aury Felix  MRN: [id number]  Patient Class: IP- Inpatient   Admission Date: 7/15/2025  Length of Stay: 3 days  Attending Physician: Layton Hayward MD  Primary Care Provider: Jimbo Menard II, MD        Subjective     Principal Problem:GIB (gastrointestinal bleeding)        HPI:    Ms. 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  Pathology: Invasive adenocarcinoma, poorly differentiated.  Surrounding small intestinal mucosa without dysplasia.      She was last admitted at [place] on 5/27 - 5/31 due to hematemesis and underwent emergent liver biopsy, gastrojujenostomy, and exploratory laparotomy.  She followed up with Dr. Reyes, General Surgery, in clinic today due to back pain.  She was noted to have elevated LFT's and jaundice.  He sent her to the ED from clinic for admission after labs showed anemia, Hg 4.7g.     In the ED she had repeat labs with the Hgb dropping to 4.2g.  Per ED, 2 units has been ordered but this patient has a multiple antibody issue.  They are awaiting blood from [place].     VS in the ED were BP 98/65 -> 158/95 -> 11/59 -> 120/80   Pulse 124 -> 108   Temp 98.7 °F (37.1 °C) (Oral)   Resp 20   Ht 5' 8" (1.727 m)   Wt 74.7 kg (164 lb 10.9 oz)   SpO2 99%   Breastfeeding No   BMI 25.04 kg/m².     Labs: WBC 16 -> 14, Hg 4.7 -> 4.2, , retic 6.2, K 3.1, Cr 0.7, Gluc 130, , Alb 2.4, TB 6.1, DB 4.5, AST 71, ALT 76, no UA, heme-occult positive.     CTA GIB study 7/14: Interval postoperative change consistent with gastrojejunostomy. Large ill-defined mass in the region the head of the pancreas and duodenum is more ill defined today with some surrounding fat stranding most likely neoplastic. Interval development also small volume of ascites and of innumerable lesions throughout the liver consistent with extensive " metastatic disease. No active extravasation of contrast indicate acute hemorrhage    On arrival to [place] patient completing 2nd unit of PRBC.  Only complaint is back pain.  Reports she saw General surgery today for postop follow up as she has been having shortness a breath.  Reports she has heme Onc appointment on the 17th.  Patient will be admitted to Hospital Medicine we will consult GI    Overview/Hospital Course:  7/17  Patient had severe diffuse biliary dilatation with long segment irregular narrowing of the common bile duct, patient had internal and external biliary drainage catheter placed by IR, who recommended to leave the catheter to bag drainage and total bilirubin normalizes, may then CABG drain as tolerated, patient to return to IR in 10-12 weeks for routine catheter exchange    Interval History:  Patient was seen in the morning A&O x4 had severe diffuse biliary dilatation by IR documentation with long segment a regular narrowing of the common bile duct, patient had internal/external biliary drainage catheter on 07/17, IR recommended to leave the catheter to bag drainage until bilirubin normalize and mid in cap the drain as tolerated patient told to return to IR for 10-12 weeks for routine catheter exchange  Switched IV antibiotics to p.o. patient will finish 7 days of antibiotics overall significant improvement for medical status       Review of Systems   Constitutional:  Positive for fatigue.   Musculoskeletal:  Positive for back pain.   Neurological:  Positive for weakness.   All other systems reviewed and are negative.    Objective:     Vital Signs (Most Recent):  Temp: 97.9 °F (36.6 °C) (07/18/25 0715)  Pulse: 100 (07/18/25 0937)  Resp: 13 (07/18/25 0845)  BP: 99/70 (07/18/25 0937)  SpO2: (!) 91 % (07/18/25 0845) Vital Signs (24h Range):  Temp:  [97.9 °F (36.6 °C)-98.8 °F (37.1 °C)] 97.9 °F (36.6 °C)  Pulse:  [] 100  Resp:  [12-38] 13  SpO2:  [89 %-99 %] 91 %  BP: ()/(65-84) 99/70      Weight: 79 kg (174 lb 2.6 oz)  Body mass index is 28.98 kg/m².    Intake/Output Summary (Last 24 hours) at 7/18/2025 1125  Last data filed at 7/18/2025 0858  Gross per 24 hour   Intake 288.55 ml   Output 330 ml   Net -41.45 ml         Physical Exam  Constitutional:       Appearance: Normal appearance.   Eyes:      General: Scleral icterus present.   Cardiovascular:      Rate and Rhythm: Tachycardia present.   Pulmonary:      Effort: Pulmonary effort is normal.   Abdominal:      General: Abdomen is flat. A surgical scar is present.      Palpations: Abdomen is soft.      Comments: Well healed surgical scar   Neurological:      Mental Status: She is alert.               Significant Labs: All pertinent labs within the past 24 hours have been reviewed.  Recent Lab Results  (Last 5 results in the past 24 hours)        07/18/25  0724   07/18/25  0538   07/17/25  2134   07/17/25  1658   07/17/25  1443        Albumin   1.8             ALP   449             ALT   82             Anion Gap   8             AST   108             BILIRUBIN TOTAL   9.4  Comment: For infants and newborns, interpretation of results should be based   on gestational age, weight and in agreement with clinical   observations.    Premature Infant recommended reference ranges:   0-24 hours:  <8.0 mg/dL   24-48 hours: <12.0 mg/dL   3-5 days:    <15.0 mg/dL   6-29 days:   <15.0 mg/dL             BUN   12             Calcium   8.2             Chloride   103             CO2   22             Creatinine   0.6             eGFR   >60  Comment: Estimated GFR calculated using the CKD-EPI creatinine (2021) equation.             Glucose   139             Hemoglobin   9.0       9.9       POCT Glucose 149     149   181         Potassium   4.5             PROTEIN TOTAL   6.1             Sodium   133                                    Significant Imaging: I have reviewed all pertinent imaging results/findings within the past 24 hours.          Assessment &  Plan  Essential hypertension  Patient's blood pressure range in the last 24 hours was: BP  Min: 94/71  Max: 119/82.The patient's inpatient anti-hypertensive regimen is listed below:  Current Antihypertensives  metoprolol tartrate (LOPRESSOR) tablet 25 mg, 2 times daily, Oral    Plan  - BP is controlled, no changes needed to their regimen  - hold antihypertensive given acute blood loss  GIB (gastrointestinal bleeding)  Patient's hemorrhage is due to gastrointestinal bleed, this bleeding is not associated with a medication or a coagulopathy. Patients most recent Hgb, Hct, platelets, and INR are listed below.  Recent Labs     07/15/25  1522 07/15/25  1954 07/16/25  0432 07/16/25  1202 07/17/25  1217 07/17/25  1443 07/18/25  0538   HGB 7.4*  7.5*   < > 9.1*  9.1*   < > 9.4* 9.9* 9.0*   HCT 23.0*  --  28.2*  --  29.3*  --   --    *  --  458*  --  482*  --   --     < > = values in this interval not displayed.     Plan  - Will trend hemoglobin/hematocrit Every 8 hours  - Will monitor and correct any coagulation defects  - Will transfuse if Hgb is <7g/dl (<8g/dl in cases of active ACS) or if patient has rapid bleeding leading to hemodynamic instability  - given 2 units PRBC prior to transfer  -patient with positive indirect Thao  -started on PPI  -consult placed to GI no intervention at this moment  Received IV iron and we will continue on p.o. iron, hemoglobin is stable  Duodenal cancer   Pathology: Invasive adenocarcinoma, poorly differentiated.  Surrounding small intestinal mucosa without dysplasia.     Has initial appointment on the 17th in [place]   We will consult heme Onc while inpatient  IR is consulted on 07/16Plan for biliary drainage catheter placement tomorrow with general anesthesia.   /7/17  Patient had severe diffuse biliary dilatation with long segment irregular narrowing of the common bile duct, patient had internal and external biliary drainage catheter placed by IR, who recommended to leave the  catheter to bag drainage and total bilirubin normalizes, may then CABG drain as tolerated, patient to return to IR in 10-12 weeks for routine catheter exchange  ABLA (acute blood loss anemia)  Anemia is likely due to acute blood loss which was from GI bleed. Most recent hemoglobin and hematocrit are listed below.  Recent Labs     07/15/25  1522 07/15/25  1954 07/16/25  0432 07/16/25  1202 07/17/25  1217 07/17/25  1443 07/18/25  0538   HGB 7.4*  7.5*   < > 9.1*  9.1*   < > 9.4* 9.9* 9.0*   HCT 23.0*  --  28.2*  --  29.3*  --   --     < > = values in this interval not displayed.     Plan  - Monitor serial CBC: Every 8 hours  - Transfuse PRBC if patient becomes hemodynamically unstable, symptomatic or H/H drops below 7/21.  - Patient has received 2 units of PRBCs on 7/14/2025  - Patient's anemia is currently stable  - awaiting post transfusion H&H  -GI consulted  Transaminitis  Likely secondary to extensive metastatic disease in the liver with innumerable lesions.  LFTs    Heme Onc and GI both consulted  IR was consultedPlan for biliary drainage catheter placement tomorrow 7/17 with general anesthesia.   7/17  Patient had severe diffuse biliary dilatation with long segment irregular narrowing of the common bile duct, patient had internal and external biliary drainage catheter placed by IR, who recommended to leave the catheter to bag drainage and total bilirubin normalizes, may then CABG drain as tolerated, patient to return to IR in 10-12 weeks for routine catheter exchange  Hyponatremia  Hyponatremia is likely due to Dehydration/hypovolemia. The patient's most recent sodium results are listed below.  Recent Labs     07/16/25  0432 07/17/25  1216 07/18/25  0538   * 133* 133*     Plan  - Correct the sodium by 4-6mEq in 24 hours.   - Obtain the following studies: Urine sodium, urine osmolality, serum osmolality.  - Will treat the hyponatremia with IV fluids as follows:  On admission bolus NS and maintenance  -  Monitor sodium Daily.   - Patient hyponatremia is stable     VTE Risk Mitigation (From admission, onward)           Ordered     IP VTE HIGH RISK PATIENT  Once         07/15/25 0629     Place sequential compression device  Until discontinued         07/15/25 0629     Place sequential compression device  Until discontinued         07/15/25 0354                    Discharge Planning   MAIDA: 7/19/2025     Code Status: Full Code   Medical Readiness for Discharge Date:   Discharge Plan A: Home with family   Discharge Delays: None known at this time          Critical care time spent on the evaluation and treatment of severe organ dysfunction, review of pertinent labs and imaging studies, discussions with consulting providers and discussions with patient/family:  45 minutes.          Layton Hamilton MD  Department of Hospital Medicine   [place] - Intensive Care

## 2025-07-18 NOTE — NURSING
Pt transferred to med surg unit. Handoff report made via phone. Bedside handoff done with receiving RN.

## 2025-07-18 NOTE — PLAN OF CARE
Pt AAOx4. VSS. UOP of 400ccs. Pain controlled by PRN dilaudid and oxycodone. Frequent checks for safety done during shift. Report given to AM RN.    Problem: Adult Inpatient Plan of Care  Goal: Plan of Care Review  Outcome: Progressing  Goal: Patient-Specific Goal (Individualized)  Outcome: Progressing  Goal: Absence of Hospital-Acquired Illness or Injury  Outcome: Progressing  Goal: Optimal Comfort and Wellbeing  Outcome: Progressing  Goal: Readiness for Transition of Care  Outcome: Progressing     Problem: Diabetes Comorbidity  Goal: Blood Glucose Level Within Targeted Range  Outcome: Progressing     Problem: Oncology Care  Goal: Effective Coping  Outcome: Progressing  Goal: Improved Activity Tolerance  Outcome: Progressing  Goal: Optimal Oral Intake  Outcome: Progressing  Goal: Improved Oral Mucous Membrane Integrity  Outcome: Progressing  Goal: Optimal Pain Control and Function  Outcome: Progressing

## 2025-07-18 NOTE — ANESTHESIA POSTPROCEDURE EVALUATION
Anesthesia Post Evaluation    Patient: Aury Felix    Procedure(s) Performed: * No procedures listed *    Final Anesthesia Type: general      Patient location during evaluation: PACU  Patient participation: Yes- Able to Participate  Level of consciousness: awake and alert  Post-procedure vital signs: reviewed and stable  Pain management: adequate  Airway patency: patent    PONV status at discharge: No PONV  Anesthetic complications: no      Cardiovascular status: blood pressure returned to baseline  Respiratory status: unassisted  Hydration status: euvolemic  Follow-up not needed.          Vitals Value Taken Time   BP 94/71 07/18/25 08:01   Temp 36.6 °C (97.9 °F) 07/18/25 07:15   Pulse 104 07/18/25 08:20   Resp 35 07/18/25 08:20   SpO2 91 % 07/18/25 08:20   Vitals shown include unfiled device data.      No case tracking events are documented in the log.      Pain/Vita Score: Pain Rating Prior to Med Admin: 8 (7/18/2025  1:26 AM)  Pain Rating Post Med Admin: 2 (7/18/2025  1:56 AM)

## 2025-07-18 NOTE — ASSESSMENT & PLAN NOTE
Patient's hemorrhage is due to gastrointestinal bleed, this bleeding is not associated with a medication or a coagulopathy. Patients most recent Hgb, Hct, platelets, and INR are listed below.  Recent Labs     07/15/25  1522 07/15/25  1954 07/16/25  0432 07/16/25  1202 07/17/25  1217 07/17/25  1443 07/18/25  0538   HGB 7.4*  7.5*   < > 9.1*  9.1*   < > 9.4* 9.9* 9.0*   HCT 23.0*  --  28.2*  --  29.3*  --   --    *  --  458*  --  482*  --   --     < > = values in this interval not displayed.     Plan  - Will trend hemoglobin/hematocrit Every 8 hours  - Will monitor and correct any coagulation defects  - Will transfuse if Hgb is <7g/dl (<8g/dl in cases of active ACS) or if patient has rapid bleeding leading to hemodynamic instability  - given 2 units PRBC prior to transfer  -patient with positive indirect Thao  -started on PPI  -consult placed to GI no intervention at this moment  Received IV iron and we will continue on p.o. iron, hemoglobin is stable

## 2025-07-19 VITALS
HEART RATE: 100 BPM | TEMPERATURE: 98 F | RESPIRATION RATE: 18 BRPM | OXYGEN SATURATION: 95 % | BODY MASS INDEX: 28.1 KG/M2 | SYSTOLIC BLOOD PRESSURE: 96 MMHG | WEIGHT: 168.63 LBS | DIASTOLIC BLOOD PRESSURE: 65 MMHG | HEIGHT: 65 IN

## 2025-07-19 LAB
ALBUMIN SERPL BCP-MCNC: 2 G/DL (ref 3.5–5.2)
ALP SERPL-CCNC: 442 UNIT/L (ref 40–150)
ALT SERPL W/O P-5'-P-CCNC: 89 UNIT/L (ref 10–44)
ANION GAP (OHS): 10 MMOL/L (ref 8–16)
AST SERPL-CCNC: 103 UNIT/L (ref 11–45)
BILIRUB SERPL-MCNC: 8.8 MG/DL (ref 0.1–1)
BUN SERPL-MCNC: 14 MG/DL (ref 6–20)
CALCIUM SERPL-MCNC: 8.3 MG/DL (ref 8.7–10.5)
CHLORIDE SERPL-SCNC: 103 MMOL/L (ref 95–110)
CO2 SERPL-SCNC: 20 MMOL/L (ref 23–29)
CREAT SERPL-MCNC: 0.6 MG/DL (ref 0.5–1.4)
GFR SERPLBLD CREATININE-BSD FMLA CKD-EPI: >60 ML/MIN/1.73/M2
GLUCOSE SERPL-MCNC: 123 MG/DL (ref 70–110)
HGB BLD-MCNC: 9.5 GM/DL (ref 12–16)
POCT GLUCOSE: 115 MG/DL (ref 70–110)
POTASSIUM SERPL-SCNC: 4.5 MMOL/L (ref 3.5–5.1)
PROT SERPL-MCNC: 6.6 GM/DL (ref 6–8.4)
SODIUM SERPL-SCNC: 133 MMOL/L (ref 136–145)

## 2025-07-19 PROCEDURE — 25000003 PHARM REV CODE 250

## 2025-07-19 PROCEDURE — 82040 ASSAY OF SERUM ALBUMIN: CPT | Performed by: REGISTERED NURSE

## 2025-07-19 PROCEDURE — 63600175 PHARM REV CODE 636 W HCPCS

## 2025-07-19 PROCEDURE — 85018 HEMOGLOBIN: CPT

## 2025-07-19 PROCEDURE — 36415 COLL VENOUS BLD VENIPUNCTURE: CPT

## 2025-07-19 RX ORDER — METRONIDAZOLE 500 MG/1
500 TABLET ORAL EVERY 8 HOURS
Qty: 21 TABLET | Refills: 0 | Status: ON HOLD | OUTPATIENT
Start: 2025-07-19 | End: 2025-07-26

## 2025-07-19 RX ORDER — CIPROFLOXACIN 750 MG/1
750 TABLET, FILM COATED ORAL EVERY 12 HOURS
Qty: 14 TABLET | Refills: 0 | Status: ON HOLD | OUTPATIENT
Start: 2025-07-19 | End: 2025-07-26

## 2025-07-19 RX ORDER — SIMETHICONE 125 MG
125 TABLET,CHEWABLE ORAL EVERY 6 HOURS PRN
Qty: 30 TABLET | Refills: 0 | Status: ON HOLD | OUTPATIENT
Start: 2025-07-19

## 2025-07-19 RX ORDER — POLYETHYLENE GLYCOL 3350 17 G/17G
17 POWDER, FOR SOLUTION ORAL DAILY
Qty: 30 EACH | Refills: 0 | Status: ON HOLD | OUTPATIENT
Start: 2025-07-19

## 2025-07-19 RX ORDER — OXYCODONE HYDROCHLORIDE 5 MG/1
5 TABLET ORAL EVERY 8 HOURS PRN
Qty: 12 TABLET | Refills: 0 | Status: ON HOLD | OUTPATIENT
Start: 2025-07-19

## 2025-07-19 RX ORDER — FERROUS SULFATE 325(65) MG
325 TABLET, DELAYED RELEASE (ENTERIC COATED) ORAL EVERY OTHER DAY
Qty: 15 TABLET | Refills: 2 | Status: ON HOLD | OUTPATIENT
Start: 2025-07-19

## 2025-07-19 RX ADMIN — POLYETHYLENE GLYCOL 3350 17 G: 17 POWDER, FOR SOLUTION ORAL at 09:07

## 2025-07-19 RX ADMIN — CIPROFOLXACIN 750 MG: 250 TABLET ORAL at 09:07

## 2025-07-19 RX ADMIN — METRONIDAZOLE 500 MG: 500 TABLET ORAL at 06:07

## 2025-07-19 RX ADMIN — HYDROMORPHONE HYDROCHLORIDE 1 MG: 1 INJECTION, SOLUTION INTRAMUSCULAR; INTRAVENOUS; SUBCUTANEOUS at 06:07

## 2025-07-19 RX ADMIN — OXYCODONE HYDROCHLORIDE 5 MG: 5 TABLET ORAL at 03:07

## 2025-07-19 RX ADMIN — MUPIROCIN: 20 OINTMENT TOPICAL at 09:07

## 2025-07-19 RX ADMIN — FERROUS SULFATE TAB 325 MG (65 MG ELEMENTAL FE) 1 EACH: 325 (65 FE) TAB at 09:07

## 2025-07-19 RX ADMIN — PANTOPRAZOLE SODIUM 40 MG: 40 TABLET, DELAYED RELEASE ORAL at 09:07

## 2025-07-19 NOTE — PLAN OF CARE
Case Management Final Discharge Note    Discharge Disposition: Home    New DME ordered / company name: None    Relevant SDOH / Transition of Care Barriers:  None    Person available to provide assistance at home when needed and their contact information: Extended Emergency Contact Information  Primary Emergency Contact: Gigi Garrett  Mobile Phone: [phone]  Relation: Significant other  Secondary Emergency Contact: Neftali Felix  Address: [address] [place] [address]           SHARYN WILDE [address] [facility]  Home Phone: [phone]  Mobile Phone: [phone]  Relation: Daughter     Scheduled followup appointment: PCP- 07/21/25    Referrals placed: Interventional Radiology - Office will contact patient to schedule. Hematology/Oncology - 08/07/25     Transportation: Family will provide transportation home    Patient and family educated on discharge services and updated on DC plan. Bedside RN notified, patient clear to discharge from Case Management Perspective.     07/19/25 0950   Final Note   Assessment Type Final Discharge Note   Anticipated Discharge Disposition Home   What phone number can be called within the next 1-3 days to see how you are doing after discharge? [phone]   Hospital Resources/Appts/Education Provided Appointments scheduled and added to AVS   Post-Acute Status   Discharge Delays None known at this time

## 2025-07-19 NOTE — NURSING
Pt and spouse educated on proper drain emptying.  Teach-back method demonstrated by pt successfully.  No other questions at this time.

## 2025-07-19 NOTE — ASSESSMENT & PLAN NOTE
Patient's hemorrhage is due to gastrointestinal bleed, this bleeding is not associated with a medication or a coagulopathy. Patients most recent Hgb, Hct, platelets, and INR are listed below.  Recent Labs     07/17/25  1217 07/17/25  1443 07/18/25  0538 07/18/25  1104 07/19/25  0610   HGB 9.4*   < > 9.0* 9.2* 9.5*   HCT 29.3*  --   --  28.7*  --    *  --   --  424  --     < > = values in this interval not displayed.     Plan  - Will trend hemoglobin/hematocrit Every 8 hours  - Will monitor and correct any coagulation defects  - Will transfuse if Hgb is <7g/dl (<8g/dl in cases of active ACS) or if patient has rapid bleeding leading to hemodynamic instability  - given 2 units PRBC prior to transfer  -patient with positive indirect Thao  -started on PPI  -consult placed to GI no intervention at this moment  Received IV iron and we will continue on p.o. iron, hemoglobin is stable

## 2025-07-19 NOTE — PLAN OF CARE
Problem: Adult Inpatient Plan of Care  Goal: Plan of Care Review  Outcome: Progressing  Goal: Optimal Comfort and Wellbeing  Outcome: Progressing  Goal: Readiness for Transition of Care  Outcome: Progressing     Problem: Diabetes Comorbidity  Goal: Blood Glucose Level Within Targeted Range  Outcome: Progressing     Problem: Wound  Goal: Optimal Pain Control and Function  Outcome: Progressing     Problem: Fall Injury Risk  Goal: Absence of Fall and Fall-Related Injury  Outcome: Progressing     Pt safety maintained. Pt on continuous cardiac monitoring. Medication administered per MAR. Biliary tube output measured and recorded. Pt OOB, up to bedside commode w/staff assistance. Pt instructed to call w/any needs, verbalized understanding. Bed in lowest position, locked and bed alarms on. Call light w/in pt's reach.

## 2025-07-19 NOTE — ASSESSMENT & PLAN NOTE
Anemia is likely due to acute blood loss which was from GI bleed. Most recent hemoglobin and hematocrit are listed below.  Recent Labs     07/17/25  1217 07/17/25  1443 07/18/25  0538 07/18/25  1104 07/19/25  0610   HGB 9.4*   < > 9.0* 9.2* 9.5*   HCT 29.3*  --   --  28.7*  --     < > = values in this interval not displayed.     Plan  - Monitor serial CBC: Every 8 hours  - Transfuse PRBC if patient becomes hemodynamically unstable, symptomatic or H/H drops below 7/21.  - Patient has received 2 units of PRBCs on 7/14/2025  - Patient's anemia is currently stable  - awaiting post transfusion H&H  -GI consulted

## 2025-07-19 NOTE — ASSESSMENT & PLAN NOTE
Patient's blood pressure range in the last 24 hours was: BP  Min: 77/56  Max: 100/74.The patient's inpatient anti-hypertensive regimen is listed below:  Current Antihypertensives       Plan  - BP is controlled, no changes needed to their regimen  - hold antihypertensive given acute blood loss

## 2025-07-19 NOTE — DISCHARGE SUMMARY
"[facility] Medicine  Discharge Summary      Patient Name: Aury Felix  MRN: [id number]  VIKAS: [id number]  Patient Class: IP- Inpatient  Admission Date: 7/15/2025  Hospital Length of Stay: 4 days  Discharge Date and Time: 07/19/2025 4:15 PM  Attending Physician: No att. providers found   Discharging Provider: Layton Hamilton MD  Primary Care Provider: Jimbo Menard II, MD    Primary Care Team: Networked reference to record PCT     HPI:     Ms. 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  Pathology: Invasive adenocarcinoma, poorly differentiated.  Surrounding small intestinal mucosa without dysplasia.      She was last admitted at [facility] on 5/27 - 5/31 due to hematemesis and underwent emergent liver biopsy, gastrojujenostomy, and exploratory laparotomy.  She followed up with Dr. Reyes, General Surgery, in clinic today due to back pain.  She was noted to have elevated LFT's and jaundice.  He sent her to the ED from clinic for admission after labs showed anemia, Hg 4.7g.     In the ED she had repeat labs with the Hgb dropping to 4.2g.  Per ED, 2 units has been ordered but this patient has a multiple antibody issue.  They are awaiting blood from [place].     VS in the ED were BP 98/65 -> 158/95 -> 11/59 -> 120/80   Pulse 124 -> 108   Temp 98.7 °F (37.1 °C) (Oral)   Resp 20   Ht 5' 8" (1.727 m)   Wt 74.7 kg (164 lb 10.9 oz)   SpO2 99%   Breastfeeding No   BMI 25.04 kg/m².     Labs: WBC 16 -> 14, Hg 4.7 -> 4.2, , retic 6.2, K 3.1, Cr 0.7, Gluc 130, , Alb 2.4, TB 6.1, DB 4.5, AST 71, ALT 76, no UA, heme-occult positive.     CTA GIB study 7/14: Interval postoperative change consistent with gastrojejunostomy. Large ill-defined mass in the region the head of the pancreas and duodenum is more ill defined today with some surrounding fat stranding most likely neoplastic. Interval development also small " volume of ascites and of innumerable lesions throughout the liver consistent with extensive metastatic disease. No active extravasation of contrast indicate acute hemorrhage    On arrival to [place] patient completing 2nd unit of PRBC.  Only complaint is back pain.  Reports she saw General surgery today for postop follow up as she has been having shortness a breath.  Reports she has heme Onc appointment on the 17th.  Patient will be admitted to Hospital Medicine we will consult GI    * No surgery found *      Hospital Course:   Patient with past medical history of duodenal and pancreatic cancer was admitted for severe anemia received blood transfusions, CAT scan abdomen showed metastasis of the cancer with dilated common bile duct, patient had severe leukocytosis and tachycardia, IR, oncology and GI were both consulted, oncology recommended palliative chemotherapy as outpatient, GI indicated no intervention and IR was consulted for catheter placement, on 07/17  Patient had severe diffuse biliary dilatation with long segment irregular narrowing of the common bile duct, patient had internal and external biliary drainage catheter placed by IR, who recommended to leave the catheter to bag drainage and total bilirubin normalizes, may then CABG drain as tolerated, patient to return to IR in 10-12 weeks for routine catheter exchange.  On 07/16 patient was started on broad-spectrum antibiotics, blood cultures remain negative, patient to be discharged home p.o. antibiotics to finish total of 7 days of antibiotics, and to follow up with her oncologist( scheduled the follow up appointment), follow up with IR for placement of the catheter in 2 weeks( still there follow up appointment).  Patient needs to see her PCP within 3-5 days of discharge     Goals of Care Treatment Preferences:  Code Status: Full Code         Consults:   Consults (From admission, onward)          Status Ordering Provider      Inpatient consult to Interventional Radiology  Once        Provider:  (Not yet assigned)    Completed BONNIE ESCALANTECHACE KAYLYN     Inpatient consult to Hematology/Oncology  Once        Provider:  (Not yet assigned)    Completed KYLE GONZALEZ     Inpatient consult to Gastroenterology-[facility]  Once        Provider:  Alcides Hoang MD    Completed KYLE GONZALEZ     General Surgery  Once        Provider:  Messi Appiah MD    Completed KYLE GONZALEZ            Assessment & Plan  Essential hypertension  Patient's blood pressure range in the last 24 hours was: BP  Min: 77/56  Max: 100/74.The patient's inpatient anti-hypertensive regimen is listed below:  Current Antihypertensives       Plan  - BP is controlled, no changes needed to their regimen  - hold antihypertensive given acute blood loss  GIB (gastrointestinal bleeding)  Patient's hemorrhage is due to gastrointestinal bleed, this bleeding is not associated with a medication or a coagulopathy. Patients most recent Hgb, Hct, platelets, and INR are listed below.  Recent Labs     07/17/25  1217 07/17/25  1443 07/18/25  0538 07/18/25  1104 07/19/25  0610   HGB 9.4*   < > 9.0* 9.2* 9.5*   HCT 29.3*  --   --  28.7*  --    *  --   --  424  --     < > = values in this interval not displayed.     Plan  - Will trend hemoglobin/hematocrit Every 8 hours  - Will monitor and correct any coagulation defects  - Will transfuse if Hgb is <7g/dl (<8g/dl in cases of active ACS) or if patient has rapid bleeding leading to hemodynamic instability  - given 2 units PRBC prior to transfer  -patient with positive indirect Thao  -started on PPI  -consult placed to GI no intervention at this moment  Received IV iron and we will continue on p.o. iron, hemoglobin is stable  Duodenal cancer   Pathology: Invasive adenocarcinoma, poorly differentiated.  Surrounding small intestinal mucosa without dysplasia.     Has initial appointment on the 17th in [place]   We will  consult heme Onc while inpatient  IR is consulted on 07/16Plan for biliary drainage catheter placement tomorrow with general anesthesia.   /7/17  Patient had severe diffuse biliary dilatation with long segment irregular narrowing of the common bile duct, patient had internal and external biliary drainage catheter placed by IR, who recommended to leave the catheter to bag drainage and total bilirubin normalizes, may then CABG drain as tolerated, patient to return to IR in 10-12 weeks for routine catheter exchange  ABLA (acute blood loss anemia)  Anemia is likely due to acute blood loss which was from GI bleed. Most recent hemoglobin and hematocrit are listed below.  Recent Labs     07/17/25  1217 07/17/25  1443 07/18/25  0538 07/18/25  1104 07/19/25  0610   HGB 9.4*   < > 9.0* 9.2* 9.5*   HCT 29.3*  --   --  28.7*  --     < > = values in this interval not displayed.     Plan  - Monitor serial CBC: Every 8 hours  - Transfuse PRBC if patient becomes hemodynamically unstable, symptomatic or H/H drops below 7/21.  - Patient has received 2 units of PRBCs on 7/14/2025  - Patient's anemia is currently stable  - awaiting post transfusion H&H  -GI consulted  Transaminitis  Likely secondary to extensive metastatic disease in the liver with innumerable lesions.  LFTs    Heme Onc and GI both consulted  IR was consultedPlan for biliary drainage catheter placement tomorrow 7/17 with general anesthesia.   7/17  Patient had severe diffuse biliary dilatation with long segment irregular narrowing of the common bile duct, patient had internal and external biliary drainage catheter placed by IR, who recommended to leave the catheter to bag drainage and total bilirubin normalizes, may then CABG drain as tolerated, patient to return to IR in 10-12 weeks for routine catheter exchange  Hyponatremia  Hyponatremia is likely due to Dehydration/hypovolemia. The patient's most recent sodium results are listed below.  Recent Labs      07/17/25  1216 07/18/25  0538 07/19/25  0610   * 133* 133*     Plan  - Correct the sodium by 4-6mEq in 24 hours.   - Obtain the following studies: Urine sodium, urine osmolality, serum osmolality.  - Will treat the hyponatremia with IV fluids as follows:  On admission bolus NS and maintenance  - Monitor sodium Daily.   - Patient hyponatremia is stable     Final Active Diagnoses:    Diagnosis Date Noted POA    PRINCIPAL PROBLEM:  GIB (gastrointestinal bleeding) [K92.2] 04/10/2025 Yes    Hyponatremia [E87.1] 07/16/2025 Yes    ABLA (acute blood loss anemia) [D62] 07/15/2025 Yes    Transaminitis [R74.01] 07/15/2025 Yes    Duodenal cancer [C17.0] 05/07/2025 Yes    Essential hypertension [I10] 05/31/2017 Yes      Problems Resolved During this Admission:       Discharged Condition: fair    Disposition: Home or Self Care    Follow Up:   Follow-up Information       [facility].    Specialty: Interventional Radiology  Why: Ambulatory Referral to Interventional Radiology placed for catheter exchanges.  Contact information:  [address] [place] [id number] [phone]  Additional information:  Please park in [address] and use Hospital main entrance. Check in at hospital registration.             [facility] Appointment Follow up.    Why: Ambulatory Referral Placed for Oncology follow-up.  Contact information:  [address]  [place] [address]  [phone]               Say Stevens MD Follow up.    Specialty: Internal Medicine  Why: Message sent to office for sooner follow-up appointment, Primary Care Physician  Contact information:  [address] [facility]  [facility] [address]  [phone]                           Patient Instructions:      Ambulatory referral/consult to Interventional RAD   Standing Status: Future   Referral Priority: Routine Referral Type: Consultation   Number of Visits Requested: 1     Ambulatory referral/consult to Hematology / Oncology    Standing Status: Future   Referral Priority: Urgent Referral Type: Consultation   Referral Reason: Specialty Services Required   Requested Specialty: Hematology and Oncology   Number of Visits Requested: 1       Significant Diagnostic Studies: Labs: All labs within the past 24 hours have been reviewed    Pending Diagnostic Studies:       None           Medications:  Reconciled Home Medications:      Medication List        START taking these medications      aspirin 81 MG EC tablet  Commonly known as: ECOTRIN  Take 1 tablet (81 mg total) by mouth once daily.     ciprofloxacin HCl 750 MG tablet  Commonly known as: CIPRO  Take 1 tablet (750 mg total) by mouth every 12 (twelve) hours. for 7 days     ferrous sulfate 325 (65 FE) MG EC tablet  Take 1 tablet (325 mg total) by mouth every other day.     metroNIDAZOLE 500 MG tablet  Commonly known as: FLAGYL  Take 1 tablet (500 mg total) by mouth every 8 (eight) hours. for 7 days     polyethylene glycol 17 gram Pwpk  Commonly known as: GLYCOLAX  Take 17 g by mouth once daily.     simethicone 125 MG chewable tablet  Commonly known as: MYLICON  Take 1 tablet (125 mg total) by mouth every 6 (six) hours as needed for Flatulence.            CHANGE how you take these medications      oxyCODONE 5 MG immediate release tablet  Commonly known as: ROXICODONE  Take 1 tablet (5 mg total) by mouth every 8 (eight) hours as needed for Pain.  What changed:   when to take this  reasons to take this  Another medication with the same name was removed. Continue taking this medication, and follow the directions you see here.            CONTINUE taking these medications      JARDIANCE 25 mg tablet  Generic drug: empagliflozin  Take 1 tablet (25 mg total) by mouth once daily.     LANTUS SOLOSTAR U-100 INSULIN 100 unit/mL (3 mL) Inpn pen  Generic drug: insulin glargine U-100 (Lantus)  Inject 20 Units into the skin every evening.     lisinopriL 20 MG tablet  Commonly known as: PRINIVIL,ZESTRIL  Take  1 tablet (20 mg total) by mouth once daily.     pantoprazole 40 MG tablet  Commonly known as: PROTONIX  Take 1 tablet (40 mg total) by mouth once daily.     rosuvastatin 40 MG Tab  Commonly known as: CRESTOR  Take 1 tablet (40 mg total) by mouth once daily.            STOP taking these medications      blood-glucose meter kit     dicyclomine 10 MG capsule  Commonly known as: BENTYL     oxyCODONE-acetaminophen 5-325 mg per tablet  Commonly known as: PERCOCET              Indwelling Lines/Drains at time of discharge:   Lines/Drains/Airways       Drain  Duration                  Biliary Tube 07/17/25 0914 10 Fr. RUQ 2 days                        Time spent on the discharge of patient:  45 minutes         Layton Hamilton MD  Department of Hospital Medicine  [facility]

## 2025-07-19 NOTE — ASSESSMENT & PLAN NOTE
Pathology: Invasive adenocarcinoma, poorly differentiated.  Surrounding small intestinal mucosa without dysplasia.     Has initial appointment on the 17th in [place]   We will consult heme Onc while inpatient  IR is consulted on 07/16Plan for biliary drainage catheter placement tomorrow with general anesthesia.   /7/17  Patient had severe diffuse biliary dilatation with long segment irregular narrowing of the common bile duct, patient had internal and external biliary drainage catheter placed by IR, who recommended to leave the catheter to bag drainage and total bilirubin normalizes, may then CABG drain as tolerated, patient to return to IR in 10-12 weeks for routine catheter exchange

## 2025-07-19 NOTE — PLAN OF CARE
Discharge orders noted. AVS prepared with medication list, importance of medication compliance, follow up appointments, diet, home care instructions, treatment plan, self management, and when to seek medical attention. Detailed clinical reference list attached. AVS printed and handed to patient by bedside nurse. VN reviewed discharge instructions with patient and spouse using teachback method.  Allowed time for questions, all questions answered.  Patient and spouse verbalized complete understanding of discharge instructions and voices no concerns.      Discharge instructions complete.  New meds called into home pharmacy Transport wheelchair requested.  Bedside nurse notified.

## 2025-07-19 NOTE — ASSESSMENT & PLAN NOTE
Hyponatremia is likely due to Dehydration/hypovolemia. The patient's most recent sodium results are listed below.  Recent Labs     07/17/25  1216 07/18/25  0538 07/19/25  0610   * 133* 133*     Plan  - Correct the sodium by 4-6mEq in 24 hours.   - Obtain the following studies: Urine sodium, urine osmolality, serum osmolality.  - Will treat the hyponatremia with IV fluids as follows:  On admission bolus NS and maintenance  - Monitor sodium Daily.   - Patient hyponatremia is stable

## 2025-07-19 NOTE — CARE UPDATE
07/18/25 2011   PRE-TX-O2   Device (Oxygen Therapy) room air   SpO2 (!) 94 %   Pulse Oximetry Type Continuous   $ Pulse Oximetry - Multiple Charge Pulse Oximetry - Multiple   Pulse 97   Resp 20   Temp 97.8 °F (36.6 °C)   BP (!) 77/56

## 2025-07-22 LAB
BACTERIA BLD CULT: NORMAL
BACTERIA BLD CULT: NORMAL

## 2025-07-26 ENCOUNTER — HOSPITAL ENCOUNTER (EMERGENCY)
Facility: HOSPITAL | Age: 55
Discharge: ANOTHER HOSPITAL | End: 2025-07-26
Attending: SURGERY
Payer: MEDICAID

## 2025-07-26 ENCOUNTER — HOSPITAL ENCOUNTER (INPATIENT)
Facility: HOSPITAL | Age: 55
LOS: 4 days | Discharge: HOSPICE | DRG: 375 | End: 2025-07-30
Attending: INTERNAL MEDICINE | Admitting: INTERNAL MEDICINE
Payer: MEDICAID

## 2025-07-26 ENCOUNTER — ANESTHESIA EVENT (OUTPATIENT)
Dept: ENDOSCOPY | Facility: HOSPITAL | Age: 55
End: 2025-07-26

## 2025-07-26 ENCOUNTER — NURSE TRIAGE (OUTPATIENT)
Dept: ADMINISTRATIVE | Facility: CLINIC | Age: 55
End: 2025-07-26

## 2025-07-26 VITALS
RESPIRATION RATE: 20 BRPM | HEIGHT: 67 IN | TEMPERATURE: 98 F | WEIGHT: 155.31 LBS | BODY MASS INDEX: 24.38 KG/M2 | SYSTOLIC BLOOD PRESSURE: 119 MMHG | OXYGEN SATURATION: 98 % | DIASTOLIC BLOOD PRESSURE: 62 MMHG | HEART RATE: 116 BPM

## 2025-07-26 DIAGNOSIS — D62 ABLA (ACUTE BLOOD LOSS ANEMIA): ICD-10-CM

## 2025-07-26 DIAGNOSIS — K92.0 HEMATEMESIS: ICD-10-CM

## 2025-07-26 DIAGNOSIS — K92.0 HEMATEMESIS, UNSPECIFIED WHETHER NAUSEA PRESENT: Primary | ICD-10-CM

## 2025-07-26 DIAGNOSIS — C17.0 DUODENAL CANCER: Primary | ICD-10-CM

## 2025-07-26 PROBLEM — D72.829 LEUKOCYTOSIS: Status: ACTIVE | Noted: 2025-07-26

## 2025-07-26 PROBLEM — D68.9 COAGULOPATHY: Status: ACTIVE | Noted: 2025-07-26

## 2025-07-26 LAB
ABO + RH BLD: NORMAL
ABSOLUTE EOSINOPHIL (OHS): 0.08 K/UL
ABSOLUTE EOSINOPHIL (OHS): 0.11 K/UL
ABSOLUTE EOSINOPHIL (OHS): 0.12 K/UL
ABSOLUTE EOSINOPHIL (OHS): 0.34 K/UL
ABSOLUTE MONOCYTE (OHS): 1.85 K/UL (ref 0.3–1)
ABSOLUTE MONOCYTE (OHS): 2.2 K/UL (ref 0.3–1)
ABSOLUTE MONOCYTE (OHS): 2.27 K/UL (ref 0.3–1)
ABSOLUTE MONOCYTE (OHS): 2.46 K/UL (ref 0.3–1)
ABSOLUTE NEUTROPHIL COUNT (OHS): 21.35 K/UL (ref 1.8–7.7)
ABSOLUTE NEUTROPHIL COUNT (OHS): 21.46 K/UL (ref 1.8–7.7)
ABSOLUTE NEUTROPHIL COUNT (OHS): 21.53 K/UL (ref 1.8–7.7)
ABSOLUTE NEUTROPHIL COUNT (OHS): 23.1 K/UL (ref 1.8–7.7)
ALBUMIN SERPL BCP-MCNC: 2 G/DL (ref 3.5–5.2)
ALLENS TEST: ABNORMAL
ALP SERPL-CCNC: 331 UNIT/L (ref 40–150)
ALT SERPL W/O P-5'-P-CCNC: 38 UNIT/L (ref 10–44)
ANION GAP (OHS): 16 MMOL/L (ref 8–16)
ANISOCYTOSIS BLD QL SMEAR: ABNORMAL
ANISOCYTOSIS BLD QL SMEAR: NORMAL
APTT PPP: 35.3 SECONDS (ref 21–32)
APTT PPP: 60.8 SECONDS (ref 21–32)
AST SERPL-CCNC: 97 UNIT/L (ref 11–45)
BACTERIA #/AREA URNS HPF: NORMAL /HPF
BASOPHILS # BLD AUTO: 0.08 K/UL
BASOPHILS # BLD AUTO: 0.11 K/UL
BASOPHILS # BLD AUTO: 0.11 K/UL
BASOPHILS # BLD AUTO: 0.13 K/UL
BASOPHILS NFR BLD AUTO: 0.3 %
BASOPHILS NFR BLD AUTO: 0.4 %
BASOPHILS NFR BLD AUTO: 0.4 %
BASOPHILS NFR BLD AUTO: 0.5 %
BILIRUB SERPL-MCNC: 17.8 MG/DL (ref 0.1–1)
BILIRUB UR QL STRIP.AUTO: ABNORMAL
BLD PROD TYP BPU: NORMAL
BLOOD GROUP ANTIBODIES SERPL: NORMAL
BLOOD UNIT EXPIRATION DATE: NORMAL
BLOOD UNIT TYPE CODE: 5100
BUN SERPL-MCNC: 14 MG/DL (ref 6–20)
CALCIUM SERPL-MCNC: 8 MG/DL (ref 8.7–10.5)
CHLORIDE SERPL-SCNC: 103 MMOL/L (ref 95–110)
CK SERPL-CCNC: 36 U/L (ref 20–180)
CLARITY UR: CLEAR
CO2 SERPL-SCNC: 17 MMOL/L (ref 23–29)
COLOR UR AUTO: YELLOW
CREAT SERPL-MCNC: 0.6 MG/DL (ref 0.5–1.4)
CROSSMATCH INTERPRETATION: NORMAL
DACRYOCYTES BLD QL SMEAR: ABNORMAL
DELSYS: ABNORMAL
DISPENSE STATUS: NORMAL
ERYTHROCYTE [DISTWIDTH] IN BLOOD BY AUTOMATED COUNT: 24.8 % (ref 11.5–14.5)
ERYTHROCYTE [DISTWIDTH] IN BLOOD BY AUTOMATED COUNT: 26.7 % (ref 11.5–14.5)
ERYTHROCYTE [DISTWIDTH] IN BLOOD BY AUTOMATED COUNT: 26.9 % (ref 11.5–14.5)
ERYTHROCYTE [DISTWIDTH] IN BLOOD BY AUTOMATED COUNT: 26.9 % (ref 11.5–14.5)
FIBRINOGEN PPP-MCNC: 190 MG/DL (ref 182–400)
GFR SERPLBLD CREATININE-BSD FMLA CKD-EPI: >60 ML/MIN/1.73/M2
GLUCOSE SERPL-MCNC: 122 MG/DL (ref 70–110)
GLUCOSE UR QL STRIP: NEGATIVE
HCO3 UR-SCNC: 19.7 MMOL/L (ref 24–28)
HCT VFR BLD AUTO: 19.6 % (ref 37–48.5)
HCT VFR BLD AUTO: 23.8 % (ref 37–48.5)
HCT VFR BLD AUTO: 24.8 % (ref 37–48.5)
HCT VFR BLD AUTO: 25.7 % (ref 37–48.5)
HCT VFR BLD CALC: 33 %PCV (ref 36–54)
HGB BLD-MCNC: 6.5 GM/DL (ref 12–16)
HGB BLD-MCNC: 7.6 GM/DL (ref 12–16)
HGB BLD-MCNC: 8.3 GM/DL (ref 12–16)
HGB BLD-MCNC: 8.6 GM/DL (ref 12–16)
HGB UR QL STRIP: NEGATIVE
HYALINE CASTS #/AREA URNS LPF: 0 /LPF (ref 0–1)
HYPOCHROMIA BLD QL SMEAR: NORMAL
IMM GRANULOCYTES # BLD AUTO: 0.84 K/UL (ref 0–0.04)
IMM GRANULOCYTES # BLD AUTO: 0.95 K/UL (ref 0–0.04)
IMM GRANULOCYTES # BLD AUTO: 1.08 K/UL (ref 0–0.04)
IMM GRANULOCYTES # BLD AUTO: 1.13 K/UL (ref 0–0.04)
IMM GRANULOCYTES NFR BLD AUTO: 3.2 % (ref 0–0.5)
IMM GRANULOCYTES NFR BLD AUTO: 3.5 % (ref 0–0.5)
IMM GRANULOCYTES NFR BLD AUTO: 4 % (ref 0–0.5)
IMM GRANULOCYTES NFR BLD AUTO: 4 % (ref 0–0.5)
INDIRECT COOMBS: ABNORMAL
INDIRECT COOMBS: ABNORMAL
INR PPP: 1.3 (ref 0.8–1.2)
INR PPP: 2.2 (ref 0.8–1.2)
INR PPP: >10 (ref 0.8–1.2)
INR PPP: >10 (ref 0.8–1.2)
KETONES UR QL STRIP: NEGATIVE
LACTATE SERPL-SCNC: 2.6 MMOL/L (ref 0.5–2.2)
LACTATE SERPL-SCNC: 3.7 MMOL/L (ref 0.5–2.2)
LACTATE SERPL-SCNC: 4.5 MMOL/L (ref 0.5–2.2)
LARGE/GIANT PLATELETS (OHS): PRESENT
LEUKOCYTE ESTERASE UR QL STRIP: NEGATIVE
LIPASE SERPL-CCNC: 145 U/L (ref 4–60)
LYMPHOCYTES # BLD AUTO: 1.73 K/UL (ref 1–4.8)
LYMPHOCYTES # BLD AUTO: 1.95 K/UL (ref 1–4.8)
LYMPHOCYTES # BLD AUTO: 1.98 K/UL (ref 1–4.8)
LYMPHOCYTES # BLD AUTO: 2.01 K/UL (ref 1–4.8)
MCH RBC QN AUTO: 25.2 PG (ref 27–31)
MCH RBC QN AUTO: 25.7 PG (ref 27–31)
MCH RBC QN AUTO: 26.1 PG (ref 27–31)
MCH RBC QN AUTO: 27.3 PG (ref 27–31)
MCHC RBC AUTO-ENTMCNC: 31.9 G/DL (ref 32–36)
MCHC RBC AUTO-ENTMCNC: 33.2 G/DL (ref 32–36)
MCHC RBC AUTO-ENTMCNC: 33.5 G/DL (ref 32–36)
MCHC RBC AUTO-ENTMCNC: 33.5 G/DL (ref 32–36)
MCV RBC AUTO: 77 FL (ref 82–98)
MCV RBC AUTO: 79 FL (ref 82–98)
MCV RBC AUTO: 79 FL (ref 82–98)
MCV RBC AUTO: 82 FL (ref 82–98)
MICROSCOPIC COMMENT: NORMAL
NITRITE UR QL STRIP: NEGATIVE
NT-PROBNP SERPL-MCNC: 872 PG/ML
NUCLEATED RBC (/100WBC) (OHS): 0 /100 WBC
OVALOCYTES BLD QL SMEAR: NORMAL
PCO2 BLDA: 35.5 MMHG (ref 35–45)
PH SMN: 7.35 [PH] (ref 7.35–7.45)
PH UR STRIP: 6 [PH]
PLATELET # BLD AUTO: 383 K/UL (ref 150–450)
PLATELET # BLD AUTO: 390 K/UL (ref 150–450)
PLATELET # BLD AUTO: 465 K/UL (ref 150–450)
PLATELET # BLD AUTO: 476 K/UL (ref 150–450)
PLATELET BLD QL SMEAR: ABNORMAL
PLATELET BLD QL SMEAR: NORMAL
PMV BLD AUTO: 10.1 FL (ref 9.2–12.9)
PMV BLD AUTO: 10.2 FL (ref 9.2–12.9)
PMV BLD AUTO: 10.4 FL (ref 9.2–12.9)
PMV BLD AUTO: 9.7 FL (ref 9.2–12.9)
PO2 BLDA: 28 MMHG (ref 40–60)
POC BE: -6 MMOL/L (ref -2–2)
POC IONIZED CALCIUM: 1.03 MMOL/L (ref 1.06–1.42)
POC SATURATED O2: 49 % (ref 95–100)
POC TCO2: 21 MMOL/L (ref 24–29)
POIKILOCYTOSIS BLD QL SMEAR: NORMAL
POLYCHROMASIA BLD QL SMEAR: ABNORMAL
POLYCHROMASIA BLD QL SMEAR: NORMAL
POTASSIUM BLD-SCNC: 3.7 MMOL/L (ref 3.5–5.1)
POTASSIUM SERPL-SCNC: 3.6 MMOL/L (ref 3.5–5.1)
PROCALCITONIN SERPL-MCNC: 1.51 NG/ML
PROT SERPL-MCNC: 6 GM/DL (ref 6–8.4)
PROT UR QL STRIP: ABNORMAL
PROTHROMBIN TIME: 14 SECONDS (ref 9–12.5)
PROTHROMBIN TIME: 22.9 SECONDS (ref 9–12.5)
PROTHROMBIN TIME: >100 SECONDS (ref 9–12.5)
PROTHROMBIN TIME: >100 SECONDS (ref 9–12.5)
RBC # BLD AUTO: 2.49 M/UL (ref 4–5.4)
RBC # BLD AUTO: 3.01 M/UL (ref 4–5.4)
RBC # BLD AUTO: 3.04 M/UL (ref 4–5.4)
RBC # BLD AUTO: 3.35 M/UL (ref 4–5.4)
RBC #/AREA URNS HPF: 1 /HPF (ref 0–4)
RELATIVE EOSINOPHIL (OHS): 0.3 %
RELATIVE EOSINOPHIL (OHS): 0.4 %
RELATIVE EOSINOPHIL (OHS): 0.4 %
RELATIVE EOSINOPHIL (OHS): 1.3 %
RELATIVE LYMPHOCYTE (OHS): 6.1 % (ref 18–48)
RELATIVE LYMPHOCYTE (OHS): 7.4 % (ref 18–48)
RELATIVE MONOCYTE (OHS): 7 % (ref 4–15)
RELATIVE MONOCYTE (OHS): 8 % (ref 4–15)
RELATIVE MONOCYTE (OHS): 8.2 % (ref 4–15)
RELATIVE MONOCYTE (OHS): 9.1 % (ref 4–15)
RELATIVE NEUTROPHIL (OHS): 79.2 % (ref 38–73)
RELATIVE NEUTROPHIL (OHS): 79.7 % (ref 38–73)
RELATIVE NEUTROPHIL (OHS): 80.6 % (ref 38–73)
RELATIVE NEUTROPHIL (OHS): 81.2 % (ref 38–73)
RH BLD: ABNORMAL
RH BLD: ABNORMAL
RH BLD: NORMAL
SAMPLE: ABNORMAL
SCHISTOCYTES BLD QL SMEAR: ABNORMAL
SCHISTOCYTES BLD QL SMEAR: NORMAL
SCHISTOCYTES BLD QL SMEAR: NORMAL
SCHISTOCYTES BLD QL SMEAR: PRESENT
SITE: ABNORMAL
SODIUM BLD-SCNC: 139 MMOL/L (ref 136–145)
SODIUM SERPL-SCNC: 136 MMOL/L (ref 136–145)
SP GR UR STRIP: <=1.005
SPECIMEN OUTDATE: ABNORMAL
SPECIMEN OUTDATE: ABNORMAL
SPHEROCYTES BLD QL SMEAR: NORMAL
SPHEROCYTES BLD QL SMEAR: NORMAL
SQUAMOUS #/AREA URNS HPF: 1 /HPF
STOMATOCYTES BLD QL SMEAR: PRESENT
TARGETS BLD QL SMEAR: NORMAL
TARGETS BLD QL SMEAR: NORMAL
TOXIC GRANULES BLD QL SMEAR: PRESENT
TROPONIN I SERPL HS-MCNC: <3 NG/L
UNIT NUMBER: NORMAL
UROBILINOGEN UR STRIP-ACNC: NEGATIVE EU/DL
WBC # BLD AUTO: 26.46 K/UL (ref 3.9–12.7)
WBC # BLD AUTO: 26.91 K/UL (ref 3.9–12.7)
WBC # BLD AUTO: 27.18 K/UL (ref 3.9–12.7)
WBC # BLD AUTO: 28.42 K/UL (ref 3.9–12.7)
WBC #/AREA URNS HPF: 2 /HPF (ref 0–5)
WBC TOXIC VACUOLES BLD QL SMEAR: PRESENT

## 2025-07-26 PROCEDURE — 82803 BLOOD GASES ANY COMBINATION: CPT

## 2025-07-26 PROCEDURE — 83880 ASSAY OF NATRIURETIC PEPTIDE: CPT | Performed by: SURGERY

## 2025-07-26 PROCEDURE — 85384 FIBRINOGEN ACTIVITY: CPT | Performed by: FAMILY MEDICINE

## 2025-07-26 PROCEDURE — 83605 ASSAY OF LACTIC ACID: CPT

## 2025-07-26 PROCEDURE — 96375 TX/PRO/DX INJ NEW DRUG ADDON: CPT

## 2025-07-26 PROCEDURE — 85730 THROMBOPLASTIN TIME PARTIAL: CPT | Performed by: INTERNAL MEDICINE

## 2025-07-26 PROCEDURE — 85025 COMPLETE CBC W/AUTO DIFF WBC: CPT | Performed by: STUDENT IN AN ORGANIZED HEALTH CARE EDUCATION/TRAINING PROGRAM

## 2025-07-26 PROCEDURE — 85610 PROTHROMBIN TIME: CPT | Performed by: FAMILY MEDICINE

## 2025-07-26 PROCEDURE — 85025 COMPLETE CBC W/AUTO DIFF WBC: CPT | Performed by: SURGERY

## 2025-07-26 PROCEDURE — 85014 HEMATOCRIT: CPT

## 2025-07-26 PROCEDURE — 86922 COMPATIBILITY TEST ANTIGLOB: CPT | Performed by: STUDENT IN AN ORGANIZED HEALTH CARE EDUCATION/TRAINING PROGRAM

## 2025-07-26 PROCEDURE — 85610 PROTHROMBIN TIME: CPT

## 2025-07-26 PROCEDURE — 84075 ASSAY ALKALINE PHOSPHATASE: CPT | Performed by: SURGERY

## 2025-07-26 PROCEDURE — 83605 ASSAY OF LACTIC ACID: CPT | Performed by: SURGERY

## 2025-07-26 PROCEDURE — 82550 ASSAY OF CK (CPK): CPT | Performed by: SURGERY

## 2025-07-26 PROCEDURE — 85730 THROMBOPLASTIN TIME PARTIAL: CPT | Performed by: SURGERY

## 2025-07-26 PROCEDURE — 99900035 HC TECH TIME PER 15 MIN (STAT)

## 2025-07-26 PROCEDURE — 63600175 PHARM REV CODE 636 W HCPCS

## 2025-07-26 PROCEDURE — 93010 ELECTROCARDIOGRAM REPORT: CPT | Mod: ,,, | Performed by: INTERNAL MEDICINE

## 2025-07-26 PROCEDURE — 36430 TRANSFUSION BLD/BLD COMPNT: CPT | Performed by: STUDENT IN AN ORGANIZED HEALTH CARE EDUCATION/TRAINING PROGRAM

## 2025-07-26 PROCEDURE — 30233N1 TRANSFUSION OF NONAUTOLOGOUS RED BLOOD CELLS INTO PERIPHERAL VEIN, PERCUTANEOUS APPROACH: ICD-10-PCS | Performed by: INTERNAL MEDICINE

## 2025-07-26 PROCEDURE — 25000003 PHARM REV CODE 250: Performed by: INTERNAL MEDICINE

## 2025-07-26 PROCEDURE — 96361 HYDRATE IV INFUSION ADD-ON: CPT

## 2025-07-26 PROCEDURE — 86965 POOLING BLOOD PLATELETS: CPT | Performed by: STUDENT IN AN ORGANIZED HEALTH CARE EDUCATION/TRAINING PROGRAM

## 2025-07-26 PROCEDURE — 25000003 PHARM REV CODE 250

## 2025-07-26 PROCEDURE — 25000003 PHARM REV CODE 250: Performed by: FAMILY MEDICINE

## 2025-07-26 PROCEDURE — 93005 ELECTROCARDIOGRAM TRACING: CPT

## 2025-07-26 PROCEDURE — 25000003 PHARM REV CODE 250: Performed by: SURGERY

## 2025-07-26 PROCEDURE — 87040 BLOOD CULTURE FOR BACTERIA: CPT | Performed by: SURGERY

## 2025-07-26 PROCEDURE — 86870 RBC ANTIBODY IDENTIFICATION: CPT | Performed by: SURGERY

## 2025-07-26 PROCEDURE — P9016 RBC LEUKOCYTES REDUCED: HCPCS | Performed by: STUDENT IN AN ORGANIZED HEALTH CARE EDUCATION/TRAINING PROGRAM

## 2025-07-26 PROCEDURE — 99291 CRITICAL CARE FIRST HOUR: CPT | Mod: ,,, | Performed by: INTERNAL MEDICINE

## 2025-07-26 PROCEDURE — 85610 PROTHROMBIN TIME: CPT | Performed by: SURGERY

## 2025-07-26 PROCEDURE — P9012 CRYOPRECIPITATE EACH UNIT: HCPCS | Performed by: STUDENT IN AN ORGANIZED HEALTH CARE EDUCATION/TRAINING PROGRAM

## 2025-07-26 PROCEDURE — 82330 ASSAY OF CALCIUM: CPT

## 2025-07-26 PROCEDURE — P9017 PLASMA 1 DONOR FRZ W/IN 8 HR: HCPCS | Performed by: STUDENT IN AN ORGANIZED HEALTH CARE EDUCATION/TRAINING PROGRAM

## 2025-07-26 PROCEDURE — 81001 URINALYSIS AUTO W/SCOPE: CPT | Performed by: SURGERY

## 2025-07-26 PROCEDURE — 36415 COLL VENOUS BLD VENIPUNCTURE: CPT | Performed by: SURGERY

## 2025-07-26 PROCEDURE — 84484 ASSAY OF TROPONIN QUANT: CPT | Performed by: SURGERY

## 2025-07-26 PROCEDURE — 20000000 HC ICU ROOM

## 2025-07-26 PROCEDURE — 84295 ASSAY OF SERUM SODIUM: CPT

## 2025-07-26 PROCEDURE — 86850 RBC ANTIBODY SCREEN: CPT | Mod: 91 | Performed by: STUDENT IN AN ORGANIZED HEALTH CARE EDUCATION/TRAINING PROGRAM

## 2025-07-26 PROCEDURE — 63600175 PHARM REV CODE 636 W HCPCS: Performed by: SURGERY

## 2025-07-26 PROCEDURE — 94799 UNLISTED PULMONARY SVC/PX: CPT

## 2025-07-26 PROCEDURE — 83690 ASSAY OF LIPASE: CPT | Performed by: SURGERY

## 2025-07-26 PROCEDURE — 86902 BLOOD TYPE ANTIGEN DONOR EA: CPT | Performed by: STUDENT IN AN ORGANIZED HEALTH CARE EDUCATION/TRAINING PROGRAM

## 2025-07-26 PROCEDURE — 99285 EMERGENCY DEPT VISIT HI MDM: CPT | Mod: 25

## 2025-07-26 PROCEDURE — 96365 THER/PROPH/DIAG IV INF INIT: CPT

## 2025-07-26 PROCEDURE — 84145 PROCALCITONIN (PCT): CPT | Performed by: SURGERY

## 2025-07-26 PROCEDURE — 63600175 PHARM REV CODE 636 W HCPCS: Performed by: FAMILY MEDICINE

## 2025-07-26 PROCEDURE — 99223 1ST HOSP IP/OBS HIGH 75: CPT | Mod: ,,, | Performed by: STUDENT IN AN ORGANIZED HEALTH CARE EDUCATION/TRAINING PROGRAM

## 2025-07-26 PROCEDURE — 84132 ASSAY OF SERUM POTASSIUM: CPT

## 2025-07-26 PROCEDURE — 96376 TX/PRO/DX INJ SAME DRUG ADON: CPT

## 2025-07-26 PROCEDURE — 86850 RBC ANTIBODY SCREEN: CPT | Performed by: SURGERY

## 2025-07-26 PROCEDURE — 94761 N-INVAS EAR/PLS OXIMETRY MLT: CPT | Mod: XB

## 2025-07-26 PROCEDURE — 25500020 PHARM REV CODE 255: Performed by: SURGERY

## 2025-07-26 RX ORDER — SODIUM CHLORIDE 9 MG/ML
1000 INJECTION, SOLUTION INTRAVENOUS CONTINUOUS
Status: DISCONTINUED | OUTPATIENT
Start: 2025-07-26 | End: 2025-07-26 | Stop reason: HOSPADM

## 2025-07-26 RX ORDER — SODIUM CHLORIDE 0.9 % (FLUSH) 0.9 %
10 SYRINGE (ML) INJECTION
Status: DISCONTINUED | OUTPATIENT
Start: 2025-07-26 | End: 2025-07-30 | Stop reason: HOSPADM

## 2025-07-26 RX ORDER — HYDROCODONE BITARTRATE AND ACETAMINOPHEN 500; 5 MG/1; MG/1
TABLET ORAL
Status: DISCONTINUED | OUTPATIENT
Start: 2025-07-26 | End: 2025-07-27

## 2025-07-26 RX ORDER — HYDROCODONE BITARTRATE AND ACETAMINOPHEN 500; 5 MG/1; MG/1
TABLET ORAL
Status: DISCONTINUED | OUTPATIENT
Start: 2025-07-26 | End: 2025-07-26

## 2025-07-26 RX ORDER — PANTOPRAZOLE SODIUM 40 MG/10ML
40 INJECTION, POWDER, LYOPHILIZED, FOR SOLUTION INTRAVENOUS 2 TIMES DAILY
Status: DISCONTINUED | OUTPATIENT
Start: 2025-07-26 | End: 2025-07-26

## 2025-07-26 RX ORDER — MORPHINE SULFATE 2 MG/ML
2 INJECTION, SOLUTION INTRAMUSCULAR; INTRAVENOUS
Refills: 0 | Status: COMPLETED | OUTPATIENT
Start: 2025-07-26 | End: 2025-07-26

## 2025-07-26 RX ORDER — PANTOPRAZOLE SODIUM 40 MG/10ML
40 INJECTION, POWDER, LYOPHILIZED, FOR SOLUTION INTRAVENOUS EVERY 12 HOURS
Status: DISCONTINUED | OUTPATIENT
Start: 2025-07-26 | End: 2025-07-30 | Stop reason: HOSPADM

## 2025-07-26 RX ORDER — HYDROCODONE BITARTRATE AND ACETAMINOPHEN 500; 5 MG/1; MG/1
TABLET ORAL
Status: DISCONTINUED | OUTPATIENT
Start: 2025-07-26 | End: 2025-07-26 | Stop reason: HOSPADM

## 2025-07-26 RX ORDER — ONDANSETRON HYDROCHLORIDE 2 MG/ML
4 INJECTION, SOLUTION INTRAVENOUS
Status: COMPLETED | OUTPATIENT
Start: 2025-07-26 | End: 2025-07-26

## 2025-07-26 RX ORDER — ONDANSETRON HYDROCHLORIDE 2 MG/ML
4 INJECTION, SOLUTION INTRAVENOUS EVERY 8 HOURS PRN
Status: DISCONTINUED | OUTPATIENT
Start: 2025-07-26 | End: 2025-07-30 | Stop reason: HOSPADM

## 2025-07-26 RX ORDER — MORPHINE SULFATE 2 MG/ML
2 INJECTION, SOLUTION INTRAMUSCULAR; INTRAVENOUS EVERY 4 HOURS PRN
Status: DISCONTINUED | OUTPATIENT
Start: 2025-07-26 | End: 2025-07-26

## 2025-07-26 RX ORDER — MUPIROCIN 20 MG/G
OINTMENT TOPICAL 2 TIMES DAILY
Status: DISCONTINUED | OUTPATIENT
Start: 2025-07-26 | End: 2025-07-30 | Stop reason: HOSPADM

## 2025-07-26 RX ORDER — ACETAMINOPHEN 325 MG/1
650 TABLET ORAL EVERY 4 HOURS PRN
Status: DISCONTINUED | OUTPATIENT
Start: 2025-07-26 | End: 2025-07-30 | Stop reason: HOSPADM

## 2025-07-26 RX ORDER — SODIUM CHLORIDE 9 MG/ML
500 INJECTION, SOLUTION INTRAVENOUS ONCE
Status: COMPLETED | OUTPATIENT
Start: 2025-07-26 | End: 2025-07-26

## 2025-07-26 RX ORDER — MORPHINE SULFATE 2 MG/ML
2 INJECTION, SOLUTION INTRAMUSCULAR; INTRAVENOUS
Status: COMPLETED | OUTPATIENT
Start: 2025-07-26 | End: 2025-07-26

## 2025-07-26 RX ORDER — HYDROMORPHONE HYDROCHLORIDE 1 MG/ML
0.5 INJECTION, SOLUTION INTRAMUSCULAR; INTRAVENOUS; SUBCUTANEOUS EVERY 4 HOURS PRN
Status: DISCONTINUED | OUTPATIENT
Start: 2025-07-26 | End: 2025-07-28

## 2025-07-26 RX ADMIN — SODIUM CHLORIDE 500 ML: 9 INJECTION, SOLUTION INTRAVENOUS at 07:07

## 2025-07-26 RX ADMIN — MORPHINE SULFATE 2 MG: 2 INJECTION, SOLUTION INTRAMUSCULAR; INTRAVENOUS at 06:07

## 2025-07-26 RX ADMIN — PANTOPRAZOLE SODIUM 40 MG: 40 INJECTION, POWDER, FOR SOLUTION INTRAVENOUS at 03:07

## 2025-07-26 RX ADMIN — MUPIROCIN: 20 OINTMENT TOPICAL at 08:07

## 2025-07-26 RX ADMIN — PIPERACILLIN SODIUM AND TAZOBACTAM SODIUM 4.5 G: 4; .5 INJECTION, POWDER, FOR SOLUTION INTRAVENOUS at 08:07

## 2025-07-26 RX ADMIN — PHYTONADIONE 10 MG: 10 INJECTION, EMULSION INTRAMUSCULAR; INTRAVENOUS; SUBCUTANEOUS at 09:07

## 2025-07-26 RX ADMIN — PIPERACILLIN SODIUM AND TAZOBACTAM SODIUM 4.5 G: 4; .5 INJECTION, POWDER, LYOPHILIZED, FOR SOLUTION INTRAVENOUS at 06:07

## 2025-07-26 RX ADMIN — MORPHINE SULFATE 2 MG: 2 INJECTION, SOLUTION INTRAMUSCULAR; INTRAVENOUS at 07:07

## 2025-07-26 RX ADMIN — SODIUM CHLORIDE 1000 ML: 0.9 INJECTION, SOLUTION INTRAVENOUS at 04:07

## 2025-07-26 RX ADMIN — PANTOPRAZOLE SODIUM 40 MG: 40 INJECTION, POWDER, FOR SOLUTION INTRAVENOUS at 08:07

## 2025-07-26 RX ADMIN — PANTOPRAZOLE SODIUM 8 MG/HR: 40 INJECTION, POWDER, FOR SOLUTION INTRAVENOUS at 04:07

## 2025-07-26 RX ADMIN — MORPHINE SULFATE 2 MG: 2 INJECTION, SOLUTION INTRAMUSCULAR; INTRAVENOUS at 04:07

## 2025-07-26 RX ADMIN — HYDROMORPHONE HYDROCHLORIDE 0.5 MG: 1 INJECTION, SOLUTION INTRAMUSCULAR; INTRAVENOUS; SUBCUTANEOUS at 09:07

## 2025-07-26 RX ADMIN — MORPHINE SULFATE 2 MG: 2 INJECTION, SOLUTION INTRAMUSCULAR; INTRAVENOUS at 01:07

## 2025-07-26 RX ADMIN — SODIUM CHLORIDE 1000 ML: 0.9 INJECTION, SOLUTION INTRAVENOUS at 06:07

## 2025-07-26 RX ADMIN — IOHEXOL 100 ML: 350 INJECTION, SOLUTION INTRAVENOUS at 05:07

## 2025-07-26 RX ADMIN — ONDANSETRON 4 MG: 2 INJECTION INTRAMUSCULAR; INTRAVENOUS at 09:07

## 2025-07-26 RX ADMIN — ONDANSETRON 4 MG: 2 INJECTION INTRAMUSCULAR; INTRAVENOUS at 04:07

## 2025-07-26 NOTE — ANESTHESIA PREPROCEDURE EVALUATION
[facility]  Anesthesia Pre-Operative Evaluation         Patient Name: Aury Felix  YOB: 1970  MRN: [id number]    SUBJECTIVE:     Pre-operative evaluation for Procedure(s) (LRB):  EGD (ESOPHAGOGASTRODUODENOSCOPY) (N/A)     07/26/2025    Aury Felix is a 54 y.o. female w/ a significant PMHx of HTN, HLD, DM2 (insulin and orals), newly dxed metastatic duodenal CA s/p gastrojejunostomy (not yet started on chemotherapy) and IR biliary drain placement (drain still in place) who presented to outside hospital for coffee ground hematemesis.    Patient now presents for the above procedure(s).      LDA:   Peripheral IV 07/26/25 0330 20 G Left Forearm (Active)   Site Assessment Clean;Dry;Intact 07/26/25 1500   Line Securement Device Secured with sutureless device 07/26/25 1100   Extremity Assessment Distal to IV No abnormal discoloration;No redness;No swelling 07/26/25 1500   Dressing Status Clean;Dry;Intact 07/26/25 1500   Dressing Intervention Integrity maintained 07/26/25 1500   Dressing Change Due 07/30/25 07/26/25 1500   Site Change Due 07/30/25 07/26/25 1100   Number of days: 0       Peripheral IV Single Lumen 07/26/25 1205 18 G 1 3/4 in Long PIVC Anterior;Right Upper Arm (Active)   Site Assessment Clean;Dry;Intact 07/26/25 1215   Extremity Assessment Distal to IV No abnormal discoloration;No redness;No swelling 07/26/25 1215   Dressing Status Clean;Dry;Intact 07/26/25 1215   Dressing Intervention Integrity maintained 07/26/25 1215   Number of days: 0       Peripheral IV Single Lumen 07/26/25 1204 20 G 1 3/4 in Long PIVC Anterior;Proximal;Right Forearm (Active)   Site Assessment Clean;Dry;Intact 07/26/25 1215   Extremity Assessment Distal to IV No abnormal discoloration;No redness;No swelling 07/26/25 1215   Dressing Status Clean;Dry;Intact 07/26/25 1215   Dressing Intervention Integrity maintained 07/26/25 1215   Number of days: 0            Biliary Tube 07/17/25 0914 10 Fr.  RUQ (Active)   Site Description Other (Comment) 07/18/25 1630   Dressing Status Clean;Dry;Intact 07/18/25 1630   Drainage Color Sanguineous 07/18/25 1630   Drain Status Unclamped 07/18/25 1630   Tube Output(mL)(Include Discarded Residual) 80 mL 07/19/25 1214   Number of days: 9       Female External Urinary Catheter w/ Suction 07/26/25 1700 (Active)   Number of days: 0       Prev airway:     Date/Time: 7/17/2025 8:36 AM     Performed by: Genevieve Barber CRNA  Authorized by: Lyndsay Damico MD    Intubation:     Induction:  Intravenous    Intubated:  Postinduction    Mask Ventilation:  Easy mask    Attempts:  1    Attempted By:  Student    Method of Intubation:  Video laryngoscopy    Blade:  Gu 3    Laryngeal View Grade: Grade I - full view of cords      Difficult Airway Encountered?: No      Complications:  None    Airway Device:  Oral endotracheal tube    Airway Device Size:  7.0    Style/Cuff Inflation:  Cuffed    Tube secured:  21    Secured at:  The lips    Placement Verified By:  Capnometry    Complicating Factors:  None    Findings Post-Intubation:  BS equal bilateral and atraumatic/condition of teeth unchanged       Drips: None documented.      Problem List[1]    Review of patient's allergies indicates:  No Known Allergies    Current Inpatient Medications:   mupirocin   Nasal BID    pantoprazole  40 mg Intravenous Q12H       Medications Ordered Prior to Encounter[2]    Past Surgical History:   Procedure Laterality Date    ESOPHAGOGASTRODUODENOSCOPY N/A 4/10/2025    Procedure: EGD (ESOPHAGOGASTRODUODENOSCOPY);  Surgeon: Manny Calles MD;  Location: [facility];  Service: Endoscopy;  Laterality: N/A;    ESOPHAGOGASTRODUODENOSCOPY N/A 5/21/2025    Procedure: EGD (ESOPHAGOGASTRODUODENOSCOPY);  Surgeon: Manny Calles MD;  Location: [facility];  Service: Endoscopy;  Laterality: N/A;    GASTROJEJUNOSTOMY N/A 5/27/2025    Procedure: GASTROJEJUNOSTOMY;  Surgeon: William Reyes  MD WICHO;  Location: [facility] OR;  Service: General;  Laterality: N/A;    HYSTERECTOMY      LAPAROTOMY, EXPLORATORY Bilateral 5/27/2025    Procedure: LAPAROTOMY, EXPLORATORY;  Surgeon: William Reyes MD;  Location: [facility] OR;  Service: General;  Laterality: Bilateral;  will need BK US Guide probe BK US T probe [id number] AM 5/23    LIVER BIOPSY  5/27/2025    Procedure: BIOPSY, LIVER;  Surgeon: William Reyes MD;  Location: [facility] OR;  Service: General;;       Social History[3]    OBJECTIVE:     Vital Signs Range (Last 24H):  Temp:  [36.5 °C (97.7 °F)-37 °C (98.6 °F)]   Pulse:  [113-140]   Resp:  [16-35]   BP: ()/(56-88)   SpO2:  [96 %-100 %]       Significant Labs:  Lab Results   Component Value Date    WBC 26.91 (H) 07/26/2025    HGB 6.5 (L) 07/26/2025    HCT 19.6 (LL) 07/26/2025     07/26/2025    CHOL 175 09/14/2024    TRIG 96 09/14/2024    HDL 37 (L) 09/14/2024    ALT 38 07/26/2025    AST 97 (H) 07/26/2025     07/26/2025    K 3.6 07/26/2025     07/26/2025    CREATININE 0.6 07/26/2025    BUN 14 07/26/2025    CO2 17 (L) 07/26/2025    TSH 0.844 12/30/2024    INR 2.2 (H) 07/26/2025    HGBA1C  06/30/2025      Comment:      ADA Screening Guidelines:  5.7-6.4%  Consistent with prediabetes  >=6.5%  Consistent with diabetes    High levels of fetal hemoglobin interfere with the HbA1C  assay. Heterozygous hemoglobin variants (HbS, HgC, etc)do  not significantly interfere with this assay.   However, presence of multiple variants may affect accuracy.  Falsely low HA1c results may be observed in patients with clinical conditions that shorten erythrocyte life span or decrease mean erythrocyte age.  HA1c may not accurately reflect glycemic control when clinical conditions that affect erythrocyte survival are present. Fructosamine may be used as an alternate measurement of glycemic control.       Diagnostic Studies: No relevant studies.    EKG:   Results for orders placed or performed during the hospital  encounter of 07/14/25   EKG 12-lead    Collection Time: 07/14/25 11:49 AM   Result Value Ref Range    QRS Duration 84 ms    [facility] QTC Calculation 463 ms    Narrative    Test Reason : R06.02,    Vent. Rate : 114 BPM     Atrial Rate : 114 BPM     P-R Int : 120 ms          QRS Dur :  84 ms      QT Int : 336 ms       P-R-T Axes :  58  52 -39 degrees    QTcB Int : 463 ms    Sinus tachycardia  Nonspecific ST and T wave abnormality  Abnormal ECG  When compared with ECG of 29-May-2025 06:01,  No significant change was found  Confirmed by Parish Sheriff ([id number]) on 7/15/2025 8:56:28 AM    Referred By: AAAREFERRAL SELF           Confirmed By: Parish Sheriff       2D ECHO:  TTE:  No results found for this or any previous visit.    DEBBI:  No results found for this or any previous visit.    ASSESSMENT/PLAN:          Pre-op Assessment       I have reviewed the Medications.     Review of Systems  Anesthesia Hx:             Denies Family Hx of Anesthesia complications.     Hematology/Oncology:       -- Anemia:                                  Cardiovascular:     Hypertension                                          Pulmonary:      Shortness of breath                  Endocrine:  Diabetes, type 2               Physical Exam  General: Well nourished, Cooperative and Alert    Airway:  Mallampati: II   Mouth Opening: Normal  TM Distance: Normal  Tongue: Normal  Neck ROM: Normal ROM    Dental:  Intact    Chest/Lungs:  Clear to auscultation, Normal Respiratory Rate    Heart:  Rate: Normal  Rhythm: Regular Rhythm  Sounds: Normal      Anesthesia Plan  Type of Anesthesia, risks & benefits discussed:    Anesthesia Type: Gen ETT, Gen Natural Airway  Intra-op Monitoring Plan: Standard ASA Monitors  Post Op Pain Control Plan: multimodal analgesia and IV/PO Opioids PRN  Induction:  IV  Airway Plan: Direct, Post-Induction  Informed Consent: Informed consent signed with the Patient and all parties understand the risks and agree with anesthesia plan.  All  questions answered.   ASA Score: 3  Day of Surgery Review of History & Physical: H&P Update referred to the surgeon/provider.    Ready For Surgery From Anesthesia Perspective.     .             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     Current Outpatient Medications on File Prior to Encounter   Medication Sig Dispense Refill    aspirin (ECOTRIN) 81 MG EC tablet Take 1 tablet (81 mg total) by mouth once daily. 90 tablet 3    ciprofloxacin HCl (CIPRO) 750 MG tablet Take 1 tablet (750 mg total) by mouth every 12 (twelve) hours. for 7 days 14 tablet 0    empagliflozin (JARDIANCE) 25 mg tablet Take 1 tablet (25 mg total) by mouth once daily. 90 tablet 1    ferrous sulfate 325 (65 FE) MG EC tablet Take 1 tablet (325 mg total) by mouth every other day. 15 tablet 2    insulin glargine U-100, Lantus, 100 unit/mL (3 mL) SubQ InPn pen Inject 20 Units into the skin every evening. 15 mL 1    lisinopriL (PRINIVIL,ZESTRIL) 20 MG tablet Take 1 tablet (20 mg total) by mouth once daily. 90 tablet 3    metroNIDAZOLE (FLAGYL) 500 MG tablet Take 1 tablet (500 mg total) by mouth every 8 (eight) hours. for 7 days 21 tablet 0    oxyCODONE  (ROXICODONE) 5 MG immediate release tablet Take 1 tablet (5 mg total) by mouth every 8 (eight) hours as needed for Pain. 12 tablet 0    pantoprazole (PROTONIX) 40 MG tablet Take 1 tablet (40 mg total) by mouth once daily. 90 tablet 3    polyethylene glycol (GLYCOLAX) 17 gram PwPk Take 17 g by mouth once daily. 30 each 0    rosuvastatin (CRESTOR) 40 MG Tab Take 1 tablet (40 mg total) by mouth once daily. 90 tablet 3    simethicone (MYLICON) 125 MG chewable tablet Take 1 tablet (125 mg total) by mouth every 6 (six) hours as needed for Flatulence. 30 tablet 0   [3]  Social History  Socioeconomic History    Marital status: Single   Tobacco Use    Smoking status: Never    Smokeless tobacco: Never   Substance and Sexual Activity    Alcohol use: Yes    Drug use: Never    Sexual activity: Yes     Social Drivers of Health     Financial Resource Strain: Medium Risk (7/15/2025)    Overall Financial Resource Strain (CARDIA)     Difficulty of Paying Living Expenses: Somewhat hard   Food Insecurity: No Food Insecurity (7/15/2025)    Hunger Vital Sign     Worried About Running Out of Food in the Last Year: Never true     Ran Out of Food in the Last Year: Never true   Transportation Needs: No Transportation Needs (7/15/2025)    PRAPARE - Transportation     Lack of Transportation (Medical): No     Lack of Transportation (Non-Medical): No   Physical Activity: Inactive (7/15/2025)    Exercise Vital Sign     Days of Exercise per Week: 0 days     Minutes of Exercise per Session: 0 min   Stress: Stress Concern Present (7/15/2025)    [employer] [place] [employer] - Occupational Stress Questionnaire     Feeling of Stress : To some extent   Housing Stability: Low Risk  (7/15/2025)    Housing Stability Vital Sign     Unable to Pay for Housing in the Last Year: No     Number of Times Moved in the Last Year: 0     Homeless in the Last Year: No

## 2025-07-26 NOTE — ASSESSMENT & PLAN NOTE
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 INR >10 prior to transfer but 2.2 on repeat here prior to any interventions. CTA prior to transfer w/o active bleed. Required 1u pRBCs, given FFP and cryo for elevated INR in setting of active bleed.  - GI consulted   - plan for scope in AM   - CLD now, NPO MN  - correcting coagulopathy with goal INR <2.0, plt >50  - pRBC to maintain Hg >7  - holding AC  - q8 CBC  - f/u repeat lactate

## 2025-07-26 NOTE — TELEPHONE ENCOUNTER
"Caller states she has vomited a large amount of blood and has rectal bleeding.  Pt advised per protocol and verbalized understanding.       Reason for Disposition   [1] Vomited blood AND [2] large amount (example: "a cup of blood")    Additional Information   Negative: Unable to walk, or can only walk with assistance (e.g., requires support)   Negative: Fainted   Negative: Difficult to awaken or acting confused (e.g., disoriented, slurred speech)   Negative: Shock suspected (e.g., cold/pale/clammy skin, too weak to stand, low BP, rapid pulse)    Protocols used: Vomiting Blood-A-AH    "

## 2025-07-26 NOTE — CONSULTS
[facility]  Gastroenterology  Consult Note    Patient Name: Aury Felix  MRN: [id number]  Admission Date: 7/26/2025  Hospital Length of Stay: 0 days  Code Status: Full Code   Attending Provider: Jet Sanford*   Consulting Provider: Joel Butler MD  Primary Care Physician: Jimbo Menard II, MD  Principal Problem:<principal problem not specified>    Inpatient consult to Gastroenterology  Consult performed by: Joel Butler MD  Consult ordered by: Jossy Miller MD        Subjective:     HPI: Aury Felix is a 54 y.o. female with history of HTN, HLD, DM2 (insulin and orals), newly dxed metastatic duodenal CA s/p gastrojejunostomy (not yet started on chemotherapy) and IR biliary drain placement (drain still in place) who presented to outside hospital for coffee ground hematemesis.     Gastroenterology was consulted for concerns for GI bleed and coffee-ground emesis.    Patient was noted to have significantly deranged labs at the outside hospital and was noted to have INR of greater than 10.  Patient has a red blood type and hence none other transfusions were given and was transferred to [facility] for higher level of care    Patient was noted to have lactic acidosis along with leukocytosis and elevated bilirubin and unstable hemodynamics and was resuscitated with IV fluids and was transferred to ICU.    CTA bleeding scan with no active bleed found, just in the body of the report a possible thrombus of SMV--  Small nonocclusive filling defect of upper superior mesenteric venous branch (axial series 6, image 70), appears new since prior. Hepatic and splenic veins appear grossly patent.       Past Medical History:   Diagnosis Date    Cancer     Diabetes mellitus     Hyperlipidemia     Hypertension        Past Surgical History:   Procedure Laterality Date    ESOPHAGOGASTRODUODENOSCOPY N/A 4/10/2025    Procedure: EGD (ESOPHAGOGASTRODUODENOSCOPY);  Surgeon: Marli  Manny AMAYA MD;  Location: [facility] ENDO;  Service: Endoscopy;  Laterality: N/A;    ESOPHAGOGASTRODUODENOSCOPY N/A 5/21/2025    Procedure: EGD (ESOPHAGOGASTRODUODENOSCOPY);  Surgeon: Manny Calles MD;  Location: [facility] ENDO;  Service: Endoscopy;  Laterality: N/A;    GASTROJEJUNOSTOMY N/A 5/27/2025    Procedure: GASTROJEJUNOSTOMY;  Surgeon: William Reyes MD;  Location: [facility] OR;  Service: General;  Laterality: N/A;    HYSTERECTOMY      LAPAROTOMY, EXPLORATORY Bilateral 5/27/2025    Procedure: LAPAROTOMY, EXPLORATORY;  Surgeon: William Reyes MD;  Location: [facility] OR;  Service: General;  Laterality: Bilateral;  will need BK US Guide probe BK US T probe [id number] AM 5/23    LIVER BIOPSY  5/27/2025    Procedure: BIOPSY, LIVER;  Surgeon: William Reyes MD;  Location: [facility] OR;  Service: General;;       No family history on file.    Social History[1]    Medications Ordered Prior to Encounter[2]    Review of patient's allergies indicates:  No Known Allergies      Objective:     Vitals:    07/26/25 1402   BP:    Pulse: (!) 118   Resp:    Temp:          Constitutional:  alert,  not in acute distress, and ill appearing, but non-toxic  HENT: Head: Normal, normocephalic, atraumatic.  Eyes: sclera icteric  Cardiovascular: Tachycardic  Respiratory: normal chest expansion & respiratory effort   and no accessory muscle use  GI: soft and tenderness mild in the RUQ  Musculoskeletal:  +1 pedal edema  Skin: jaundice present  Neurological: alert, oriented x3  Psychiatric: mood and affect are within normal limits    Significant Labs:  Recent Labs   Lab 07/26/25  0350 07/26/25  1112   HGB 8.6* 7.6*       Lab Results   Component Value Date    WBC 28.42 (H) 07/26/2025    HGB 7.6 (L) 07/26/2025    HCT 23.8 (L) 07/26/2025    MCV 79 (L) 07/26/2025     (H) 07/26/2025       Lab Results   Component Value Date     07/26/2025    K 3.6 07/26/2025     07/26/2025    CO2 17 (L) 07/26/2025    BUN 14 07/26/2025     CREATININE 0.6 07/26/2025    CALCIUM 8.0 (L) 07/26/2025    ANIONGAP 16 07/26/2025    ESTGFRAFRICA >60 02/07/2020    EGFRNONAA >60 02/07/2020       Lab Results   Component Value Date    ALT 38 07/26/2025    AST 97 (H) 07/26/2025    ALKPHOS 331 (H) 07/26/2025    BILITOT 17.8 (H) 07/26/2025       Lab Results   Component Value Date    INR 2.2 (H) 07/26/2025    INR >10.0 (HH) 07/26/2025    INR >10.0 (HH) 07/26/2025       Significant Imaging:  Reviewed pertinent radiology findings.       Assessment/Plan:     Aury Felix is a 54 y.o. female with history of  DM2 (insulin and orals), newly dxed metastatic duodenal CA s/p gastrojejunostomy (not yet started on chemotherapy) and IR biliary drain placement (drain still in place) who presented to outside hospital for coffee ground hematemesis.     Gastroenterology was consulted for concerns for GI bleed and coffee-ground emesis.    Problem List:  Coffee-ground emesis  Metastatic duodenal carcinoma s/p gastrojejunostomy  Anemia not requiring transfusion  Hypercoagulability, not on blood thinners  Percutaneous drain in place  Leukocytosis  Hyperbilirubinemia    Recommendations:    --patient with anemia with hypercoagulability with recent diagnosis of duodenal cancer with gastrojejunostomy who presented with coffee-ground emesis  --recommend correction of coagulopathy.  Labs seems significantly improved compared to outside hospital.  Recommend closer monitoring and stabilization and optimization for anticipated EGD tomorrow  --we will leukocytosis would be reactive postprocedure however recommend broad infectious workup given percutaneous drainage catheter.    -clear liquid diet  - Keep NPO at midnight for potential EGD on 07/27/2025  - Trend Hgb and transfuse for goal Hgb > 7, unless otherwise indicated  - Maintain IV access with 2 large bore IV's  - Intravascular resuscitation/support with IVF's   - Hold all NSAIDs and anticoagulants, unless contraindicated  - Bolus IV  pantoprazole 80 mg followed by 40 mg BID  - Please correct any coagulopathy with platelets and FFP for goal of platelets >50K and INR <2.0  - Please notify GI team if there is significant change in patient's clinical status      Thank you for involving us in the care of Aury Felix. Please call with any additional questions, concerns or changes in the patient's clinical status. We will continue to follow.     Joel Butler MD  Gastroenterology Fellow PGY IV  [facility]                  [1]   Social History  Socioeconomic History    Marital status: Single   Tobacco Use    Smoking status: Never    Smokeless tobacco: Never   Substance and Sexual Activity    Alcohol use: Yes    Drug use: Never    Sexual activity: Yes     Social Drivers of Health     Financial Resource Strain: Medium Risk (7/15/2025)    Overall Financial Resource Strain (CARDIA)     Difficulty of Paying Living Expenses: Somewhat hard   Food Insecurity: No Food Insecurity (7/15/2025)    Hunger Vital Sign     Worried About Running Out of Food in the Last Year: Never true     Ran Out of Food in the Last Year: Never true   Transportation Needs: No Transportation Needs (7/15/2025)    PRAPARE - Transportation     Lack of Transportation (Medical): No     Lack of Transportation (Non-Medical): No   Physical Activity: Inactive (7/15/2025)    Exercise Vital Sign     Days of Exercise per Week: 0 days     Minutes of Exercise per Session: 0 min   Stress: Stress Concern Present (7/15/2025)    [employer] [place] [employer] - Occupational Stress Questionnaire     Feeling of Stress : To some extent   Housing Stability: Low Risk  (7/15/2025)    Housing Stability Vital Sign     Unable to Pay for Housing in the Last Year: No     Number of Times Moved in the Last Year: 0     Homeless in the Last Year: No   [2]   Current Facility-Administered Medications on File Prior to Encounter   Medication Dose Route Frequency Provider Last Rate Last  Admin    [COMPLETED] 0.9% NaCl infusion  500 mL Intravenous Once Aileen Andrew MD   Stopped at 07/26/25 0900    [COMPLETED] iohexoL (OMNIPAQUE 350) injection 100 mL  100 mL Intravenous ONCE PRN Geovanny Edward MD   100 mL at 07/26/25 0532    [COMPLETED] morphine injection 2 mg  2 mg Intravenous ED 1 Time Geovanny Edward MD   2 mg at 07/26/25 0434    [COMPLETED] morphine injection 2 mg  2 mg Intravenous ED 1 Time Aileen Andrew MD   2 mg at 07/26/25 0758    [COMPLETED] ondansetron injection 4 mg  4 mg Intravenous ED 1 Time Geovanny Edward MD   4 mg at 07/26/25 0433    [COMPLETED] pantoprazole (PROTONIX) 40 mg in 0.9% NaCl 100 mL IVPB (MB+)  8 mg/hr Intravenous ED 1 Time Geovanny Edward MD 20 mL/hr at 07/26/25 0530 8 mg/hr at 07/26/25 0530    [COMPLETED] phytonadione vitamin k (AQUA-MEPHYTON) 10 mg in D5W 50 mL IVPB  10 mg Intravenous ED 1 Time Aileen Andrew MD 50 mL/hr at 07/26/25 0901 10 mg at 07/26/25 0901    [COMPLETED] piperacillin-tazobactam (ZOSYN) 4.5 g in D5W 100 mL IVPB (MB+)  4.5 g Intravenous ED 1 Time Geovanny Edward MD   Stopped at 07/26/25 0755    [COMPLETED] sodium chloride 0.9% bolus 1,000 mL 1,000 mL  1,000 mL Intravenous ED 1 Time Geovanny Edward MD   Stopped at 07/26/25 0648    [DISCONTINUED] 0.9%  NaCl infusion (for blood administration)   Intravenous Q24H PRN Aileen Andrew MD        [DISCONTINUED] 0.9%  NaCl infusion (for blood administration)   Intravenous Q24H PRN Aileen Andrew MD        [DISCONTINUED] 0.9% NaCl infusion  1,000 mL Intravenous Continuous Geovanny Edward  mL/hr at 07/26/25 0648 1,000 mL at 07/26/25 0648     Current Outpatient Medications on File Prior to Encounter   Medication Sig Dispense Refill    aspirin (ECOTRIN) 81 MG EC tablet Take 1 tablet (81 mg total) by mouth once daily. 90 tablet 3    ciprofloxacin HCl (CIPRO) 750 MG tablet Take 1 tablet (750 mg total) by mouth every 12 (twelve) hours. for 7 days 14 tablet 0    empagliflozin  (JARDIANCE) 25 mg tablet Take 1 tablet (25 mg total) by mouth once daily. 90 tablet 1    ferrous sulfate 325 (65 FE) MG EC tablet Take 1 tablet (325 mg total) by mouth every other day. 15 tablet 2    insulin glargine U-100, Lantus, 100 unit/mL (3 mL) SubQ InPn pen Inject 20 Units into the skin every evening. 15 mL 1    lisinopriL (PRINIVIL,ZESTRIL) 20 MG tablet Take 1 tablet (20 mg total) by mouth once daily. 90 tablet 3    metroNIDAZOLE (FLAGYL) 500 MG tablet Take 1 tablet (500 mg total) by mouth every 8 (eight) hours. for 7 days 21 tablet 0    oxyCODONE (ROXICODONE) 5 MG immediate release tablet Take 1 tablet (5 mg total) by mouth every 8 (eight) hours as needed for Pain. 12 tablet 0    pantoprazole (PROTONIX) 40 MG tablet Take 1 tablet (40 mg total) by mouth once daily. 90 tablet 3    polyethylene glycol (GLYCOLAX) 17 gram PwPk Take 17 g by mouth once daily. 30 each 0    rosuvastatin (CRESTOR) 40 MG Tab Take 1 tablet (40 mg total) by mouth once daily. 90 tablet 3    simethicone (MYLICON) 125 MG chewable tablet Take 1 tablet (125 mg total) by mouth every 6 (six) hours as needed for Flatulence. 30 tablet 0

## 2025-07-26 NOTE — SUBJECTIVE & OBJECTIVE
Past Medical History:   Diagnosis Date    Cancer     Diabetes mellitus     Hyperlipidemia     Hypertension        Past Surgical History:   Procedure Laterality Date    ESOPHAGOGASTRODUODENOSCOPY N/A 4/10/2025    Procedure: EGD (ESOPHAGOGASTRODUODENOSCOPY);  Surgeon: Manny Calles MD;  Location: [facility] ENDO;  Service: Endoscopy;  Laterality: N/A;    ESOPHAGOGASTRODUODENOSCOPY N/A 5/21/2025    Procedure: EGD (ESOPHAGOGASTRODUODENOSCOPY);  Surgeon: Manny Calles MD;  Location: [facility] ENDO;  Service: Endoscopy;  Laterality: N/A;    GASTROJEJUNOSTOMY N/A 5/27/2025    Procedure: GASTROJEJUNOSTOMY;  Surgeon: William Reyes MD;  Location: [facility] OR;  Service: General;  Laterality: N/A;    HYSTERECTOMY      LAPAROTOMY, EXPLORATORY Bilateral 5/27/2025    Procedure: LAPAROTOMY, EXPLORATORY;  Surgeon: William Reyes MD;  Location: [facility] OR;  Service: General;  Laterality: Bilateral;  will need BK US Guide probe BK US T probe [id number] AM 5/23    LIVER BIOPSY  5/27/2025    Procedure: BIOPSY, LIVER;  Surgeon: William Reyes MD;  Location: [facility] OR;  Service: General;;       Review of patient's allergies indicates:  No Known Allergies    Family History    None       Tobacco Use    Smoking status: Never    Smokeless tobacco: Never   Substance and Sexual Activity    Alcohol use: Yes    Drug use: Never    Sexual activity: Yes      Review of Systems   Constitutional:  Negative for appetite change.   HENT:  Negative for congestion.    Respiratory:  Negative for shortness of breath.    Cardiovascular:  Negative for chest pain.   Gastrointestinal:  Positive for abdominal pain, blood in stool, nausea and vomiting.   Genitourinary:  Negative for difficulty urinating.   Skin:  Negative for rash.   Neurological:  Negative for headaches.   Psychiatric/Behavioral:  Negative for confusion.      Objective:     Vital Signs (Most Recent):  Temp: 97.9 °F (36.6 °C) (07/26/25 1645)  Pulse: (!) 113 (07/26/25 1708)  Resp: (!)  22 (07/26/25 1708)  BP: (!) (P) 128/97 (07/26/25 1708)  SpO2: 99 % (07/26/25 1708) Vital Signs (24h Range):  Temp:  [97.7 °F (36.5 °C)-98.6 °F (37 °C)] 97.9 °F (36.6 °C)  Pulse:  [113-140] 113  Resp:  [16-35] 22  SpO2:  [96 %-100 %] 99 %  BP: ()/(56-88) (P) 128/97      There is no height or weight on file to calculate BMI.      Intake/Output Summary (Last 24 hours) at 7/26/2025 1753  Last data filed at 7/26/2025 1330  Gross per 24 hour   Intake 416.25 ml   Output --   Net 416.25 ml          Physical Exam  Vitals and nursing note reviewed.   Constitutional:       Appearance: She is well-developed.   HENT:      Head: Normocephalic.      Mouth/Throat:      Mouth: Mucous membranes are moist.   Eyes:      General: Scleral icterus present.      Pupils: Pupils are equal, round, and reactive to light.   Cardiovascular:      Rate and Rhythm: Normal rate and regular rhythm.      Pulses: Normal pulses.   Pulmonary:      Effort: Pulmonary effort is normal.   Abdominal:      General: Bowel sounds are normal. There is no distension.      Palpations: Abdomen is soft.      Tenderness: There is abdominal tenderness.   Musculoskeletal:         General: No swelling.   Skin:     General: Skin is warm and dry.      Coloration: Skin is jaundiced.   Neurological:      Mental Status: She is alert.            Vents:     Lines/Drains/Airways       Drain  Duration                  Biliary Tube 07/17/25 0914 10 Fr. RUQ 9 days    Female External Urinary Catheter w/ Suction 07/26/25 1700 <1 day              Peripheral Intravenous Line  Duration             Peripheral IV 07/26/25 0330 20 G Left Forearm <1 day    Peripheral IV Single Lumen 07/26/25 1204 20 G 1 3/4 in Long PIVC Anterior;Proximal;Right Forearm <1 day    Peripheral IV Single Lumen 07/26/25 1205 18 G 1 3/4 in Long PIVC Anterior;Right Upper Arm <1 day                  Significant Labs:    CBC/Anemia Profile:  Recent Labs   Lab 07/26/25  0350 07/26/25  1106 07/26/25  1112  07/26/25  1533   WBC 26.46*  --  28.42* 26.91*   HGB 8.6*  --  7.6* 6.5*   HCT 25.7* 33* 23.8* 19.6*   *  --  465* 390   MCV 77*  --  79* 79*   RDW 26.7*  --  26.9* 26.9*        Chemistries:  Recent Labs   Lab 07/26/25  0350      K 3.6      CO2 17*   BUN 14   CREATININE 0.6   CALCIUM 8.0*   ALBUMIN 2.0*   PROT 6.0   BILITOT 17.8*   ALKPHOS 331*   ALT 38   AST 97*       All pertinent labs within the past 24 hours have been reviewed.    Significant Imaging: I have reviewed all pertinent imaging results/findings within the past 24 hours.

## 2025-07-26 NOTE — ED NOTES
Notified MD of pt BP changes . Per MD order 500 ML ns bolus before morphine . Pt alert sitting up in bed talking .

## 2025-07-26 NOTE — ED NOTES
Patient's IR biliary drain dressing changed. Soiled dressings removed. Patient's skin cleaned with chlorehexidine prep. Non adhesive dressing applied.

## 2025-07-26 NOTE — ED NOTES
Pt attempted to urinate on bedpan. Unable to urinate at this time. Pt encouraged to press call bell when able to urinate - will hold off on catheter at this time

## 2025-07-26 NOTE — HPI
Aury Felix is a 54F with HTN, HLD, DM2 (insulin and orals), newly dxed metastatic duodenal CA and 4 recent hospital stays s/p gastrojejunostomy and IR biliary drain placement, drain still in place, has not started on chemo (has upcoming Heme/Onc clinic appt on 8/7/25,) who presented to the [facility] ED last night with coffee ground hematemesis.   She has associated weight loss (need to quantify), jaundice, and anorexia with poor PO intake.  In ED, tachycardic 116-140, with soft BP low 100s, and found to have elevated Tbili 17.8 over her baseline of around 8, with severe coagulopathy of INR >10, with relatively normal AST and ALT, and a normal platelet count in the 400s.   She's not on any oral anticoagulants, just a baby ASA.  She was given aggressive fluid resuscitation with NS 1.5L bolus then 125cc/hr, PPI IV gtt.  Lactic acidosis of 4, and after IVFs, Lactate still elevated at 3.7.  H/H 8.6/25.7, down slightly from baseline.        CTA bleeding scan with no active bleed found, just in the body of the report a possible thrombus of SMV--  Small nonocclusive filling defect of upper superior mesenteric venous branch (axial series 6, image 70), appears new since prior. Hepatic and splenic veins appear grossly patent.      Discussed case with Sommer Andrew (ED) and Juvenal (MICU).  Worried about an oozing mass, which would need direct visualization.  Mass could have eroded into a vessel, or can get a clot distal to the mass so it becomes a flow problem. Correcting coagulopathy, give Vit K IV (ordered by ED to give prior to transfer) and transfusion of cryoglobulin and FFP 4 units - unfortunately, unable to start transfusing at outside ED prior to transfer, because type and screen is sent by  [facility] to [facility].  Checking fibrinogen.  Discussed with IR Dr Earl Mccarty and GI Dr Nini Zavala.   Per Dr Mccarty, agree with correcting INR to see if that helps, then probably scope from GI to assess for a  "source, IR has has nothing to target as of now -  "see the gastroepiploic artery coursing in close proximity to the doudenal mass, but if embolize that, a portion of her stomach will likely infarct."    Transfer to [facility] MICU as a Level 1 for potentially life-threatening bleed.     On arrival to [facility], patient reports some abdominal pain relieved with morphine. Her BP stable, HR 110s. Labs notable for WBC 28, Hg 7.6 > 6.5, repeat INR 2.2. Fibrinogen normal. Lactic acid 3.7.  "

## 2025-07-26 NOTE — EICU
Virtual ICU Admission    Admit Date: 2025  LOS: 0  Code Status: Full Code   : 1970  Bed: 7080/7080 A:     Diagnosis: <principal problem not specified>    Patient  has a past medical history of Cancer, Diabetes mellitus, Hyperlipidemia, and Hypertension.    Last VS: /60 (BP Location: Right arm, Patient Position: Lying)   Pulse (!) 119   Temp 97.7 °F (36.5 °C) (Oral)   Resp (!) 21   SpO2 100%       VICU Review    VICU nurse assessment :  [place] completed and VTE prophylaxis review    Pressure injury prevention panel (order)    Nursing orders placed : IP HARDEEP Peripheral IV Access

## 2025-07-26 NOTE — ED NOTES
Pt aware of need for urine sample - unable to urinate at this time. Will hold off on catheter and wait for patient to urinate due to risk of infection.

## 2025-07-26 NOTE — PROVIDER TRANSFER
Outside Transfer Acceptance Note / Regional Referral Center    Referring facility: [facility] ED  Referring provider: Aileen Andrew MD  [place] [facility] [facility]   Accepting provider: Leticia Ly MD  Admitting provider: Jet Sanford MD  Reason for transfer: Higher Level of Care   Transfer diagnosis: Hematemesis (coffee ground) in duodenal cancer, with severe coagulopathy INR >10  Transfer specialty requested: GI and IR  Transfer specialty notified: Yes  Transfer level: NUMBER 1-5: 1  Bed type requested: MICU  Isolation status: No active isolations   Admission class or status: IP- Inpatient      Narrative     54F with HTN, HLD, DM2 (insulin and orals), newly dxed metastatic duodenal CA and 4 recent hospital stays s/p gastrojejunostomy and IR biliary drain placement, drain still in place, has not started on chemo (has upcoming Heme/Onc clinic appt on 8/7/25,) who presented to the [facility] ED last night with coffee ground hematemesis.   She has associated weight loss (need to quantify), jaundice, and anorexia with poor PO intake.  In ED, tachycardic 116-140, with soft BP low 100s, and found to have elevated Tbili 17.8 over her baseline of around 8, with severe coagulopathy of INR >10, with relatively normal AST and ALT, and a normal platelet count in the 400s.   She's not on any oral anticoagulants, just a baby ASA.  She was given aggressive fluid resuscitation with NS 1.5L bolus then 125cc/hr, PPI IV gtt.  Lactic acidosis  of 4, and after IVFs, Lactate still elevated at 3.7.  H/H 8.6/25.7, down slightly from baseline.       CTA bleeding scan with no active bleed found, just in the body of the report a possible thrombus of SMV--  Small nonocclusive filling defect of upper superior mesenteric venous branch (axial series 6, image 70), appears new since prior. Hepatic and splenic veins appear grossly patent.     Discussed case with Sommer Andrew (ED) and Juvenal (MICU).  Worried about an oozing mass, which would  "need direct visualization.  Mass could have eroded into a vessel, or can get a clot distal to the mass so it becomes a flow problem. Correcting coagulopathy, give Vit K IV (ordered by ED to give prior to transfer) and transfusion of cryoglobulin and FFP 4 units - unfortunately, unable to start transfusing at outside ED prior to transfer, because type and screen is sent by  up to [facility] [place].  Checking fibrinogen.  Discussed with IR Dr Earl Mccarty and GI Dr Nini Zavala.   Per Dr Mccarty, agree with correcting INR to see if that helps, then probably scope from GI to assess for a source, IR has has nothing to target as of now -  "see the gastroepiploic artery coursing in close proximity to the doudenal mass, but if embolize that, a portion of her stomach will likely infarct."    [place] [facility] [place] as a Level 1 for potentially life-threatening bleed.      Objective     Vitals: see above  Recent Labs: All pertinent labs within the past 24 hours have been reviewed.  Recent imaging: see above   Allergies: NKDA  NPO: Yes        Instructions      Upon patient arrival to the ICU, please contact Critical Care Medicine on call.     Leticia Ly MD   "

## 2025-07-26 NOTE — H&P
[facility] - Medical ICU  Critical Care Medicine  History & Physical    Patient Name: Aury Felix  MRN: [id number]  Admission Date: 7/26/2025  Hospital Length of Stay: 0 days  Code Status: Full Code  Attending Physician: Jet Sanford*   Primary Care Provider: Jimbo Menard II, MD   Principal Problem: <principal problem not specified>    Subjective:     HPI:  Aury Felix is a 54F with HTN, HLD, DM2 (insulin and orals), newly dxed metastatic duodenal CA and 4 recent hospital stays s/p gastrojejunostomy and IR biliary drain placement, drain still in place, has not started on chemo (has upcoming Heme/Onc clinic appt on 8/7/25,) who presented to the [facility] ED last night with coffee ground hematemesis.   She has associated weight loss (need to quantify), jaundice, and anorexia with poor PO intake.  In ED, tachycardic 116-140, with soft BP low 100s, and found to have elevated Tbili 17.8 over her baseline of around 8, with severe coagulopathy of INR >10, with relatively normal AST and ALT, and a normal platelet count in the 400s.   She's not on any oral anticoagulants, just a baby ASA.  She was given aggressive fluid resuscitation with NS 1.5L bolus then 125cc/hr, PPI IV gtt.  Lactic acidosis of 4, and after IVFs, Lactate still elevated at 3.7.  H/H 8.6/25.7, down slightly from baseline.        CTA bleeding scan with no active bleed found, just in the body of the report a possible thrombus of SMV--  Small nonocclusive filling defect of upper superior mesenteric venous branch (axial series 6, image 70), appears new since prior. Hepatic and splenic veins appear grossly patent.      Discussed case with Sommer Andrew (ED) and Juvenal (MICU).  Worried about an oozing mass, which would need direct visualization.  Mass could have eroded into a vessel, or can get a clot distal to the mass so it becomes a flow problem. Correcting coagulopathy, give Vit K IV (ordered by ED to give prior to transfer)  "and transfusion of cryoglobulin and FFP 4 units - unfortunately, unable to start transfusing at outside ED prior to transfer, because type and screen is sent by  up to [facility].  Checking fibrinogen.  Discussed with IR Dr Earl Mccarty and GI Dr Nini Zavala.   Per Dr Mccarty, agree with correcting INR to see if that helps, then probably scope from GI to assess for a source, IR has has nothing to target as of now -  "see the gastroepiploic artery coursing in close proximity to the doudenal mass, but if embolize that, a portion of her stomach will likely infarct."    Transfer to [facility] MICU as a Level 1 for potentially life-threatening bleed.     On arrival to [facility], patient reports some abdominal pain relieved with morphine. Her BP stable, HR 110s. Labs notable for WBC 28, Hg 7.6 > 6.5, repeat INR 2.2. Fibrinogen normal. Lactic acid 3.7.    Hospital/ICU Course:  No notes on file     Past Medical History:   Diagnosis Date    Cancer     Diabetes mellitus     Hyperlipidemia     Hypertension        Past Surgical History:   Procedure Laterality Date    ESOPHAGOGASTRODUODENOSCOPY N/A 4/10/2025    Procedure: EGD (ESOPHAGOGASTRODUODENOSCOPY);  Surgeon: Manny Calles MD;  Location: [facility];  Service: Endoscopy;  Laterality: N/A;    ESOPHAGOGASTRODUODENOSCOPY N/A 5/21/2025    Procedure: EGD (ESOPHAGOGASTRODUODENOSCOPY);  Surgeon: Manny Calles MD;  Location: [facility];  Service: Endoscopy;  Laterality: N/A;    GASTROJEJUNOSTOMY N/A 5/27/2025    Procedure: GASTROJEJUNOSTOMY;  Surgeon: William Reyes MD;  Location: [facility] OR;  Service: General;  Laterality: N/A;    HYSTERECTOMY      LAPAROTOMY, EXPLORATORY Bilateral 5/27/2025    Procedure: LAPAROTOMY, EXPLORATORY;  Surgeon: William Reyes MD;  Location: [facility] OR;  Service: General;  Laterality: Bilateral;  will need BK US Guide probe BK US T probe [id number] AM 5/23    LIVER BIOPSY  5/27/2025    Procedure: BIOPSY, LIVER;  Surgeon: William Reyes" MD WICHO;  Location: [facility] OR;  Service: General;;       Review of patient's allergies indicates:  No Known Allergies    Family History    None       Tobacco Use    Smoking status: Never    Smokeless tobacco: Never   Substance and Sexual Activity    Alcohol use: Yes    Drug use: Never    Sexual activity: Yes      Review of Systems   Constitutional:  Negative for appetite change.   HENT:  Negative for congestion.    Respiratory:  Negative for shortness of breath.    Cardiovascular:  Negative for chest pain.   Gastrointestinal:  Positive for abdominal pain, blood in stool, nausea and vomiting.   Genitourinary:  Negative for difficulty urinating.   Skin:  Negative for rash.   Neurological:  Negative for headaches.   Psychiatric/Behavioral:  Negative for confusion.      Objective:     Vital Signs (Most Recent):  Temp: 97.9 °F (36.6 °C) (07/26/25 1645)  Pulse: (!) 113 (07/26/25 1708)  Resp: (!) 22 (07/26/25 1708)  BP: (!) (P) 128/97 (07/26/25 1708)  SpO2: 99 % (07/26/25 1708) Vital Signs (24h Range):  Temp:  [97.7 °F (36.5 °C)-98.6 °F (37 °C)] 97.9 °F (36.6 °C)  Pulse:  [113-140] 113  Resp:  [16-35] 22  SpO2:  [96 %-100 %] 99 %  BP: ()/(56-88) (P) 128/97      There is no height or weight on file to calculate BMI.      Intake/Output Summary (Last 24 hours) at 7/26/2025 1753  Last data filed at 7/26/2025 1330  Gross per 24 hour   Intake 416.25 ml   Output --   Net 416.25 ml          Physical Exam  Vitals and nursing note reviewed.   Constitutional:       Appearance: She is well-developed.   HENT:      Head: Normocephalic.      Mouth/Throat:      Mouth: Mucous membranes are moist.   Eyes:      General: Scleral icterus present.      Pupils: Pupils are equal, round, and reactive to light.   Cardiovascular:      Rate and Rhythm: Normal rate and regular rhythm.      Pulses: Normal pulses.   Pulmonary:      Effort: Pulmonary effort is normal.   Abdominal:      General: Bowel sounds are normal. There is no distension.       Palpations: Abdomen is soft.      Tenderness: There is abdominal tenderness.   Musculoskeletal:         General: No swelling.   Skin:     General: Skin is warm and dry.      Coloration: Skin is jaundiced.   Neurological:      Mental Status: She is alert.            Vents:     Lines/Drains/Airways       Drain  Duration                  Biliary Tube 07/17/25 0914 10 Fr. RUQ 9 days    Female External Urinary Catheter w/ Suction 07/26/25 1700 <1 day              Peripheral Intravenous Line  Duration             Peripheral IV 07/26/25 0330 20 G Left Forearm <1 day    Peripheral IV Single Lumen 07/26/25 1204 20 G 1 3/4 in Long PIVC Anterior;Proximal;Right Forearm <1 day    Peripheral IV Single Lumen 07/26/25 1205 18 G 1 3/4 in Long PIVC Anterior;Right Upper Arm <1 day                  Significant Labs:    CBC/Anemia Profile:  Recent Labs   Lab 07/26/25  0350 07/26/25  1106 07/26/25  1112 07/26/25  1533   WBC 26.46*  --  28.42* 26.91*   HGB 8.6*  --  7.6* 6.5*   HCT 25.7* 33* 23.8* 19.6*   *  --  465* 390   MCV 77*  --  79* 79*   RDW 26.7*  --  26.9* 26.9*        Chemistries:  Recent Labs   Lab 07/26/25  0350      K 3.6      CO2 17*   BUN 14   CREATININE 0.6   CALCIUM 8.0*   ALBUMIN 2.0*   PROT 6.0   BILITOT 17.8*   ALKPHOS 331*   ALT 38   AST 97*       All pertinent labs within the past 24 hours have been reviewed.    Significant Imaging: I have reviewed all pertinent imaging results/findings within the past 24 hours.  Assessment/Plan:     Cardiac/Vascular  Essential hypertension  Holding BP meds in setting of active bleed, resume as appropriate.    Hematology  Coagulopathy  INR at OSH with INR >10 x2 but INR 2.2 here prior to intervention. Likely due to liver dysfunction.  Received cryo x1 and FFP x1.  - daily PT-INR  - maintain INR <2 and plt >50 per GI recs      Oncology  Leukocytosis  Possibly reactive due to cancer and GIB bleed; however, given percutaneous drainage catheter, will cover with abx  for now.  - zosyn for intraabdominal coverage  - f/u Bcx    ABLA (acute blood loss anemia)  54F with newly dxed metastatic duodenal CA and 4 recent hospital stays s/p gastrojejunostomy and IR biliary drain placement presented with coffee ground emesis, abdominal pain, jaundice and weight loss. INR >10 prior to transfer but 2.2 on repeat here prior to any interventions. CTA prior to transfer w/o active bleed. Required 1u pRBCs, given FFP and cryo for elevated INR in setting of active bleed.  - GI consulted   - plan for scope in AM   - CLD now, NPO MN  - correcting coagulopathy with goal INR <2.0, plt >50  - pRBC to maintain Hg >7  - holding AC  - q8 CBC  - f/u repeat lactate    Reactive thrombocytosis  Elevated platelets on admission.   - daily CBC    Duodenal cancer  Recently diagnosed, metastatic, not started on chemotherapy yet.        Critical Care Daily Checklist:    A: Awake: RASS Goal/Actual Goal:    Actual:     B: Spontaneous Breathing Trial Performed?     C: SAT & SBT Coordinated?  N/a                      D: Delirium: CAM-ICU     E: Early Mobility Performed? Yes   F: Feeding Goal #1    Goal status #1:    Goal #2:    Goal status #2:     Current Diet Order   Procedures    Diet Clear Liquid    Diet NPO      AS: Analgesia/Sedation Morphine PRN   T: Thromboembolic Prophylaxis None in setting of bleed   H: HOB > 300 Yes   U: Stress Ulcer Prophylaxis (if needed) protonix   G: Glucose Control SSI if needed   B: Bowel Function     I: Indwelling Catheter (Lines & Lo) Necessity Biliary tube   D: De-escalation of Antimicrobials/Pharmacotherapies zosyn    Plan for the day/ETD Admit to MICU    Code Status:  Family/Goals of Care: Full Code         Critical secondary to Patient has a condition that poses threat to life and bodily function: life threatening GIB    Critical care was time spent personally by me on the following activities: development of treatment plan with patient or surrogate and bedside caregivers,  discussions with consultants, evaluation of patient's response to treatment, examination of patient, ordering and performing treatments and interventions, ordering and review of laboratory studies, ordering and review of radiographic studies, pulse oximetry, re-evaluation of patient's condition. This critical care time did not overlap with that of any other provider or involve time for any procedures.     Jossy Miller MD  Critical Care Medicine  [facility]

## 2025-07-26 NOTE — ASSESSMENT & PLAN NOTE
Possibly reactive due to cancer and GIB bleed; however, given percutaneous drainage catheter, will cover with abx for now.  - zosyn for intraabdominal coverage  - f/u Bcx

## 2025-07-26 NOTE — ED NOTES
Received call from Lab stating that the  just picked up patient blood about 30 mins ago . D/T pt pos miriam pt has special antibodies that has to send blood out to [facility] and it will take several hours before blood can be returned. Lab unable to provide cryoprecipitate. MD ordered FFP lab stated they are unsure if they can provide but she will work on it and it will take about an  45 mins to an hour before . MD notified

## 2025-07-26 NOTE — ED NOTES
Obtained PIV access with limited blood return. Attempted to draw blood x 2 attempts. Lab at bedside for blood draw.

## 2025-07-26 NOTE — PLAN OF CARE
MICU DAILY GOALS     Family/Goals of care/Code Status   Code Status: Full Code    24H Vital Sign Range  Temp:  [97.7 °F (36.5 °C)-98.6 °F (37 °C)]   Pulse:  [112-140]   Resp:  [16-36]   BP: ()/(56-97)   SpO2:  [96 %-100 %]      Shift Events (include procedures and significant events)   No acute events throughout shift    AWAKE RASS: Goal -    Actual - RASS (Adam Agitation-Sedation Scale): alert and calm    Restraint necessity: Not necessary   BREATHE SBT: Not intubated    Coordinate A & B, analgesics/sedatives Pain: managed   SAT: Not intubated   Delirium CAM-ICU:     Early(intubated/ Progressive (non-intubated) Mobility MOVE Screen (INTUBATED ONLY): Not intubated    Activity: Activity Management: Rolling - L1   Feeding/Nutrition Diet order: Diet/Nutrition Received: NPO,     Thrombus DVT prophylaxis:     HOB Elevation Head of Bed (HOB) Positioning: HOB at 30 degrees   Ulcer Prophylaxis GI: yes   Glucose control managed     Skin Skin assessment:     Sacrum intact/not altered? Yes  Heels intact/not altered? Yes  Surgical wound? No    CHECK ONE!   (no altered skin or altered skin) and sub boxes:  [] No Altered Skin Integrity Present    []Prevention Measures Documented    [x] Altered Skin Integrity Present or Discovered   [x] LDA already present in EPIC, daily doc completed              [] LDA added if not already in EPIC (describe/stage wound).               [] Wound Image Taken (required on admit,                   transfer/discharge and every Tuesday)    Wound Care Consulted? No   Bowel Function no issues    Indwelling Catheter Necessity            De-escalation Antibiotics No        VS and assessment per flow sheet, patient progressing towards goals as tolerated, plan of care reviewed with [unfilled] and family, all concerns addressed, will continue to monitor.

## 2025-07-26 NOTE — ASSESSMENT & PLAN NOTE
INR at OSH with INR >10 x2 but INR 2.2 here prior to intervention. Likely due to liver dysfunction.  Received cryo x1 and FFP x1.  - daily PT-INR  - maintain INR <2 and plt >50 per GI recs

## 2025-07-26 NOTE — ED PROVIDER NOTES
"Encounter Date: 7/26/2025       History     Chief Complaint   Patient presents with    Hematemesis     Patient reports sudden onset of vomiting blood that started 30-45 minutes ago.  Reports his of cancer - not on chemo at this time.  Patient had a "tube placed approximately 1.5 weeks ago.  Also reports blood in stool.  No fever.     History of Present Illness  Aury Felix is a 54 y.o. female that presents with hematemesis tonight  Patient recently diagnosed with a pancreatic recurrent, status post Whipple  Patient was admitted earlier this month to the [facility] for evaluation  Patient noted to have recurrent duodenal mass with biliary dilatation as well  Patient has a external biliary drain placed, poor outcome suggested by staff  Presents tonight with hematemesis with a history of recently hematemesis    The history is provided by the patient.     Review of patient's allergies indicates:  No Known Allergies    Past Medical History:   Diagnosis Date    Cancer     Diabetes mellitus     Hyperlipidemia     Hypertension      Past Surgical History:   Procedure Laterality Date    ESOPHAGOGASTRODUODENOSCOPY N/A 4/10/2025    Procedure: EGD (ESOPHAGOGASTRODUODENOSCOPY);  Surgeon: Manny Calles MD;  Location: [facility];  Service: Endoscopy;  Laterality: N/A;    ESOPHAGOGASTRODUODENOSCOPY N/A 5/21/2025    Procedure: EGD (ESOPHAGOGASTRODUODENOSCOPY);  Surgeon: Manny Calles MD;  Location: [facility];  Service: Endoscopy;  Laterality: N/A;    GASTROJEJUNOSTOMY N/A 5/27/2025    Procedure: GASTROJEJUNOSTOMY;  Surgeon: William Reyes MD;  Location: [facility];  Service: General;  Laterality: N/A;    HYSTERECTOMY      LAPAROTOMY, EXPLORATORY Bilateral 5/27/2025    Procedure: LAPAROTOMY, EXPLORATORY;  Surgeon: William Reyes MD;  Location: [facility];  Service: General;  Laterality: Bilateral;  will need BK US Guide probe BK US T probe [id number] AM 5/23    LIVER BIOPSY  5/27/2025    Procedure: " BIOPSY, LIVER;  Surgeon: William Reyes MD;  Location: [facility] OR;  Service: General;;     No family history on file.  Social History[1]    Review of Systems   Constitutional: Negative.    HENT: Negative.     Eyes: Negative.    Respiratory: Negative.     Cardiovascular: Negative.    Gastrointestinal: Negative.         (+) nausea vomiting with hematemesis   Genitourinary:  Negative for dysuria, urgency and vaginal discharge.   Skin: Negative.    Neurological: Negative.    Psychiatric/Behavioral: Negative.     All other systems reviewed and are negative.      Physical Exam     Initial Vitals [07/26/25 0305]   BP Pulse Resp Temp SpO2   121/61 (!) 140 18 98.6 °F  99 %     Physical Exam    Nursing note and vitals reviewed.  Constitutional:   (+) ill-appearing jaundiced  female   HENT:   Head: Normocephalic and atraumatic.   Right Ear: External ear normal.   Left Ear: External ear normal.   Nose: Nose normal. Mouth/Throat: Oropharynx is clear and moist.   Eyes: Conjunctivae and EOM are normal. Pupils are equal, round, and reactive to light.   Neck: Neck supple. No JVD present.   Normal range of motion.  Cardiovascular:            (+) sinus tachycardia   Pulmonary/Chest: No respiratory distress. She has no wheezes. She has no rhonchi. She has no rales. She exhibits no tenderness.   Abdominal: Abdomen is soft. Bowel sounds are normal. She exhibits no distension. There is no abdominal tenderness.   (+) right biliary drain with bile in the bag There is no rebound.   Musculoskeletal:         General: Normal range of motion.      Cervical back: Normal range of motion and neck supple.     Neurological: She is alert and oriented to person, place, and time. She has normal strength and normal reflexes.   Skin: Skin is warm and dry.         ED Course   Critical Care    Date/Time: 7/26/2025 4:31 AM    Performed by: Geovanny Edward MD  Authorized by: Geovanny Edward MD  Direct patient critical care time: 25  minutes  Additional history critical care time: 5 minutes  Ordering / reviewing critical care time: 5 minutes  Documentation critical care time: 5 minutes  Consulting other physicians critical care time: 5 minutes  Consult with family critical care time: 5 minutes  Total critical care time (exclusive of procedural time) : 50 minutes  Critical care was necessary to treat or prevent imminent or life-threatening deterioration of the following conditions: dehydration and toxidrome.  Critical care was time spent personally by me on the following activities: blood draw for specimens, development of treatment plan with patient or surrogate, discussions with consultants, interpretation of cardiac output measurements, evaluation of patient's response to treatment, obtaining history from patient or surrogate, examination of patient, ordering and performing treatments and interventions, ordering and review of laboratory studies, ordering and review of radiographic studies, re-evaluation of patient's condition and review of old charts.        Labs Reviewed   LIPASE - Abnormal       Result Value    Lipase Level 145 (*)    URINALYSIS, REFLEX TO URINE CULTURE - Abnormal    Color, UA Yellow      Appearance, UA Clear      pH, UA 6.0      Spec Grav UA <=1.005 (*)     Protein, UA 1+ (*)     Glucose, UA Negative      Ketones, UA Negative      Bilirubin, UA 3+ (*)     Blood, UA Negative      Nitrites, UA Negative      Urobilinogen, UA Negative      Leukocyte Esterase, UA Negative     COMPREHENSIVE METABOLIC PANEL - Abnormal    Sodium 136      Potassium 3.6      Chloride 103      CO2 17 (*)     Glucose 122 (*)     BUN 14      Creatinine 0.6      Calcium 8.0 (*)     Protein Total 6.0      Albumin 2.0 (*)     Bilirubin Total 17.8 (*)      (*)     AST 97 (*)     ALT 38      Anion Gap 16      eGFR >60     NT-PRO NATRIURETIC PEPTIDE - Abnormal    NT-proBNP 872 (*)     Narrative:     NOTE:  Access complete set of age - and/or  gender-specific reference intervals for this test in the [facility] Collection Manual.   PROTIME-INR - Abnormal    PT >100.0 (*)     INR >10.0 (*)    APTT - Abnormal    PTT 60.8 (*)    LACTIC ACID, PLASMA - Abnormal    Lactic Acid Level 4.5 (*)     Narrative:     Falsely low lactic acid results can be found in samples containing >=13.0 mg/dL total bilirubin and/or >=3.5 mg/dL direct bilirubin.   Falsely low lactic acid results can be found in samples containing >=13.0 mg/dL total bilirubin and/or >=3.5 mg/dL direct bilirubin.    PROCALCITONIN - Abnormal    Procalcitonin 1.51 (*)    CBC WITH DIFFERENTIAL - Abnormal    WBC 26.46 (*)     RBC 3.35 (*)     HGB 8.6 (*)     HCT 25.7 (*)     MCV 77 (*)     MCH 25.7 (*)     MCHC 33.5      RDW 26.7 (*)     Platelet Count 476 (*)     MPV 10.2      Nucleated RBC 0      Neut % 80.6 (*)     Lymph % 7.4 (*)     Mono % 7.0      Eos % 1.3      Basophil % 0.5      Imm Grans % 3.2 (*)     Neut # 21.35 (*)     Lymph # 1.95      Mono # 1.85 (*)     Eos # 0.34      Baso # 0.13      Imm Grans # 0.84 (*)    MORPHOLOGY - Abnormal    Platelet Estimate Increased (*)     Fragmented Cells Moderate      Anisocytosis Marked      Polychromasia Occasional      Tear drop cell Occasional      Stomatocytes Present      Schistocyte Present      Toxic Gran Present      Large/Giant Platelets Present      Vacuolated Granulocytes Present     LACTIC ACID, PLASMA - Abnormal    Lactic Acid Level 3.7 (*)     Narrative:     Falsely low lactic acid results can be found in samples containing >=13.0 mg/dL total bilirubin and/or >=3.5 mg/dL direct bilirubin.   Falsely low lactic acid results can be found in samples containing >=13.0 mg/dL total bilirubin and/or >=3.5 mg/dL direct bilirubin.    TYPE & SCREEN - Abnormal    Specimen Outdate 07/29/2025 23:59      Group & Rh AB NEG      Indirect Thao POS (*)    TROPONIN I HIGH SENSITIVITY - Normal    Troponin High Sensitive <3     CK - Normal    CPK 36      CULTURE, BLOOD   CULTURE, BLOOD   CBC W/ AUTO DIFFERENTIAL    Narrative:     The following orders were created for panel order CBC Auto Differential.  Procedure                               Abnormality         Status                     ---------                               -----------         ------                     CBC with Differential[[id number]]       Abnormal            Final result                 Please view results for these tests on the individual orders.   URINALYSIS MICROSCOPIC    RBC, UA 1      WBC, UA 2      Bacteria, UA Occasional      Squamous Epithelial Cells, UA 1      Hyaline Casts, UA 0      Microscopic Comment       GREY TOP URINE HOLD   PROTIME-INR   FIBRINOGEN   ANTIBODY IDENTIFICATION   PREPARE CRYOPRECIPITATE (DOSE) SOFT   PREPARE FRESH FROZEN PLASMA SOFT     EKG Readings: (Independently Interpreted)   Sinus tachycardia  Ventricular rate of 126 per minute  Nonspecific ST and T wave abnormality  Abnormal ECG  When compared with ECG of 14-Jul-2025 11:49,  Nonspecific T wave abnormality no longer evident in Lateral leads   Geovanny Edward M.D. 4:26 AM 7/26/2025        Imaging Results              CTA Acute GI Bleed, Abdomen and Pelvis (Final result)  Result time 07/26/25 07:12:42      Final result by Power Enciso MD (07/26/25 07:12:42)                   Impression:      1. No intraluminal intestinal, intraperitoneal, or retroperitoneal hemorrhage.  2. Since prior CTA of 07/14/2025, status post placement of external-internal biliary drainage catheter with improvement of previously identified biliary ductal dilatation.  3. Compared to most recent CT imaging, no substantial change in appearance of the ill-defined mass centered in the region of the head of the pancreas and duodenum.  4. Partially imaged nodule versus consolidation at the left lower lobe, for which attention on follow-up would be recommended.  5. Additional details, as provided in the body of the  report.      Electronically signed by: Power Zaria  Date:    07/26/2025  Time:    07:12               Narrative:    EXAMINATION:  CTA ACUTE GI BLEED, ABDOMEN AND PELVIS    CLINICAL HISTORY:  Reports hematemesis;    TECHNIQUE:  Contiguous 2.5-mm axial images were obtained through the abdomen and pelvis following the administration of 100 cc of intravenous Omnipaque 350.  Sagittal and coronal reformats and maximum intensity projections were also submitted.    COMPARISON:  CTA abdomen and pelvis 07/14/2025.    FINDINGS:  BOWEL AND PERITONEUM    Bowel: Operative changes of bowel again noted, which would be in keeping with gastrojejunostomy.  Moderate gaseous distension of the stomach stomach.  No evidence of elio bowel obstruction.  Irregular wall thickening with suggested mucosal hyperemia and submucosal edema appears more pronounced relative to the 07/14/2025 CTA.  Ill-defined mass lesion centered in the region of the duodenum and pancreas felt relatively similar to prior.    Intraluminal intestinal hemorrhage: None.    Free air or fluid: No definite free air.  Small moderate volume abdominopelvic free fluid, felt relatively similar to 07/14/2025.    VASCULATURE    Visceral arteries: The celiac, superior mesenteric, renal, and inferior mesenteric arteries appear grossly patent.  The left hepatic artery appears to arise directly from the aorta and is also patent.    Abdominal aorta and iliac arteries: No high-grade stenosis or aneurysmal dilatation.  Relatively mild atherosclerosis.    Mesenteric and portal veins: Attenuated appearance of the left portal venous branches without discrete filling defect or abrupt truncation.  Appearance felt similar to 07/14/2025.  Small nonocclusive filling defect with and upper superior mesenteric venous branch (axial series 6, image 70), appears new since prior.  Hepatic and splenic veins appear grossly patent.    Inferior vena cava: Normally patent.    ABDOMEN/PELVIS    Lower  chest: Atelectasis and scar noted.  Indeterminate 19 mm nodular consolidation in the left lower lobe partially imaged (series 5, image 1).    Liver: Innumerable hypodense lesions throughout the liver suggesting metastatic disease, as before.    Gallbladder/bile ducts: Since prior CT, the patient is now status post placement of external-internal drainage catheter with distal pigtail loop formed within distal duodenum.  Improved biliary duct dilatation.  Short segment mild dilatation of proximal jejunal loops approximately 38 mm.  Bowel elsewhere relatively normal caliber.  Colonic diverticulosis.  Appendix not confidently identified.  No definite focal pericecal inflammation.    Pancreas: As above.  No new peripancreatic inflammatory changes are similar degree of mild dilatation of proximal main pancreatic duct.    Spleen: Unremarkable.    Adrenals: Unremarkable.    Kidneys: Unremarkable.    Lymph nodes: Unchanged upper mesenteric and retroperitoneal adenopathy compared to 07/14/2025.    Pelvis: Unremarkable.    Urinary bladder: Unremarkable.    Body wall: Remote operative sequela in the anterior abdominal wall.  Rectus diastasis.  Wide-mouth fat and vessel containing ventral hernia.    Bones: No definite acute change.  Degenerative findings of the spine, sacroiliac joints, pubic symphysis, and hip joints.                                       Medications   0.9% NaCl infusion (1,000 mLs Intravenous New Bag 7/26/25 0648)   0.9%  NaCl infusion (for blood administration) (has no administration in time range)   phytonadione vitamin k (AQUA-MEPHYTON) 10 mg in D5W 50 mL IVPB (10 mg Intravenous New Bag 7/26/25 0901)   0.9%  NaCl infusion (for blood administration) (has no administration in time range)   sodium chloride 0.9% bolus 1,000 mL 1,000 mL (0 mLs Intravenous Stopped 7/26/25 0648)   morphine injection 2 mg (2 mg Intravenous Given 7/26/25 0434)   ondansetron injection 4 mg (4 mg Intravenous Given 7/26/25 0433)    pantoprazole (PROTONIX) 40 mg in 0.9% NaCl 100 mL IVPB (MB+) (8 mg/hr Intravenous Restarted 7/26/25 0530)   iohexoL (OMNIPAQUE 350) injection 100 mL (100 mLs Intravenous Given 7/26/25 0532)   piperacillin-tazobactam (ZOSYN) 4.5 g in D5W 100 mL IVPB (MB+) (0 g Intravenous Stopped 7/26/25 0755)   morphine injection 2 mg (2 mg Intravenous Given 7/26/25 0758)   0.9% NaCl infusion (0 mLs Intravenous Stopped 7/26/25 0900)     Medical Decision Making  Differential includes upper GI bleed, cancer bleeding, peptic ulcer bleeding    Pt taken over by myself pending ct scan. No active bleed appreciated. Significant elevation of coags. Unable to administer blood products at this facility. PT will need critical care bed. Awaiting transfer to outside facility.  Discussed case with transfer doctor as well as ICU doctor.  Have attempted to order cryoprecipitate and FFP however as this patient has multiple antibodies are blood bag is unable to accommodate this at this time.  Vitamin K ordered.  Awaiting transfer to [facility] for further management.  Patient understands plan of care of transfer.        Problems Addressed:  Hematemesis: complicated acute illness or injury    Amount and/or Complexity of Data Reviewed  Labs: ordered. Decision-making details documented in ED Course.  Radiology: ordered and independent interpretation performed.  ECG/medicine tests: ordered and independent interpretation performed.    Risk  Prescription drug management.    ED Management & Risks of Complication, Morbidity, & Mortality:    Final diagnoses:  [K92.0] Hematemesis  [K92.0] Hematemesis, unspecified whether nausea present (Primary)          ED Disposition Condition    Transfer to Another Facility Critical                      [1]   Social History  Tobacco Use    Smoking status: Never    Smokeless tobacco: Never   Substance Use Topics    Alcohol use: Yes    Drug use: Never        Aileen Andrew MD  07/26/25 4218

## 2025-07-27 ENCOUNTER — ANESTHESIA (OUTPATIENT)
Dept: ENDOSCOPY | Facility: HOSPITAL | Age: 55
End: 2025-07-27

## 2025-07-27 LAB
ABSOLUTE EOSINOPHIL (OHS): 0.12 K/UL
ABSOLUTE EOSINOPHIL (OHS): 0.17 K/UL
ABSOLUTE EOSINOPHIL (OHS): 0.37 K/UL
ABSOLUTE EOSINOPHIL (OHS): 0.39 K/UL
ABSOLUTE MONOCYTE (OHS): 2.1 K/UL (ref 0.3–1)
ABSOLUTE MONOCYTE (OHS): 2.18 K/UL (ref 0.3–1)
ABSOLUTE MONOCYTE (OHS): 2.48 K/UL (ref 0.3–1)
ABSOLUTE MONOCYTE (OHS): 2.53 K/UL (ref 0.3–1)
ABSOLUTE NEUTROPHIL COUNT (OHS): 21.01 K/UL (ref 1.8–7.7)
ABSOLUTE NEUTROPHIL COUNT (OHS): 21.4 K/UL (ref 1.8–7.7)
ABSOLUTE NEUTROPHIL COUNT (OHS): 22.27 K/UL (ref 1.8–7.7)
ABSOLUTE NEUTROPHIL COUNT (OHS): 23.01 K/UL (ref 1.8–7.7)
ALBUMIN SERPL BCP-MCNC: 2 G/DL (ref 3.5–5.2)
ALP SERPL-CCNC: 250 UNIT/L (ref 40–150)
ALT SERPL W/O P-5'-P-CCNC: 35 UNIT/L (ref 0–55)
ANION GAP (OHS): 13 MMOL/L (ref 8–16)
AST SERPL-CCNC: 117 UNIT/L (ref 0–50)
BASOPHILS # BLD AUTO: 0.09 K/UL
BASOPHILS # BLD AUTO: 0.11 K/UL
BASOPHILS # BLD AUTO: 0.12 K/UL
BASOPHILS # BLD AUTO: 0.14 K/UL
BASOPHILS NFR BLD AUTO: 0.3 %
BASOPHILS NFR BLD AUTO: 0.4 %
BASOPHILS NFR BLD AUTO: 0.4 %
BASOPHILS NFR BLD AUTO: 0.5 %
BILIRUB SERPL-MCNC: 18.4 MG/DL (ref 0.1–1)
BUN SERPL-MCNC: 14 MG/DL (ref 6–20)
CALCIUM SERPL-MCNC: 7.6 MG/DL (ref 8.7–10.5)
CHLORIDE SERPL-SCNC: 108 MMOL/L (ref 95–110)
CO2 SERPL-SCNC: 17 MMOL/L (ref 23–29)
CREAT SERPL-MCNC: 0.6 MG/DL (ref 0.5–1.4)
ERYTHROCYTE [DISTWIDTH] IN BLOOD BY AUTOMATED COUNT: 24.6 % (ref 11.5–14.5)
ERYTHROCYTE [DISTWIDTH] IN BLOOD BY AUTOMATED COUNT: 24.6 % (ref 11.5–14.5)
ERYTHROCYTE [DISTWIDTH] IN BLOOD BY AUTOMATED COUNT: 25.2 % (ref 11.5–14.5)
ERYTHROCYTE [DISTWIDTH] IN BLOOD BY AUTOMATED COUNT: 25.2 % (ref 11.5–14.5)
GFR SERPLBLD CREATININE-BSD FMLA CKD-EPI: >60 ML/MIN/1.73/M2
GLUCOSE SERPL-MCNC: 113 MG/DL (ref 70–110)
HCT VFR BLD AUTO: 24.1 % (ref 37–48.5)
HCT VFR BLD AUTO: 24.7 % (ref 37–48.5)
HCT VFR BLD AUTO: 25.6 % (ref 37–48.5)
HCT VFR BLD AUTO: 25.7 % (ref 37–48.5)
HGB BLD-MCNC: 8 GM/DL (ref 12–16)
HGB BLD-MCNC: 8.1 GM/DL (ref 12–16)
HGB BLD-MCNC: 8.3 GM/DL (ref 12–16)
HGB BLD-MCNC: 8.5 GM/DL (ref 12–16)
HOLD SPECIMEN: NORMAL
IMM GRANULOCYTES # BLD AUTO: 0.8 K/UL (ref 0–0.04)
IMM GRANULOCYTES # BLD AUTO: 0.93 K/UL (ref 0–0.04)
IMM GRANULOCYTES # BLD AUTO: 0.94 K/UL (ref 0–0.04)
IMM GRANULOCYTES # BLD AUTO: 1.17 K/UL (ref 0–0.04)
IMM GRANULOCYTES NFR BLD AUTO: 3 % (ref 0–0.5)
IMM GRANULOCYTES NFR BLD AUTO: 3.2 % (ref 0–0.5)
IMM GRANULOCYTES NFR BLD AUTO: 3.4 % (ref 0–0.5)
IMM GRANULOCYTES NFR BLD AUTO: 4.1 % (ref 0–0.5)
INR PPP: 1.2 (ref 0.8–1.2)
LACTATE SERPL-SCNC: 2.3 MMOL/L (ref 0.5–2.2)
LYMPHOCYTES # BLD AUTO: 2.09 K/UL (ref 1–4.8)
LYMPHOCYTES # BLD AUTO: 2.28 K/UL (ref 1–4.8)
LYMPHOCYTES # BLD AUTO: 2.34 K/UL (ref 1–4.8)
LYMPHOCYTES # BLD AUTO: 2.42 K/UL (ref 1–4.8)
MAGNESIUM SERPL-MCNC: 2 MG/DL (ref 1.6–2.6)
MCH RBC QN AUTO: 27.1 PG (ref 27–31)
MCH RBC QN AUTO: 27.2 PG (ref 27–31)
MCH RBC QN AUTO: 27.4 PG (ref 27–31)
MCH RBC QN AUTO: 27.5 PG (ref 27–31)
MCHC RBC AUTO-ENTMCNC: 32.4 G/DL (ref 32–36)
MCHC RBC AUTO-ENTMCNC: 32.8 G/DL (ref 32–36)
MCHC RBC AUTO-ENTMCNC: 33.1 G/DL (ref 32–36)
MCHC RBC AUTO-ENTMCNC: 33.2 G/DL (ref 32–36)
MCV RBC AUTO: 82 FL (ref 82–98)
MCV RBC AUTO: 83 FL (ref 82–98)
MCV RBC AUTO: 84 FL (ref 82–98)
MCV RBC AUTO: 84 FL (ref 82–98)
NUCLEATED RBC (/100WBC) (OHS): 0 /100 WBC
PHOSPHATE SERPL-MCNC: 2.9 MG/DL (ref 2.7–4.5)
PLATELET # BLD AUTO: 399 K/UL (ref 150–450)
PLATELET # BLD AUTO: 404 K/UL (ref 150–450)
PLATELET # BLD AUTO: 404 K/UL (ref 150–450)
PLATELET # BLD AUTO: 410 K/UL (ref 150–450)
PMV BLD AUTO: 9.4 FL (ref 9.2–12.9)
PMV BLD AUTO: 9.5 FL (ref 9.2–12.9)
PMV BLD AUTO: 9.8 FL (ref 9.2–12.9)
PMV BLD AUTO: 9.9 FL (ref 9.2–12.9)
POTASSIUM SERPL-SCNC: 4 MMOL/L (ref 3.5–5.1)
PROT SERPL-MCNC: 5.7 GM/DL (ref 6–8.4)
PROTHROMBIN TIME: 13.4 SECONDS (ref 9–12.5)
RBC # BLD AUTO: 2.92 M/UL (ref 4–5.4)
RBC # BLD AUTO: 2.95 M/UL (ref 4–5.4)
RBC # BLD AUTO: 3.06 M/UL (ref 4–5.4)
RBC # BLD AUTO: 3.12 M/UL (ref 4–5.4)
RELATIVE EOSINOPHIL (OHS): 0.4 %
RELATIVE EOSINOPHIL (OHS): 0.6 %
RELATIVE EOSINOPHIL (OHS): 1.4 %
RELATIVE EOSINOPHIL (OHS): 1.4 %
RELATIVE LYMPHOCYTE (OHS): 7.9 % (ref 18–48)
RELATIVE LYMPHOCYTE (OHS): 8 % (ref 18–48)
RELATIVE LYMPHOCYTE (OHS): 8.3 % (ref 18–48)
RELATIVE LYMPHOCYTE (OHS): 8.5 % (ref 18–48)
RELATIVE MONOCYTE (OHS): 7.9 % (ref 4–15)
RELATIVE MONOCYTE (OHS): 8 % (ref 4–15)
RELATIVE MONOCYTE (OHS): 8.7 % (ref 4–15)
RELATIVE MONOCYTE (OHS): 8.7 % (ref 4–15)
RELATIVE NEUTROPHIL (OHS): 78.2 % (ref 38–73)
RELATIVE NEUTROPHIL (OHS): 78.4 % (ref 38–73)
RELATIVE NEUTROPHIL (OHS): 78.8 % (ref 38–73)
RELATIVE NEUTROPHIL (OHS): 79.5 % (ref 38–73)
SODIUM SERPL-SCNC: 138 MMOL/L (ref 136–145)
WBC # BLD AUTO: 26.46 K/UL (ref 3.9–12.7)
WBC # BLD AUTO: 27.38 K/UL (ref 3.9–12.7)
WBC # BLD AUTO: 28.44 K/UL (ref 3.9–12.7)
WBC # BLD AUTO: 29.18 K/UL (ref 3.9–12.7)

## 2025-07-27 PROCEDURE — 99291 CRITICAL CARE FIRST HOUR: CPT | Mod: ,,, | Performed by: INTERNAL MEDICINE

## 2025-07-27 PROCEDURE — 20600001 HC STEP DOWN PRIVATE ROOM

## 2025-07-27 PROCEDURE — 43255 EGD CONTROL BLEEDING ANY: CPT | Performed by: STUDENT IN AN ORGANIZED HEALTH CARE EDUCATION/TRAINING PROGRAM

## 2025-07-27 PROCEDURE — 63600175 PHARM REV CODE 636 W HCPCS: Performed by: ANESTHESIOLOGY

## 2025-07-27 PROCEDURE — 84100 ASSAY OF PHOSPHORUS: CPT | Performed by: STUDENT IN AN ORGANIZED HEALTH CARE EDUCATION/TRAINING PROGRAM

## 2025-07-27 PROCEDURE — 37000008 HC ANESTHESIA 1ST 15 MINUTES: Performed by: STUDENT IN AN ORGANIZED HEALTH CARE EDUCATION/TRAINING PROGRAM

## 2025-07-27 PROCEDURE — 83605 ASSAY OF LACTIC ACID: CPT

## 2025-07-27 PROCEDURE — 63600175 PHARM REV CODE 636 W HCPCS

## 2025-07-27 PROCEDURE — C1052 HEMOSTATIC AGENT, GI, TOPIC: HCPCS | Performed by: STUDENT IN AN ORGANIZED HEALTH CARE EDUCATION/TRAINING PROGRAM

## 2025-07-27 PROCEDURE — 83735 ASSAY OF MAGNESIUM: CPT | Performed by: STUDENT IN AN ORGANIZED HEALTH CARE EDUCATION/TRAINING PROGRAM

## 2025-07-27 PROCEDURE — 85025 COMPLETE CBC W/AUTO DIFF WBC: CPT | Performed by: INTERNAL MEDICINE

## 2025-07-27 PROCEDURE — 37000009 HC ANESTHESIA EA ADD 15 MINS: Performed by: STUDENT IN AN ORGANIZED HEALTH CARE EDUCATION/TRAINING PROGRAM

## 2025-07-27 PROCEDURE — 85610 PROTHROMBIN TIME: CPT

## 2025-07-27 PROCEDURE — 85025 COMPLETE CBC W/AUTO DIFF WBC: CPT | Performed by: STUDENT IN AN ORGANIZED HEALTH CARE EDUCATION/TRAINING PROGRAM

## 2025-07-27 PROCEDURE — 99233 SBSQ HOSP IP/OBS HIGH 50: CPT | Mod: ,,, | Performed by: STUDENT IN AN ORGANIZED HEALTH CARE EDUCATION/TRAINING PROGRAM

## 2025-07-27 PROCEDURE — 99223 1ST HOSP IP/OBS HIGH 75: CPT | Mod: ,,, | Performed by: SURGERY

## 2025-07-27 PROCEDURE — 25000003 PHARM REV CODE 250: Performed by: ANESTHESIOLOGY

## 2025-07-27 PROCEDURE — 0W3P8ZZ CONTROL BLEEDING IN GASTROINTESTINAL TRACT, VIA NATURAL OR ARTIFICIAL OPENING ENDOSCOPIC: ICD-10-PCS | Performed by: INTERNAL MEDICINE

## 2025-07-27 PROCEDURE — 94761 N-INVAS EAR/PLS OXIMETRY MLT: CPT

## 2025-07-27 PROCEDURE — 80053 COMPREHEN METABOLIC PANEL: CPT | Performed by: STUDENT IN AN ORGANIZED HEALTH CARE EDUCATION/TRAINING PROGRAM

## 2025-07-27 PROCEDURE — 25000003 PHARM REV CODE 250: Performed by: INTERNAL MEDICINE

## 2025-07-27 PROCEDURE — 43255 EGD CONTROL BLEEDING ANY: CPT | Mod: ,,, | Performed by: STUDENT IN AN ORGANIZED HEALTH CARE EDUCATION/TRAINING PROGRAM

## 2025-07-27 PROCEDURE — XW0G886 INTRODUCTION OF MINERAL-BASED TOPICAL HEMOSTATIC AGENT INTO UPPER GI, VIA NATURAL OR ARTIFICIAL OPENING ENDOSCOPIC, NEW TECHNOLOGY GROUP 6: ICD-10-PCS | Performed by: INTERNAL MEDICINE

## 2025-07-27 PROCEDURE — 25000003 PHARM REV CODE 250

## 2025-07-27 RX ORDER — LIDOCAINE HYDROCHLORIDE 20 MG/ML
INJECTION INTRAVENOUS
Status: DISCONTINUED | OUTPATIENT
Start: 2025-07-27 | End: 2025-07-27

## 2025-07-27 RX ORDER — DEXMEDETOMIDINE HYDROCHLORIDE 100 UG/ML
INJECTION, SOLUTION INTRAVENOUS
Status: DISCONTINUED | OUTPATIENT
Start: 2025-07-27 | End: 2025-07-27

## 2025-07-27 RX ORDER — PROPOFOL 10 MG/ML
VIAL (ML) INTRAVENOUS CONTINUOUS PRN
Status: DISCONTINUED | OUTPATIENT
Start: 2025-07-27 | End: 2025-07-27

## 2025-07-27 RX ORDER — PHENYLEPHRINE HYDROCHLORIDE 10 MG/ML
INJECTION INTRAVENOUS
Status: DISCONTINUED | OUTPATIENT
Start: 2025-07-27 | End: 2025-07-27

## 2025-07-27 RX ORDER — SUCCINYLCHOLINE CHLORIDE 20 MG/ML
INJECTION INTRAMUSCULAR; INTRAVENOUS
Status: DISCONTINUED | OUTPATIENT
Start: 2025-07-27 | End: 2025-07-27

## 2025-07-27 RX ADMIN — PIPERACILLIN SODIUM AND TAZOBACTAM SODIUM 4.5 G: 4; .5 INJECTION, POWDER, FOR SOLUTION INTRAVENOUS at 07:07

## 2025-07-27 RX ADMIN — PANTOPRAZOLE SODIUM 40 MG: 40 INJECTION, POWDER, FOR SOLUTION INTRAVENOUS at 08:07

## 2025-07-27 RX ADMIN — PROPOFOL 150 MCG/KG/MIN: 10 INJECTION, EMULSION INTRAVENOUS at 09:07

## 2025-07-27 RX ADMIN — HYDROMORPHONE HYDROCHLORIDE 0.5 MG: 1 INJECTION, SOLUTION INTRAMUSCULAR; INTRAVENOUS; SUBCUTANEOUS at 08:07

## 2025-07-27 RX ADMIN — PHENYLEPHRINE HYDROCHLORIDE 100 MCG: 10 INJECTION INTRAVENOUS at 10:07

## 2025-07-27 RX ADMIN — ONDANSETRON 4 MG: 2 INJECTION INTRAMUSCULAR; INTRAVENOUS at 02:07

## 2025-07-27 RX ADMIN — HYDROMORPHONE HYDROCHLORIDE 0.5 MG: 1 INJECTION, SOLUTION INTRAMUSCULAR; INTRAVENOUS; SUBCUTANEOUS at 02:07

## 2025-07-27 RX ADMIN — PIPERACILLIN SODIUM AND TAZOBACTAM SODIUM 4.5 G: 4; .5 INJECTION, POWDER, FOR SOLUTION INTRAVENOUS at 01:07

## 2025-07-27 RX ADMIN — SUCCINYLCHOLINE CHLORIDE 120 MG: 20 INJECTION, SOLUTION INTRAMUSCULAR; INTRAVENOUS; PARENTERAL at 09:07

## 2025-07-27 RX ADMIN — DEXMEDETOMIDINE 8 MCG: 100 INJECTION, SOLUTION, CONCENTRATE INTRAVENOUS at 10:07

## 2025-07-27 RX ADMIN — MUPIROCIN: 20 OINTMENT TOPICAL at 08:07

## 2025-07-27 RX ADMIN — SODIUM CHLORIDE, SODIUM GLUCONATE, SODIUM ACETATE, POTASSIUM CHLORIDE, MAGNESIUM CHLORIDE, SODIUM PHOSPHATE, DIBASIC, AND POTASSIUM PHOSPHATE: .53; .5; .37; .037; .03; .012; .00082 INJECTION, SOLUTION INTRAVENOUS at 09:07

## 2025-07-27 RX ADMIN — PIPERACILLIN SODIUM AND TAZOBACTAM SODIUM 4.5 G: 4; .5 INJECTION, POWDER, FOR SOLUTION INTRAVENOUS at 03:07

## 2025-07-27 RX ADMIN — ONDANSETRON 4 MG: 2 INJECTION INTRAMUSCULAR; INTRAVENOUS at 08:07

## 2025-07-27 RX ADMIN — LIDOCAINE HYDROCHLORIDE 100 MG: 20 INJECTION INTRAVENOUS at 09:07

## 2025-07-27 NOTE — PROVATION PATIENT INSTRUCTIONS
Discharge Summary/Instructions after an Endoscopic Procedure  Patient Name: Aury Felix  Patient MRN: [id number]  Patient YOB: 1970 Sunday, July 27, 2025  Nini Zavala MD  Dear patient,  As a result of recent federal legislation (The Federal Cures Act), you may   receive lab or pathology results from your procedure in your [facility]   account before your physician is able to contact you. Your physician or   their representative will relay the results to you with their   recommendations at their soonest availability.  Thank you,  RESTRICTIONS:  During your procedure today, you received medications for sedation.  These   medications may affect your judgment, balance and coordination.  Therefore,   for 24 hours, you have the following restrictions:   - DO NOT drive a car, operate machinery, make legal/financial decisions,   sign important papers or drink alcohol.    ACTIVITY:  Today: no heavy lifting, straining or running due to procedural   sedation/anesthesia.  The following day: return to full activity including work.  DIET:  Eat and drink normally unless instructed otherwise.     TREATMENT FOR COMMON SIDE EFFECTS:  - Mild abdominal pain, nausea, belching, bloating or excessive gas:  rest,   eat lightly and use a heating pad.  - Sore Throat: treat with throat lozenges and/or gargle with warm salt   water.  - Because air was used during the procedure, expelling large amounts of air   from your rectum or belching is normal.  - If a bowel prep was taken, you may not have a bowel movement for 1-3 days.    This is normal.  SYMPTOMS TO WATCH FOR AND REPORT TO YOUR PHYSICIAN:  1. Abdominal pain or bloating, other than gas cramps.  2. Chest pain.  3. Back pain.  4. Signs of infection such as: chills or fever occurring within 24 hours   after the procedure.  5. Rectal bleeding, which would show as bright red, maroon, or black stools.   (A tablespoon of blood from the rectum is not serious, especially  if   hemorrhoids are present.)  6. Vomiting.  7. Weakness or dizziness.  GO DIRECTLY TO THE NEAREST EMERGENCY ROOM IF YOU HAVE ANY OF THE FOLLOWING:      Difficulty breathing              Chills and/or fever over 101 F   Persistent vomiting and/or vomiting blood   Severe abdominal pain   Severe chest pain   Black, tarry stools   Bleeding- more than one tablespoon   Any other symptom or condition that you feel may need urgent attention  Your doctor recommends these additional instructions:  If any biopsies were taken, your doctors clinic will contact you in 1 to 2   weeks with any results.  - Return patient to ICU for ongoing care.   - NPO.   - Use a proton pump inhibitor IV BID.   - This is bleeding from a duodenal malignancy, endoscopic treatment is only   temporizing. Definitive treatment would be surgical resection but the   malignancy appears to be locally advanced. Would recommend discussing with   surg onc for further steps but this may not be treatable.  For questions, problems or results please call your physician - Nini Zavala MD at Work:  [phone].  [facility], EMERGENCY ROOM PHONE NUMBER: [phone]  IF A COMPLICATION OR EMERGENCY SITUATION ARISES AND YOU ARE UNABLE TO REACH   YOUR PHYSICIAN - GO DIRECTLY TO THE EMERGENCY ROOM.  MD Niin Brewer MD  7/27/2025 1:53:34 PM  This report has been verified and signed electronically.  Dear patient,  As a result of recent federal legislation (The Federal Cures Act), you may   receive lab or pathology results from your procedure in your [facility]   account before your physician is able to contact you. Your physician or   their representative will relay the results to you with their   recommendations at their soonest availability.  Thank you,  PROVATION

## 2025-07-27 NOTE — CONSULTS
"IR consulted on this patient with history of duodenal adenocarcinoma with hepatic mets, s/p whipple and biliary drain placement. She present initially to an OSH with coffee ground emesis and melena. Her labs showed that her INR was>10 and her HGb dropped to 6.5. A CTA was performed which did not demonstrate active extravasation.    Since presenting to our hospital she has received blood products and her INR has been corrected to  1.2.    Her hgb is stable and she has had no further events of hematemesis.     As there is no identifiable source of hemorrhage at this time, IR will refrain from intervention at this time. If she develops more evidence of bleeding recommend repeat CTA (GI bleed protocol) to assess for active source of hemorrhage.    Alex Mccarty MD ([place])  Interventional Radiology        "

## 2025-07-27 NOTE — EICU
IVICU Night Rounds Checklist  24H Vital Sign Range:  Temp:  [97.7 °F (36.5 °C)-98.6 °F (37 °C)]   Pulse:  [111-140]   Resp:  [16-36]   BP: ()/(56-97)   SpO2:  [96 %-100 %]     Video rounds and LDA reconciliation

## 2025-07-27 NOTE — TRANSFER OF CARE
Anesthesia Transfer of Care Note    Patient: Aury Felix    Procedure(s) Performed: Procedure(s) (LRB):  EGD (ESOPHAGOGASTRODUODENOSCOPY) (N/A)    Patient location: ICU    Anesthesia Type: general    Post pain: adequate analgesia    Post assessment: no apparent anesthetic complications    Post vital signs: stable    Level of consciousness: awake and responds to stimulation    Nausea/Vomiting: no nausea/vomiting    Complications: none    Transfer of care protocol was followed      Last vitals: Visit Vitals  BP 93/68 (BP Location: Right arm, Patient Position: Lying)   Pulse (!) 114   Temp 36.6 °C (97.9 °F) (Axillary)   Resp 18   SpO2 100%

## 2025-07-27 NOTE — CONSULTS
"IR consulted on this patient secondary to leakage around the biliary drain. An abdominal xray demonstrated that the biliary drain had migrated forward into the small bowel. Decision was made to retract the drain and stitch into place.     A new stitch was placed at bedside with the drain retracted approximately 6 cm from its initial location.  No further leakage was visualized and the biliary drain was noted to be draining very well into the gravity drainage bag.     If further leakage occurs, then the drain could be exchanged under fluoroscopic guidance.    Alex Mccarty MD ([facility])  Interventional Radiology             "

## 2025-07-27 NOTE — PROGRESS NOTES
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 There was diffuse oozing, there was no single target for endoscopic therapy. Treated with hemospray.  She previously underwent a palliative gastrojejunostomy. The original tumor was not resected due to locally advanced malignancy.  Endoscopic therapy is of limited utility in this case, hemospray is temporizing but the underlying problem persists. IR embolization vs resection would be the next steps. Overall prognosis is poor and neither of those options may be feasible. Consider palliative care to discuss comfort measures.    Nini Zavala MD

## 2025-07-27 NOTE — SUBJECTIVE & OBJECTIVE
Interval History/Significant Events: Patient got EGD This morning. Bleeding duodenal mass, hemostatic gel, patient temporized and no longer bleeding. Reached out to IR and Surg onc to see about biliary drain replacement/embolization/palliative resection. Unlikely any of these would make much difference in her prognosis. Will follow recs.     Review of Systems   Constitutional:  Negative for appetite change.   HENT:  Negative for congestion.    Respiratory:  Negative for shortness of breath.    Cardiovascular:  Negative for chest pain.   Gastrointestinal:  Positive for abdominal pain, blood in stool, nausea and vomiting.   Genitourinary:  Negative for difficulty urinating.   Skin:  Negative for rash.   Neurological:  Negative for headaches.   Psychiatric/Behavioral:  Negative for confusion.      Objective:     Vital Signs (Most Recent):  Temp: 97.9 °F (36.6 °C) (07/27/25 0748)  Pulse: (!) 111 (07/27/25 1300)  Resp: (!) 24 (07/27/25 1430)  BP: 99/69 (07/27/25 1300)  SpO2: 96 % (07/27/25 1300) Vital Signs (24h Range):  Temp:  [97.5 °F (36.4 °C)-97.9 °F (36.6 °C)] 97.9 °F (36.6 °C)  Pulse:  [104-116] 111  Resp:  [18-46] 24  SpO2:  [95 %-100 %] 96 %  BP: ()/(68-97) 99/69      There is no height or weight on file to calculate BMI.      Intake/Output Summary (Last 24 hours) at 7/27/2025 1656  Last data filed at 7/27/2025 1339  Gross per 24 hour   Intake 1016.17 ml   Output 550 ml   Net 466.17 ml          Physical Exam  Vitals and nursing note reviewed.   Constitutional:       Appearance: She is well-developed. She is ill-appearing and toxic-appearing.   HENT:      Head: Normocephalic.      Mouth/Throat:      Mouth: Mucous membranes are moist.   Eyes:      General: Scleral icterus present.      Pupils: Pupils are equal, round, and reactive to light.   Cardiovascular:      Rate and Rhythm: Normal rate and regular rhythm.      Pulses: Normal pulses.   Pulmonary:      Effort: Pulmonary effort is normal.   Abdominal:       General: Bowel sounds are normal. There is no distension.      Palpations: Abdomen is soft.      Tenderness: There is abdominal tenderness.   Musculoskeletal:         General: No swelling.   Skin:     General: Skin is warm and dry.      Coloration: Skin is jaundiced.   Neurological:      Mental Status: She is alert.            Vents:     Lines/Drains/Airways       Drain  Duration                  Biliary Tube 07/17/25 0914 10 Fr. RUQ 10 days    Female External Urinary Catheter w/ Suction 07/26/25 1700 <1 day              Peripheral Intravenous Line  Duration             Peripheral IV 07/26/25 0330 20 G Left Forearm 1 day    Peripheral IV Single Lumen 07/26/25 1204 20 G 1 3/4 in Long PIVC Anterior;Proximal;Right Forearm 1 day    Peripheral IV Single Lumen 07/26/25 1205 18 G 1 3/4 in Long PIVC Anterior;Right Upper Arm 1 day                  Significant Labs:    CBC/Anemia Profile:  Recent Labs   Lab 07/27/25  0045 07/27/25  0326 07/27/25  1547   WBC 28.44* 29.18* 26.46*   HGB 8.0* 8.5* 8.3*   HCT 24.1* 25.7* 25.6*    404 399   MCV 83 82 84   RDW 24.6* 24.6* 25.2*        Chemistries:  Recent Labs   Lab 07/26/25  0350 07/27/25  0326    138   K 3.6 4.0    108   CO2 17* 17*   BUN 14 14   CREATININE 0.6 0.6   CALCIUM 8.0* 7.6*   ALBUMIN 2.0* 2.0*   PROT 6.0 5.7*   BILITOT 17.8* 18.4*   ALKPHOS 331* 250*   ALT 38 35   AST 97* 117*   MG  --  2.0   PHOS  --  2.9       All pertinent labs within the past 24 hours have been reviewed.    Significant Imaging:  I have reviewed all pertinent imaging results/findings within the past 24 hours.

## 2025-07-27 NOTE — ASSESSMENT & PLAN NOTE
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 INR >10 prior to transfer but 2.2 on repeat here prior to any interventions. CTA prior to transfer w/o active bleed. Required 1u pRBCs, given FFP and cryo for elevated INR in setting of active bleed.  - GI consulted   - plan for scope in AM   - CLD now, NPO MN  - correcting coagulopathy with goal INR <2.0, plt >50  - pRBC to maintain Hg >7  - holding AC  - q8 CBC  - f/u repeat lactate  -repeat lactic downtrending

## 2025-07-27 NOTE — HPI
Mrs. Felix is a pleasant 54 yoF with metastatic duodenal adenocarcinoma s/p aborted whipple at [facility] in May for widely metastatic and locally advanced cancer and instead underwent a palliative gastrojejunostomy. She underwent PTC placement earlier this month. She has not received palliative chemotherapy yet. She presented to the hospital following episodes of hematemesis that started Friday morning. She is not on blood thinners, but her INR was initially > 10. She was given 1 unit of pRBC, cryo, FFP, and vitamin K with normalization of her INR. She underwent EGD this morning revealing diffuse oozing from her duodenal mass for which GI sprayed hemospray. She has not had any more episodes of hematemesis today. Hgb stable at 8.3. Surgical oncology consulted for consideration of palliative resection.

## 2025-07-27 NOTE — ASSESSMENT & PLAN NOTE
Recently diagnosed, metastatic, not started on chemotherapy yet.  -IR unlikely to embolize, If she develops more evidence of bleeding recommend repeat CTA (GI bleed protocol) to assess for active source of hemorrhage.   -EGD showed oozing mass, hemostatic gel deployed  -Surgical oncology consulted for palliative resection vs not

## 2025-07-27 NOTE — PROGRESS NOTES
[facility] - Medical ICU  Critical Care Medicine  Progress Note    Patient Name: Aury Felix  MRN: [id number]  Admission Date: 7/26/2025  Hospital Length of Stay: 1 days  Code Status: Full Code  Attending Provider: Jet Sanford*  Primary Care Provider: Jimbo Menard II, MD   Principal Problem: ABLA (acute blood loss anemia)    Subjective:     HPI:  Aury Felix is a 54F with HTN, HLD, DM2 (insulin and orals), newly dxed metastatic duodenal CA and 4 recent hospital stays s/p gastrojejunostomy and IR biliary drain placement, drain still in place, has not started on chemo (has upcoming Heme/Onc clinic appt on 8/7/25,) who presented to the [facility] ED last night with coffee ground hematemesis.   She has associated weight loss (need to quantify), jaundice, and anorexia with poor PO intake.  In ED, tachycardic 116-140, with soft BP low 100s, and found to have elevated Tbili 17.8 over her baseline of around 8, with severe coagulopathy of INR >10, with relatively normal AST and ALT, and a normal platelet count in the 400s.   She's not on any oral anticoagulants, just a baby ASA.  She was given aggressive fluid resuscitation with NS 1.5L bolus then 125cc/hr, PPI IV gtt.  Lactic acidosis of 4, and after IVFs, Lactate still elevated at 3.7.  H/H 8.6/25.7, down slightly from baseline.        CTA bleeding scan with no active bleed found, just in the body of the report a possible thrombus of SMV--  Small nonocclusive filling defect of upper superior mesenteric venous branch (axial series 6, image 70), appears new since prior. Hepatic and splenic veins appear grossly patent.      Discussed case with Sommer Andrew (ED) and Juvenal (MICU).  Worried about an oozing mass, which would need direct visualization.  Mass could have eroded into a vessel, or can get a clot distal to the mass so it becomes a flow problem. Correcting coagulopathy, give Vit K IV (ordered by ED to give prior to transfer) and  "transfusion of cryoglobulin and FFP 4 units - unfortunately, unable to start transfusing at outside ED prior to transfer, because type and screen is sent by  up to [facility].  Checking fibrinogen.  Discussed with IR Dr Earl Mccarty and GI Dr Nini Zavala.   Per Dr Mccarty, agree with correcting INR to see if that helps, then probably scope from GI to assess for a source, IR has has nothing to target as of now -  "see the gastroepiploic artery coursing in close proximity to the doudenal mass, but if embolize that, a portion of her stomach will likely infarct."    [place] [facility] as a Level 1 for potentially life-threatening bleed.     On [place] [facility], patient reports some abdominal pain relieved with morphine. Her BP stable, HR 110s. Labs notable for WBC 28, Hg 7.6 > 6.5, repeat INR 2.2. Fibrinogen normal. Lactic acid 3.7.    Hospital/ICU Course:  07/26/2025 - New duodenal adenocarcinoma with hepatic mets, s/p whipple and biliary drain placement. She present initially to an OSH with coffee ground emesis and melena. Her labs showed that her INR was>10 and her HGb dropped to 6.5. Therapeutic transfer to [facility] with repeat INR normal.    07/27/2025 - Got EGD this morning that showed an oozing mass in duodenum temporized with hemostatic gel. IR has seen her and does not think they will be embolizing her at this time as they fear infarcting her stomach. Surgical oncology consulted and messaged, they will evaluate her to see if palliative surgery is an option. She missed a recent appointment with her oncologist due to this admission. Considering reaching out to heme/onc but unlikely they will start chemotherapy this hospitalization given her bleed and current tumor burden.    Interval History/Significant Events: Patient got EGD This morning. Bleeding duodenal mass, hemostatic gel, patient temporized and no longer bleeding. Reached out to IR and Surg onc to see about biliary drain " replacement/embolization/palliative resection. Unlikely any of these would make much difference in her prognosis. Will follow recs.     Review of Systems   Constitutional:  Negative for appetite change.   HENT:  Negative for congestion.    Respiratory:  Negative for shortness of breath.    Cardiovascular:  Negative for chest pain.   Gastrointestinal:  Positive for abdominal pain, blood in stool, nausea and vomiting.   Genitourinary:  Negative for difficulty urinating.   Skin:  Negative for rash.   Neurological:  Negative for headaches.   Psychiatric/Behavioral:  Negative for confusion.      Objective:     Vital Signs (Most Recent):  Temp: 97.9 °F (36.6 °C) (07/27/25 0748)  Pulse: (!) 111 (07/27/25 1300)  Resp: (!) 24 (07/27/25 1430)  BP: 99/69 (07/27/25 1300)  SpO2: 96 % (07/27/25 1300) Vital Signs (24h Range):  Temp:  [97.5 °F (36.4 °C)-97.9 °F (36.6 °C)] 97.9 °F (36.6 °C)  Pulse:  [104-116] 111  Resp:  [18-46] 24  SpO2:  [95 %-100 %] 96 %  BP: ()/(68-97) 99/69      There is no height or weight on file to calculate BMI.      Intake/Output Summary (Last 24 hours) at 7/27/2025 1656  Last data filed at 7/27/2025 1339  Gross per 24 hour   Intake 1016.17 ml   Output 550 ml   Net 466.17 ml          Physical Exam  Vitals and nursing note reviewed.   Constitutional:       Appearance: She is well-developed. She is ill-appearing and toxic-appearing.   HENT:      Head: Normocephalic.      Mouth/Throat:      Mouth: Mucous membranes are moist.   Eyes:      General: Scleral icterus present.      Pupils: Pupils are equal, round, and reactive to light.   Cardiovascular:      Rate and Rhythm: Normal rate and regular rhythm.      Pulses: Normal pulses.   Pulmonary:      Effort: Pulmonary effort is normal.   Abdominal:      General: Bowel sounds are normal. There is no distension.      Palpations: Abdomen is soft.      Tenderness: There is abdominal tenderness.   Musculoskeletal:         General: No swelling.   Skin:      General: Skin is warm and dry.      Coloration: Skin is jaundiced.   Neurological:      Mental Status: She is alert.            Vents:     Lines/Drains/Airways       Drain  Duration                  Biliary Tube 07/17/25 0914 10 Fr. RUQ 10 days    Female External Urinary Catheter w/ Suction 07/26/25 1700 <1 day              Peripheral Intravenous Line  Duration             Peripheral IV 07/26/25 0330 20 G Left Forearm 1 day    Peripheral IV Single Lumen 07/26/25 1204 20 G 1 3/4 in Long PIVC Anterior;Proximal;Right Forearm 1 day    Peripheral IV Single Lumen 07/26/25 1205 18 G 1 3/4 in Long PIVC Anterior;Right Upper Arm 1 day                  Significant Labs:    CBC/Anemia Profile:  Recent Labs   Lab 07/27/25  0045 07/27/25  0326 07/27/25  1547   WBC 28.44* 29.18* 26.46*   HGB 8.0* 8.5* 8.3*   HCT 24.1* 25.7* 25.6*    404 399   MCV 83 82 84   RDW 24.6* 24.6* 25.2*        Chemistries:  Recent Labs   Lab 07/26/25  0350 07/27/25  0326    138   K 3.6 4.0    108   CO2 17* 17*   BUN 14 14   CREATININE 0.6 0.6   CALCIUM 8.0* 7.6*   ALBUMIN 2.0* 2.0*   PROT 6.0 5.7*   BILITOT 17.8* 18.4*   ALKPHOS 331* 250*   ALT 38 35   AST 97* 117*   MG  --  2.0   PHOS  --  2.9       All pertinent labs within the past 24 hours have been reviewed.    Significant Imaging:  I have reviewed all pertinent imaging results/findings within the past 24 hours.    ABG  Recent Labs   Lab 07/26/25  1106   PH 7.353   PO2 28*   PCO2 35.5   HCO3 19.7*   BE -6*     Assessment/Plan:     Cardiac/Vascular  Essential hypertension  Holding BP meds in setting of active bleed, resume as appropriate.      Hematology  Coagulopathy  INR at OSH with INR >10 x2 but INR 2.2 here prior to intervention. Likely due to liver dysfunction.  Received cryo x1 and FFP x1.  - daily PT-INR  - maintain INR <2 and plt >50 per GI recs      Oncology  * ABLA (acute blood loss anemia)  54F with newly dxed metastatic duodenal CA and 4 recent hospital stays s/p  gastrojejunostomy and IR biliary drain placement presented with coffee ground emesis, abdominal pain, jaundice and weight loss. INR >10 prior to transfer but 2.2 on repeat here prior to any interventions. CTA prior to transfer w/o active bleed. Required 1u pRBCs, given FFP and cryo for elevated INR in setting of active bleed.  - GI consulted   - plan for scope in AM   - CLD now, NPO MN  - correcting coagulopathy with goal INR <2.0, plt >50  - pRBC to maintain Hg >7  - holding AC  - q8 CBC  - f/u repeat lactate  -repeat lactic downtrending    Leukocytosis  Possibly reactive due to cancer and GIB bleed; however, given percutaneous drainage catheter, will cover with abx for now.  - zosyn for intraabdominal coverage  - f/u Bcx    Reactive thrombocytosis  Elevated platelets on admission.   - daily CBC    Duodenal cancer  Recently diagnosed, metastatic, not started on chemotherapy yet.  -IR unlikely to embolize, If she develops more evidence of bleeding recommend repeat CTA (GI bleed protocol) to assess for active source of hemorrhage.   -EGD showed oozing mass, hemostatic gel deployed  -Surgical oncology consulted for palliative resection vs not       Critical Care Daily Checklist:    A: Awake: RASS Goal/Actual Goal:    Actual:     B: Spontaneous Breathing Trial Performed?     C: SAT & SBT Coordinated?  na                      D: Delirium: CAM-ICU     E: Early Mobility Performed? Yes   F: Feeding Goal #1    Goal status #1:    Goal #2:    Goal status #2:     Current Diet Order   Procedures    Diet NPO      AS: Analgesia/Sedation PRN opiates   T: Thromboembolic Prophylaxis bleed   H: HOB > 300 Yes   U: Stress Ulcer Prophylaxis (if needed) pantoprazole   G: Glucose Control Well controlled at this time   B: Bowel Function     I: Indwelling Catheter (Lines & Lo) Necessity Biliary drain,, 3x piv    D: De-escalation of Antimicrobials/Pharmacotherapies Stay on zosyn for intrabdominal coverage    Plan for the day/ETD sd     Code Status:  Family/Goals of Care: Full Code  Need to further discuss patients poor prognosis       Critical secondary to Patient has a condition that poses threat to life and bodily function: Acute GI Bleed and metastatic cancer with cirrhosis       Critical care was time spent personally by me on the following activities: development of treatment plan with patient or surrogate and bedside caregivers, discussions with consultants, evaluation of patient's response to treatment, examination of patient, ordering and performing treatments and interventions, ordering and review of laboratory studies, ordering and review of radiographic studies, pulse oximetry, re-evaluation of patient's condition. This critical care time did not overlap with that of any other provider or involve time for any procedures.     Zakia Barrientos MD  Critical Care Medicine  [facility]

## 2025-07-27 NOTE — ASSESSMENT & PLAN NOTE
54 yoF with metastatic and locally advanced duodenal cancer admitted for hematemesis secondary to diffuse oozing from the mass, s/p EGD with hemospray. Coagulopathy, likely secondary to liver dysfunction, now corrected. She is currently stable.    - No surgical intervention at this time  - Palliative resection would be a last resort, and potentially not feasible given her locally advanced cancer   - Recommend continuing to monitor for re bleeding  - If signs of re bleeding, recommend repeat CTA and IR embolization  - Can also consider reaching out to radiation oncology for inpatient palliative radiation, which can help with bleeding from the tumor  - Recommend palliative care consult for goals of care discussions  - Please call with any questions or concerns

## 2025-07-27 NOTE — PLAN OF CARE
MICU DAILY GOALS     Family/Goals of care/Code Status   Code Status: Full Code    24H Vital Sign Range  Temp:  [97.5 °F (36.4 °C)-98.1 °F (36.7 °C)]   Pulse:  [105-132]   Resp:  [16-36]   BP: ()/(56-97)   SpO2:  [96 %-100 %]      Shift Events (include procedures and significant events)   No acute events throughout shift    AWAKE RASS: Goal -    Actual - RASS (Adam Agitation-Sedation Scale): alert and calm    Restraint necessity: Not necessary   BREATHE SBT: Not intubated    Coordinate A & B, analgesics/sedatives Pain: managed   SAT: Not intubated   Delirium CAM-ICU:     Early(intubated/ Progressive (non-intubated) Mobility MOVE Screen (INTUBATED ONLY): Not intubated    Activity: Activity Management: Rolling - L1   Feeding/Nutrition Diet order: Diet/Nutrition Received: NPO,     Thrombus DVT prophylaxis:     HOB Elevation Head of Bed (HOB) Positioning: HOB elevated   Ulcer Prophylaxis GI: yes   Glucose control managed     Skin Skin assessment:     Sacrum intact/not altered? Yes  Heels intact/not altered? Yes  Surgical wound? No    CHECK ONE!   (no altered skin or altered skin) and sub boxes:  [x] No Altered Skin Integrity Present    [x]Prevention Measures Documented    [] Altered Skin Integrity Present or Discovered   [] LDA already present in EPIC, daily doc completed              [] LDA added if not already in EPIC (describe/stage wound).               [] Wound Image Taken (required on admit,                   transfer/discharge and every Tuesday)    Wound Care Consulted? No   Bowel Function no issues    Indwelling Catheter Necessity            De-escalation Antibiotics No        VS and assessment per flow sheet, patient progressing towards goals as tolerated, plan of care reviewed with patient and family, all concerns addressed, will continue to monitor.;

## 2025-07-27 NOTE — ANESTHESIA PROCEDURE NOTES
Intubation    Date/Time: 7/27/2025 9:54 AM    Performed by: Birdie Greer CRNA  Authorized by: Carolina Disla MD    Intubation:     Induction:  Intravenous    Intubated:  Postinduction    Mask Ventilation:  Not attempted    Attempts:  1    Attempted By:  GALA    Blade:  Gu 3    Laryngeal View Grade: Grade I - full view of cords      Difficult Airway Encountered?: No      Complications:  None    Airway Device:  Oral endotracheal tube    Airway Device Size:  7.0    Style/Cuff Inflation:  Cuffed (inflated to minimal occlusive pressure)    Inflation Amount (mL):  6    Tube secured:  22    Secured at:  The lips    Placement Verified By:  Capnometry    Complicating Factors:  None    Findings Post-Intubation:  BS equal bilateral and atraumatic/condition of teeth unchanged

## 2025-07-27 NOTE — RESIDENT HANDOFF
Handoff     Primary Team: Networked reference to record PCT  Room Number: 7080/7080 A     Patient Name: Aury Felix MRN: [id number]     Date of Birth: [birth year] Allergies: Patient has no known allergies.     Age: 54 y.o. Admit Date: 7/26/2025     Sex: female  BMI: There is no height or weight on file to calculate BMI.     Code Status: Full Code        Illness Level (current clinical status): Watcher - Yes -     Reason for Admission: ABLA (acute blood loss anemia)    Brief HPI (pertinent PMH and diagnosis or differential diagnosis): Aury Felix is a 54F with HTN, HLD, DM2 (insulin and orals), newly dxed metastatic duodenal CA and 4 recent hospital stays s/p gastrojejunostomy and IR biliary drain placement, drain still in place, has not started on chemo (has upcoming Heme/Onc clinic appt on 8/7/25,) who presented to the [facility] ED last night with coffee ground hematemesis.   She has associated weight loss (need to quantify), jaundice, and anorexia with poor PO intake.  In ED, tachycardic 116-140, with soft BP low 100s, and found to have elevated Tbili 17.8 over her baseline of around 8, with severe coagulopathy of INR >10, with relatively normal AST and ALT, and a normal platelet count in the 400s.   She's not on any oral anticoagulants, just a baby ASA.  She was given aggressive fluid resuscitation with NS 1.5L bolus then 125cc/hr, PPI IV gtt.  Lactic acidosis of 4, and after IVFs, Lactate still elevated at 3.7.  H/H 8.6/25.7, down slightly from baseline.        CTA bleeding scan with no active bleed found, just in the body of the report a possible thrombus of SMV--  Small nonocclusive filling defect of upper superior mesenteric venous branch (axial series 6, image 70), appears new since prior. Hepatic and splenic veins appear grossly patent.      Discussed case with Sommer Andrew (ED) and Juvenal (MICU).  Worried about an oozing mass, which would need direct visualization.  Mass could have eroded into a  "vessel, or can get a clot distal to the mass so it becomes a flow problem. Correcting coagulopathy, give Vit K IV (ordered by ED to give prior to transfer) and transfusion of cryoglobulin and FFP 4 units - unfortunately, unable to start transfusing at outside ED prior to transfer, because type and screen is sent by  up to [facility].  Checking fibrinogen.  Discussed with IR Dr Earl Mccarty and GI Dr Nini Zavala.   Per Dr Mccarty, agree with correcting INR to see if that helps, then probably scope from GI to assess for a source, IR has has nothing to target as of now -  "see the gastroepiploic artery coursing in close proximity to the doudenal mass, but if embolize that, a portion of her stomach will likely infarct."    Transfer to [facility]U as a Level 1 for potentially life-threatening bleed.      On arrival to [facility], patient reports some abdominal pain relieved with morphine. Her BP stable, HR 110s. Labs notable for WBC 28, Hg 7.6 > 6.5, repeat INR 2.2. Fibrinogen normal. Lactic acid 3.7.    Procedure Date:   EGD 7/27/25  Gastrojejunostomy 5/27/25  Biliary Drain placed by Dr. Ramírez 7/17/25      Hospital Course (updated, brief assessment by system or problem, significant events): 07/26/2025 - New duodenal adenocarcinoma with hepatic mets, s/p gastrojejunostomy and biliary drain placement. She present initially to an OSH with coffee ground emesis and melena. Her labs showed that her INR was>10 and her HGb dropped to 6.5. Therapeutic transfer to [facility] with repeat INR normal.    07/27/2025 - Got EGD this morning that showed an oozing mass in duodenum temporized with hemostatic gel. IR has seen her and does not think they will be embolizing her at this time as they fear infarcting her stomach. Surgical oncology consulted and messaged, they will evaluate her to see if palliative surgery is an option. She missed a recent appointment with her oncologist due to this admission. Considering reaching out to " heme/onc but unlikely they will start chemotherapy this hospitalization given her bleed and current tumor burden.    Tasks (specific, using if-then statements):   -Most of her current plan dictated by IR, Surg Onc, GI,   -If she rebleeds and crumps get CTA gi protocol and reach out to IR  -She needs a long GOC discussion, possibly palliative with a conversation about hospice  -Surgical oncology will eval her for palliative resection but she is unlikely to be a good surgical candidate  -IR will need to pull back/replace her drain given her uptrending bilirubin and ascitic/biliary drainage from around tube   -Her white count is likely reactive but we are still covering her for an intraabdominal infx with zosyn      Contingency Plan (special circumstances anticipated and plan): CTA with GI bleed protocol, IR will possibly embolize if they have to even if they infarct stomach in setting of massive bleed    Estimated Discharge Date: TBD    Discharge Disposition: Admitted as an Inpatient    Mentored By: Dr. Sanford

## 2025-07-27 NOTE — HOSPITAL COURSE
07/26/2025 - New duodenal adenocarcinoma with hepatic mets, s/p gastrojejunostomy and biliary drain placement. She present initially to an OSH with coffee ground emesis and melena. Her labs showed that her INR was>10 and her HGb dropped to 6.5. Therapeutic transfer to [facility] with repeat INR normal.    07/27/2025 - Got EGD this morning that showed an oozing mass in duodenum temporized with hemostatic gel. IR has seen her and does not think they will be embolizing her at this time as they fear infarcting her stomach. Surgical oncology consulted and messaged, they will evaluate her to see if palliative surgery is an option. She missed a recent appointment with her oncologist due to this admission. Considering reaching out to heme/onc but unlikely they will start chemotherapy this hospitalization given her bleed and current tumor burden.

## 2025-07-27 NOTE — CONSULTS
[facility] - Medical ICU  Surgical Oncology  Consult Note    Patient Name: Aury Felix  MRN: [id number]  Code Status: Full Code  Admission Date: 2025  Hospital Length of Stay: 1 days  Attending Physician: Jet Sanford*  Primary Care Provider: Jimbo Menard II, MD    Patient information was obtained from patient, spouse/SO, past medical records, and ER records.     Inpatient consult to Surgical Oncology  Consult performed by: Manny Lerma MD  Consult ordered by: Zakia Barrientos MD        Subjective:     Principal Problem: ABLA (acute blood loss anemia)    History of Present Illness: Mrs. Felix is a pleasant 54 yoF with metastatic duodenal adenocarcinoma s/p aborted whipple at [place] in May for widely metastatic and locally advanced cancer and instead underwent a palliative gastrojejunostomy. She underwent PTC placement earlier this month. She has not received palliative chemotherapy yet. She presented to the hospital following episodes of hematemesis that started Friday morning. She is not on blood thinners, but her INR was initially > 10. She was given 1 unit of pRBC, cryo, FFP, and vitamin K with normalization of her INR. She underwent EGD this morning revealing diffuse oozing from her duodenal mass for which GI sprayed hemospray. She has not had any more episodes of hematemesis today. Hgb stable at 8.3. Surgical oncology consulted for consideration of palliative resection.    No current facility-administered medications on file prior to encounter.     Current Outpatient Medications on File Prior to Encounter   Medication Sig    aspirin (ECOTRIN) 81 MG EC tablet Take 1 tablet (81 mg total) by mouth once daily.    [] ciprofloxacin HCl (CIPRO) 750 MG tablet Take 1 tablet (750 mg total) by mouth every 12 (twelve) hours. for 7 days    empagliflozin (JARDIANCE) 25 mg tablet Take 1 tablet (25 mg total) by mouth once daily.    ferrous sulfate 325 (65 FE) MG EC tablet Take 1  tablet (325 mg total) by mouth every other day.    insulin glargine U-100, Lantus, 100 unit/mL (3 mL) SubQ InPn pen Inject 20 Units into the skin every evening.    lisinopriL (PRINIVIL,ZESTRIL) 20 MG tablet Take 1 tablet (20 mg total) by mouth once daily.    [] metroNIDAZOLE (FLAGYL) 500 MG tablet Take 1 tablet (500 mg total) by mouth every 8 (eight) hours. for 7 days    oxyCODONE (ROXICODONE) 5 MG immediate release tablet Take 1 tablet (5 mg total) by mouth every 8 (eight) hours as needed for Pain.    pantoprazole (PROTONIX) 40 MG tablet Take 1 tablet (40 mg total) by mouth once daily.    polyethylene glycol (GLYCOLAX) 17 gram PwPk Take 17 g by mouth once daily.    rosuvastatin (CRESTOR) 40 MG Tab Take 1 tablet (40 mg total) by mouth once daily.    simethicone (MYLICON) 125 MG chewable tablet Take 1 tablet (125 mg total) by mouth every 6 (six) hours as needed for Flatulence.       Review of patient's allergies indicates:  No Known Allergies    Past Medical History:   Diagnosis Date    Cancer     Diabetes mellitus     Hyperlipidemia     Hypertension      Past Surgical History:   Procedure Laterality Date    ESOPHAGOGASTRODUODENOSCOPY N/A 4/10/2025    Procedure: EGD (ESOPHAGOGASTRODUODENOSCOPY);  Surgeon: Manny Calles MD;  Location: [facility];  Service: Endoscopy;  Laterality: N/A;    ESOPHAGOGASTRODUODENOSCOPY N/A 2025    Procedure: EGD (ESOPHAGOGASTRODUODENOSCOPY);  Surgeon: Manny Calles MD;  Location: [facility] ENDO;  Service: Endoscopy;  Laterality: N/A;    GASTROJEJUNOSTOMY N/A 2025    Procedure: GASTROJEJUNOSTOMY;  Surgeon: William Reyes MD;  Location: [facility] OR;  Service: General;  Laterality: N/A;    HYSTERECTOMY      LAPAROTOMY, EXPLORATORY Bilateral 2025    Procedure: LAPAROTOMY, EXPLORATORY;  Surgeon: William Reyes MD;  Location: [facility] OR;  Service: General;  Laterality: Bilateral;  will need BK US Guide probe BK US T probe [id number] AM     LIVER BIOPSY   5/27/2025    Procedure: BIOPSY, LIVER;  Surgeon: William Reyes MD;  Location: [facility] OR;  Service: General;;     Family History    None       Tobacco Use    Smoking status: Never    Smokeless tobacco: Never   Substance and Sexual Activity    Alcohol use: Yes    Drug use: Never    Sexual activity: Yes     Review of Systems   Constitutional:  Negative for chills and fever.   Respiratory:  Negative for cough and shortness of breath.    Cardiovascular:  Negative for chest pain and palpitations.   Gastrointestinal:  Negative for abdominal pain.   Genitourinary:  Negative for dysuria.   Neurological:  Negative for dizziness and light-headedness.     Objective:     Vital Signs (Most Recent):  Temp: 97.9 °F (36.6 °C) (07/27/25 0748)  Pulse: (!) 111 (07/27/25 1300)  Resp: (!) 24 (07/27/25 1430)  BP: 99/69 (07/27/25 1300)  SpO2: 96 % (07/27/25 1300) Vital Signs (24h Range):  Temp:  [97.5 °F (36.4 °C)-97.9 °F (36.6 °C)] 97.9 °F (36.6 °C)  Pulse:  [104-115] 111  Resp:  [18-46] 24  SpO2:  [95 %-100 %] 96 %  BP: ()/(68-89) 99/69        There is no height or weight on file to calculate BMI.     Physical Exam  Vitals reviewed.   Constitutional:       Appearance: Normal appearance.   Cardiovascular:      Rate and Rhythm: Normal rate.      Pulses: Normal pulses.   Pulmonary:      Effort: Pulmonary effort is normal.   Abdominal:      General: There is no distension.      Palpations: Abdomen is soft. There is no mass.      Tenderness: There is no abdominal tenderness. There is no guarding.      Comments: Benign abdominal exam, well healed laparotomy incision, PTC in place   Skin:     General: Skin is warm and dry.      Coloration: Skin is jaundiced.   Neurological:      General: No focal deficit present.      Mental Status: She is alert and oriented to person, place, and time.            I have reviewed all pertinent lab results within the past 24 hours.  CBC:   Recent Labs   Lab 07/27/25  1547   WBC 26.46*   RBC 3.06*   HGB  8.3*   HCT 25.6*      MCV 84   MCH 27.1   MCHC 32.4     CMP:   Recent Labs   Lab 07/27/25  0326   *   CALCIUM 7.6*   ALBUMIN 2.0*   PROT 5.7*      K 4.0   CO2 17*      BUN 14   CREATININE 0.6   ALKPHOS 250*   ALT 35   *   BILITOT 18.4*     Coagulation:   Recent Labs   Lab 07/26/25  1204 07/26/25  2030 07/27/25  0326   INR 2.2*   < > 1.2   APTT 35.3*  --   --     < > = values in this interval not displayed.       Significant Diagnostics:  I have reviewed all pertinent imaging results/findings within the past 24 hours.    Assessment/Plan:     Duodenal cancer  54 yoF with metastatic and locally advanced duodenal cancer admitted for hematemesis secondary to diffuse oozing from the mass, s/p EGD with hemospray. Coagulopathy, likely secondary to liver dysfunction, now corrected. She is currently stable.    - No surgical intervention at this time  - Palliative resection would be a last resort, and potentially not feasible given her locally advanced cancer   - Recommend continuing to monitor for re bleeding  - If signs of re bleeding, recommend repeat CTA and IR embolization  - Can also consider reaching out to radiation oncology for inpatient palliative radiation, which can help with bleeding from the tumor  - Recommend palliative care consult for goals of care discussions  - Please call with any questions or concerns      VTE Risk Mitigation (From admission, onward)           Ordered     IP VTE HIGH RISK PATIENT  Once         07/26/25 1057     Place sequential compression device  Until discontinued         07/26/25 1057     Reason for No Pharmacological VTE Prophylaxis  Once        Question:  Reasons:  Answer:  Active Bleeding    07/26/25 1057                    Thank you for your consult. I will follow-up with patient. Please contact us if you have any additional questions.    Manny Lerma MD  General Surgery  [facility] - Medical ICU

## 2025-07-27 NOTE — ANESTHESIA POSTPROCEDURE EVALUATION
Anesthesia Post Evaluation    Patient: Aury Felix    Procedure(s) Performed: Procedure(s) (LRB):  EGD (ESOPHAGOGASTRODUODENOSCOPY) (N/A)    Final Anesthesia Type: general      Patient location during evaluation: ICU  Patient participation: No - Unable to Participate, Sedation  Level of consciousness: sedated  Post-procedure vital signs: reviewed and stable  Pain management: adequate  Airway patency: patent    PONV status at discharge: No PONV  Anesthetic complications: no      Cardiovascular status: hemodynamically stable  Respiratory status: spontaneous ventilation and face mask  Hydration status: euvolemic  Follow-up not needed.          Vitals Value Taken Time   BP 97/70 07/27/25 10:47   Temp 36.6 °C (97.9 °F) 07/27/25 07:48   Pulse 108 07/27/25 10:58   Resp 21 07/27/25 10:58   SpO2 94 % 07/27/25 10:58   Vitals shown include unfiled device data.      No case tracking events are documented in the log.      Pain/Vita Score: Pain Rating Prior to Med Admin: 7 (7/27/2025  8:06 AM)  Pain Rating Post Med Admin: 0 (7/26/2025 10:14 PM)

## 2025-07-27 NOTE — SUBJECTIVE & OBJECTIVE
No current facility-administered medications on file prior to encounter.     Current Outpatient Medications on File Prior to Encounter   Medication Sig    aspirin (ECOTRIN) 81 MG EC tablet Take 1 tablet (81 mg total) by mouth once daily.    [] ciprofloxacin HCl (CIPRO) 750 MG tablet Take 1 tablet (750 mg total) by mouth every 12 (twelve) hours. for 7 days    empagliflozin (JARDIANCE) 25 mg tablet Take 1 tablet (25 mg total) by mouth once daily.    ferrous sulfate 325 (65 FE) MG EC tablet Take 1 tablet (325 mg total) by mouth every other day.    insulin glargine U-100, Lantus, 100 unit/mL (3 mL) SubQ InPn pen Inject 20 Units into the skin every evening.    lisinopriL (PRINIVIL,ZESTRIL) 20 MG tablet Take 1 tablet (20 mg total) by mouth once daily.    [] metroNIDAZOLE (FLAGYL) 500 MG tablet Take 1 tablet (500 mg total) by mouth every 8 (eight) hours. for 7 days    oxyCODONE (ROXICODONE) 5 MG immediate release tablet Take 1 tablet (5 mg total) by mouth every 8 (eight) hours as needed for Pain.    pantoprazole (PROTONIX) 40 MG tablet Take 1 tablet (40 mg total) by mouth once daily.    polyethylene glycol (GLYCOLAX) 17 gram PwPk Take 17 g by mouth once daily.    rosuvastatin (CRESTOR) 40 MG Tab Take 1 tablet (40 mg total) by mouth once daily.    simethicone (MYLICON) 125 MG chewable tablet Take 1 tablet (125 mg total) by mouth every 6 (six) hours as needed for Flatulence.       Review of patient's allergies indicates:  No Known Allergies    Past Medical History:   Diagnosis Date    Cancer     Diabetes mellitus     Hyperlipidemia     Hypertension      Past Surgical History:   Procedure Laterality Date    ESOPHAGOGASTRODUODENOSCOPY N/A 4/10/2025    Procedure: EGD (ESOPHAGOGASTRODUODENOSCOPY);  Surgeon: Manny Calles MD;  Location: [facility];  Service: Endoscopy;  Laterality: N/A;    ESOPHAGOGASTRODUODENOSCOPY N/A 2025    Procedure: EGD (ESOPHAGOGASTRODUODENOSCOPY);  Surgeon: Marli  Manny AMAYA MD;  Location: [facility] ENDO;  Service: Endoscopy;  Laterality: N/A;    GASTROJEJUNOSTOMY N/A 5/27/2025    Procedure: GASTROJEJUNOSTOMY;  Surgeon: William Reyes MD;  Location: [facility] OR;  Service: General;  Laterality: N/A;    HYSTERECTOMY      LAPAROTOMY, EXPLORATORY Bilateral 5/27/2025    Procedure: LAPAROTOMY, EXPLORATORY;  Surgeon: William Reyes MD;  Location: [facility] OR;  Service: General;  Laterality: Bilateral;  will need BK US Guide probe BK US T probe [id number] AM 5/23    LIVER BIOPSY  5/27/2025    Procedure: BIOPSY, LIVER;  Surgeon: William Reyes MD;  Location: [facility] OR;  Service: General;;     Family History    None       Tobacco Use    Smoking status: Never    Smokeless tobacco: Never   Substance and Sexual Activity    Alcohol use: Yes    Drug use: Never    Sexual activity: Yes     Review of Systems   Constitutional:  Negative for chills and fever.   Respiratory:  Negative for cough and shortness of breath.    Cardiovascular:  Negative for chest pain and palpitations.   Gastrointestinal:  Negative for abdominal pain.   Genitourinary:  Negative for dysuria.   Neurological:  Negative for dizziness and light-headedness.     Objective:     Vital Signs (Most Recent):  Temp: 97.9 °F (36.6 °C) (07/27/25 0748)  Pulse: (!) 111 (07/27/25 1300)  Resp: (!) 24 (07/27/25 1430)  BP: 99/69 (07/27/25 1300)  SpO2: 96 % (07/27/25 1300) Vital Signs (24h Range):  Temp:  [97.5 °F (36.4 °C)-97.9 °F (36.6 °C)] 97.9 °F (36.6 °C)  Pulse:  [104-115] 111  Resp:  [18-46] 24  SpO2:  [95 %-100 %] 96 %  BP: ()/(68-89) 99/69        There is no height or weight on file to calculate BMI.     Physical Exam  Vitals reviewed.   Constitutional:       Appearance: Normal appearance.   Cardiovascular:      Rate and Rhythm: Normal rate.      Pulses: Normal pulses.   Pulmonary:      Effort: Pulmonary effort is normal.   Abdominal:      General: There is no distension.      Palpations: Abdomen is soft. There is no mass.       Tenderness: There is no abdominal tenderness. There is no guarding.      Comments: Benign abdominal exam, well healed laparotomy incision, PTC in place   Skin:     General: Skin is warm and dry.      Coloration: Skin is jaundiced.   Neurological:      General: No focal deficit present.      Mental Status: She is alert and oriented to person, place, and time.            I have reviewed all pertinent lab results within the past 24 hours.  CBC:   Recent Labs   Lab 07/27/25  1547   WBC 26.46*   RBC 3.06*   HGB 8.3*   HCT 25.6*      MCV 84   MCH 27.1   MCHC 32.4     CMP:   Recent Labs   Lab 07/27/25  0326   *   CALCIUM 7.6*   ALBUMIN 2.0*   PROT 5.7*      K 4.0   CO2 17*      BUN 14   CREATININE 0.6   ALKPHOS 250*   ALT 35   *   BILITOT 18.4*     Coagulation:   Recent Labs   Lab 07/26/25  1204 07/26/25  2030 07/27/25  0326   INR 2.2*   < > 1.2   APTT 35.3*  --   --     < > = values in this interval not displayed.       Significant Diagnostics:  I have reviewed all pertinent imaging results/findings within the past 24 hours.

## 2025-07-27 NOTE — EICU
Virtual ICU Quality Rounds    Admit Date: 7/26/2025  Hospital Day: 1    ICU Day: 1d    24H Vital Sign Range:  Temp:  [97.5 °F (36.4 °C)-97.9 °F (36.6 °C)]   Pulse:  [104-123]   Resp:  [18-36]   BP: ()/(56-97)   SpO2:  [95 %-100 %]     VICU Surveillance Screening

## 2025-07-27 NOTE — PLAN OF CARE
MICU DAILY GOALS     Family/Goals of care/Code Status   Code Status: Full Code    24H Vital Sign Range  Temp:  [97.5 °F (36.4 °C)-97.9 °F (36.6 °C)]   Pulse:  [104-115]   Resp:  [18-46]   BP: ()/(68-89)   SpO2:  [95 %-100 %]      Shift Events (include procedures and significant events)   EGD, Bili drain repositioned and resutured by provider    AWAKE RASS: Goal -    Actual - RASS (Adam Agitation-Sedation Scale): alert and calm    Restraint necessity: Not necessary   BREATHE SBT: Not intubated    Coordinate A & B, analgesics/sedatives Pain: managed   SAT: Not intubated   Delirium CAM-ICU:     Early(intubated/ Progressive (non-intubated) Mobility MOVE Screen (INTUBATED ONLY): Not intubated    Activity: Activity Management: Rolling - L1   Feeding/Nutrition Diet order: Diet/Nutrition Received: NPO,     Thrombus DVT prophylaxis:     HOB Elevation Head of Bed (HOB) Positioning: HOB at 30 degrees   Ulcer Prophylaxis GI: yes   Glucose control managed     Skin Skin assessment:     Sacrum intact/not altered? Yes  Heels intact/not altered? Yes  Surgical wound? No    CHECK ONE!   (no altered skin or altered skin) and sub boxes:  [] No Altered Skin Integrity Present    []Prevention Measures Documented    [x] Altered Skin Integrity Present or Discovered   [x] LDA already present in EPIC, daily doc completed              [] LDA added if not already in EPIC (describe/stage wound).               [] Wound Image Taken (required on admit,                   transfer/discharge and every Tuesday)    Wound Care Consulted? Yes   Bowel Function no issues    Indwelling Catheter Necessity            De-escalation Antibiotics No        VS and assessment per flow sheet, patient progressing towards goals as tolerated, plan of care reviewed with [unfilled] and family, all concerns addressed, will continue to monitor.

## 2025-07-28 LAB
ABSOLUTE EOSINOPHIL (OHS): 0.28 K/UL
ABSOLUTE MONOCYTE (OHS): 2.21 K/UL (ref 0.3–1)
ABSOLUTE NEUTROPHIL COUNT (OHS): 23.68 K/UL (ref 1.8–7.7)
ALBUMIN SERPL BCP-MCNC: 1.8 G/DL (ref 3.5–5.2)
ALP SERPL-CCNC: 236 UNIT/L (ref 40–150)
ALT SERPL W/O P-5'-P-CCNC: 33 UNIT/L (ref 0–55)
ANION GAP (OHS): 11 MMOL/L (ref 8–16)
AST SERPL-CCNC: 125 UNIT/L (ref 0–50)
BASOPHILS # BLD AUTO: 0.1 K/UL
BASOPHILS NFR BLD AUTO: 0.3 %
BILIRUB SERPL-MCNC: 16.8 MG/DL (ref 0.1–1)
BUN SERPL-MCNC: 14 MG/DL (ref 6–20)
CALCIUM SERPL-MCNC: 7.7 MG/DL (ref 8.7–10.5)
CHLORIDE SERPL-SCNC: 106 MMOL/L (ref 95–110)
CO2 SERPL-SCNC: 17 MMOL/L (ref 23–29)
CREAT SERPL-MCNC: 0.5 MG/DL (ref 0.5–1.4)
ERYTHROCYTE [DISTWIDTH] IN BLOOD BY AUTOMATED COUNT: 26.2 % (ref 11.5–14.5)
GFR SERPLBLD CREATININE-BSD FMLA CKD-EPI: >60 ML/MIN/1.73/M2
GLUCOSE SERPL-MCNC: 145 MG/DL (ref 70–110)
HCT VFR BLD AUTO: 25.2 % (ref 37–48.5)
HGB BLD-MCNC: 8.2 GM/DL (ref 12–16)
IMM GRANULOCYTES # BLD AUTO: 0.94 K/UL (ref 0–0.04)
IMM GRANULOCYTES NFR BLD AUTO: 3.2 % (ref 0–0.5)
INR PPP: 1.2 (ref 0.8–1.2)
LYMPHOCYTES # BLD AUTO: 2.38 K/UL (ref 1–4.8)
MAGNESIUM SERPL-MCNC: 2 MG/DL (ref 1.6–2.6)
MCH RBC QN AUTO: 27.6 PG (ref 27–31)
MCHC RBC AUTO-ENTMCNC: 32.5 G/DL (ref 32–36)
MCV RBC AUTO: 85 FL (ref 82–98)
NUCLEATED RBC (/100WBC) (OHS): 0 /100 WBC
OHS QRS DURATION: 80 MS
OHS QTC CALCULATION: 492 MS
PHOSPHATE SERPL-MCNC: 2.3 MG/DL (ref 2.7–4.5)
PLATELET # BLD AUTO: 477 K/UL (ref 150–450)
PMV BLD AUTO: 10 FL (ref 9.2–12.9)
POTASSIUM SERPL-SCNC: 3.9 MMOL/L (ref 3.5–5.1)
PROT SERPL-MCNC: 5.6 GM/DL (ref 6–8.4)
PROTHROMBIN TIME: 13 SECONDS (ref 9–12.5)
RBC # BLD AUTO: 2.97 M/UL (ref 4–5.4)
RELATIVE EOSINOPHIL (OHS): 0.9 %
RELATIVE LYMPHOCYTE (OHS): 8 % (ref 18–48)
RELATIVE MONOCYTE (OHS): 7.5 % (ref 4–15)
RELATIVE NEUTROPHIL (OHS): 80.1 % (ref 38–73)
SODIUM SERPL-SCNC: 134 MMOL/L (ref 136–145)
WBC # BLD AUTO: 29.59 K/UL (ref 3.9–12.7)

## 2025-07-28 PROCEDURE — 85025 COMPLETE CBC W/AUTO DIFF WBC: CPT

## 2025-07-28 PROCEDURE — 25000003 PHARM REV CODE 250

## 2025-07-28 PROCEDURE — 20600001 HC STEP DOWN PRIVATE ROOM

## 2025-07-28 PROCEDURE — 63600175 PHARM REV CODE 636 W HCPCS

## 2025-07-28 PROCEDURE — 80053 COMPREHEN METABOLIC PANEL: CPT | Performed by: STUDENT IN AN ORGANIZED HEALTH CARE EDUCATION/TRAINING PROGRAM

## 2025-07-28 PROCEDURE — 94761 N-INVAS EAR/PLS OXIMETRY MLT: CPT

## 2025-07-28 PROCEDURE — 25000003 PHARM REV CODE 250: Performed by: INTERNAL MEDICINE

## 2025-07-28 PROCEDURE — 84100 ASSAY OF PHOSPHORUS: CPT | Performed by: STUDENT IN AN ORGANIZED HEALTH CARE EDUCATION/TRAINING PROGRAM

## 2025-07-28 PROCEDURE — 99223 1ST HOSP IP/OBS HIGH 75: CPT | Mod: ,,, | Performed by: INTERNAL MEDICINE

## 2025-07-28 PROCEDURE — 36415 COLL VENOUS BLD VENIPUNCTURE: CPT

## 2025-07-28 PROCEDURE — 83735 ASSAY OF MAGNESIUM: CPT | Performed by: STUDENT IN AN ORGANIZED HEALTH CARE EDUCATION/TRAINING PROGRAM

## 2025-07-28 PROCEDURE — 85610 PROTHROMBIN TIME: CPT

## 2025-07-28 RX ORDER — HYDROMORPHONE HYDROCHLORIDE 1 MG/ML
1 INJECTION, SOLUTION INTRAMUSCULAR; INTRAVENOUS; SUBCUTANEOUS EVERY 4 HOURS PRN
Status: DISCONTINUED | OUTPATIENT
Start: 2025-07-28 | End: 2025-07-30 | Stop reason: HOSPADM

## 2025-07-28 RX ADMIN — HYDROMORPHONE HYDROCHLORIDE 0.5 MG: 1 INJECTION, SOLUTION INTRAMUSCULAR; INTRAVENOUS; SUBCUTANEOUS at 01:07

## 2025-07-28 RX ADMIN — PANTOPRAZOLE SODIUM 40 MG: 40 INJECTION, POWDER, FOR SOLUTION INTRAVENOUS at 12:07

## 2025-07-28 RX ADMIN — DIBASIC SODIUM PHOSPHATE, MONOBASIC POTASSIUM PHOSPHATE AND MONOBASIC SODIUM PHOSPHATE 2 TABLET: 852; 155; 130 TABLET ORAL at 02:07

## 2025-07-28 RX ADMIN — HYDROMORPHONE HYDROCHLORIDE 1 MG: 1 INJECTION, SOLUTION INTRAMUSCULAR; INTRAVENOUS; SUBCUTANEOUS at 04:07

## 2025-07-28 RX ADMIN — HYDROMORPHONE HYDROCHLORIDE 0.5 MG: 1 INJECTION, SOLUTION INTRAMUSCULAR; INTRAVENOUS; SUBCUTANEOUS at 07:07

## 2025-07-28 RX ADMIN — HYDROMORPHONE HYDROCHLORIDE 1 MG: 1 INJECTION, SOLUTION INTRAMUSCULAR; INTRAVENOUS; SUBCUTANEOUS at 11:07

## 2025-07-28 RX ADMIN — PANTOPRAZOLE SODIUM 40 MG: 40 INJECTION, POWDER, FOR SOLUTION INTRAVENOUS at 08:07

## 2025-07-28 RX ADMIN — ONDANSETRON 4 MG: 2 INJECTION INTRAMUSCULAR; INTRAVENOUS at 02:07

## 2025-07-28 RX ADMIN — PIPERACILLIN SODIUM AND TAZOBACTAM SODIUM 4.5 G: 4; .5 INJECTION, POWDER, FOR SOLUTION INTRAVENOUS at 10:07

## 2025-07-28 RX ADMIN — MUPIROCIN: 20 OINTMENT TOPICAL at 08:07

## 2025-07-28 RX ADMIN — PIPERACILLIN SODIUM AND TAZOBACTAM SODIUM 4.5 G: 4; .5 INJECTION, POWDER, FOR SOLUTION INTRAVENOUS at 08:07

## 2025-07-28 RX ADMIN — MUPIROCIN: 20 OINTMENT TOPICAL at 12:07

## 2025-07-28 RX ADMIN — HYDROMORPHONE HYDROCHLORIDE 0.5 MG: 1 INJECTION, SOLUTION INTRAMUSCULAR; INTRAVENOUS; SUBCUTANEOUS at 02:07

## 2025-07-28 RX ADMIN — PIPERACILLIN SODIUM AND TAZOBACTAM SODIUM 4.5 G: 4; .5 INJECTION, POWDER, FOR SOLUTION INTRAVENOUS at 02:07

## 2025-07-28 NOTE — CLINICAL REVIEW
Sepsis Predictive Model Notification  Virtual ICU      Admit Date: 2025  LOS: 2  Code Status: Full Code   : 1970  Age: 54 y.o.  Sex: female  Bed: 8097/8097 A:   MRN: [id number]  Attending Physician: Carol Henderson MD  Primary Service: [facility] MED U  Date of Alert: 2025  Time reviewed: 1433     Sepsis Criteria     Possible source of sepsis: Intra-abdominal infection    Sepsis positive criteria met: Heart rate >90, Respiratory rate >20, WBC>12,000, Lactic acid > 2, and Lactic acid >4    Sepsis interventions: Lactic Acid, Blood Cultures, Antibiotics, and 2nd Lactic Acid        Note placed in the chart    This encounter was triggered by the Sepsis Predictive Model and positive sepsis screen  IP Sepsis Screen (most recent)       Sepsis Screen (IP) - 25 1433       Is the patient's history or complaint suggestive of a possible infection? Yes  -WL    Are there at least two of the following signs and symptoms present? Yes  -WL    Sepsis signs/symptoms - Tachycardia Tachycardia     >90  -WL    Sepsis signs/symptoms - Tachypnea Tachypnea     >20  -WL    Sepsis signs/symptoms - WBC WBC < 4,000 or WBC > 12,000  -WL    Are any of the following organ dysfunction criteria present and not considered to be due to a chronic condition? Yes  -WL    Organ Dysfunction Criteria Total Bilirubin > 2.0 Platelet count < 100,000  -WL    Organ Dysfunction Criteria Lactate > 2.0  -WL    Initiate Sepsis Protocol No  -WL    Reason sepsis not considered Pt. receiving appropriate management   BCx2, LAx2, on IVAB -WL              User Key  (r) = Recorded By, (t) = Taken By, (c) = Cosigned By      Initials Name    Allyson Cherry RN

## 2025-07-28 NOTE — CONSULTS
Hematology Oncology Consult Note    Inpatient consult to Hematology/Oncology  Consult performed by: Hao Lew MD  Consult ordered by: Carol Henderson MD        SUBJECTIVE:     History of Present Illness:  Ms. Rodriguez is a 54-year-old female with a history of newly diagnosed metastatic duodenal adenocarcinoma. She has had four recent hospitalizations, including post-operative recovery following a gastrojejunostomy and interventional radiology-guided biliary drain placement on 07/17; the drain remains in place. She has not yet initiated chemotherapy and is scheduled to follow up with Hematology/Oncology on 08/07/2025.  She presented to the [facility] last night with coffee ground hematemesis. Associated symptoms include significant unquantified weight loss, jaundice, anorexia, and poor oral intake. On presentation, her INR was >10, total bilirubin 17.8, AST 97, , and WBC 27.38K, Hgb 8.0  An EGD revealed active bleeding from the duodenal tumor with diffuse oozing; there was no discrete source amenable to targeted endoscopic therapy. Hemospray was applied to achieve hemostasis. Oncology has been consulted in the context of her known metastatic disease.    Review of patient's allergies indicates:  No Known Allergies  Past Medical History:   Diagnosis Date    Cancer     Diabetes mellitus     Hyperlipidemia     Hypertension      Past Surgical History:   Procedure Laterality Date    ESOPHAGOGASTRODUODENOSCOPY N/A 4/10/2025    Procedure: EGD (ESOPHAGOGASTRODUODENOSCOPY);  Surgeon: Manny Calles MD;  Location: [facility];  Service: Endoscopy;  Laterality: N/A;    ESOPHAGOGASTRODUODENOSCOPY N/A 5/21/2025    Procedure: EGD (ESOPHAGOGASTRODUODENOSCOPY);  Surgeon: Manny Calles MD;  Location: [facility];  Service: Endoscopy;  Laterality: N/A;    GASTROJEJUNOSTOMY N/A 5/27/2025    Procedure: GASTROJEJUNOSTOMY;  Surgeon: William Reyes MD;  Location: [facility] OR;   Service: General;  Laterality: N/A;    HYSTERECTOMY      LAPAROTOMY, EXPLORATORY Bilateral 5/27/2025    Procedure: LAPAROTOMY, EXPLORATORY;  Surgeon: William Reyes MD;  Location: [facility] OR;  Service: General;  Laterality: Bilateral;  will need BK US Guide probe BK US T probe [id number] AM 5/23    LIVER BIOPSY  5/27/2025    Procedure: BIOPSY, LIVER;  Surgeon: William Reyes MD;  Location: [facility] OR;  Service: General;;     No family history on file.  Social History[1]  Review of Systems   Constitutional:  Positive for malaise/fatigue and weight loss.   Gastrointestinal:  Positive for vomiting.   Neurological:  Positive for weakness.     OBJECTIVE:     Vital Signs:  Temp:  [98.1 °F (36.7 °C)-98.8 °F (37.1 °C)]   Pulse:  [109-115]   Resp:  [16-23]   BP: ()/(68-76)   SpO2:  [91 %-98 %]     Physical Exam  Constitutional:       Appearance: She is ill-appearing.   Cardiovascular:      Rate and Rhythm: Normal rate and regular rhythm.      Pulses: Normal pulses.      Heart sounds: Normal heart sounds.   Pulmonary:      Breath sounds: Normal breath sounds.   Abdominal:      General: Bowel sounds are normal.      Palpations: Abdomen is soft.   Musculoskeletal:      Right lower leg: No edema.   Neurological:      Mental Status: She is oriented to person, place, and time.       Laboratory:  I have reviewed all pertinent lab results within the past 24 hours.      Diagnostic Results:  Labs: Reviewed      ASSESSMENT/PLAN:     Metastatic Duodenal Cancer    Ms. Rodriguez is a 54-year-old female with a history of newly diagnosed metastatic duodenal adenocarcinoma s/p G tube placement and PTC drain for biliary obstruction presented with UGIB. On presentation, her INR was >10 requiring vitamin K, total bilirubin 17.8, AST 97, , and WBC 27.38K, Hgb 8.0 An EGD revealed active bleeding from the duodenal tumor with diffuse oozing; there was no discrete source amenable to targeted endoscopic therapy. Hemospray was applied to  achieve hemostasis. CTA AP shows no intraluminal intestinal, intraperitoneal, or retroperitoneal hemorrhage. There is no substantial change in appearance of the ill-defined mass centered in the region of the head of the pancreas and duodenum. Innumerable hypodense lesions throughout the liver suggesting metastatic disease    Recommendations:   -Advanced metastatic duodenal adenocarcinoma with extensive hepatic involvement  -Liver failure likely secondary to diffuse hepatic metastases, with persistent hyperbilirubinemia despite PTC drain  -She is not a candidate for systemic chemotherapy due to poor performance status and ongoing liver failure  -Overall prognosis is extremely poor  -Recommend palliative care consult for goals of care conversation and transitioning to hospice.   -Explained to patient and daughter at bedside. All questions answered.    Discussed with Dr. Rebolledo      We will sign off. Let us know if any questions.      Hao Lew MD  Hematology/Oncology PGY-IV          I have reviewed the notes, assessments, and/or procedures performed by the housestaff, as above.  I have personally interviewed and examined the patient at the beside, and rounded with the housestaff. I concur with her/his assessment and plan and the documentation of Aury Reyes      As above given current liver dysfunction and ECOG PS not a candidate for treatment.  Agree with palliative consult, hospice referral.      60 mins total time were spent during this encounter.      Vladislav Rebolledo M.D., M.S., F.A.C.P.  Hematology/Oncology Attending  [facility]                [1]   Social History  Tobacco Use    Smoking status: Never    Smokeless tobacco: Never   Substance Use Topics    Alcohol use: Yes    Drug use: Never

## 2025-07-28 NOTE — PLAN OF CARE
Hospital Medicine ICU Acceptance Note    Date of Admit: 7/26/2025  Date of Transfer / Stepdown: 7/27/2025  Dick, WANDA/J, L, Onc (IV chemo w/in 1 month), Gyn/Onc, or other special case?: No  ICU team stepping patient down: MICU  ICU team member giving handoff: Eleazar Barirentos MD   Accepting  team: U    History of Present Illness:         Hospital/ICU Course:     Aury MALHOTRA Isidro is a 54F with HTN, HLD, DM2 (insulin and orals), newly dxed metastatic duodenal CA and 4 recent hospital stays s/p palliative gastrojejunostomy and IR biliary drain placement, drain still in place, has not started on chemo (has upcoming Heme/Onc clinic appt on 8/7/25,) who presented to the [facility] ED last night with coffee ground hematemesis. She has had several units of blood. The original tumor was not resected due to locally advanced malignancy. However oozing mass temporized with hemostatic gel during EGD 07/27. IR has seen her but prefer not to embolize her due to gastroepiploic artery proximity and they will infarct her stomach. Biliar draining leaking. Xray did show biliary drain had migrated into the small bowel, IR retracted it and secured it on 07/27 wth visualized good drainage and leakage stopped. Surg onc consulted but they are not offering her surgical resection and concerned about feasibility of resection given locally advanced cancer.  They recommend palliative consult and possible palliative radiation.       Deemed appropriate for transfer to the floor on 7/27/2025, and was accepted to  team U for further care and management.    Consultants and Procedures:     Consultants:  Consults (From admission, onward)          Status Ordering Provider     Inpatient consult to Surgical Oncology  Once        Provider:  (Not yet assigned)    Completed ELEAZAR BARRIENTOS     Inpatient consult to Interventional Radiology  Once        Provider:  (Not yet assigned)    Completed ELEAZAR BARRIENTOS     Inpatient consult to Interventional Radiology   Once        Provider:  (Not yet assigned)    Completed SIDRA OLIVER     Inpatient consult to Gastroenterology  Once        Provider:  (Not yet assigned)    Completed SIDRA OLIVER            Procedures:    As documented    Transfer Information:     Diet:  As ordered    Physical Activity:  As tolerated      Pending plan at time of transfer to the floor:  Continue current plan initiated by ICU, will further monitor and adjust as clinically indicated upon arrival to the floor.    Patient has been accepted by Hospital Medicine Team U, who will assume care of the patient upon arrival to the floor from the ICU. Please contact ICU team with any concerns prior to transfer to the floor.      Carol Henderson MD  Attending Physician  [facility]

## 2025-07-28 NOTE — EICU
Intervention Initiated From:  COR / DYLANU    Maureen intervened regarding:  Rounding (Video assessment)    VICU Night Rounds Checklist  24H Vital Sign Range:  Temp:  [97.5 °F (36.4 °C)-97.9 °F (36.6 °C)]   Pulse:  [104-114]   Resp:  [18-46]   BP: ()/(69-89)   SpO2:  [95 %-99 %]     Video rounds and LDA reconciliation

## 2025-07-28 NOTE — PLAN OF CARE
MICU DAILY GOALS     Family/Goals of care/Code Status   Code Status: Full Code    24H Vital Sign Range  Temp:  [97.8 °F (36.6 °C)-97.9 °F (36.6 °C)]   Pulse:  [104-118]   Resp:  [18-46]   BP: ()/(69-89)   SpO2:  [91 %-99 %]      Shift Events (include procedures and significant events)   No acute events throughout shift    AWAKE RASS: Goal -    Actual - RASS (Adam Agitation-Sedation Scale): alert and calm    Restraint necessity: Not necessary   BREATHE SBT: Not intubated    Coordinate A & B, analgesics/sedatives Pain: managed   SAT: Not intubated   Delirium CAM-ICU:     Early(intubated/ Progressive (non-intubated) Mobility MOVE Screen (INTUBATED ONLY): Not intubated    Activity: Activity Management: Rolling - L1   Feeding/Nutrition Diet order: Diet/Nutrition Received: consistent carb/diabetic diet,     Thrombus DVT prophylaxis:     HOB Elevation Head of Bed (HOB) Positioning: HOB elevated   Ulcer Prophylaxis GI: yes   Glucose control managed     Skin Skin assessment:     Sacrum intact/not altered? Yes  Heels intact/not altered? Yes  Surgical wound? No    CHECK ONE!   (no altered skin or altered skin) and sub boxes:  [x] No Altered Skin Integrity Present    [x]Prevention Measures Documented    [] Altered Skin Integrity Present or Discovered   [] LDA already present in EPIC, daily doc completed              [] LDA added if not already in EPIC (describe/stage wound).               [] Wound Image Taken (required on admit,                   transfer/discharge and every Tuesday)    Wound Care Consulted? No   Bowel Function no issues    Indwelling Catheter Necessity            De-escalation Antibiotics No        VS and assessment per flow sheet, patient progressing towards goals as tolerated, plan of care reviewed with patient and family, all concerns addressed, will continue to monitor.

## 2025-07-28 NOTE — PLAN OF CARE
[facility] - Telemetry Stepdown  Initial Discharge Assessment       Primary Care Provider: Jimbo Menard II, MD    Admission Diagnosis: Duodenal mass [K31.89]  Hematemesis [K92.0]    Admission Date: 7/26/2025  Expected Discharge Date:     Transition of Care Barriers: (P) None    Payor: MEDICAID / Plan: PENDING MEDICAID / Product Type: Government /     Extended Emergency Contact Information  Primary Emergency Contact: Gigi Garrett  Mobile Phone: [phone]  Relation: Significant other  Secondary Emergency Contact: Neftali Felix  Address: [address] [place] [address]           [place] [address] [facility]  Home Phone: [phone]  Mobile Phone: [phone]  Relation: Daughter    Discharge Plan A: (P) Home with family  Discharge Plan B: (P) Home      [facility] - [place] - [address]  [address]  [facility] [address]  Phone: [phone] Fax: [phone]    [id number] - [place], [place] - [address]  [address]  [place] [address]  Phone: [phone] Fax: [phone]    [facility]  [address]  [place] [address]  Phone: [phone] Fax: [phone]      Initial Assessment (most recent)       Adult Discharge Assessment - 07/28/25 1510          Discharge Assessment    Assessment Type Discharge Planning Assessment (P)      Confirmed/corrected address, phone number and insurance Yes (P)      Confirmed Demographics Correct on Facesheet (P)      Source of Information patient (P)      Communicated MAIDA with patient/caregiver Date not available/Unable to determine (P)      People in Home friend(s);child(shona), adult (P)      Name(s) of People in Home SonKimo & pt's friendGigi (P)      Do you expect to return to your current living situation? Yes (P)      Do you have help at home or someone to help you manage your care at home? Yes (P)      Who are your caregiver(s) and their phone number(s)? Family (P)      Prior to hospitilization  cognitive status: Alert/Oriented (P)      Current cognitive status: Alert/Oriented (P)      Walking or Climbing Stairs Difficulty no (P)      Dressing/Bathing Difficulty no (P)      Home Accessibility stairs to enter home (P)      Number of Stairs, Main Entrance four (P)      Equipment Currently Used at Home none (P)      Readmission within 30 days? Yes (P)      Patient currently being followed by outpatient case management? No (P)      Do you currently have service(s) that help you manage your care at home? No (P)      Do you take prescription medications? Yes (P)      Do you have prescription coverage? Yes (P)      Do you have any problems affording any of your prescribed medications? No (P)      Is the patient taking medications as prescribed? yes (P)      Who is going to help you get home at discharge? Family (P)      How do you get to doctors appointments? family or friend will provide (P)      Are you on dialysis? No (P)      Do you take coumadin? No (P)      Discharge Plan A Home with family (P)      Discharge Plan B Home (P)      DME Needed Upon Discharge  other (see comments) (P)    TBD.    Discharge Plan discussed with: Patient (P)      Transition of Care Barriers None (P)         Physical Activity    On average, how many days per week do you engage in moderate to strenuous exercise (like a brisk walk)? 0 days (P)      On average, how many minutes do you engage in exercise at this level? 0 min (P)         Financial Resource Strain    How hard is it for you to pay for the very basics like food, housing, medical care, and heating? Not very hard (P)         Housing Stability    In the last 12 months, was there a time when you were not able to pay the mortgage or rent on time? No (P)      At any time in the past 12 months, were you homeless or living in a shelter (including now)? No (P)         Transportation Needs    In the past 12 months, has lack of transportation kept you from medical appointments or from  getting medications? No (P)      In the past 12 months, has lack of transportation kept you from meetings, work, or from getting things needed for daily living? No (P)         Food Insecurity    Within the past 12 months, you worried that your food would run out before you got the money to buy more. Never true (P)      Within the past 12 months, the food you bought just didn't last and you didn't have money to get more. Never true (P)         Stress    Do you feel stress - tense, restless, nervous, or anxious, or unable to sleep at night because your mind is troubled all the time - these days? Not at all (P)         Social Isolation    How often do you feel lonely or isolated from those around you?  Never (P)         Alcohol Use    Q1: How often do you have a drink containing alcohol? Never (P)      Q2: How many drinks containing alcohol do you have on a typical day when you are drinking? Patient does not drink (P)      Q3: How often do you have six or more drinks on one occasion? Never (P)         [employer]    In the past 12 months has the electric, gas, oil, or water company threatened to shut off services in your home? No (P)         Health Literacy    How often do you need to have someone help you when you read instructions, pamphlets, or other written material from your doctor or pharmacy? Never (P)                    SW met with pt at bedside to complete Initial Discharge Planning Assessment. Pt lives with her son, Kimo & Friend, Gigi in pt's mobile home in [place], [place]. There are 4 steps to enter. No HME, Home Health, Dialysis or Coumadin. Pt. Drives, but family is available to provide transportation home upon discharge. Pt. states she is anticipating a Scope procedure.     Discharge Plan A and Plan B have been determined by review of patient's clinical status, future medical and therapeutic needs, and coverage/benefits for post-acute care in coordination with multidisciplinary team members.      Janes  WESLY Cole

## 2025-07-28 NOTE — CARE UPDATE
RAPID RESPONSE NURSE CHART REVIEW        Chart Reviewed: 07/28/2025, 7:15 AM    MRN: [id number]  Bed: 8097/8097 A    Dx: ABLA (acute blood loss anemia)    Aury Felix has a past medical history of Cancer, Diabetes mellitus, Hyperlipidemia, and Hypertension.    Last VS: /76   Pulse (!) 111   Temp 97.9 °F (36.6 °C)   Resp (!) 23   SpO2 (!) 94%     24H Vital Sign Range:  Temp:  [97.8 °F (36.6 °C)-97.9 °F (36.6 °C)]   Pulse:  [104-118]   Resp:  [18-46]   BP: ()/(69-82)   SpO2:  [91 %-99 %]     Level of Consciousness (AVPU): alert    Recent Labs     07/27/25  1547 07/27/25 2003 07/28/25  0427   WBC 26.46* 27.38* 29.59*   HGB 8.3* 8.1* 8.2*   HCT 25.6* 24.7* 25.2*    404 477*       Recent Labs     07/26/25  0350 07/27/25  0326 07/28/25  0427    138 134*   K 3.6 4.0 3.9    108 106   CO2 17* 17* 17*   BUN 14 14 14   CREATININE 0.6 0.6 0.5   * 113* 145*   PHOS  --  2.9 2.3*   MG  --  2.0 2.0        Recent Labs     07/26/25  1106   PH 7.353   PCO2 35.5   PO2 28*   HCO3 19.7*   POCSATURATED 49   BE -6*        OXYGEN:     MEWS score: 4    Rounding completed at 09:10 AM.    Rounding completed w/Charge ELIZABETH Johnston. Discussed tachycardia w/HR in the 110s. VS otherwise stable. H/H stable. Continuous telemetry monitoring orders in place. No acute concerns verbalized at this time. Instructed to call [phone] for further concerns or assistance.    Luanne Isbell RN

## 2025-07-29 PROBLEM — Z51.5 PALLIATIVE CARE ENCOUNTER: Status: ACTIVE | Noted: 2025-07-29

## 2025-07-29 LAB
ABSOLUTE EOSINOPHIL (OHS): 0.18 K/UL
ABSOLUTE EOSINOPHIL (OHS): 0.26 K/UL
ABSOLUTE MONOCYTE (OHS): 1.83 K/UL (ref 0.3–1)
ABSOLUTE MONOCYTE (OHS): 1.95 K/UL (ref 0.3–1)
ABSOLUTE NEUTROPHIL COUNT (OHS): 22.52 K/UL (ref 1.8–7.7)
ABSOLUTE NEUTROPHIL COUNT (OHS): 23.22 K/UL (ref 1.8–7.7)
ALBUMIN SERPL BCP-MCNC: 1.8 G/DL (ref 3.5–5.2)
ALP SERPL-CCNC: 252 UNIT/L (ref 40–150)
ALT SERPL W/O P-5'-P-CCNC: 40 UNIT/L (ref 0–55)
ANION GAP (OHS): 13 MMOL/L (ref 8–16)
ANISOCYTOSIS BLD QL SMEAR: SLIGHT
AST SERPL-CCNC: 175 UNIT/L (ref 0–50)
BASOPHILS # BLD AUTO: 0.11 K/UL
BASOPHILS # BLD AUTO: 0.13 K/UL
BASOPHILS NFR BLD AUTO: 0.4 %
BASOPHILS NFR BLD AUTO: 0.5 %
BILIRUB SERPL-MCNC: 17.9 MG/DL (ref 0.1–1)
BUN SERPL-MCNC: 11 MG/DL (ref 6–20)
CALCIUM SERPL-MCNC: 7.7 MG/DL (ref 8.7–10.5)
CHLORIDE SERPL-SCNC: 105 MMOL/L (ref 95–110)
CO2 SERPL-SCNC: 17 MMOL/L (ref 23–29)
CREAT SERPL-MCNC: 0.6 MG/DL (ref 0.5–1.4)
ERYTHROCYTE [DISTWIDTH] IN BLOOD BY AUTOMATED COUNT: 26.6 % (ref 11.5–14.5)
ERYTHROCYTE [DISTWIDTH] IN BLOOD BY AUTOMATED COUNT: 26.8 % (ref 11.5–14.5)
GFR SERPLBLD CREATININE-BSD FMLA CKD-EPI: >60 ML/MIN/1.73/M2
GLUCOSE SERPL-MCNC: 99 MG/DL (ref 70–110)
HCT VFR BLD AUTO: 23.4 % (ref 37–48.5)
HCT VFR BLD AUTO: 26 % (ref 37–48.5)
HGB BLD-MCNC: 7.5 GM/DL (ref 12–16)
HGB BLD-MCNC: 8 GM/DL (ref 12–16)
HYPOCHROMIA BLD QL SMEAR: ABNORMAL
IMM GRANULOCYTES # BLD AUTO: 0.73 K/UL (ref 0–0.04)
IMM GRANULOCYTES # BLD AUTO: 0.97 K/UL (ref 0–0.04)
IMM GRANULOCYTES NFR BLD AUTO: 2.6 % (ref 0–0.5)
IMM GRANULOCYTES NFR BLD AUTO: 3.4 % (ref 0–0.5)
INR PPP: 1.2 (ref 0.8–1.2)
LYMPHOCYTES # BLD AUTO: 2.42 K/UL (ref 1–4.8)
LYMPHOCYTES # BLD AUTO: 2.57 K/UL (ref 1–4.8)
MAGNESIUM SERPL-MCNC: 1.9 MG/DL (ref 1.6–2.6)
MCH RBC QN AUTO: 27.1 PG (ref 27–31)
MCH RBC QN AUTO: 28 PG (ref 27–31)
MCHC RBC AUTO-ENTMCNC: 30.8 G/DL (ref 32–36)
MCHC RBC AUTO-ENTMCNC: 32.1 G/DL (ref 32–36)
MCV RBC AUTO: 87 FL (ref 82–98)
MCV RBC AUTO: 88 FL (ref 82–98)
NUCLEATED RBC (/100WBC) (OHS): 0 /100 WBC
NUCLEATED RBC (/100WBC) (OHS): 0 /100 WBC
PHOSPHATE SERPL-MCNC: 2.8 MG/DL (ref 2.7–4.5)
PLATELET # BLD AUTO: 515 K/UL (ref 150–450)
PLATELET # BLD AUTO: 519 K/UL (ref 150–450)
PLATELET BLD QL SMEAR: ABNORMAL
PMV BLD AUTO: 9.7 FL (ref 9.2–12.9)
PMV BLD AUTO: 9.9 FL (ref 9.2–12.9)
POIKILOCYTOSIS BLD QL SMEAR: SLIGHT
POTASSIUM SERPL-SCNC: 4.2 MMOL/L (ref 3.5–5.1)
PROT SERPL-MCNC: 5.7 GM/DL (ref 6–8.4)
PROTHROMBIN TIME: 12.7 SECONDS (ref 9–12.5)
RBC # BLD AUTO: 2.68 M/UL (ref 4–5.4)
RBC # BLD AUTO: 2.95 M/UL (ref 4–5.4)
RELATIVE EOSINOPHIL (OHS): 0.6 %
RELATIVE EOSINOPHIL (OHS): 0.9 %
RELATIVE LYMPHOCYTE (OHS): 8.5 % (ref 18–48)
RELATIVE LYMPHOCYTE (OHS): 9.1 % (ref 18–48)
RELATIVE MONOCYTE (OHS): 6.5 % (ref 4–15)
RELATIVE MONOCYTE (OHS): 6.8 % (ref 4–15)
RELATIVE NEUTROPHIL (OHS): 79.6 % (ref 38–73)
RELATIVE NEUTROPHIL (OHS): 81.1 % (ref 38–73)
SODIUM SERPL-SCNC: 135 MMOL/L (ref 136–145)
TARGETS BLD QL SMEAR: ABNORMAL
WBC # BLD AUTO: 28.28 K/UL (ref 3.9–12.7)
WBC # BLD AUTO: 28.61 K/UL (ref 3.9–12.7)

## 2025-07-29 PROCEDURE — 25000003 PHARM REV CODE 250

## 2025-07-29 PROCEDURE — 84100 ASSAY OF PHOSPHORUS: CPT | Performed by: STUDENT IN AN ORGANIZED HEALTH CARE EDUCATION/TRAINING PROGRAM

## 2025-07-29 PROCEDURE — 99497 ADVNCD CARE PLAN 30 MIN: CPT | Mod: 25,,,

## 2025-07-29 PROCEDURE — 99498 ADVNCD CARE PLAN ADDL 30 MIN: CPT | Mod: ,,,

## 2025-07-29 PROCEDURE — 20600001 HC STEP DOWN PRIVATE ROOM

## 2025-07-29 PROCEDURE — 36415 COLL VENOUS BLD VENIPUNCTURE: CPT

## 2025-07-29 PROCEDURE — 85610 PROTHROMBIN TIME: CPT

## 2025-07-29 PROCEDURE — 83735 ASSAY OF MAGNESIUM: CPT | Performed by: STUDENT IN AN ORGANIZED HEALTH CARE EDUCATION/TRAINING PROGRAM

## 2025-07-29 PROCEDURE — 63600175 PHARM REV CODE 636 W HCPCS

## 2025-07-29 PROCEDURE — 25000003 PHARM REV CODE 250: Performed by: INTERNAL MEDICINE

## 2025-07-29 PROCEDURE — 80053 COMPREHEN METABOLIC PANEL: CPT | Performed by: STUDENT IN AN ORGANIZED HEALTH CARE EDUCATION/TRAINING PROGRAM

## 2025-07-29 PROCEDURE — 99223 1ST HOSP IP/OBS HIGH 75: CPT | Mod: 25,,,

## 2025-07-29 PROCEDURE — 85025 COMPLETE CBC W/AUTO DIFF WBC: CPT

## 2025-07-29 PROCEDURE — 36415 COLL VENOUS BLD VENIPUNCTURE: CPT | Performed by: STUDENT IN AN ORGANIZED HEALTH CARE EDUCATION/TRAINING PROGRAM

## 2025-07-29 RX ORDER — MORPHINE SULFATE ORAL SOLUTION 10 MG/5ML
15 SOLUTION ORAL
Refills: 0 | Status: DISCONTINUED | OUTPATIENT
Start: 2025-07-29 | End: 2025-07-30

## 2025-07-29 RX ORDER — HYDROMORPHONE HYDROCHLORIDE 2 MG/1
2 TABLET ORAL
Refills: 0 | Status: CANCELLED | OUTPATIENT
Start: 2025-07-29

## 2025-07-29 RX ORDER — MORPHINE SULFATE ORAL SOLUTION 10 MG/5ML
10 SOLUTION ORAL
Refills: 0 | Status: DISCONTINUED | OUTPATIENT
Start: 2025-07-29 | End: 2025-07-29

## 2025-07-29 RX ORDER — HYDROMORPHONE HYDROCHLORIDE 2 MG/1
6 TABLET ORAL
Refills: 0 | Status: DISCONTINUED | OUTPATIENT
Start: 2025-07-29 | End: 2025-07-30 | Stop reason: HOSPADM

## 2025-07-29 RX ADMIN — HYDROMORPHONE HYDROCHLORIDE 6 MG: 2 TABLET ORAL at 05:07

## 2025-07-29 RX ADMIN — PIPERACILLIN SODIUM AND TAZOBACTAM SODIUM 4.5 G: 4; .5 INJECTION, POWDER, FOR SOLUTION INTRAVENOUS at 11:07

## 2025-07-29 RX ADMIN — MORPHINE SULFATE 10 MG: 10 SOLUTION ORAL at 11:07

## 2025-07-29 RX ADMIN — PANTOPRAZOLE SODIUM 40 MG: 40 INJECTION, POWDER, FOR SOLUTION INTRAVENOUS at 08:07

## 2025-07-29 RX ADMIN — MUPIROCIN: 20 OINTMENT TOPICAL at 08:07

## 2025-07-29 RX ADMIN — PIPERACILLIN SODIUM AND TAZOBACTAM SODIUM 4.5 G: 4; .5 INJECTION, POWDER, FOR SOLUTION INTRAVENOUS at 04:07

## 2025-07-29 RX ADMIN — PIPERACILLIN SODIUM AND TAZOBACTAM SODIUM 4.5 G: 4; .5 INJECTION, POWDER, FOR SOLUTION INTRAVENOUS at 08:07

## 2025-07-29 RX ADMIN — HYDROMORPHONE HYDROCHLORIDE 1 MG: 1 INJECTION, SOLUTION INTRAMUSCULAR; INTRAVENOUS; SUBCUTANEOUS at 04:07

## 2025-07-29 RX ADMIN — HYDROMORPHONE HYDROCHLORIDE 6 MG: 2 TABLET ORAL at 08:07

## 2025-07-29 RX ADMIN — HYDROMORPHONE HYDROCHLORIDE 1 MG: 1 INJECTION, SOLUTION INTRAMUSCULAR; INTRAVENOUS; SUBCUTANEOUS at 01:07

## 2025-07-29 NOTE — PLAN OF CARE
KAITY hard faxed home hospice referral to [facility] [phone] fx, [phone] ph.      Sindi Graham RN     [phone]

## 2025-07-29 NOTE — ASSESSMENT & PLAN NOTE
Patient's blood pressure range in the last 24 hours was: BP  Min: 96/71  Max: 108/79.The patient's inpatient anti-hypertensive regimen is listed below:  Current Antihypertensives       Plan  - BP is controlled, no changes needed to their regimen  - holding BP meds in setting of active bleed.

## 2025-07-29 NOTE — ASSESSMENT & PLAN NOTE
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 INR >10 prior to transfer but 2.2 on repeat here prior to any interventions. CTA prior to transfer w/o active bleed. Required 1u pRBCs, given FFP and cryo for elevated INR in setting of active bleed.  - GI consulted         - s/p hemostatic spray on EGD on 07/27  - correcting coagulopathy with goal INR <2.0, plt >50  - pRBC to maintain Hg >7  - holding AC  - q8 CBC  - f/u repeat lactate  -repeat lactic downtrending      Anemia is likely due to acute blood loss which was from duodenal mass. Most recent hemoglobin and hematocrit are listed below.  Recent Labs     07/27/25  1547 07/27/25 2003 07/28/25  0427   HGB 8.3* 8.1* 8.2*   HCT 25.6* 24.7* 25.2*     Plan  - Monitor serial CBC: Every 12 hours  - Transfuse PRBC if patient becomes hemodynamically unstable, symptomatic or H/H drops below 7/21.  - Patient   - Patient's anemia is currently stable

## 2025-07-29 NOTE — HPI
Aury Felix is a 54F with HTN, HLD, DM2 (insulin and orals), newly dxed metastatic duodenal CA and 4 recent hospital stays s/p gastrojejunostomy and IR biliary drain placement, drain still in place, has not started on chemo (has upcoming Heme/Onc clinic appt on 8/7/25,) who presented to the [facility] ED last night with coffee ground hematemesis.   She has associated weight loss (need to quantify), jaundice, and anorexia with poor PO intake.  In ED, tachycardic 116-140, with soft BP low 100s, and found to have elevated Tbili 17.8 over her baseline of around 8, with severe coagulopathy of INR >10, with relatively normal AST and ALT, and a normal platelet count in the 400s.   She's not on any oral anticoagulants, just a baby ASA.  She was given aggressive fluid resuscitation with NS 1.5L bolus then 125cc/hr, PPI IV gtt.  Lactic acidosis of 4, and after IVFs, Lactate still elevated at 3.7.  H/H 8.6/25.7, down slightly from baseline.        CTA bleeding scan with no active bleed found, just in the body of the report a possible thrombus of SMV--  Small nonocclusive filling defect of upper superior mesenteric venous branch (axial series 6, image 70), appears new since prior. Hepatic and splenic veins appear grossly patent.      Discussed case with Sommer Andrew (ED) and Juvenal (MICU).  Worried about an oozing mass, which would need direct visualization.  Mass could have eroded into a vessel, or can get a clot distal to the mass so it becomes a flow problem. Correcting coagulopathy, give Vit K IV (ordered by ED to give prior to transfer) and transfusion of cryoglobulin and FFP 4 units - unfortunately, unable to start transfusing at outside ED prior to transfer, because type and screen is sent by  up to [facility] [place].  Checking fibrinogen.  Discussed with IR Dr Earl Mccarty and GI Dr Nini Zavala.   Per Dr Mccarty, agree with correcting INR to see if that helps, then probably scope from GI to assess for a  "source, IR has has nothing to target as of now -  "see the gastroepiploic artery coursing in close proximity to the doudenal mass, but if embolize that, a portion of her stomach will likely infarct."    Transfer to [facility] MICU as a Level 1 for potentially life-threatening bleed.      On arrival to [facility], patient reports some abdominal pain relieved with morphine. Her BP stable, HR 110s. Labs notable for WBC 28, Hg 7.6 > 6.5, repeat INR 2.2. Fibrinogen normal. Lactic acid 3.7.     Procedure Date:   EGD 7/27/25  Gastrojejunostomy 5/27/25  Biliary Drain placed by Dr. Ramírez 7/17/25     "

## 2025-07-29 NOTE — PLAN OF CARE
Problem: Adult Inpatient Plan of Care  Goal: Plan of Care Review  Outcome: Progressing  Goal: Patient-Specific Goal (Individualized)  Outcome: Progressing  Goal: Absence of Hospital-Acquired Illness or Injury  Outcome: Progressing  Goal: Optimal Comfort and Wellbeing  Outcome: Progressing  Goal: Readiness for Transition of Care  Outcome: Progressing     Problem: Diabetes Comorbidity  Goal: Blood Glucose Level Within Targeted Range  Outcome: Progressing     Problem: Fall Injury Risk  Goal: Absence of Fall and Fall-Related Injury  Outcome: Progressing     Problem: Skin Injury Risk Increased  Goal: Skin Health and Integrity  Outcome: Progressing     Problem: Coping Ineffective  Goal: Effective Coping  Outcome: Progressing

## 2025-07-29 NOTE — HPI
"As per H&P, "Aury Felix is a 54 y.o. female with a PMHx of HTN, HLD, DM2 (insulin and orals), newly dxed metastatic duodenal CA and 4 recent hospital stays s/p gastrojejunostomy and IR biliary drain placement, drain still in place, has not started on chemo (has upcoming Heme/Onc clinic appt on 8/7/25,) who presented to the [facility] ED last night with coffee ground hematemesis.   She has associated weight loss (need to quantify), jaundice, and anorexia with poor PO intake.  In ED, tachycardic 116-140, with soft BP low 100s, and found to have elevated Tbili 17.8 over her baseline of around 8, with severe coagulopathy of INR >10, with relatively normal AST and ALT, and a normal platelet count in the 400s.   She's not on any oral anticoagulants, just a baby ASA.  She was given aggressive fluid resuscitation with NS 1.5L bolus then 125cc/hr, PPI IV gtt.  Lactic acidosis of 4, and after IVFs, Lactate still elevated at 3.7.  H/H 8.6/25.7, down slightly from baseline.        CTA bleeding scan with no active bleed found, just in the body of the report a possible thrombus of SMV--  Small nonocclusive filling defect of upper superior mesenteric venous branch (axial series 6, image 70), appears new since prior. Hepatic and splenic veins appear grossly patent.      Discussed case with Sommer Andrew (ED) and Juvenal (MICU).  Worried about an oozing mass, which would need direct visualization.  Mass could have eroded into a vessel, or can get a clot distal to the mass so it becomes a flow problem. Correcting coagulopathy, give Vit K IV (ordered by ED to give prior to transfer) and transfusion of cryoglobulin and FFP 4 units - unfortunately, unable to start transfusing at outside ED prior to transfer, because type and screen is sent by  up to [facility].  Checking fibrinogen.  Discussed with IR Dr Earl Mccarty and GI Dr Nini Zavala.   Per Dr Mccarty, agree with correcting INR to see if that helps, then probably " "scope from GI to assess for a source, IR has has nothing to target as of now -  "see the gastroepiploic artery coursing in close proximity to the doudenal mass, but if embolize that, a portion of her stomach will likely infarct."    Transfer to [facility] MICU as a Level 1 for potentially life-threatening bleed."    Patient has been stepped down to Hospital Medicine for continued care. During this hospitalization, patient has been seen by Medical Oncology, Surgical Oncology who shared there are no additional treatment options for cancer directed therapy.  Palliative Care was consulted by primary, Dr. Henderson, for end of life care including hospice discussions.  "

## 2025-07-29 NOTE — SUBJECTIVE & OBJECTIVE
Interval History: Palliative Care introduced to patient. Goals of care discussions initiated.    Past Medical History:   Diagnosis Date    Cancer     Diabetes mellitus     Hyperlipidemia     Hypertension        Past Surgical History:   Procedure Laterality Date    ESOPHAGOGASTRODUODENOSCOPY N/A 4/10/2025    Procedure: EGD (ESOPHAGOGASTRODUODENOSCOPY);  Surgeon: Manny Calles MD;  Location: [facility];  Service: Endoscopy;  Laterality: N/A;    ESOPHAGOGASTRODUODENOSCOPY N/A 5/21/2025    Procedure: EGD (ESOPHAGOGASTRODUODENOSCOPY);  Surgeon: Manny Calles MD;  Location: [facility] ENDO;  Service: Endoscopy;  Laterality: N/A;    GASTROJEJUNOSTOMY N/A 5/27/2025    Procedure: GASTROJEJUNOSTOMY;  Surgeon: William Reyes MD;  Location: [facility] OR;  Service: General;  Laterality: N/A;    HYSTERECTOMY      LAPAROTOMY, EXPLORATORY Bilateral 5/27/2025    Procedure: LAPAROTOMY, EXPLORATORY;  Surgeon: William Reyes MD;  Location: [facility] OR;  Service: General;  Laterality: Bilateral;  will need BK US Guide probe BK US T probe [id number] AM 5/23    LIVER BIOPSY  5/27/2025    Procedure: BIOPSY, LIVER;  Surgeon: William Reyes MD;  Location: [facility];  Service: General;;       Review of patient's allergies indicates:  No Known Allergies    Medications:  Continuous Infusions:  Scheduled Meds:   mupirocin   Nasal BID    pantoprazole  40 mg Intravenous Q12H    piperacillin-tazobactam (Zosyn) IV (PEDS and ADULTS) (extended infusion is not appropriate)  4.5 g Intravenous Q8H     PRN Meds:  Current Facility-Administered Medications:     acetaminophen, 650 mg, Oral, Q4H PRN    HYDROmorphone, 1 mg, Intravenous, Q4H PRN    morphine, 10 mg, Oral, Q2H PRN    ondansetron, 4 mg, Intravenous, Q8H PRN    sodium chloride 0.9%, 10 mL, Intravenous, PRN    Family History    None       Tobacco Use    Smoking status: Never    Smokeless tobacco: Never   Substance and Sexual Activity    Alcohol use: Yes    Drug use: Never    Sexual  activity: Yes       Review of Systems   Constitutional:  Positive for activity change, appetite change and fatigue.   Respiratory: Negative.     Cardiovascular: Negative.    Gastrointestinal:  Positive for abdominal distention, abdominal pain, blood in stool, nausea and vomiting.   Endocrine: Negative.    Genitourinary: Negative.    Musculoskeletal: Negative.    Skin:  Positive for color change.   Allergic/Immunologic: Negative.    Neurological:  Positive for weakness.   Hematological:  Bruises/bleeds easily.     Objective:     Vital Signs (Most Recent):  Temp: 98.3 °F (36.8 °C) (07/29/25 0747)  Pulse: (!) 116 (07/29/25 0747)  Resp: 17 (07/29/25 1122)  BP: 97/70 (07/29/25 0747)  SpO2: 95 % (07/29/25 0747) Vital Signs (24h Range):  Temp:  [98 °F (36.7 °C)-98.8 °F (37.1 °C)] 98.3 °F (36.8 °C)  Pulse:  [111-118] 116  Resp:  [16-22] 17  SpO2:  [93 %-96 %] 95 %  BP: ()/(68-70) 97/70        There is no height or weight on file to calculate BMI.       Physical Exam  Vitals and nursing note reviewed.   Constitutional:       Appearance: She is ill-appearing and toxic-appearing.   HENT:      Head: Normocephalic and atraumatic.      Nose: Nose normal.      Mouth/Throat:      Mouth: Mucous membranes are dry.   Eyes:      Extraocular Movements: Extraocular movements intact.   Cardiovascular:      Rate and Rhythm: Normal rate.   Pulmonary:      Effort: Pulmonary effort is normal.   Abdominal:      General: There is distension.      Tenderness: There is abdominal tenderness.   Skin:     General: Skin is warm and dry.      Coloration: Skin is jaundiced.      Findings: Bruising present.   Neurological:      Mental Status: She is alert and oriented to person, place, and time.      Motor: Weakness present.            Review of Symptoms      Symptom Assessment (ESAS 0-10 Scale)  Pain:  10  Dyspnea:  0  Anxiety:  0  Nausea:  0  Depression:  0  Anorexia:  0  Fatigue:  0  Insomnia:  0  Restlessness:  0  Agitation:  0     CAM /  "Delirium:  Negative  Constipation:  Negative  Diarrhea:  Negative      Bowel Management Plan (BMP):  Yes      Pain Assessment:  OME in 24 hours:  40  Location(s): abdomen    Abdomen       Location: bilateral        Quality: Sharp, cramping and burning        Quantity: 10/10 in intensity        Chronicity: Onset 5 month(s) ago, gradually worsening        Aggravating Factors: Recumbency        Alleviating Factors: Opiates        Associated Symptoms: None    Modified Hever Scale:  0    ECOG Performance Status stGstrstastdstest:st st1st Living Arrangements:  Lives alone    Psychosocial/Cultural:   See Palliative Psychosocial Note: Yes  - not . Significant other is Gigi Garrett  - 4 adult children. 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 Patient shares she is not interested in seeing a thaddeus today.        Advance Care Planning   Advance Directives:   Living Will: No    LaPOST: No    Do Not Resuscitate Status: No    Medical Power of : No      Decision Making:  Patient answered questions  Goals of Care: The patient endorses that what is most important right now is to focus on spending time at home, avoiding the hospital, remaining as independent as possible, symptom/pain control, quality of life, even if it means sacrificing a little time, and comfort and QOL     Accordingly, we have decided that the best plan to meet the patient's goals includes enrolling in hospice care         Significant Labs: All " pertinent labs within the past 24 hours have been reviewed.  CBC:   Recent Labs   Lab 07/29/25  0749   WBC 28.28*   HGB 8.0*   HCT 26.0*   MCV 88   *     BMP:  Recent Labs   Lab 07/29/25  0749   GLU 99   *   K 4.2      CO2 17*   BUN 11   CREATININE 0.6   CALCIUM 7.7*   MG 1.9     LFT:  Lab Results   Component Value Date     (H) 07/29/2025    ALKPHOS 252 (H) 07/29/2025    BILITOT 17.9 (H) 07/29/2025     Albumin:   Albumin   Date Value Ref Range Status   07/29/2025 1.8 (L) 3.5 - 5.2 g/dL Final   01/17/2025 3.5 3.5 - 5.2 g/dL Final     Protein:   Protein Total   Date Value Ref Range Status   07/29/2025 5.7 (L) 6.0 - 8.4 gm/dL Final     Total Protein   Date Value Ref Range Status   01/17/2025 8.2 6.0 - 8.4 g/dL Final     Lactic acid:   Lab Results   Component Value Date    LACTATE 2.3 (H) 07/27/2025    LACTATE 2.6 (H) 07/26/2025       Significant Imaging: I have reviewed all pertinent imaging results/findings within the past 24 hours.

## 2025-07-29 NOTE — PROGRESS NOTES
[facility] - Telemetry [facility] Medicine  Progress Note    Patient Name: Aury Felix  MRN: [id number]  Patient Class: IP- Inpatient   Admission Date: 7/26/2025  Length of Stay: 2 days  Attending Physician: Carol Henderson MD  Primary Care Provider: Jimbo Menard II, MD        Subjective     Principal Problem:ABLA (acute blood loss anemia)        HPI:  Aury Felix is a 54F with HTN, HLD, DM2 (insulin and orals), newly dxed metastatic duodenal CA and 4 recent hospital stays s/p gastrojejunostomy and IR biliary drain placement, drain still in place, has not started on chemo (has upcoming Heme/Onc clinic appt on 8/7/25,) who presented to the [facility] ED last night with coffee ground hematemesis.   She has associated weight loss (need to quantify), jaundice, and anorexia with poor PO intake.  In ED, tachycardic 116-140, with soft BP low 100s, and found to have elevated Tbili 17.8 over her baseline of around 8, with severe coagulopathy of INR >10, with relatively normal AST and ALT, and a normal platelet count in the 400s.   She's not on any oral anticoagulants, just a baby ASA.  She was given aggressive fluid resuscitation with NS 1.5L bolus then 125cc/hr, PPI IV gtt.  Lactic acidosis of 4, and after IVFs, Lactate still elevated at 3.7.  H/H 8.6/25.7, down slightly from baseline.        CTA bleeding scan with no active bleed found, just in the body of the report a possible thrombus of SMV--  Small nonocclusive filling defect of upper superior mesenteric venous branch (axial series 6, image 70), appears new since prior. Hepatic and splenic veins appear grossly patent.      Discussed case with Sommer Andrew (ED) and Juvenal (MICU).  Worried about an oozing mass, which would need direct visualization.  Mass could have eroded into a vessel, or can get a clot distal to the mass so it becomes a flow problem. Correcting coagulopathy, give Vit K IV (ordered by ED to give prior to transfer) and  "transfusion of cryoglobulin and FFP 4 units - unfortunately, unable to start transfusing at outside ED prior to transfer, because type and screen is sent by  [facility] to [facility].  Checking fibrinogen.  Discussed with IR Dr Earl Mccarty and GI Dr Nini Zavala.   Per Dr Mccarty, agree with correcting INR to see if that helps, then probably scope from GI to assess for a source, IR has has nothing to target as of now -  "see the gastroepiploic artery coursing in close proximity to the doudenal mass, but if embolize that, a portion of her stomach will likely infarct."    Transfer to [facility] MICU as a Level 1 for potentially life-threatening bleed.      On arrival to [facility], patient reports some abdominal pain relieved with morphine. Her BP stable, HR 110s. Labs notable for WBC 28, Hg 7.6 > 6.5, repeat INR 2.2. Fibrinogen normal. Lactic acid 3.7.     Procedure Date:   EGD 7/27/25  Gastrojejunostomy 5/27/25  Biliary Drain placed by Dr. Ramírez 7/17/25       Overview/Hospital Course:  Aury Felix is a 54F with HTN, HLD, DM2 (insulin and orals), newly dxed metastatic duodenal CA and 4 recent hospital stays s/p palliative gastrojejunostomy and IR biliary drain placement, drain still in place, has not started on chemo (has upcoming Heme/Onc clinic appt on 8/7/25,) who presented to the [facility] ED last night with coffee ground hematemesis. She has had several units of blood. The original tumor was not resected due to locally advanced malignancy. However oozing mass temporized with hemostatic gel during EGD 07/27. IR has seen her but prefer not to embolize her due to gastroepiploic artery proximity and they will infarct her stomach. Biliar draining leaking. Xray did show biliary drain had migrated into the small bowel, IR retracted it and secured it on 07/27 wth visualized good drainage and leakage stopped. Surg onc consulted but they are not offering her surgical resection and concerned about feasibility of resection given " locally advanced cancer.  They recommend palliative consult and possible palliative radiation.         Deemed appropriate for transfer to the floor on 7/27/2025, and was accepted to  team U for further care and management.          Interval History:     Met patient and family today. Discussed events in the ICU and leading up to this hospitalization. Established therapeutic relationship before starting GOC.     Med/onc consulted to confirm no chemotherapy being offered or of benefit.     Will consult pallitiave formally tomorrow.     Review of Systems  Objective:     Vital Signs (Most Recent):  Temp: 98 °F (36.7 °C) (07/28/25 1951)  Pulse: (!) 111 (07/28/25 1951)  Resp: 17 (07/28/25 2324)  BP: 103/70 (07/28/25 1951)  SpO2: 96 % (07/28/25 1951) Vital Signs (24h Range):  Temp:  [97.9 °F (36.6 °C)-98.8 °F (37.1 °C)] 98 °F (36.7 °C)  Pulse:  [109-116] 111  Resp:  [16-29] 17  SpO2:  [91 %-98 %] 96 %  BP: ()/(68-79) 103/70        There is no height or weight on file to calculate BMI.    Intake/Output Summary (Last 24 hours) at 7/28/2025 2341  Last data filed at 7/28/2025 0601  Gross per 24 hour   Intake 182.41 ml   Output 760 ml   Net -577.59 ml         Physical Exam    Vitals and nursing note reviewed.   Constitutional:       Appearance: She is well-developed. She is ill-appearing and toxic-appearing.   HENT:      Head: Normocephalic.      Mouth/Throat:      Mouth: Mucous membranes are moist.   Eyes:      General: Scleral icterus present.      Pupils: Pupils are equal, round, and reactive to light.   Cardiovascular:      Rate and Rhythm: Normal rate and regular rhythm.      Pulses: Normal pulses.   Pulmonary:      Effort: Pulmonary effort is normal.   Abdominal:      General: Bowel sounds are normal. There is no distension.      Palpations: Abdomen is soft.      Tenderness: There is abdominal tenderness.   Musculoskeletal:         General: No swelling.   Skin:     General: Skin is warm and dry.      Coloration:  Skin is jaundiced.   Neurological:      Mental Status: She is alert.       Significant Labs: All pertinent labs within the past 24 hours have been reviewed.    Significant Imaging: I have reviewed all pertinent imaging results/findings within the past 24 hours.      Assessment & Plan  ABLA (acute blood loss anemia)    54F with newly dxed metastatic duodenal CA and 4 recent hospital stays s/p gastrojejunostomy and IR biliary drain placement presented with coffee ground emesis, abdominal pain, jaundice and weight loss. INR >10 prior to transfer but 2.2 on repeat here prior to any interventions. CTA prior to transfer w/o active bleed. Required 1u pRBCs, given FFP and cryo for elevated INR in setting of active bleed.  - GI consulted         - s/p hemostatic spray on EGD on 07/27  - correcting coagulopathy with goal INR <2.0, plt >50  - pRBC to maintain Hg >7  - holding AC  - q8 CBC  - f/u repeat lactate  -repeat lactic downtrending      Anemia is likely due to acute blood loss which was from duodenal mass. Most recent hemoglobin and hematocrit are listed below.  Recent Labs     07/27/25  1547 07/27/25 2003 07/28/25  0427   HGB 8.3* 8.1* 8.2*   HCT 25.6* 24.7* 25.2*     Plan  - Monitor serial CBC: Every 12 hours  - Transfuse PRBC if patient becomes hemodynamically unstable, symptomatic or H/H drops below 7/21.  - Patient   - Patient's anemia is currently stable    Essential hypertension  Patient's blood pressure range in the last 24 hours was: BP  Min: 96/71  Max: 108/79.The patient's inpatient anti-hypertensive regimen is listed below:  Current Antihypertensives       Plan  - BP is controlled, no changes needed to their regimen  - holding BP meds in setting of active bleed.   Duodenal cancer  Recently diagnosed, metastatic, not started on chemotherapy yet.  -IR unlikely to embolize, If she develops more evidence of bleeding recommend repeat CTA (GI bleed protocol) to assess for active source of hemorrhage.   -EGD  showed oozing mass, hemostatic gel deployed  -Surgical oncology consulted : no palliative resection offered  - Med onc consulted : no chemotherapy offered   - palliative consult in the AM     Reactive thrombocytosis    Elevated platelets on admission.   - daily CBC  Coagulopathy  INR at OSH with INR >10 x2 but INR 2.2 here prior to intervention. Likely due to liver dysfunction.  Received cryo x1 and FFP x1.  - daily PT-INR  - maintain INR <2 and plt >50 per GI recs    Leukocytosis  Possibly reactive due to cancer and GIB bleed; however, given percutaneous drainage catheter, will cover with abx for now.  - zosyn for intraabdominal coverage  - f/u Bcx    VTE Risk Mitigation (From admission, onward)           Ordered     IP VTE HIGH RISK PATIENT  Once         07/26/25 1057     Place sequential compression device  Until discontinued         07/26/25 1057     Reason for No Pharmacological VTE Prophylaxis  Once        Question:  Reasons:  Answer:  Active Bleeding    07/26/25 1057                    Discharge Planning   MAIDA: 7/30/2025     Code Status: Full Code   Medical Readiness for Discharge Date:   Discharge Plan A: Home with family                              Carol Henderson MD  Department of Hospital Medicine   [facility] - Telemetry Stepdown

## 2025-07-29 NOTE — ASSESSMENT & PLAN NOTE
Recently diagnosed, metastatic, not started on chemotherapy yet.  -IR unlikely to embolize, If she develops more evidence of bleeding recommend repeat CTA (GI bleed protocol) to assess for active source of hemorrhage.   -EGD showed oozing mass, hemostatic gel deployed  -Surgical oncology consulted : no palliative resection offered  - Med onc consulted : no chemotherapy offered   - palliative consult in the AM

## 2025-07-29 NOTE — CARE UPDATE
RAPID RESPONSE NURSE CHART REVIEW        Chart Reviewed: 07/29/2025, 8:44 AM    MRN: [id number]  Bed: 8097/8097 A    Dx: ABLA (acute blood loss anemia)    Aury Felix has a past medical history of Cancer, Diabetes mellitus, Hyperlipidemia, and Hypertension.    Last VS: BP 97/70 (BP Location: Left arm, Patient Position: Lying)   Pulse (!) 116   Temp 98.3 °F (36.8 °C) (Oral)   Resp 18   SpO2 95%     24H Vital Sign Range:  Temp:  [98 °F (36.7 °C)-98.8 °F (37.1 °C)]   Pulse:  [111-118]   Resp:  [16-22]   BP: ()/(68-71)   SpO2:  [93 %-96 %]     Level of Consciousness (AVPU): alert    Recent Labs     07/27/25 2003 07/28/25 0427 07/29/25  0749   WBC 27.38* 29.59* 28.28*   HGB 8.1* 8.2* 8.0*   HCT 24.7* 25.2* 26.0*    477* 519*       Recent Labs     07/27/25 0326 07/28/25 0427 07/29/25  0749    134* 135*   K 4.0 3.9 4.2    106 105   CO2 17* 17* 17*   BUN 14 14 11   CREATININE 0.6 0.5 0.6   * 145* 99   PHOS 2.9 2.3* 2.8   MG 2.0 2.0 1.9        Recent Labs     07/26/25  1106   PH 7.353   PCO2 35.5   PO2 28*   HCO3 19.7*   POCSATURATED 49   BE -6*        MEWS score: 4    Rounding completed at 1002.    Rounding completed with charge nurse Preston for tachycardia reports vitals remain stable. No acute concerns verbalized at this time. Instructed to call [phone] for further concerns or assistance.    Eitan Sierra RN

## 2025-07-29 NOTE — PLAN OF CARE
Problem: Adult Inpatient Plan of Care  Goal: Plan of Care Review  Outcome: Progressing  Flowsheets (Taken 7/29/2025 0622)  Plan of Care Reviewed With:   patient   child  Goal: Patient-Specific Goal (Individualized)  Outcome: Progressing  Goal: Absence of Hospital-Acquired Illness or Injury  Outcome: Progressing  Intervention: Identify and Manage Fall Risk  Flowsheets (Taken 7/29/2025 0622)  Safety Promotion/Fall Prevention:   assistive device/personal item within reach   bed alarm set   medications reviewed   lighting adjusted   side rails raised x 2  Intervention: Prevent Skin Injury  Flowsheets (Taken 7/29/2025 0622)  Body Position: turned  Goal: Optimal Comfort and Wellbeing  Outcome: Progressing  Goal: Readiness for Transition of Care  Outcome: Progressing     Problem: Diabetes Comorbidity  Goal: Blood Glucose Level Within Targeted Range  Outcome: Progressing  Intervention: Monitor and Manage Glycemia  Flowsheets (Taken 7/29/2025 0622)  Glycemic Management: blood glucose monitored     Problem: Fall Injury Risk  Goal: Absence of Fall and Fall-Related Injury  Outcome: Progressing  Intervention: Identify and Manage Contributors  Flowsheets (Taken 7/29/2025 0622)  Medication Review/Management:   medications reviewed   high-risk medications identified     Problem: Skin Injury Risk Increased  Goal: Skin Health and Integrity  Outcome: Progressing

## 2025-07-29 NOTE — ASSESSMENT & PLAN NOTE
Impression:  Aury Felix is a 54 y.o. female with PMHx of HTN, HLD, DM2 (insulin and orals), newly dxed metastatic duodenal CA and 4 recent hospital stays s/p palliative gastrojejunostomy and IR biliary drain placement, drain still in place, has not started on chemo (has upcoming Heme/Onc clinic appt on 8/7/25,) who presented to the [facility] ED last night with coffee ground hematemesis. She has had several units of blood. The original tumor was not resected due to locally advanced malignancy. However oozing mass temporized with hemostatic gel during EGD 07/27. IR has seen her but prefer not to embolize her due to gastroepiploic artery proximity and they will infarct her stomach. Surgical Oncology and Medical Oncology both shared with patient that she, unfortunately, does not have any additional chemo-directed treatment options. Palliative Medicine was consulted by primary, Dr. Henderson, for end of life care including hospice discussions.    Patient is laying in bed, no acute distress noted. Patient is joined by her 2 best friends. Patient is amenable to palliative care.     - Prior experience with serious illness: no  -The patient has not previously engaged in advance care planning or GOC discussions  - Insight/Understanding of illness: yes    Advance Care Planning     Date: 07/29/2025    Code Status  In light of the patients advanced and life limiting illness, I engaged the the patient in a voluntary conversation about the patient's preferences for care at the very end of life.  We discussed the risks and benefits of invasive treatments like CPR and intubation.  We acknowledged that if patient is heart or breathing naturally stopped, we can attempt to restart her heart but we are still unable to cure her terminal illness.  We also acknowledged that doing CPR or other invasive treatments do not align with her wishes prioritizing comfort she enters the final chapters of life. I recommended patient modify code  status to DNR, we allow her to have a natural, peaceful, and comfortable death as we hope to preserve her dignity. Patient expressed understanding. However, patient wishes to maintain full code status at this time.  I communicated with the patient her code status will remain as full code.  Patient is able to enroll in hospice with full code status.  Would recommend continuing code status discussions as we hope to align patient's care with her goals.      [facility]  I engaged the patient in a voluntary conversation about advance care planning and we specifically addressed what the goals of care would be moving forward, in light of the patient's change in clinical status, specifically her metastatic disease, acute blood loss, and no cancer directed treatments being offered.  We did specifically address the patient's likely prognosis, which appears to be poor.  We explored the patient's values and preferences for future care.  Patient hopes to return home with hospice.  Philosophies of hospice reviewed.  Patient is patient's friends state that they are aware of hospice care and the resources it provides.  The patient endorses that what is most important right now is to focus on spending time at home, avoiding the hospital, remaining as independent as possible, symptom/pain control, quality of life, even if it means sacrificing a little time, and comfort and QOL     Accordingly, we have decided that the best plan to meet the patient's goals includes enrolling in hospice care and pivot to comfort-focused care      Hospice  I did explain the role for hospice care at this stage of the patient's illness, including its ability to help the patient live with the best quality of life possible.  We willbe making a hospice referral.  When asked if patient had a preferred hospice company she would like to work with, she stated [facility].  Case management team notified.               Life Limiting Diagnosis  Duodenal Cancer  Acute  "Blood Loss Anemia            Symptom Management  - Pain: yes, non-radiating abdominal pain that worsens at night. Patient attributes her pain to her metastatic abdominal cancer  - LA  reviewed  - 7a-7a OME: 80  - add morphine 10mg PO q2h PRN as 1st line treatment for pain  - modify hydromorphone 1mg q4h PRN if not relieved by PO medication    - Dyspnea: no  - 24h SpO2: 93-97%  - maintained on RA    - Nausea: yes  - ondansetron 4mg q8h PRN  - patient reports moderate relief medication    - Debility: yes  - Functional status: fair.  Pt was able to manage ADLs  - Fall precautions          Recommendations  - Please see above  - Continue management per primary team  - Utilize comfort care order-set as needed  - Patient and family would benefit from continued education and information regarding plan of care, prognosis, and what to expect in the future  - Most important goals at this time: comfort   - Code status: Full Code . Discussed risks and benefits with patient. Recommended changing code status to DNR as it aligns with patient's hopes of preserving comfort. Patient states she would like to leave her code status "as is" at this this time. Patient can enroll in hospice with Full Code status. Recommend continuing code status discussion.  - Disposition: home with hospice        The above recommendations communicated directly to primary team on 7/29/25  Thank you for consulting Palliative Medicine.          "

## 2025-07-29 NOTE — CONSULTS
[facility] - Telemetry Stepdown  Palliative Medicine  Consult Note    Patient Name: Aury Felix  MRN: [id number]  Admission Date: 7/26/2025  Hospital Length of Stay: 3 days  Code Status: Full Code   Attending Provider: Carol Henderson MD  Consulting Provider: Cat Guallpa DNP  Primary Care Physician: Jimbo Menard II, MD  Principal Problem:ABLA (acute blood loss anemia)    Patient information was obtained from patient, past medical records, and primary team.      Inpatient consult to Palliative Care  Consult performed by: Cta Guallpa DNP  Consult ordered by: Carol Henderson MD  Reason for consult: end of life care including hospice discussions        Assessment/Plan:     Palliative Care  Palliative care encounter  Impression:  Aury Felix is a 54 y.o. female with PMHx of HTN, HLD, DM2 (insulin and orals), newly dxed metastatic duodenal CA and 4 recent hospital stays s/p palliative gastrojejunostomy and IR biliary drain placement, drain still in place, has not started on chemo (has upcoming Heme/Onc clinic appt on 8/7/25,) who presented to the [facility] ED last night with coffee ground hematemesis. She has had several units of blood. The original tumor was not resected due to locally advanced malignancy. However oozing mass temporized with hemostatic gel during EGD 07/27. IR has seen her but prefer not to embolize her due to gastroepiploic artery proximity and they will infarct her stomach. Surgical Oncology and Medical Oncology both shared with patient that she, unfortunately, does not have any additional chemo-directed treatment options. Palliative Medicine was consulted by primary, Dr. Henderson, for end of life care including hospice discussions.    Patient is laying in bed, no acute distress noted. Patient is joined by her 2 best friends. Patient is amenable to palliative care.     - Prior experience with serious illness: no  -The patient has not previously engaged in advance care planning  or [facility] discussions  - Insight/Understanding of illness: yes    Advance Care Planning    Date: 07/29/2025    Code Status  In light of the patients advanced and life limiting illness, I engaged the the patient in a voluntary conversation about the patient's preferences for care at the very end of life.  We discussed the risks and benefits of invasive treatments like CPR and intubation.  We acknowledged that if patient is heart or breathing naturally stopped, we can attempt to restart her heart but we are still unable to cure her terminal illness.  We also acknowledged that doing CPR or other invasive treatments do not align with her wishes prioritizing comfort she enters the final chapters of life. I recommended patient modify code status to DNR, we allow her to have a natural, peaceful, and comfortable death as we hope to preserve her dignity. Patient expressed understanding. However, patient wishes to maintain full code status at this time.  I communicated with the patient her code status will remain as full code.  Patient is able to enroll in hospice with full code status.  Would recommend continuing code status discussions as we hope to align patient's care with her goals.      [facility]  I engaged the patient in a voluntary conversation about advance care planning and we specifically addressed what the goals of care would be moving forward, in light of the patient's change in clinical status, specifically her metastatic disease, acute blood loss, and no cancer directed treatments being offered.  We did specifically address the patient's likely prognosis, which appears to be poor.  We explored the patient's values and preferences for future care.  Patient hopes to return home with hospice.  Philosophies of hospice reviewed.  Patient is patient's friends state that they are aware of hospice care and the resources it provides.  The patient endorses that what is most important right now is to focus on spending time at  "home, avoiding the hospital, remaining as independent as possible, symptom/pain control, quality of life, even if it means sacrificing a little time, and comfort and QOL     Accordingly, we have decided that the best plan to meet the patient's goals includes enrolling in hospice care and pivot to comfort-focused care      Hospice  I did explain the role for hospice care at this stage of the patient's illness, including its ability to help the patient live with the best quality of life possible.  We willbe making a hospice referral.  When asked if patient had a preferred hospice company she would like to work with, she stated [facility].  Case management team notified.               Life Limiting Diagnosis  Duodenal Cancer  Acute Blood Loss Anemia            Symptom Management  - Pain: yes, non-radiating abdominal pain that worsens at night. Patient attributes her pain to her metastatic abdominal cancer  - LA  reviewed  - 7a-7a OME: 80  - add morphine 10mg PO q2h PRN as 1st line treatment for pain  - modify hydromorphone 1mg q4h PRN if not relieved by PO medication    - Dyspnea: no  - 24h SpO2: 93-97%  - maintained on RA    - Nausea: yes  - ondansetron 4mg q8h PRN  - patient reports moderate relief medication    - Debility: yes  - Functional status: fair.  Pt was able to manage ADLs  - Fall precautions          Recommendations  - Please see above  - Continue management per primary team  - Utilize comfort care order-set as needed  - Patient and family would benefit from continued education and information regarding plan of care, prognosis, and what to expect in the future  - Most important goals at this time: comfort   - Code status: Full Code . Discussed risks and benefits with patient. Recommended changing code status to DNR as it aligns with patient's hopes of preserving comfort. Patient states she would like to leave her code status "as is" at this this time. Patient can enroll in hospice with Full Code " "status. Recommend continuing code status discussion.  - Disposition: home with hospice        The above recommendations communicated directly to primary team on 7/29/25  Thank you for consulting Palliative Medicine.                Thank you for your consult. I will sign off. Please contact us if you have any additional questions.    Subjective:     HPI:   As per H&P, "Aury Felix is a 54 y.o. female with a PMHx of HTN, HLD, DM2 (insulin and orals), newly dxed metastatic duodenal CA and 4 recent hospital stays s/p gastrojejunostomy and IR biliary drain placement, drain still in place, has not started on chemo (has upcoming Heme/Onc clinic appt on 8/7/25,) who presented to the [facility] ED last night with coffee ground hematemesis.   She has associated weight loss (need to quantify), jaundice, and anorexia with poor PO intake.  In ED, tachycardic 116-140, with soft BP low 100s, and found to have elevated Tbili 17.8 over her baseline of around 8, with severe coagulopathy of INR >10, with relatively normal AST and ALT, and a normal platelet count in the 400s.   She's not on any oral anticoagulants, just a baby ASA.  She was given aggressive fluid resuscitation with NS 1.5L bolus then 125cc/hr, PPI IV gtt.  Lactic acidosis of 4, and after IVFs, Lactate still elevated at 3.7.  H/H 8.6/25.7, down slightly from baseline.        CTA bleeding scan with no active bleed found, just in the body of the report a possible thrombus of SMV--  Small nonocclusive filling defect of upper superior mesenteric venous branch (axial series 6, image 70), appears new since prior. Hepatic and splenic veins appear grossly patent.      Discussed case with Sommer Andrew (ED) and Juvenal (MICU).  Worried about an oozing mass, which would need direct visualization.  Mass could have eroded into a vessel, or can get a clot distal to the mass so it becomes a flow problem. Correcting coagulopathy, give Vit K IV (ordered by ED to give prior to " "transfer) and transfusion of cryoglobulin and FFP 4 units - unfortunately, unable to start transfusing at outside ED prior to transfer, because type and screen is sent by  up to [facility].  Checking fibrinogen.  Discussed with IR Dr Earl Mccarty and GI Dr Nini Zavala.   Per Dr Mccarty, agree with correcting INR to see if that helps, then probably scope from GI to assess for a source, IR has has nothing to target as of now -  "see the gastroepiploic artery coursing in close proximity to the doudenal mass, but if embolize that, a portion of her stomach will likely infarct."    Transfer to [facility] MICU as a Level 1 for potentially life-threatening bleed."    Patient has been stepped down to Hospital Medicine for continued care. During this hospitalization, patient has been seen by Medical Oncology, Surgical Oncology who shared there are no additional treatment options for cancer directed therapy.  Palliative Care was consulted by primary, Dr. Henderson, for end of life care including hospice discussions.    Hospital Course:  No notes on file    Interval History: Palliative Care introduced to patient. Goals of care discussions initiated.    Past Medical History:   Diagnosis Date    Cancer     Diabetes mellitus     Hyperlipidemia     Hypertension        Past Surgical History:   Procedure Laterality Date    ESOPHAGOGASTRODUODENOSCOPY N/A 4/10/2025    Procedure: EGD (ESOPHAGOGASTRODUODENOSCOPY);  Surgeon: Manny Calles MD;  Location: [facility] ENDO;  Service: Endoscopy;  Laterality: N/A;    ESOPHAGOGASTRODUODENOSCOPY N/A 5/21/2025    Procedure: EGD (ESOPHAGOGASTRODUODENOSCOPY);  Surgeon: Manny Calles MD;  Location: [facility] ENDO;  Service: Endoscopy;  Laterality: N/A;    GASTROJEJUNOSTOMY N/A 5/27/2025    Procedure: GASTROJEJUNOSTOMY;  Surgeon: William Reyes MD;  Location: [facility] OR;  Service: General;  Laterality: N/A;    HYSTERECTOMY      LAPAROTOMY, EXPLORATORY Bilateral 5/27/2025    Procedure: " LAPAROTOMY, EXPLORATORY;  Surgeon: William Reyes MD;  Location: [facility] OR;  Service: General;  Laterality: Bilateral;  will need BK US Guide probe BK US T probe [id number] AM 5/23    LIVER BIOPSY  5/27/2025    Procedure: BIOPSY, LIVER;  Surgeon: William Reyes MD;  Location: [facility] OR;  Service: General;;       Review of patient's allergies indicates:  No Known Allergies    Medications:  Continuous Infusions:  Scheduled Meds:   mupirocin   Nasal BID    pantoprazole  40 mg Intravenous Q12H    piperacillin-tazobactam (Zosyn) IV (PEDS and ADULTS) (extended infusion is not appropriate)  4.5 g Intravenous Q8H     PRN Meds:  Current Facility-Administered Medications:     acetaminophen, 650 mg, Oral, Q4H PRN    HYDROmorphone, 1 mg, Intravenous, Q4H PRN    morphine, 10 mg, Oral, Q2H PRN    ondansetron, 4 mg, Intravenous, Q8H PRN    sodium chloride 0.9%, 10 mL, Intravenous, PRN    Family History    None       Tobacco Use    Smoking status: Never    Smokeless tobacco: Never   Substance and Sexual Activity    Alcohol use: Yes    Drug use: Never    Sexual activity: Yes       Review of Systems   Constitutional:  Positive for activity change, appetite change and fatigue.   Respiratory: Negative.     Cardiovascular: Negative.    Gastrointestinal:  Positive for abdominal distention, abdominal pain, blood in stool, nausea and vomiting.   Endocrine: Negative.    Genitourinary: Negative.    Musculoskeletal: Negative.    Skin:  Positive for color change.   Allergic/Immunologic: Negative.    Neurological:  Positive for weakness.   Hematological:  Bruises/bleeds easily.     Objective:     Vital Signs (Most Recent):  Temp: 98.3 °F (36.8 °C) (07/29/25 0747)  Pulse: (!) 116 (07/29/25 0747)  Resp: 17 (07/29/25 1122)  BP: 97/70 (07/29/25 0747)  SpO2: 95 % (07/29/25 0747) Vital Signs (24h Range):  Temp:  [98 °F (36.7 °C)-98.8 °F (37.1 °C)] 98.3 °F (36.8 °C)  Pulse:  [111-118] 116  Resp:  [16-22] 17  SpO2:  [93 %-96 %] 95 %  BP:  "()/(68-70) 97/70        There is no height or weight on file to calculate BMI.       Physical Exam  Vitals and nursing note reviewed.   Constitutional:       Appearance: She is ill-appearing and toxic-appearing.   HENT:      Head: Normocephalic and atraumatic.      Nose: Nose normal.      Mouth/Throat:      Mouth: Mucous membranes are dry.   Eyes:      Extraocular Movements: Extraocular movements intact.   Cardiovascular:      Rate and Rhythm: Normal rate.   Pulmonary:      Effort: Pulmonary effort is normal.   Abdominal:      General: There is distension.      Tenderness: There is abdominal tenderness.   Skin:     General: Skin is warm and dry.      Coloration: Skin is jaundiced.      Findings: Bruising present.   Neurological:      Mental Status: She is alert and oriented to person, place, and time.      Motor: Weakness present.            Review of Symptoms      Symptom Assessment (ESAS 0-10 Scale)  Pain:  10  Dyspnea:  0  Anxiety:  0  Nausea:  0  Depression:  0  Anorexia:  0  Fatigue:  0  Insomnia:  0  Restlessness:  0  Agitation:  0     CAM / Delirium:  Negative  Constipation:  Negative  Diarrhea:  Negative      Bowel Management Plan (BMP):  Yes      Pain Assessment:  OME in 24 hours:  40  Location(s): abdomen    Abdomen       Location: bilateral        Quality: Sharp, cramping and burning        Quantity: 10/10 in intensity        Chronicity: Onset 5 month(s) ago, gradually worsening        Aggravating Factors: Recumbency        Alleviating Factors: Opiates        Associated Symptoms: None    Modified Hever Scale:  0    ECOG Performance Status stGstrstastdstest:st st1st Living Arrangements:  Lives alone    Psychosocial/Cultural:   See Palliative Psychosocial Note: Yes  - not . Significant other is Junius Major  - 4 adult children. 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 Patient shares she is not interested in seeing a thaddeus today.        Advance Care Planning  Advance Directives:   Living Will: No    LaPOST: No    Do Not Resuscitate Status: No    Medical Power of : No      Decision Making:  Patient answered questions  Goals of Care: The patient endorses that what is most important right now is to focus on spending time at home, avoiding the hospital, remaining as independent as possible, symptom/pain control, quality of life, even if it means sacrificing a little time, and comfort and QOL     Accordingly, we have decided that the best plan to meet the patient's goals includes enrolling in hospice care         Significant Labs: All pertinent labs within the past 24 hours have been reviewed.  CBC:   Recent Labs   Lab 07/29/25  0749   WBC 28.28*   HGB 8.0*   HCT 26.0*   MCV 88   *     BMP:  Recent Labs   Lab 07/29/25  0749   GLU 99   *   K 4.2      CO2 17*   BUN 11   CREATININE 0.6   CALCIUM 7.7*   MG 1.9     LFT:  Lab Results   Component Value Date     (H) 07/29/2025    ALKPHOS 252 (H) 07/29/2025    BILITOT 17.9 (H) 07/29/2025     Albumin:   Albumin   Date Value Ref Range Status   07/29/2025 1.8 (L) 3.5 - 5.2 g/dL Final   01/17/2025 3.5 3.5 - 5.2 g/dL Final     Protein:   Protein Total   Date Value Ref Range Status   07/29/2025 5.7 (L) 6.0 - 8.4 gm/dL Final     Total Protein   Date Value Ref Range Status   01/17/2025 8.2 6.0 - 8.4 g/dL Final     Lactic acid:   Lab Results   Component Value Date    LACTATE 2.3 (H) 07/27/2025    LACTATE 2.6 (H) 07/26/2025       Significant  Imaging: I have reviewed all pertinent imaging results/findings within the past 24 hours.      Complexity Data to Review Risk   1 acute / chronic illness posing a threat to life or bodily function including  Metastatic Cancer Reviewed prior external notes from Gastroenterology, General Surgery, Interventional Radiology, and Medical Oncology    Reviewed investigative results including CBC, CMP, and CTA results    Discussed management of patient with primary team Drug therapy requiring intensive monitoring for toxicity (ex. Opioids), Discussed goals-of-care as it pertains to life-limiting illness, Discussed decision regarding Code Status, Parenteral controlled substances, and Decision regarding excalation of hospital level care     Additional 46min time spent on a voluntary advance care planning and /or goals of care discussion, providing emotional support, formulating, and communicating prognosis and exploring burden/benefit of various approaches of treatment.       Cat Guallpa, CORAL  Palliative Medicine  [facility] - Telemetry Stepdown

## 2025-07-29 NOTE — SUBJECTIVE & OBJECTIVE
Interval History:     Met patient and family today. Discussed events in the ICU and leading up to this hospitalization. Established therapeutic relationship before starting GOC.     Med/onc consulted to confirm no chemotherapy being offered or of benefit.     Will consult pallitiave formally tomorrow.     Review of Systems  Objective:     Vital Signs (Most Recent):  Temp: 98 °F (36.7 °C) (07/28/25 1951)  Pulse: (!) 111 (07/28/25 1951)  Resp: 17 (07/28/25 2324)  BP: 103/70 (07/28/25 1951)  SpO2: 96 % (07/28/25 1951) Vital Signs (24h Range):  Temp:  [97.9 °F (36.6 °C)-98.8 °F (37.1 °C)] 98 °F (36.7 °C)  Pulse:  [109-116] 111  Resp:  [16-29] 17  SpO2:  [91 %-98 %] 96 %  BP: ()/(68-79) 103/70        There is no height or weight on file to calculate BMI.    Intake/Output Summary (Last 24 hours) at 7/28/2025 2341  Last data filed at 7/28/2025 0601  Gross per 24 hour   Intake 182.41 ml   Output 760 ml   Net -577.59 ml         Physical Exam    Vitals and nursing note reviewed.   Constitutional:       Appearance: She is well-developed. She is ill-appearing and toxic-appearing.   HENT:      Head: Normocephalic.      Mouth/Throat:      Mouth: Mucous membranes are moist.   Eyes:      General: Scleral icterus present.      Pupils: Pupils are equal, round, and reactive to light.   Cardiovascular:      Rate and Rhythm: Normal rate and regular rhythm.      Pulses: Normal pulses.   Pulmonary:      Effort: Pulmonary effort is normal.   Abdominal:      General: Bowel sounds are normal. There is no distension.      Palpations: Abdomen is soft.      Tenderness: There is abdominal tenderness.   Musculoskeletal:         General: No swelling.   Skin:     General: Skin is warm and dry.      Coloration: Skin is jaundiced.   Neurological:      Mental Status: She is alert.       Significant Labs: All pertinent labs within the past 24 hours have been reviewed.    Significant Imaging: I have reviewed all pertinent imaging results/findings  within the past 24 hours.

## 2025-07-30 VITALS
OXYGEN SATURATION: 96 % | DIASTOLIC BLOOD PRESSURE: 63 MMHG | HEART RATE: 114 BPM | SYSTOLIC BLOOD PRESSURE: 90 MMHG | RESPIRATION RATE: 18 BRPM | TEMPERATURE: 97 F

## 2025-07-30 LAB
ABO + RH BLD: NORMAL
ABSOLUTE EOSINOPHIL (OHS): 0.27 K/UL
ABSOLUTE MONOCYTE (OHS): 1.92 K/UL (ref 0.3–1)
ABSOLUTE NEUTROPHIL COUNT (OHS): 21.28 K/UL (ref 1.8–7.7)
ALBUMIN SERPL BCP-MCNC: 1.6 G/DL (ref 3.5–5.2)
ALP SERPL-CCNC: 278 UNIT/L (ref 40–150)
ALT SERPL W/O P-5'-P-CCNC: 51 UNIT/L (ref 0–55)
ANION GAP (OHS): 12 MMOL/L (ref 8–16)
AST SERPL-CCNC: 252 UNIT/L (ref 0–50)
BASOPHILS # BLD AUTO: 0.11 K/UL
BASOPHILS NFR BLD AUTO: 0.4 %
BILIRUB SERPL-MCNC: 17.9 MG/DL (ref 0.1–1)
BLD PROD TYP BPU: NORMAL
BLOOD UNIT EXPIRATION DATE: NORMAL
BLOOD UNIT TYPE CODE: 2800
BLOOD UNIT TYPE CODE: 600
BLOOD UNIT TYPE CODE: 600
BLOOD UNIT TYPE CODE: 8400
BUN SERPL-MCNC: 11 MG/DL (ref 6–20)
CALCIUM SERPL-MCNC: 7.7 MG/DL (ref 8.7–10.5)
CHLORIDE SERPL-SCNC: 103 MMOL/L (ref 95–110)
CO2 SERPL-SCNC: 21 MMOL/L (ref 23–29)
CREAT SERPL-MCNC: 0.7 MG/DL (ref 0.5–1.4)
CROSSMATCH INTERPRETATION: NORMAL
CRYO POOL: NORMAL
DISPENSE STATUS: NORMAL
ERYTHROCYTE [DISTWIDTH] IN BLOOD BY AUTOMATED COUNT: 26.5 % (ref 11.5–14.5)
FFP: NORMAL
FFP: NORMAL
GFR SERPLBLD CREATININE-BSD FMLA CKD-EPI: >60 ML/MIN/1.73/M2
GLUCOSE SERPL-MCNC: 86 MG/DL (ref 70–110)
HCT VFR BLD AUTO: 23.4 % (ref 37–48.5)
HGB BLD-MCNC: 7.4 GM/DL (ref 12–16)
IMM GRANULOCYTES # BLD AUTO: 0.93 K/UL (ref 0–0.04)
IMM GRANULOCYTES NFR BLD AUTO: 3.5 % (ref 0–0.5)
INR PPP: 1.2 (ref 0.8–1.2)
LYMPHOCYTES # BLD AUTO: 2.44 K/UL (ref 1–4.8)
MAGNESIUM SERPL-MCNC: 1.8 MG/DL (ref 1.6–2.6)
MCH RBC QN AUTO: 27.1 PG (ref 27–31)
MCHC RBC AUTO-ENTMCNC: 31.6 G/DL (ref 32–36)
MCV RBC AUTO: 86 FL (ref 82–98)
NUCLEATED RBC (/100WBC) (OHS): 0 /100 WBC
PHOSPHATE SERPL-MCNC: 2.6 MG/DL (ref 2.7–4.5)
PLATELET # BLD AUTO: 517 K/UL (ref 150–450)
PMV BLD AUTO: 10.1 FL (ref 9.2–12.9)
POTASSIUM SERPL-SCNC: 3.7 MMOL/L (ref 3.5–5.1)
PROT SERPL-MCNC: 5.3 GM/DL (ref 6–8.4)
PROTHROMBIN TIME: 13 SECONDS (ref 9–12.5)
RBC # BLD AUTO: 2.73 M/UL (ref 4–5.4)
RBCS: NORMAL
RELATIVE EOSINOPHIL (OHS): 1 %
RELATIVE LYMPHOCYTE (OHS): 9.1 % (ref 18–48)
RELATIVE MONOCYTE (OHS): 7.1 % (ref 4–15)
RELATIVE NEUTROPHIL (OHS): 78.9 % (ref 38–73)
SODIUM SERPL-SCNC: 136 MMOL/L (ref 136–145)
UNIT NUMBER: NORMAL
WBC # BLD AUTO: 26.95 K/UL (ref 3.9–12.7)

## 2025-07-30 PROCEDURE — 63600175 PHARM REV CODE 636 W HCPCS

## 2025-07-30 PROCEDURE — 36415 COLL VENOUS BLD VENIPUNCTURE: CPT

## 2025-07-30 PROCEDURE — 25000003 PHARM REV CODE 250

## 2025-07-30 PROCEDURE — 84100 ASSAY OF PHOSPHORUS: CPT | Performed by: STUDENT IN AN ORGANIZED HEALTH CARE EDUCATION/TRAINING PROGRAM

## 2025-07-30 PROCEDURE — 85025 COMPLETE CBC W/AUTO DIFF WBC: CPT

## 2025-07-30 PROCEDURE — 99233 SBSQ HOSP IP/OBS HIGH 50: CPT | Mod: 25,,,

## 2025-07-30 PROCEDURE — 83735 ASSAY OF MAGNESIUM: CPT | Performed by: STUDENT IN AN ORGANIZED HEALTH CARE EDUCATION/TRAINING PROGRAM

## 2025-07-30 PROCEDURE — 85610 PROTHROMBIN TIME: CPT

## 2025-07-30 PROCEDURE — 80053 COMPREHEN METABOLIC PANEL: CPT | Performed by: STUDENT IN AN ORGANIZED HEALTH CARE EDUCATION/TRAINING PROGRAM

## 2025-07-30 RX ORDER — HYDROMORPHONE HYDROCHLORIDE 2 MG/1
6 TABLET ORAL
Start: 2025-07-30 | End: 2025-08-02

## 2025-07-30 RX ADMIN — PIPERACILLIN SODIUM AND TAZOBACTAM SODIUM 4.5 G: 4; .5 INJECTION, POWDER, FOR SOLUTION INTRAVENOUS at 11:07

## 2025-07-30 RX ADMIN — PANTOPRAZOLE SODIUM 40 MG: 40 INJECTION, POWDER, FOR SOLUTION INTRAVENOUS at 08:07

## 2025-07-30 RX ADMIN — MUPIROCIN: 20 OINTMENT TOPICAL at 08:07

## 2025-07-30 RX ADMIN — HYDROMORPHONE HYDROCHLORIDE 6 MG: 2 TABLET ORAL at 02:07

## 2025-07-30 RX ADMIN — PIPERACILLIN SODIUM AND TAZOBACTAM SODIUM 4.5 G: 4; .5 INJECTION, POWDER, FOR SOLUTION INTRAVENOUS at 03:07

## 2025-07-30 NOTE — PROGRESS NOTES
[facility] - Telemetry Stepdown  Palliative Medicine  Progress Note    Patient Name: Aury Felix  MRN: [id number]  Admission Date: 7/26/2025  Hospital Length of Stay: 4 days  Code Status: Full Code   Attending Provider: aCrol Henderson MD  Consulting Provider: Cat Guallpa DNP  Primary Care Physician: Jimbo Menard II, MD  Principal Problem:ABLA (acute blood loss anemia)    Patient information was obtained from patient, relative(s), past medical records, and primary team.      Assessment/Plan:     Palliative Care  Palliative care encounter  Impression:  Aury Felix is a 54 y.o. female with PMHx of HTN, HLD, DM2 (insulin and orals), newly dxed metastatic duodenal CA and 4 recent hospital stays s/p palliative gastrojejunostomy and IR biliary drain placement, drain still in place, has not started on chemo (has upcoming Heme/Onc clinic appt on 8/7/25,) who presented to the [facility] ED last night with coffee ground hematemesis. She has had several units of blood. The original tumor was not resected due to locally advanced malignancy. However oozing mass temporized with hemostatic gel during EGD 07/27. IR has seen her but prefer not to embolize her due to gastroepiploic artery proximity and they will infarct her stomach. Surgical Oncology and Medical Oncology both shared with patient that she, unfortunately, does not have any additional chemo-directed treatment options. Palliative Medicine was consulted by primary, Dr. Henderson, for end of life care including hospice discussions.    Patient is laying in bed, no acute distress noted. Patient is joined by her 2 best friends. Patient is amenable to palliative care.     - Prior experience with serious illness: no  -The patient has not previously engaged in advance care planning or GOC discussions  - Insight/Understanding of illness: yes    Advance Care Planning    Date: 07/30/2025  - patient resting in bed.  Patient's daughter, Jia, at bedside.   Patient is not in any acute distress.  - symptom management discussed with patient and family.  Patient's daughter inquires about patient's pain medication regimen.  Medication reviewed, and discussed opioid safety.  - patient reports better pain management with PO dilaudid.  - patient's goals remain clearing consistent.  Patient hopes to return home with hospice.   - case discussed with case management.  Patient pending insurance approval.  However, [facility] has accepted patient and anticipates signing consents today.  - patient updated with dispo plan.  Patient shares that she will need a hospital bed, and other resources that she hopes to receive from hospice agency.  Patient encouraged to discuss further with hospice representative.    Date: 07/29/2025    Code Status  In light of the patients advanced and life limiting illness, I engaged the the patient in a voluntary conversation about the patient's preferences for care at the very end of life.  We discussed the risks and benefits of invasive treatments like CPR and intubation.  We acknowledged that if patient is heart or breathing naturally stopped, we can attempt to restart her heart but we are still unable to cure her terminal illness.  We also acknowledged that doing CPR or other invasive treatments do not align with her wishes prioritizing comfort she enters the final chapters of life. I recommended patient modify code status to DNR, we allow her to have a natural, peaceful, and comfortable death as we hope to preserve her dignity. Patient expressed understanding. However, patient wishes to maintain full code status at this time.  I communicated with the patient her code status will remain as full code.  Patient is able to enroll in hospice with full code status.  Would recommend continuing code status discussions as we hope to align patient's care with her goals.      [facility]  I engaged the patient in a voluntary conversation about advance care  planning and we specifically addressed what the goals of care would be moving forward, in light of the patient's change in clinical status, specifically her metastatic disease, acute blood loss, and no cancer directed treatments being offered.  We did specifically address the patient's likely prognosis, which appears to be poor.  We explored the patient's values and preferences for future care.  Patient hopes to return home with hospice.  Philosophies of hospice reviewed.  Patient is patient's friends state that they are aware of hospice care and the resources it provides.  The patient endorses that what is most important right now is to focus on spending time at home, avoiding the hospital, remaining as independent as possible, symptom/pain control, quality of life, even if it means sacrificing a little time, and comfort and QOL     Accordingly, we have decided that the best plan to meet the patient's goals includes enrolling in hospice care and pivot to comfort-focused care      Hospice  I did explain the role for hospice care at this stage of the patient's illness, including its ability to help the patient live with the best quality of life possible.  We willbe making a hospice referral.  When asked if patient had a preferred hospice company she would like to work with, she stated [facility].  Case management team notified.               Life Limiting Diagnosis  Duodenal Cancer  Acute Blood Loss Anemia            Symptom Management  - Pain: yes, non-radiating abdominal pain that worsens at night. Patient attributes her pain to her metastatic abdominal cancer  - LA  reviewed  - 7a-7a OME: 78  - dilaudid 4mg PO q3h PRN pain    - Dyspnea: no  - 24h SpO2: 93-97%  - maintained on RA    - Nausea: yes  - ondansetron 4mg q8h PRN  - patient reports moderate relief medication    - Debility: yes  - Functional status: fair.  Pt was able to manage ADLs  - Fall precautions          Recommendations  - Please see  "above  - Continue management per primary team  - Utilize comfort care order-set as needed  - Patient and family would benefit from continued education and information regarding plan of care, prognosis, and what to expect in the future  - Most important goals at this time: comfort   - Code status: Full Code . Discussed risks and benefits with patient. Recommended changing code status to DNR as it aligns with patient's hopes of preserving comfort. Patient states she would like to leave her code status "as is" at this this time. Patient can enroll in hospice with Full Code status. Recommend continuing code status discussion.  - Disposition: home with hospice        The above recommendations communicated directly to primary team on 7/30/25  Thank you for consulting Palliative Medicine.                I will sign off. Please contact us if you have any additional questions.    Subjective:     Chief Complaint: No chief complaint on file.      HPI:   As per H&P, "Aury Felix is a 54 y.o. female with a PMHx of HTN, HLD, DM2 (insulin and orals), newly dxed metastatic duodenal CA and 4 recent hospital stays s/p gastrojejunostomy and IR biliary drain placement, drain still in place, has not started on chemo (has upcoming Heme/Onc clinic appt on 8/7/25,) who presented to the [facility] ED last night with coffee ground hematemesis.   She has associated weight loss (need to quantify), jaundice, and anorexia with poor PO intake.  In ED, tachycardic 116-140, with soft BP low 100s, and found to have elevated Tbili 17.8 over her baseline of around 8, with severe coagulopathy of INR >10, with relatively normal AST and ALT, and a normal platelet count in the 400s.   She's not on any oral anticoagulants, just a baby ASA.  She was given aggressive fluid resuscitation with NS 1.5L bolus then 125cc/hr, PPI IV gtt.  Lactic acidosis of 4, and after IVFs, Lactate still elevated at 3.7.  H/H 8.6/25.7, down slightly from baseline.        CTA " "bleeding scan with no active bleed found, just in the body of the report a possible thrombus of SMV--  Small nonocclusive filling defect of upper superior mesenteric venous branch (axial series 6, image 70), appears new since prior. Hepatic and splenic veins appear grossly patent.      Discussed case with Sommer Andrew (ED) and Juvenal (MICU).  Worried about an oozing mass, which would need direct visualization.  Mass could have eroded into a vessel, or can get a clot distal to the mass so it becomes a flow problem. Correcting coagulopathy, give Vit K IV (ordered by ED to give prior to transfer) and transfusion of cryoglobulin and FFP 4 units - unfortunately, unable to start transfusing at outside ED prior to transfer, because type and screen is sent by  up to [facility] [place].  Checking fibrinogen.  Discussed with IR Dr Earl Mccarty and GI Dr Nini Zavala.   Per Dr Mccarty, agree with correcting INR to see if that helps, then probably scope from GI to assess for a source, IR has has nothing to target as of now -  "see the gastroepiploic artery coursing in close proximity to the doudenal mass, but if embolize that, a portion of her stomach will likely infarct."    Transfer to [facility] MICU as a Level 1 for potentially life-threatening bleed."    Patient has been stepped down to Hospital Medicine for continued care. During this hospitalization, patient has been seen by Medical Oncology, Surgical Oncology who shared there are no additional treatment options for cancer directed therapy.  Palliative Care was consulted by primary, Dr. Henderson, for end of life care including hospice discussions.    Hospital Course:  No notes on file    Interval History: No acute events overnight. Goals clear and consistent. Pain management improved.    Past Medical History:   Diagnosis Date    Cancer     Diabetes mellitus     Hyperlipidemia     Hypertension        Past Surgical History:   Procedure Laterality Date    " ESOPHAGOGASTRODUODENOSCOPY N/A 4/10/2025    Procedure: EGD (ESOPHAGOGASTRODUODENOSCOPY);  Surgeon: Manny Calles MD;  Location: [facility];  Service: Endoscopy;  Laterality: N/A;    ESOPHAGOGASTRODUODENOSCOPY N/A 5/21/2025    Procedure: EGD (ESOPHAGOGASTRODUODENOSCOPY);  Surgeon: Manny Calles MD;  Location: [facility];  Service: Endoscopy;  Laterality: N/A;    ESOPHAGOGASTRODUODENOSCOPY N/A 7/27/2025    Procedure: EGD (ESOPHAGOGASTRODUODENOSCOPY);  Surgeon: Nini Zavala MD;  Location: [facility] ([address] [place] [address]);  Service: Gastroenterology;  Laterality: N/A;    GASTROJEJUNOSTOMY N/A 5/27/2025    Procedure: GASTROJEJUNOSTOMY;  Surgeon: William Reyes MD;  Location: [facility] OR;  Service: General;  Laterality: N/A;    HYSTERECTOMY      LAPAROTOMY, EXPLORATORY Bilateral 5/27/2025    Procedure: LAPAROTOMY, EXPLORATORY;  Surgeon: William Reyes MD;  Location: [facility] OR;  Service: General;  Laterality: Bilateral;  will need BK US Guide probe BK US T probe [id number] AM 5/23    LIVER BIOPSY  5/27/2025    Procedure: BIOPSY, LIVER;  Surgeon: William Reyes MD;  Location: [facility] OR;  Service: General;;       Review of patient's allergies indicates:  No Known Allergies    Medications:  Continuous Infusions:  Scheduled Meds:   mupirocin   Nasal BID    pantoprazole  40 mg Intravenous Q12H    piperacillin-tazobactam (Zosyn) IV (PEDS and ADULTS) (extended infusion is not appropriate)  4.5 g Intravenous Q8H     PRN Meds:  Current Facility-Administered Medications:     acetaminophen, 650 mg, Oral, Q4H PRN    HYDROmorphone, 1 mg, Intravenous, Q4H PRN    HYDROmorphone, 6 mg, Oral, Q2H PRN    ondansetron, 4 mg, Intravenous, Q8H PRN    sodium chloride 0.9%, 10 mL, Intravenous, PRN    Family History    None       Tobacco Use    Smoking status: Never    Smokeless tobacco: Never   Substance and Sexual Activity    Alcohol use: Yes    Drug use: Never    Sexual activity: Yes       Review of Systems   Constitutional:   Positive for activity change, appetite change and fatigue.   Respiratory: Negative.     Cardiovascular: Negative.    Gastrointestinal:  Positive for abdominal distention, abdominal pain, blood in stool, nausea and vomiting.   Endocrine: Negative.    Genitourinary: Negative.    Musculoskeletal: Negative.    Skin:  Positive for color change.   Allergic/Immunologic: Negative.    Neurological:  Positive for weakness.   Hematological:  Bruises/bleeds easily.     Objective:     Vital Signs (Most Recent):  Temp: 98.6 °F (37 °C) (07/30/25 1125)  Pulse: (!) 123 (07/30/25 1442)  Resp: 16 (07/30/25 1459)  BP: (!) 87/60 (07/30/25 1125)  SpO2: 95 % (07/30/25 1125) Vital Signs (24h Range):  Temp:  [97.9 °F (36.6 °C)-99.5 °F (37.5 °C)] 98.6 °F (37 °C)  Pulse:  [106-123] 123  Resp:  [16-18] 16  SpO2:  [94 %-95 %] 95 %  BP: ()/(59-69) 87/60        There is no height or weight on file to calculate BMI.       Physical Exam  Vitals and nursing note reviewed.   Constitutional:       Appearance: She is ill-appearing and toxic-appearing.   HENT:      Head: Normocephalic and atraumatic.      Nose: Nose normal.      Mouth/Throat:      Mouth: Mucous membranes are dry.   Eyes:      Extraocular Movements: Extraocular movements intact.   Cardiovascular:      Rate and Rhythm: Normal rate.   Pulmonary:      Effort: Pulmonary effort is normal.   Abdominal:      General: There is distension.      Tenderness: There is abdominal tenderness.   Skin:     General: Skin is warm and dry.      Coloration: Skin is jaundiced.      Findings: Bruising present.   Neurological:      Mental Status: She is alert and oriented to person, place, and time.      Motor: Weakness present.            Review of Symptoms      Symptom Assessment (ESAS 0-10 Scale)  Pain:  10  Dyspnea:  0  Anxiety:  0  Nausea:  0  Depression:  0  Anorexia:  0  Fatigue:  0  Insomnia:  0  Restlessness:  0  Agitation:  0     CAM / Delirium:  Negative  Constipation:  Negative  Diarrhea:   "Negative      Bowel Management Plan (BMP):  Yes      Pain Assessment:  OME in 24 hours:  40  Location(s): abdomen    Abdomen       Location: bilateral        Quality: Sharp, cramping and burning        Quantity: 10/10 in intensity        Chronicity: Onset 5 month(s) ago, gradually worsening        Aggravating Factors: Recumbency        Alleviating Factors: Opiates        Associated Symptoms: None    Modified Hever Scale:  0    ECOG Performance Status rdGrdrrdarddrderd:rd rd3rd Living Arrangements:  Lives alone    Psychosocial/Cultural:   See Palliative Psychosocial Note: Yes  - not . Significant other is Gigi Garrett  - 4 adult children. 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 Patient shares she is not interested in seeing a thaddeus today.        Advance Care Planning  Advance Directives:   Living Will: No    LaPOST: No    Do Not Resuscitate Status: No    Medical Power of : No      Decision Making:  Patient answered questions  Goals of Care: The patient endorses that what is most important right now is to focus on spending time at home, avoiding the hospital, remaining as independent as possible, symptom/pain control, quality of life, even if it means sacrificing a little time, and comfort and QOL     Accordingly, we have decided that the best plan to meet the patient's goals includes enrolling in hospice care         Significant Labs: All pertinent labs within the past 24 hours have been " reviewed.  CBC:   Recent Labs   Lab 07/30/25 0333   WBC 26.95*   HGB 7.4*   HCT 23.4*   MCV 86   *     BMP:  Recent Labs   Lab 07/30/25  0333   GLU 86      K 3.7      CO2 21*   BUN 11   CREATININE 0.7   CALCIUM 7.7*   MG 1.8     LFT:  Lab Results   Component Value Date     (H) 07/30/2025    ALKPHOS 278 (H) 07/30/2025    BILITOT 17.9 (H) 07/30/2025     Albumin:   Albumin   Date Value Ref Range Status   07/30/2025 1.6 (L) 3.5 - 5.2 g/dL Final   01/17/2025 3.5 3.5 - 5.2 g/dL Final     Protein:   Protein Total   Date Value Ref Range Status   07/30/2025 5.3 (L) 6.0 - 8.4 gm/dL Final     Total Protein   Date Value Ref Range Status   01/17/2025 8.2 6.0 - 8.4 g/dL Final     Lactic acid:   Lab Results   Component Value Date    LACTATE 2.3 (H) 07/27/2025    LACTATE 2.6 (H) 07/26/2025       Significant Imaging: I have reviewed all pertinent imaging results/findings within the past 24 hours.    I spent a total of 50 minutes on the day of the visit. This includes face to face time in discussion of goals of care, symptom assessment, coordination of care and emotional support. This also includes non-face to face time preparing to see the patient (eg, review of tests/imaging), obtaining and/or reviewing separately obtained history, documenting clinical information in the electronic or other health record, independently interpreting results and communicating results to the patient/family/caregiver, or care coordinator.       Cat Guallpa DNP  Palliative Medicine  [facility] - Telemetry Stepdown

## 2025-07-30 NOTE — PLAN OF CARE
Pt going home with [facility]. Transport setup for 3pm by ambulance.    Ivan Boss LMSW    [facility]  [phone]

## 2025-07-30 NOTE — CARE UPDATE
RAPID RESPONSE NURSE CHART REVIEW        Chart Reviewed: 07/30/2025, 7:05 AM    MRN: [id number]  Bed: 8090/8097 A    Dx: ABLA (acute blood loss anemia)    Aury Felix has a past medical history of Cancer, Diabetes mellitus, Hyperlipidemia, and Hypertension.    Last VS: BP 96/61   Pulse (!) 118   Temp 97.9 °F (36.6 °C)   Resp 18   SpO2 (!) 94%     24H Vital Sign Range:  Temp:  [97.9 °F (36.6 °C)-98.7 °F (37.1 °C)]   Pulse:  [106-118]   Resp:  [17-18]   BP: ()/(59-70)   SpO2:  [94 %-97 %]     Level of Consciousness (AVPU): alert    Recent Labs     07/29/25  0749 07/29/25  1440 07/30/25  0333   WBC 28.28* 28.61* 26.95*   HGB 8.0* 7.5* 7.4*   HCT 26.0* 23.4* 23.4*   * 515* 517*       Recent Labs     07/28/25  0427 07/29/25  0749 07/30/25  0333   * 135* 136   K 3.9 4.2 3.7    105 103   CO2 17* 17* 21*   BUN 14 11 11   CREATININE 0.5 0.6 0.7   * 99 86   PHOS 2.3* 2.8 2.6*   MG 2.0 1.9 1.8          MEWS score: 4    Rounding completed at 0905.    Rounding completed with charge nurse Keren for tachycardia reports HR within patient's baseline, BP stable, cardiac monitoring in place. No acute concerns verbalized at this time. Instructed to call [phone] for further concerns or assistance.    Nevin Monroy RN

## 2025-07-30 NOTE — PLAN OF CARE
CM sent hospice orders to [facility] via [email or web address].      Sindi Graham RN     [phone]

## 2025-07-30 NOTE — PROGRESS NOTES
[facility] - Telemetry [facility] Medicine  Progress Note    Patient Name: Aury Felix  MRN: [id number]  Patient Class: IP- Inpatient   Admission Date: 7/26/2025  Length of Stay: 3 days  Attending Physician: Carol Henderson MD  Primary Care Provider: Jimbo Menard II, MD        Subjective     Principal Problem:ABLA (acute blood loss anemia)        HPI:  uAry Felix is a 54F with HTN, HLD, DM2 (insulin and orals), newly dxed metastatic duodenal CA and 4 recent hospital stays s/p gastrojejunostomy and IR biliary drain placement, drain still in place, has not started on chemo (has upcoming Heme/Onc clinic appt on 8/7/25,) who presented to the [facility] ED last night with coffee ground hematemesis.   She has associated weight loss (need to quantify), jaundice, and anorexia with poor PO intake.  In ED, tachycardic 116-140, with soft BP low 100s, and found to have elevated Tbili 17.8 over her baseline of around 8, with severe coagulopathy of INR >10, with relatively normal AST and ALT, and a normal platelet count in the 400s.   She's not on any oral anticoagulants, just a baby ASA.  She was given aggressive fluid resuscitation with NS 1.5L bolus then 125cc/hr, PPI IV gtt.  Lactic acidosis of 4, and after IVFs, Lactate still elevated at 3.7.  H/H 8.6/25.7, down slightly from baseline.        CTA bleeding scan with no active bleed found, just in the body of the report a possible thrombus of SMV--  Small nonocclusive filling defect of upper superior mesenteric venous branch (axial series 6, image 70), appears new since prior. Hepatic and splenic veins appear grossly patent.      Discussed case with Sommer Andrew (ED) and Juvenal (MICU).  Worried about an oozing mass, which would need direct visualization.  Mass could have eroded into a vessel, or can get a clot distal to the mass so it becomes a flow problem. Correcting coagulopathy, give Vit K IV (ordered by ED to give prior to transfer) and  "transfusion of cryoglobulin and FFP 4 units - unfortunately, unable to start transfusing at outside ED prior to transfer, because type and screen is sent by  up to [facility] [place].  Checking fibrinogen.  Discussed with IR Dr Earl Mccarty and GI Dr Nini Zavala.   Per Dr Mccarty, agree with correcting INR to see if that helps, then probably scope from GI to assess for a source, IR has has nothing to target as of now -  "see the gastroepiploic artery coursing in close proximity to the doudenal mass, but if embolize that, a portion of her stomach will likely infarct."    [place] [facility] 1 for potentially life-threatening bleed.      [place] [facility] reports some abdominal pain relieved with morphine. Her BP stable, HR 110s. Labs notable for WBC 28, Hg 7.6 > 6.5, repeat INR 2.2. Fibrinogen normal. Lactic acid 3.7.     Procedure Date:   EGD 7/27/25  Gastrojejunostomy 5/27/25  Biliary Drain placed by Dr. Ramírez 7/17/25       Overview/Hospital Course:  Aury Felix is a 54F with HTN, HLD, DM2 (insulin and orals), newly dxed metastatic duodenal CA and 4 recent hospital stays s/p palliative gastrojejunostomy and IR biliary drain placement, drain still in place, has not started on chemo (has upcoming Heme/Onc clinic appt on 8/7/25,) who presented to the [facility] ED last night with coffee ground hematemesis. She has had several units of blood. The original tumor was not resected due to locally advanced malignancy. However oozing mass temporized with hemostatic gel during EGD 07/27. IR has seen her but prefer not to embolize her due to gastroepiploic artery proximity and they will infarct her stomach. Biliar draining leaking. Xray did show biliary drain had migrated into the small bowel, IR retracted it and secured it on 07/27 wth visualized good drainage and leakage stopped. Surg onc consulted but they are not offering her surgical resection and concerned about feasibility of resection given " "locally advanced cancer.  They recommend palliative consult and possible palliative radiation.         Deemed appropriate for transfer to the floor on 7/27/2025, and was accepted to  team U for further care and management.          Interval History:     Consulted palliative. Patient going home on hospice though remains full code. Specifically stated "just leave everything how it is" [in the system].     Hospice pending. [facility] selected because operating in the area.     Trying to achieve pain control with PO meds.  Started morphine 20 without success. Adding PO dilaudid as well.     Review of Systems  Objective:     Vital Signs (Most Recent):  Temp: 98 °F (36.7 °C) (07/29/25 2017)  Pulse: (!) 116 (07/29/25 2315)  Resp: 18 (07/29/25 2018)  BP: (!) 94/59 (07/29/25 2017)  SpO2: (!) 94 % (07/29/25 2017) Vital Signs (24h Range):  Temp:  [98 °F (36.7 °C)-98.7 °F (37.1 °C)] 98 °F (36.7 °C)  Pulse:  [106-118] 116  Resp:  [17-18] 18  SpO2:  [93 %-97 %] 94 %  BP: ()/(59-70) 94/59        There is no height or weight on file to calculate BMI.  No intake or output data in the 24 hours ending 07/29/25 2329        Physical Exam    Vitals and nursing note reviewed.   Constitutional:       Appearance: She is well-developed. She is ill-appearing and toxic-appearing.   HENT:      Head: Normocephalic.      Mouth/Throat:      Mouth: Mucous membranes are moist.   Eyes:      General: Scleral icterus present.      Pupils: Pupils are equal, round, and reactive to light.   Cardiovascular:      Rate and Rhythm: Normal rate and regular rhythm.      Pulses: Normal pulses.   Pulmonary:      Effort: Pulmonary effort is normal.   Abdominal:      General: Bowel sounds are normal. There is no distension.      Palpations: Abdomen is soft.      Tenderness: There is abdominal tenderness.   Musculoskeletal:         General: No swelling.   Skin:     General: Skin is warm and dry.      Coloration: Skin is jaundiced.   Neurological:      " Mental Status: She is alert.       Significant Labs: All pertinent labs within the past 24 hours have been reviewed.    Significant Imaging: I have reviewed all pertinent imaging results/findings within the past 24 hours.      Assessment & Plan  ABLA (acute blood loss anemia)    54F with newly dxed metastatic duodenal CA and 4 recent hospital stays s/p gastrojejunostomy and IR biliary drain placement presented with coffee ground emesis, abdominal pain, jaundice and weight loss. INR >10 prior to transfer but 2.2 on repeat here prior to any interventions. CTA prior to transfer w/o active bleed. Required 1u pRBCs, given FFP and cryo for elevated INR in setting of active bleed.  - GI consulted         - s/p hemostatic spray on EGD on 07/27  - correcting coagulopathy with goal INR <2.0, plt >50  - pRBC to maintain Hg >7  - holding AC  - q8 CBC  - f/u repeat lactate  -repeat lactic downtrending      Anemia is likely due to acute blood loss which was from duodenal mass. Most recent hemoglobin and hematocrit are listed below.  Recent Labs     07/28/25  0427 07/29/25  0749 07/29/25  1440   HGB 8.2* 8.0* 7.5*   HCT 25.2* 26.0* 23.4*     Plan  - Monitor serial CBC: Every 12 hours  - Transfuse PRBC if patient becomes hemodynamically unstable, symptomatic or H/H drops below 7/21.  - Patient   - Patient's anemia is currently stable    Essential hypertension  Patient's blood pressure range in the last 24 hours was: BP  Min: 94/59  Max: 113/69.The patient's inpatient anti-hypertensive regimen is listed below:  Current Antihypertensives       Plan  - BP is controlled, no changes needed to their regimen  - holding BP meds in setting of active bleed.   Duodenal cancer  Recently diagnosed, metastatic, not started on chemotherapy yet.  -IR unlikely to embolize, If she develops more evidence of bleeding recommend repeat CTA (GI bleed protocol) to assess for active source of hemorrhage.   -EGD showed oozing mass, hemostatic gel  deployed  -Surgical oncology consulted : no palliative resection offered  - Med onc consulted : no chemotherapy offered   - palliative consult in the AM     Reactive thrombocytosis    Elevated platelets on admission.   - daily CBC  Coagulopathy  INR at OSH with INR >10 x2 but INR 2.2 here prior to intervention. Likely due to liver dysfunction.  Received cryo x1 and FFP x1.  - daily PT-INR  - maintain INR <2 and plt >50 per GI recs    Leukocytosis  Possibly reactive due to cancer and GIB bleed; however, given percutaneous drainage catheter, will cover with abx for now.  - zosyn for intraabdominal coverage  - f/u Bcx    Palliative care encounter      VTE Risk Mitigation (From admission, onward)           Ordered     IP VTE HIGH RISK PATIENT  Once         07/26/25 1057     Place sequential compression device  Until discontinued         07/26/25 1057     Reason for No Pharmacological VTE Prophylaxis  Once        Question:  Reasons:  Answer:  Active Bleeding    07/26/25 1057                    Discharge Planning   MAIDA: 7/30/2025     Code Status: Full Code   Medical Readiness for Discharge Date:   Discharge Plan A: Hospice/home                        Carol Henderson MD  Department of Hospital Medicine   [facility] - Telemetry Stepdown

## 2025-07-30 NOTE — SUBJECTIVE & OBJECTIVE
"Interval History:     Consulted palliative. Patient going home on hospice though remains full code. Specifically stated "just leave everything how it is" [in the system].     Hospice pending. [facility] selected because operating in the area.     Trying to achieve pain control with PO meds.  Started morphine 20 without success. Adding PO dilaudid as well.     Review of Systems  Objective:     Vital Signs (Most Recent):  Temp: 98 °F (36.7 °C) (07/29/25 2017)  Pulse: (!) 116 (07/29/25 2315)  Resp: 18 (07/29/25 2018)  BP: (!) 94/59 (07/29/25 2017)  SpO2: (!) 94 % (07/29/25 2017) Vital Signs (24h Range):  Temp:  [98 °F (36.7 °C)-98.7 °F (37.1 °C)] 98 °F (36.7 °C)  Pulse:  [106-118] 116  Resp:  [17-18] 18  SpO2:  [93 %-97 %] 94 %  BP: ()/(59-70) 94/59        There is no height or weight on file to calculate BMI.  No intake or output data in the 24 hours ending 07/29/25 2329        Physical Exam    Vitals and nursing note reviewed.   Constitutional:       Appearance: She is well-developed. She is ill-appearing and toxic-appearing.   HENT:      Head: Normocephalic.      Mouth/Throat:      Mouth: Mucous membranes are moist.   Eyes:      General: Scleral icterus present.      Pupils: Pupils are equal, round, and reactive to light.   Cardiovascular:      Rate and Rhythm: Normal rate and regular rhythm.      Pulses: Normal pulses.   Pulmonary:      Effort: Pulmonary effort is normal.   Abdominal:      General: Bowel sounds are normal. There is no distension.      Palpations: Abdomen is soft.      Tenderness: There is abdominal tenderness.   Musculoskeletal:         General: No swelling.   Skin:     General: Skin is warm and dry.      Coloration: Skin is jaundiced.   Neurological:      Mental Status: She is alert.       Significant Labs: All pertinent labs within the past 24 hours have been reviewed.    Significant Imaging: I have reviewed all pertinent imaging results/findings within the past 24 hours.  "

## 2025-07-30 NOTE — PLAN OF CARE
Problem: Adult Inpatient Plan of Care  Goal: Plan of Care Review  Outcome: Progressing     Problem: Diabetes Comorbidity  Goal: Blood Glucose Level Within Targeted Range  Outcome: Progressing     Problem: Fall Injury Risk  Goal: Absence of Fall and Fall-Related Injury  Outcome: Progressing     Problem: Skin Injury Risk Increased  Goal: Skin Health and Integrity  Outcome: Progressing     Problem: Coping Ineffective  Goal: Effective Coping  Outcome: Progressing

## 2025-07-30 NOTE — PLAN OF CARE
[facility]  Department of Hospital Medicine  [address] [place] [address]  [phone] after hours  [phone] fax    HOSPICE  ORDERS    07/30/2025    Admit to Hospice:  Home Service   Diagnoses:   Active Hospital Problems    Diagnosis  POA    *ABLA (acute blood loss anemia) [D62]  Yes    Palliative care encounter [Z51.5]  Not Applicable    Coagulopathy [D68.9]  Yes    Leukocytosis [D72.829]  Yes    Reactive thrombocytosis [D75.838]  Yes    Duodenal cancer [C17.0]  Yes    Essential hypertension [I10]  Yes     Taking ACEi         Resolved Hospital Problems   No resolved problems to display.       Hospice Qualifying Diagnoses:        Patient has a life expectancy < 6 months due to:  Primary Hospice Diagnosis:  Duodenal Cancer with local metastasis and liver failure.   Comorbid Conditions Contributing to Decline: recurrent bleeding.     Vital Signs: Routine per Hospice Protocol.    Code Status: FULL CODE, discussions ongoing     Allergies: Review of patient's allergies indicates:  No Known Allergies    Diet: Patient preference, no contraindication to oral diet.     Activities: As tolerated    Goals of Care Treatment Preferences:  Code Status: Full Code          What is most important right now is to focus on spending time at home, avoiding the hospital, remaining as independent as possible, symptom/pain control, quality of life, even if it means sacrificing a little time, comfort and QOL .  Accordingly, we have decided that the best plan to meet the patient's goals includes enrolling in hospice care, pivot to comfort-focused care.      Nursing: Per Hospice Routine.      Colostomy/ Biliary Drain Care:  Colostomy bag is actually place around biliary drain to prevent leakage. Empty bag every shift and prn                                             Change and clean site every 48 hours      Other Miscellaneous Care: Biliary Drain leaking significantly so colostomy bag  placed around drain. Please empty bag every shift and as needed.       Medications:          Medication List        START taking these medications      HYDROmorphone 2 MG tablet  Commonly known as: DILAUDID  Take 3 tablets (6 mg total) by mouth every 2 (two) hours as needed for Pain.            STOP taking these medications      aspirin 81 MG EC tablet  Commonly known as: ECOTRIN     ciprofloxacin HCl 750 MG tablet  Commonly known as: CIPRO     ferrous sulfate 325 (65 FE) MG EC tablet     JARDIANCE 25 mg tablet  Generic drug: empagliflozin     LANTUS SOLOSTAR U-100 INSULIN 100 unit/mL (3 mL) Inpn pen  Generic drug: insulin glargine U-100 (Lantus)     lisinopriL 20 MG tablet  Commonly known as: PRINIVIL,ZESTRIL     metroNIDAZOLE 500 MG tablet  Commonly known as: FLAGYL     oxyCODONE 5 MG immediate release tablet  Commonly known as: ROXICODONE     pantoprazole 40 MG tablet  Commonly known as: PROTONIX     polyethylene glycol 17 gram Pwpk  Commonly known as: GLYCOLAX     rosuvastatin 40 MG Tab  Commonly known as: CRESTOR     simethicone 125 MG chewable tablet  Commonly known as: MYLICON                Future Orders:  Hospice Medical Director may dictate new orders for comfortable care measures & sign death certificate.        _________________________________  Carol Henderson MD  07/30/2025

## 2025-07-30 NOTE — ASSESSMENT & PLAN NOTE
Impression:  Aury Felix is a 54 y.o. female with PMHx of HTN, HLD, DM2 (insulin and orals), newly dxed metastatic duodenal CA and 4 recent hospital stays s/p palliative gastrojejunostomy and IR biliary drain placement, drain still in place, has not started on chemo (has upcoming Heme/Onc clinic appt on 8/7/25,) who presented to the [facility] ED last night with coffee ground hematemesis. She has had several units of blood. The original tumor was not resected due to locally advanced malignancy. However oozing mass temporized with hemostatic gel during EGD 07/27. IR has seen her but prefer not to embolize her due to gastroepiploic artery proximity and they will infarct her stomach. Surgical Oncology and Medical Oncology both shared with patient that she, unfortunately, does not have any additional chemo-directed treatment options. Palliative Medicine was consulted by primary, Dr. Henderson, for end of life care including hospice discussions.    Patient is laying in bed, no acute distress noted. Patient is joined by her 2 best friends. Patient is amenable to palliative care.     - Prior experience with serious illness: no  -The patient has not previously engaged in advance care planning or GOC discussions  - Insight/Understanding of illness: yes    Advance Care Planning     Date: 07/30/2025  - patient resting in bed.  Patient's daughter, Jia, at bedside.  Patient is not in any acute distress.  - symptom management discussed with patient and family.  Patient's daughter inquires about patient's pain medication regimen.  Medication reviewed, and discussed opioid safety.  - patient reports better pain management with PO dilaudid.  - patient's goals remain clearing consistent.  Patient hopes to return home with hospice.   - case discussed with case management.  Patient pending insurance approval.  However, [facility] has accepted patient and anticipates signing consents today.  - patient updated with dispo  plan.  Patient shares that she will need a hospital bed, and other resources that she hopes to receive from hospice agency.  Patient encouraged to discuss further with hospice representative.    Date: 07/29/2025    Code Status  In light of the patients advanced and life limiting illness, I engaged the the patient in a voluntary conversation about the patient's preferences for care at the very end of life.  We discussed the risks and benefits of invasive treatments like CPR and intubation.  We acknowledged that if patient is heart or breathing naturally stopped, we can attempt to restart her heart but we are still unable to cure her terminal illness.  We also acknowledged that doing CPR or other invasive treatments do not align with her wishes prioritizing comfort she enters the final chapters of life. I recommended patient modify code status to DNR, we allow her to have a natural, peaceful, and comfortable death as we hope to preserve her dignity. Patient expressed understanding. However, patient wishes to maintain full code status at this time.  I communicated with the patient her code status will remain as full code.  Patient is able to enroll in hospice with full code status.  Would recommend continuing code status discussions as we hope to align patient's care with her goals.      [facility]  I engaged the patient in a voluntary conversation about advance care planning and we specifically addressed what the goals of care would be moving forward, in light of the patient's change in clinical status, specifically her metastatic disease, acute blood loss, and no cancer directed treatments being offered.  We did specifically address the patient's likely prognosis, which appears to be poor.  We explored the patient's values and preferences for future care.  Patient hopes to return home with hospice.  Philosophies of hospice reviewed.  Patient is patient's friends state that they are aware of hospice care and the resources  "it provides.  The patient endorses that what is most important right now is to focus on spending time at home, avoiding the hospital, remaining as independent as possible, symptom/pain control, quality of life, even if it means sacrificing a little time, and comfort and QOL     Accordingly, we have decided that the best plan to meet the patient's goals includes enrolling in hospice care and pivot to comfort-focused care      Hospice  I did explain the role for hospice care at this stage of the patient's illness, including its ability to help the patient live with the best quality of life possible.  We willbe making a hospice referral.  When asked if patient had a preferred hospice company she would like to work with, she stated [facility].  Case management team notified.               Life Limiting Diagnosis  Duodenal Cancer  Acute Blood Loss Anemia            Symptom Management  - Pain: yes, non-radiating abdominal pain that worsens at night. Patient attributes her pain to her metastatic abdominal cancer  - [place]  reviewed  - 7a-7a OME: 78  - dilaudid 4mg PO q3h PRN pain    - Dyspnea: no  - 24h SpO2: 93-97%  - maintained on RA    - Nausea: yes  - ondansetron 4mg q8h PRN  - patient reports moderate relief medication    - Debility: yes  - Functional status: fair.  Pt was able to manage ADLs  - Fall precautions          Recommendations  - Please see above  - Continue management per primary team  - Utilize comfort care order-set as needed  - Patient and family would benefit from continued education and information regarding plan of care, prognosis, and what to expect in the future  - Most important goals at this time: comfort   - Code status: Full Code . Discussed risks and benefits with patient. Recommended changing code status to DNR as it aligns with patient's hopes of preserving comfort. Patient states she would like to leave her code status "as is" at this this time. Patient can enroll in hospice with Full " Code status. Recommend continuing code status discussion.  - Disposition: home with hospice        The above recommendations communicated directly to primary team on 7/30/25  Thank you for consulting Palliative Medicine.

## 2025-07-30 NOTE — PLAN OF CARE
Advance Care Planning   [address] - Telemetry Stepdown  Palliative Care   Psychosocial Assessment    Patient Name: Aury Felix  MRN: [id number]  Admission Date: 7/26/2025  Hospital Length of Stay: 4 days  Code Status: Full Code   Attending Provider: Carol Henderson MD  Palliative Care Provider:   Primary Care Physician: Jimbo Menard II, MD  Principal Problem:ABLA (acute blood loss anemia)    Reason for Referral: assistance with clarification of goals of care  Consult Order Date:   Primary CM/SW:    Present during Interview: patient, relative(s), past medical records, and ER records.      Primary Language:English   Needed: no      Past Medical Situation:   PMH:   Past Medical History:   Diagnosis Date    Cancer     Diabetes mellitus     Hyperlipidemia     Hypertension      Mental Health/Substance Use History: none disclosed   Risk of Abuse, neglect or exploitation: none identified   Current or Previous Trauma and/or evidence of PTSD: none disclosed   Non-traditional Health practices: none disclosed     Understanding of diagnosis and prognosis: good   Experience/Comfort level with health care system: no issues identified    Patients Mental Status: AAOx3    Socio-Economic Factors/Resources:  Address: [address] [place] [address]  Phone Number: There are no phone numbers on file.    Marital Status: long term partner, Lalo  Household composition: Lalo   Children: yes    Patient/Family perceptions about Caregiving Needs; availability and capacity: pt/family aware of increased cg needs    Family Structure, Dynamics/Relationships: pt has a good relationship with family.     Patterns/Styles of Communication and Decision-making in the Family: pt is decisional     Patient/Family Coping: appropriate      Activities of Daily Living: needs assistance   Support Systems-Family & Community (Home Health, HME etc): TBD      Transportation:  yes    Work/Education History:  unemployed  Self-Care Activities/Hobbies: spending time with family     History: no    Financial Resources:Medicaid pending       Advanced Care Planning & Legal Concerns:   Advanced Directives/Living Will: no  LaPOST/POLST: no   Planning:  no    Medical Power of : no     Oral/Written Declaration: no  Witnesses:   Surrogate Decision Maker:     Emergency Contacts:    Spirituality, Culture & Coping Mechanisms:  F- Tamar and Belief: Shinto     I - Importance:  not discussed today     C - Community/Culture Values: not discussed today      A - Address in Care: not discussed today       Goals/Hopes/Expectations: comfort at home through end of life   Fears/Anxiety/Concerns: discomfort         Preferences about EOL Environment: (own bed, family nearby, pets, music, etc) home      Complicated Bereavement Risk Assessment Tool (CBRAT)  Reference:  [facility] (May 2016). Bereavement Risk Screening and Management Guidelines.  Retrieved from: http://www.[facility].com.au/wp-content/uploads//LUJOR-Cjgtngktyki-Hlzvapzku-and-Management-Guideline-2016.pdf      Bereaved Client Characteristics   Under 18      no  Was a Twin   no  Young Spouse   no  Elderly Spouse    no  Isolated    no  Lacks Meaningful Social Support   no  Dissatisfied with help available during illness   no  New to [employer] [place] no  New to Decision-Making   no    Illness  Inherited Disorder   no  Stigmatized Disease in the family/community   no  Lengthy/Burdensome   yes     Bereaved Client's History of Loss   Cumulative Multiple Losses   no  Previous Mental Health Illnesses   no  Current Mental Health Illness   no  Other Significant Health Issues   no   Migrant/Refugee   no Will the Death be:  Sudden or Unexpected   no  Traumatic Circumstances Associated with Death   no  Significant Cultural/Social Burdens as a result of Death   no   Relationship with    Profound Lifelong Partner   no  Highly Dependent    no  Antagonistic   no  Ambivalent   no  Deeply Connected   yes  Culturally Defined   yes   Risk Factors Scores  0-2  Low  3-5  Moderate  5+  High  All persons scoring moderate to high presume to be at risk**    (** It is acknowledged that protective factors and resilience may outweigh apparent risk factors.      Total Risk Factors Score:   3, moderate     Advance Care Planning     Date: 2025    [facility]  I engaged the patient and family in a voluntary conversation about advance care planning and we specifically addressed what the goals of care would be moving forward, in light of the patient's change in clinical status, specifically duodenal cancer.  We did specifically address the patient's likely prognosis, which is poor.  We explored the patient's values and preferences for future care.  The patient and family endorses that what is most important right now is to focus on comfort and QOL     Accordingly, we have decided that the best plan to meet the patient's goals includes enrolling in hospice care    A total of 25 min was spent on advance care planning, goals of care discussion, emotional support, formulating and communicating prognosis and exploring burden/benefit of various approaches of treatment. This discussion occurred on a fully voluntary basis with the verbal consent of the patient and/or family.         Discharge Planning Needs/Plan of Care:     TWAN, along with SUZIE WORTHINGTON, met with pt and family at bedside. Pt AAOx3, in good spirits. Pt reports feeling better, in less pain, after medication adjustments made yesterday. Pt relays that she wants to go home, and has agreed to hospice care. Pt has expressed desire to meet with [facility]. No other needs identified at this time.     Shira Davis, TWAN-RAJS, [facility]-  Department of Palliative Medicine

## 2025-07-30 NOTE — SUBJECTIVE & OBJECTIVE
Interval History: No acute events overnight. Goals clear and consistent. Pain management improved.    Past Medical History:   Diagnosis Date    Cancer     Diabetes mellitus     Hyperlipidemia     Hypertension        Past Surgical History:   Procedure Laterality Date    ESOPHAGOGASTRODUODENOSCOPY N/A 4/10/2025    Procedure: EGD (ESOPHAGOGASTRODUODENOSCOPY);  Surgeon: Manny Calles MD;  Location: [facility];  Service: Endoscopy;  Laterality: N/A;    ESOPHAGOGASTRODUODENOSCOPY N/A 5/21/2025    Procedure: EGD (ESOPHAGOGASTRODUODENOSCOPY);  Surgeon: Manny Calles MD;  Location: [facility];  Service: Endoscopy;  Laterality: N/A;    ESOPHAGOGASTRODUODENOSCOPY N/A 7/27/2025    Procedure: EGD (ESOPHAGOGASTRODUODENOSCOPY);  Surgeon: Nini Zavala MD;  Location: [facility] ([address] [place] [address]);  Service: Gastroenterology;  Laterality: N/A;    GASTROJEJUNOSTOMY N/A 5/27/2025    Procedure: GASTROJEJUNOSTOMY;  Surgeon: William Reyes MD;  Location: [facility] OR;  Service: General;  Laterality: N/A;    HYSTERECTOMY      LAPAROTOMY, EXPLORATORY Bilateral 5/27/2025    Procedure: LAPAROTOMY, EXPLORATORY;  Surgeon: William Reyes MD;  Location: [facility] OR;  Service: General;  Laterality: Bilateral;  will need BK US Guide probe BK US T probe [id number] AM 5/23    LIVER BIOPSY  5/27/2025    Procedure: BIOPSY, LIVER;  Surgeon: William Reyes MD;  Location: [facility];  Service: General;;       Review of patient's allergies indicates:  No Known Allergies    Medications:  Continuous Infusions:  Scheduled Meds:   mupirocin   Nasal BID    pantoprazole  40 mg Intravenous Q12H    piperacillin-tazobactam (Zosyn) IV (PEDS and ADULTS) (extended infusion is not appropriate)  4.5 g Intravenous Q8H     PRN Meds:  Current Facility-Administered Medications:     acetaminophen, 650 mg, Oral, Q4H PRN    HYDROmorphone, 1 mg, Intravenous, Q4H PRN    HYDROmorphone, 6 mg, Oral, Q2H PRN    ondansetron, 4 mg, Intravenous, Q8H PRN    sodium  chloride 0.9%, 10 mL, Intravenous, PRN    Family History    None       Tobacco Use    Smoking status: Never    Smokeless tobacco: Never   Substance and Sexual Activity    Alcohol use: Yes    Drug use: Never    Sexual activity: Yes       Review of Systems   Constitutional:  Positive for activity change, appetite change and fatigue.   Respiratory: Negative.     Cardiovascular: Negative.    Gastrointestinal:  Positive for abdominal distention, abdominal pain, blood in stool, nausea and vomiting.   Endocrine: Negative.    Genitourinary: Negative.    Musculoskeletal: Negative.    Skin:  Positive for color change.   Allergic/Immunologic: Negative.    Neurological:  Positive for weakness.   Hematological:  Bruises/bleeds easily.     Objective:     Vital Signs (Most Recent):  Temp: 98.6 °F (37 °C) (07/30/25 1125)  Pulse: (!) 123 (07/30/25 1442)  Resp: 16 (07/30/25 1459)  BP: (!) 87/60 (07/30/25 1125)  SpO2: 95 % (07/30/25 1125) Vital Signs (24h Range):  Temp:  [97.9 °F (36.6 °C)-99.5 °F (37.5 °C)] 98.6 °F (37 °C)  Pulse:  [106-123] 123  Resp:  [16-18] 16  SpO2:  [94 %-95 %] 95 %  BP: ()/(59-69) 87/60        There is no height or weight on file to calculate BMI.       Physical Exam  Vitals and nursing note reviewed.   Constitutional:       Appearance: She is ill-appearing and toxic-appearing.   HENT:      Head: Normocephalic and atraumatic.      Nose: Nose normal.      Mouth/Throat:      Mouth: Mucous membranes are dry.   Eyes:      Extraocular Movements: Extraocular movements intact.   Cardiovascular:      Rate and Rhythm: Normal rate.   Pulmonary:      Effort: Pulmonary effort is normal.   Abdominal:      General: There is distension.      Tenderness: There is abdominal tenderness.   Skin:     General: Skin is warm and dry.      Coloration: Skin is jaundiced.      Findings: Bruising present.   Neurological:      Mental Status: She is alert and oriented to person, place, and time.      Motor: Weakness present.         "    Review of Symptoms      Symptom Assessment (ESAS 0-10 Scale)  Pain:  10  Dyspnea:  0  Anxiety:  0  Nausea:  0  Depression:  0  Anorexia:  0  Fatigue:  0  Insomnia:  0  Restlessness:  0  Agitation:  0     CAM / Delirium:  Negative  Constipation:  Negative  Diarrhea:  Negative      Bowel Management Plan (BMP):  Yes      Pain Assessment:  OME in 24 hours:  40  Location(s): abdomen    Abdomen       Location: bilateral        Quality: Sharp, cramping and burning        Quantity: 10/10 in intensity        Chronicity: Onset 5 month(s) ago, gradually worsening        Aggravating Factors: Recumbency        Alleviating Factors: Opiates        Associated Symptoms: None    Modified Hever Scale:  0    ECOG Performance Status stGstrstastdstest:st st1st Living Arrangements:  Lives alone    Psychosocial/Cultural:   See Palliative Psychosocial Note: Yes  - not . Significant other is Gigi Garrett  - 4 adult children. 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 Patient shares she is not interested in seeing a tj today.        Advance Care Planning   Advance Directives:   Living Will: No    LaPOST: No    Do Not Resuscitate Status: No    Medical Power of : No      Decision Making:  Patient answered questions  Goals of Care: The patient endorses that what is most important right now is to focus on spending time at home, avoiding the hospital, remaining as independent as possible, symptom/pain control, quality of life, " even if it means sacrificing a little time, and comfort and QOL     Accordingly, we have decided that the best plan to meet the patient's goals includes enrolling in hospice care         Significant Labs: All pertinent labs within the past 24 hours have been reviewed.  CBC:   Recent Labs   Lab 07/30/25 0333   WBC 26.95*   HGB 7.4*   HCT 23.4*   MCV 86   *     BMP:  Recent Labs   Lab 07/30/25 0333   GLU 86      K 3.7      CO2 21*   BUN 11   CREATININE 0.7   CALCIUM 7.7*   MG 1.8     LFT:  Lab Results   Component Value Date     (H) 07/30/2025    ALKPHOS 278 (H) 07/30/2025    BILITOT 17.9 (H) 07/30/2025     Albumin:   Albumin   Date Value Ref Range Status   07/30/2025 1.6 (L) 3.5 - 5.2 g/dL Final   01/17/2025 3.5 3.5 - 5.2 g/dL Final     Protein:   Protein Total   Date Value Ref Range Status   07/30/2025 5.3 (L) 6.0 - 8.4 gm/dL Final     Total Protein   Date Value Ref Range Status   01/17/2025 8.2 6.0 - 8.4 g/dL Final     Lactic acid:   Lab Results   Component Value Date    LACTATE 2.3 (H) 07/27/2025    LACTATE 2.6 (H) 07/26/2025       Significant Imaging: I have reviewed all pertinent imaging results/findings within the past 24 hours.

## 2025-07-30 NOTE — ASSESSMENT & PLAN NOTE
Patient's blood pressure range in the last 24 hours was: BP  Min: 94/59  Max: 113/69.The patient's inpatient anti-hypertensive regimen is listed below:  Current Antihypertensives       Plan  - BP is controlled, no changes needed to their regimen  - holding BP meds in setting of active bleed.

## 2025-07-30 NOTE — ASSESSMENT & PLAN NOTE
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 INR >10 prior to transfer but 2.2 on repeat here prior to any interventions. CTA prior to transfer w/o active bleed. Required 1u pRBCs, given FFP and cryo for elevated INR in setting of active bleed.  - GI consulted         - s/p hemostatic spray on EGD on 07/27  - correcting coagulopathy with goal INR <2.0, plt >50  - pRBC to maintain Hg >7  - holding AC  - q8 CBC  - f/u repeat lactate  -repeat lactic downtrending      Anemia is likely due to acute blood loss which was from duodenal mass. Most recent hemoglobin and hematocrit are listed below.  Recent Labs     07/28/25  0427 07/29/25  0749 07/29/25  1440   HGB 8.2* 8.0* 7.5*   HCT 25.2* 26.0* 23.4*     Plan  - Monitor serial CBC: Every 12 hours  - Transfuse PRBC if patient becomes hemodynamically unstable, symptomatic or H/H drops below 7/21.  - Patient   - Patient's anemia is currently stable

## 2025-07-31 LAB
BACTERIA BLD CULT: NORMAL
BACTERIA BLD CULT: NORMAL

## 2025-07-31 NOTE — PLAN OF CARE
[facility] - Telemetry Stepdown  Discharge Final Note    Primary Care Provider: Jimbo Menard II, MD    Expected Discharge Date: 7/30/2025    Patient discharged 7/30/2025 home with [facility] set up, via stretcher transport./    Future Appointments   Date Time Provider Department Center   8/7/2025 11:20 AM Dori Prince MD [facility] [place]   11/4/2025  2:00 PM Nicolette Murillo, NP [facility]   12/22/2025  8:30 AM [facility]         Final Discharge Note (most recent)       Final Note - 07/31/25 0849          Final Note    Assessment Type Final Discharge Note     Anticipated Discharge Disposition Hospice/Home        Post-Acute Status    Post-Acute Authorization Hospice     Hospice Status Set-up Complete/Auth obtained     Discharge Delays None known at this time                     Important Message from Medicare             Sindi Graham RN     [phone]

## 2025-08-07 ENCOUNTER — TELEPHONE (OUTPATIENT)
Dept: HEMATOLOGY/ONCOLOGY | Facility: CLINIC | Age: 55
End: 2025-08-07
Payer: MEDICAID

## 2025-08-07 NOTE — TELEPHONE ENCOUNTER
Pt has been put on hospice. Was informed that the pt was being switched to seeing Oncology in [place] to be closer to home, did not see appt, called to try to verify, no answer, left v/mail.  
Awake/Alert/Cooperative

## (undated) DEVICE — GOWN SMARTSLEEVE XXL/XLONG

## (undated) DEVICE — Device

## (undated) DEVICE — SUT SILK SH 2-0 30IN MP BLK

## (undated) DEVICE — PENCIL SMK EVAC CONNECTOR 10FT

## (undated) DEVICE — SUT SILK 3-0 SH DETACH 30IN

## (undated) DEVICE — SUT PROLENE 5-0 36IN C-1

## (undated) DEVICE — TUBE FEEDING PURPLE 8FRX40CM

## (undated) DEVICE — STAPLER INT PROX TX 60X3.5MM

## (undated) DEVICE — SUT 2/0 30IN SILK BLK BRAI

## (undated) DEVICE — ELECTRODE REM PLYHSV RETURN 9

## (undated) DEVICE — RELOAD PROXIMATE GREEN 75MM

## (undated) DEVICE — SUT PDS II 5-0 RB-1RB-1 VI

## (undated) DEVICE — CUTTER PROXIMATE GREEN 75MM

## (undated) DEVICE — NDL HYPDRM 23X15

## (undated) DEVICE — DRESSING AQUACEL SACRAL 9 X 9

## (undated) DEVICE — SUT SILK 3-0 BLK BR SH 30IN

## (undated) DEVICE — GLOVE BIOGEL SKINSENSE PI 8.0

## (undated) DEVICE — APPLIER MEDIUM LARGE CLIP

## (undated) DEVICE — MANIFOLD 4 PORT

## (undated) DEVICE — DRAPE CORETEMP FLD WRM 56X62IN

## (undated) DEVICE — SUT CTD VICRYL 3-0 CR/SH

## (undated) DEVICE — DRAIN PENROSE XRAY 12 X 1/2 ST

## (undated) DEVICE — RELOAD ECHELON FLEX GRN 60MM

## (undated) DEVICE — COVER TABLE HVY DTY 60X90IN

## (undated) DEVICE — DRESSING ABSRBNT ISLAND 3.6X8

## (undated) DEVICE — SUT 1 48IN PDS II VIO MONO

## (undated) DEVICE — TRAY CATH 1-LYR URIMTR 16FR

## (undated) DEVICE — DRAIN PENROSE XRAY 12 X 1/4 ST

## (undated) DEVICE — RELOAD ECHELON FLEX BLU 60MM

## (undated) DEVICE — SUT PROLENE 3-0 36 MHMH

## (undated) DEVICE — SUT 4/0 27IN PDS II VIO MON

## (undated) DEVICE — SUT PROLENE 3-0 30SH